# Patient Record
Sex: MALE | Race: WHITE | Employment: OTHER | URBAN - METROPOLITAN AREA
[De-identification: names, ages, dates, MRNs, and addresses within clinical notes are randomized per-mention and may not be internally consistent; named-entity substitution may affect disease eponyms.]

---

## 2017-06-19 ENCOUNTER — HOSPITAL ENCOUNTER (OUTPATIENT)
Dept: ULTRASOUND IMAGING | Age: 77
Discharge: HOME | End: 2017-06-19
Payer: MEDICARE

## 2017-06-19 DIAGNOSIS — R60.0 EDEMA OF RIGHT LOWER EXTREMITY: ICD-10-CM

## 2017-06-19 PROCEDURE — 93971 EXTREMITY STUDY: CPT

## 2022-03-14 ENCOUNTER — APPOINTMENT (OUTPATIENT)
Dept: GENERAL RADIOLOGY | Age: 82
DRG: 470 | End: 2022-03-14
Payer: MEDICARE

## 2022-03-14 ENCOUNTER — APPOINTMENT (OUTPATIENT)
Dept: CT IMAGING | Age: 82
DRG: 470 | End: 2022-03-14
Payer: MEDICARE

## 2022-03-14 ENCOUNTER — HOSPITAL ENCOUNTER (INPATIENT)
Age: 82
LOS: 4 days | Discharge: NURSING, REHAB OR LONG-TERM CARE | DRG: 470 | End: 2022-03-18
Attending: INTERNAL MEDICINE | Admitting: INTERNAL MEDICINE
Payer: MEDICARE

## 2022-03-14 DIAGNOSIS — G89.18 ACUTE POST-OPERATIVE PAIN: ICD-10-CM

## 2022-03-14 DIAGNOSIS — S72.425A CLOSED NONDISPLACED FRACTURE OF LATERAL CONDYLE OF LEFT FEMUR, INITIAL ENCOUNTER (HCC): Primary | ICD-10-CM

## 2022-03-14 PROBLEM — S72.8X2A OTHER FRACTURE OF LEFT FEMUR, INITIAL ENCOUNTER FOR CLOSED FRACTURE (HCC): Status: ACTIVE | Noted: 2022-03-14

## 2022-03-14 LAB
ABO/RH: NORMAL
ALBUMIN SERPL-MCNC: 3.7 G/DL (ref 3.5–4.6)
ALP BLD-CCNC: 61 U/L (ref 35–104)
ALT SERPL-CCNC: 18 U/L (ref 0–41)
ANION GAP SERPL CALCULATED.3IONS-SCNC: 14 MEQ/L (ref 9–15)
ANTIBODY SCREEN: NORMAL
APTT: 28.5 SEC (ref 24.4–36.8)
AST SERPL-CCNC: 35 U/L (ref 0–40)
BASOPHILS ABSOLUTE: 0 K/UL (ref 0–0.2)
BASOPHILS RELATIVE PERCENT: 0.5 %
BILIRUB SERPL-MCNC: 1.1 MG/DL (ref 0.2–0.7)
BUN BLDV-MCNC: 12 MG/DL (ref 8–23)
CALCIUM SERPL-MCNC: 9 MG/DL (ref 8.5–9.9)
CHLORIDE BLD-SCNC: 99 MEQ/L (ref 95–107)
CO2: 21 MEQ/L (ref 20–31)
CREAT SERPL-MCNC: 0.55 MG/DL (ref 0.7–1.2)
EOSINOPHILS ABSOLUTE: 0.2 K/UL (ref 0–0.7)
EOSINOPHILS RELATIVE PERCENT: 2.7 %
GFR AFRICAN AMERICAN: >60
GFR NON-AFRICAN AMERICAN: >60
GLOBULIN: 3.8 G/DL (ref 2.3–3.5)
GLUCOSE BLD-MCNC: 115 MG/DL (ref 70–99)
HCT VFR BLD CALC: 43.5 % (ref 42–52)
HEMOGLOBIN: 15 G/DL (ref 14–18)
INR BLD: 1.1
LYMPHOCYTES ABSOLUTE: 1.2 K/UL (ref 1–4.8)
LYMPHOCYTES RELATIVE PERCENT: 15.7 %
MCH RBC QN AUTO: 32.7 PG (ref 27–31.3)
MCHC RBC AUTO-ENTMCNC: 34.4 % (ref 33–37)
MCV RBC AUTO: 95.2 FL (ref 80–100)
MONOCYTES ABSOLUTE: 0.7 K/UL (ref 0.2–0.8)
MONOCYTES RELATIVE PERCENT: 9.7 %
NEUTROPHILS ABSOLUTE: 5.4 K/UL (ref 1.4–6.5)
NEUTROPHILS RELATIVE PERCENT: 71.4 %
PDW BLD-RTO: 13.6 % (ref 11.5–14.5)
PLATELET # BLD: 159 K/UL (ref 130–400)
POTASSIUM SERPL-SCNC: 4.1 MEQ/L (ref 3.4–4.9)
PROTHROMBIN TIME: 14.1 SEC (ref 12.3–14.9)
RBC # BLD: 4.57 M/UL (ref 4.7–6.1)
REASON FOR REJECTION: NORMAL
REJECTED TEST: NORMAL
SODIUM BLD-SCNC: 134 MEQ/L (ref 135–144)
TOTAL PROTEIN: 7.5 G/DL (ref 6.3–8)
WBC # BLD: 7.6 K/UL (ref 4.8–10.8)

## 2022-03-14 PROCEDURE — 86850 RBC ANTIBODY SCREEN: CPT

## 2022-03-14 PROCEDURE — 85610 PROTHROMBIN TIME: CPT

## 2022-03-14 PROCEDURE — 1210000000 HC MED SURG R&B

## 2022-03-14 PROCEDURE — 73564 X-RAY EXAM KNEE 4 OR MORE: CPT

## 2022-03-14 PROCEDURE — 86900 BLOOD TYPING SEROLOGIC ABO: CPT

## 2022-03-14 PROCEDURE — 36415 COLL VENOUS BLD VENIPUNCTURE: CPT

## 2022-03-14 PROCEDURE — 73700 CT LOWER EXTREMITY W/O DYE: CPT

## 2022-03-14 PROCEDURE — 85730 THROMBOPLASTIN TIME PARTIAL: CPT

## 2022-03-14 PROCEDURE — 2580000003 HC RX 258: Performed by: INTERNAL MEDICINE

## 2022-03-14 PROCEDURE — 86901 BLOOD TYPING SEROLOGIC RH(D): CPT

## 2022-03-14 PROCEDURE — 6360000002 HC RX W HCPCS: Performed by: INTERNAL MEDICINE

## 2022-03-14 PROCEDURE — 6360000002 HC RX W HCPCS

## 2022-03-14 PROCEDURE — 85025 COMPLETE CBC W/AUTO DIFF WBC: CPT

## 2022-03-14 PROCEDURE — 6830039000 HC L3 TRAUMA ALERT

## 2022-03-14 PROCEDURE — 99285 EMERGENCY DEPT VISIT HI MDM: CPT

## 2022-03-14 PROCEDURE — 71045 X-RAY EXAM CHEST 1 VIEW: CPT

## 2022-03-14 PROCEDURE — 80053 COMPREHEN METABOLIC PANEL: CPT

## 2022-03-14 RX ORDER — SODIUM CHLORIDE 0.9 % (FLUSH) 0.9 %
10 SYRINGE (ML) INJECTION EVERY 12 HOURS SCHEDULED
Status: DISCONTINUED | OUTPATIENT
Start: 2022-03-14 | End: 2022-03-16 | Stop reason: SDUPTHER

## 2022-03-14 RX ORDER — MORPHINE SULFATE 2 MG/ML
2 INJECTION, SOLUTION INTRAMUSCULAR; INTRAVENOUS EVERY 4 HOURS PRN
Status: DISCONTINUED | OUTPATIENT
Start: 2022-03-14 | End: 2022-03-15

## 2022-03-14 RX ORDER — ONDANSETRON 2 MG/ML
4 INJECTION INTRAMUSCULAR; INTRAVENOUS ONCE
Status: COMPLETED | OUTPATIENT
Start: 2022-03-14 | End: 2022-03-14

## 2022-03-14 RX ORDER — FENTANYL CITRATE 50 UG/ML
25 INJECTION, SOLUTION INTRAMUSCULAR; INTRAVENOUS ONCE
Status: COMPLETED | OUTPATIENT
Start: 2022-03-14 | End: 2022-03-14

## 2022-03-14 RX ORDER — SODIUM CHLORIDE 0.9 % (FLUSH) 0.9 %
10 SYRINGE (ML) INJECTION PRN
Status: DISCONTINUED | OUTPATIENT
Start: 2022-03-14 | End: 2022-03-16 | Stop reason: SDUPTHER

## 2022-03-14 RX ORDER — ONDANSETRON 2 MG/ML
4 INJECTION INTRAMUSCULAR; INTRAVENOUS EVERY 6 HOURS PRN
Status: DISCONTINUED | OUTPATIENT
Start: 2022-03-14 | End: 2022-03-18 | Stop reason: HOSPADM

## 2022-03-14 RX ORDER — ACETAMINOPHEN 650 MG/1
650 SUPPOSITORY RECTAL EVERY 6 HOURS PRN
Status: DISCONTINUED | OUTPATIENT
Start: 2022-03-14 | End: 2022-03-15

## 2022-03-14 RX ORDER — POTASSIUM CHLORIDE 7.45 MG/ML
10 INJECTION INTRAVENOUS PRN
Status: DISCONTINUED | OUTPATIENT
Start: 2022-03-14 | End: 2022-03-18 | Stop reason: HOSPADM

## 2022-03-14 RX ORDER — MAGNESIUM SULFATE IN WATER 40 MG/ML
2000 INJECTION, SOLUTION INTRAVENOUS PRN
Status: DISCONTINUED | OUTPATIENT
Start: 2022-03-14 | End: 2022-03-18 | Stop reason: HOSPADM

## 2022-03-14 RX ORDER — POLYETHYLENE GLYCOL 3350 17 G/17G
17 POWDER, FOR SOLUTION ORAL DAILY PRN
Status: DISCONTINUED | OUTPATIENT
Start: 2022-03-14 | End: 2022-03-18 | Stop reason: HOSPADM

## 2022-03-14 RX ORDER — ACETAMINOPHEN 325 MG/1
650 TABLET ORAL EVERY 6 HOURS PRN
Status: DISCONTINUED | OUTPATIENT
Start: 2022-03-14 | End: 2022-03-15

## 2022-03-14 RX ORDER — POTASSIUM CHLORIDE 20 MEQ/1
40 TABLET, EXTENDED RELEASE ORAL PRN
Status: DISCONTINUED | OUTPATIENT
Start: 2022-03-14 | End: 2022-03-18 | Stop reason: HOSPADM

## 2022-03-14 RX ORDER — SODIUM CHLORIDE 9 MG/ML
25 INJECTION, SOLUTION INTRAVENOUS PRN
Status: DISCONTINUED | OUTPATIENT
Start: 2022-03-14 | End: 2022-03-15

## 2022-03-14 RX ORDER — METOPROLOL SUCCINATE 50 MG/1
50 TABLET, EXTENDED RELEASE ORAL DAILY
Status: DISCONTINUED | OUTPATIENT
Start: 2022-03-15 | End: 2022-03-16 | Stop reason: SDUPTHER

## 2022-03-14 RX ORDER — ONDANSETRON 4 MG/1
4 TABLET, ORALLY DISINTEGRATING ORAL EVERY 8 HOURS PRN
Status: DISCONTINUED | OUTPATIENT
Start: 2022-03-14 | End: 2022-03-18 | Stop reason: HOSPADM

## 2022-03-14 RX ADMIN — Medication 10 ML: at 20:30

## 2022-03-14 RX ADMIN — ONDANSETRON 4 MG: 2 INJECTION INTRAMUSCULAR; INTRAVENOUS at 18:08

## 2022-03-14 RX ADMIN — FENTANYL CITRATE 25 MCG: 50 INJECTION INTRAMUSCULAR; INTRAVENOUS at 18:07

## 2022-03-14 RX ADMIN — ENOXAPARIN SODIUM 30 MG: 100 INJECTION SUBCUTANEOUS at 20:29

## 2022-03-14 RX ADMIN — MORPHINE SULFATE 2 MG: 2 INJECTION, SOLUTION INTRAMUSCULAR; INTRAVENOUS at 20:29

## 2022-03-14 ASSESSMENT — PAIN DESCRIPTION - LOCATION
LOCATION: KNEE

## 2022-03-14 ASSESSMENT — PAIN SCALES - GENERAL
PAINLEVEL_OUTOF10: 7
PAINLEVEL_OUTOF10: 8
PAINLEVEL_OUTOF10: 8
PAINLEVEL_OUTOF10: 10

## 2022-03-14 ASSESSMENT — ENCOUNTER SYMPTOMS
SHORTNESS OF BREATH: 0
EYE DISCHARGE: 0
CONSTIPATION: 0
ABDOMINAL PAIN: 0
RHINORRHEA: 0
COLOR CHANGE: 0
SORE THROAT: 0
ABDOMINAL DISTENTION: 0

## 2022-03-14 ASSESSMENT — PAIN DESCRIPTION - DESCRIPTORS: DESCRIPTORS: SHARP

## 2022-03-14 NOTE — H&P
Hospital Medicine  History and Physical    Patient:  Patricia Troncoso  MRN: [id number]    CHIEF COMPLAINT:    Chief Complaint   Patient presents with    Knee Pain     LEFT KNEE PAIN  STATES HE WENT TO GET IN TRUCK AND HIS LEFT KNEE FOLDED UNDER NEATH HI,M       History Obtained From:  Patient, EMR  Primary Care Physician: Genie Francis MD    HISTORY OF PRESENT ILLNESS:   The patient is a [de-identified] y.o. male with PMH of HTN who presents with left knee pain. The patient went to get into his truck when his knee gave out under him. He has leg/knee pain that shoots up him but otherwise did not lose consciousness. Denies fevers, chills, sweats, CP, SOB, diarrhea or burning micturition. He has not been very active over the last 1-2 years due to knee pain; does walk but short distances due to pain. Denies ever having shortness of breath with exertion or chest pain with exertion. Past Medical History:  No past medical history on file. Past Surgical History:  No past surgical history on file. Medications Prior to Admission:    Prior to Admission medications    Not on File       Allergies:  Aspirin    Social History:   TOBACCO:   has no history on file for tobacco use. ETOH:   has no history on file for alcohol use. Family History:   No family history on file. REVIEW OF SYSTEMS:  Ten systems reviewed and negative except for stated in HPI    Physical Exam:    Vitals: BP (!) 141/70   Pulse 81   Temp 97.9 °F (36.6 °C)   Resp 16   Ht 5' 10\" (1.778 m)   Wt 269 lb (122 kg)   SpO2 99%   BMI 38.60 kg/m²   General appearance: alert, appears stated age and cooperative  Skin:  No rashes or lesions  HEENT: Head: Normal, normocephalic, atraumatic. Jaki Maid    Neck: supple, symmetrical, trachea midline  Lungs: clear to auscultation bilaterally  Heart: regular rate and rhythm  Abdomen: soft, non-tender; bowel sounds normal; no masses,  no organomegaly  Extremities: +2 edema  Neurologic: Non focal    Recent Labs     03/14/22  1515 WBC 7.6   HGB 15.0        Recent Labs     03/14/22  1515   *   K 4.1   CL 99   CO2 21   BUN 12   CREATININE 0.55*   GLUCOSE 115*   AST 35   ALT 18   BILITOT 1.1*   ALKPHOS 61     Troponin T: No results for input(s): TROPONINI in the last 72 hours. ABGs: No results found for: PHART, PO2ART, [id number]  INR:   Recent Labs     03/14/22  1605   INR 1.1     URINALYSIS:No results for input(s): NITRITE, COLORU, PHUR, LABCAST, WBCUA, RBCUA, MUCUS, TRICHOMONAS, YEAST, BACTERIA, CLARITYU, SPECGRAV, LEUKOCYTESUR, UROBILINOGEN, BILIRUBINUR, BLOODU, GLUCOSEU, AMORPHOUS in the last 72 hours. Invalid input(s): Jon Fairbanks  -----------------------------------------------------------------   No results found. Assessment and Plan   1. Femur fracture:  Ortho consulted in ER; continue current consult; plan for OR tomorrow. Will consult cardiology given high than average cardiac risk and unable to get a history of SANCHEZ; echo also ordered  2. HTN:  Continue toprol; hold triamterene   3. Functional Status: Fall precautions. Up with assistance. PT OT  4. Diet: General  5. DVT ppx: Lovenox SCDs  6. Disposition: Dependent on hospital course. Will discharge once medically stable. GADIEL on board for discharge planning.      Patient Active Problem List   Diagnosis Code    Other fracture of left femur, initial encounter for closed fracture ([facility] [address] U01.4M0Z     Clau Zamarripa MD, MD  Admitting Hospitalist    Emergency Contact:   Contact 1: Name: Susan Rai 1: Number: [phone]  Contact 1: Relationship: Sister

## 2022-03-14 NOTE — ED PROVIDER NOTES
[facility] ED  eMERGENCY dEPARTMENT eNCOUnter      Pt Name: Paul Hicks  MRN: [id number]  [place] 1940  Date of evaluation: 3/14/2022  Provider: Mirella Mccurdy PA-C    CHIEF COMPLAINT       Chief Complaint   Patient presents with    Knee Pain     LEFT KNEE PAIN  STATES HE WENT TO GET IN TRUCK AND HIS LEFT KNEE FOLDED Tommygiselle SIMON,M         HISTORY OF PRESENT ILLNESS   (Location/Symptom, Timing/Onset,Context/Setting, Quality, Duration, Modifying Factors, Severity)  Note limiting factors. Paul Hicks is a [de-identified] y.o. male who presents to the emergency department complaint of left knee pain. Patient states he was standing try to get into his truck, he states that his knee gave out on his left side, and folded underneath him, he states he fell directly onto his knee. He states since increasing pain, and weakness while trying to stand only at the knee itself. Patient denies any other injuries, no head neck or back pain. Patient rates his current pain at this time as a 7 out of 10 with movement, 0-10 while at rest.  Patient states he has had some ongoing issues with his left knee, and had been considering a knee replacement. HPI    NursingNotes were reviewed. REVIEW OF SYSTEMS    (2-9 systems for level 4, 10 or more for level 5)     Review of Systems   Constitutional: Negative for activity change and appetite change. HENT: Negative for congestion, ear discharge, ear pain, nosebleeds, rhinorrhea and sore throat. Eyes: Negative for discharge. Respiratory: Negative for shortness of breath. Cardiovascular: Negative for chest pain, palpitations and leg swelling. Gastrointestinal: Negative for abdominal distention, abdominal pain and constipation. Genitourinary: Negative for difficulty urinating and dysuria. Musculoskeletal: Negative for arthralgias. Left knee pain   Skin: Negative for color change.    Neurological: Negative for dizziness, syncope, numbness and headaches. Psychiatric/Behavioral: Negative for agitation and confusion. Except as noted above the remainder of the review of systems was reviewed and negative. PAST MEDICAL HISTORY   No past medical history on file. SURGICALHISTORY     No past surgical history on file. CURRENT MEDICATIONS       Previous Medications    No medications on file       ALLERGIES     Aspirin    FAMILY HISTORY     No family history on file. SOCIAL HISTORY       Social History     Socioeconomic History    Marital status: Single     Spouse name: Not on file    Number of children: Not on file    Years of education: Not on file    Highest education level: Not on file   Occupational History    Not on file   Tobacco Use    Smoking status: Not on file    Smokeless tobacco: Not on file   Substance and Sexual Activity    Alcohol use: Not on file    Drug use: Not on file    Sexual activity: Not on file   Other Topics Concern    Not on file   Social History Narrative    Not on file     Social Determinants of Health     Financial Resource Strain:     Difficulty of Paying Living Expenses: Not on file   Food Insecurity:     Worried About Running Out of Food in the Last Year: Not on file    Kimberly of Food in the Last Year: Not on file   Transportation Needs:     Lack of Transportation (Medical): Not on file    Lack of Transportation (Non-Medical):  Not on file   Physical Activity:     Days of Exercise per Week: Not on file    Minutes of Exercise per Session: Not on file   Stress:     Feeling of Stress : Not on file   Social Connections:     Frequency of Communication with Friends and Family: Not on file    Frequency of Social Gatherings with Friends and Family: Not on file    Attends [employer] Services: Not on file    Active Member of Clubs or Organizations: Not on file    Attends Club or Organization Meetings: Not on file    Marital Status: Not on file   Intimate Partner Violence:     Fear of none    LABS:  Labs Reviewed   CBC WITH AUTO DIFFERENTIAL - Abnormal; Notable for the following components:       Result Value    RBC 4.57 (*)     MCH 32.7 (*)     All other components within normal limits   COMPREHENSIVE METABOLIC PANEL - Abnormal; Notable for the following components:    Sodium 134 (*)     Glucose 115 (*)     CREATININE 0.55 (*)     Total Bilirubin 1.1 (*)     Globulin 3.8 (*)     All other components within normal limits   SPECIMEN REJECTION   PROTIME-INR    Narrative:     QNS/REDRAW   APTT    Narrative:     QNS/REDRAW   TYPE AND SCREEN       All other labs were within normal range or not returned as of this dictation. EMERGENCY DEPARTMENT COURSE and DIFFERENTIAL DIAGNOSIS/MDM:   Vitals:    Vitals:    03/14/22 1432 03/14/22 1439   BP: (!) 141/70 (!) 141/70   Pulse: 88 81   Resp: 16 16   Temp: 97.9 °F (36.6 °C) 97.9 °F (36.6 °C)   SpO2: 99%    Weight: 269 lb (122 kg)    Height: 5' 10\" (1.778 m)             MDM  Number of Diagnoses or Management Options  Closed nondisplaced fracture of lateral condyle of left femur, initial encounter [facility])  Diagnosis management comments: Patient presented ED with complaint of left knee pain secondary to a fall which occurred around 1 PM today. Patient states that his leg gave out, and he landed on his left knee. With assistance he was able to get back in his vehicle, and drove to his sister's home. Upon arrival there he was unable to stand or ambulate, so EMS was contacted and patient was transported to the ED. He does have a fracture to the lateral condyle of the distal left femur. Patient states that he has had known knee problems in the past, is requesting orthopedic consult with Dr. Lindsay Dudley. I did call [facility], and advised them of patient's request, they state that Dr. Lindsay Dudley is not on-call, and does not provide this type of surgical intervention.   I did discuss this with the patient, he was willing to have surgical intervention, and follow-up by Dr. Latisha Page who is currently on-call. Patient will be admitted through [facility] hospitalist, with surgical consult to [facility]. I did speak with Dr. Leah Elizabeth the [facility] hospitalist, he will except admission this patient. CRITICAL CARE TIME   Total Critical Care time was 0 minutes, excluding separately reportableprocedures. There was a high probability of clinicallysignificant/life threatening deterioration in the patient's condition which required my urgent intervention. CONSULTS:  [facility] [place] CONSULT TO ORTHOPEDIC SURGERY    PROCEDURES:  Unless otherwise noted below, none     Procedures    FINAL IMPRESSION      1. Closed nondisplaced fracture of lateral condyle of left femur, initial encounter [facility])          DISPOSITION/PLAN   DISPOSITION Decision To Admit 03/14/2022 03:17:08 PM      PATIENT REFERRED TO:  No follow-up provider specified.     DISCHARGE MEDICATIONS:  New Prescriptions    No medications on file          (Please note that portions of this note were completed with a voice recognition program.  Efforts were made to edit the dictations but occasionally words are mis-transcribed.)    Ramona High PA-C (electronically signed)  Attending Emergency Physician         Ramona High PA-C  03/14/22 110[address] [facility] Kaity Fortune PA-C  03/14/22 7972

## 2022-03-14 NOTE — PROGRESS NOTES
Pharmacy Dose Adjustment Per Protocol    Lamont Martinez is a [de-identified] y.o. male. Recent Labs     03/14/22  1515   BUN 12   CREATININE 0.55*   Estimated Creatinine Clearance: 140 mL/min (A) (based on SCr of 0.55 mg/dL (L)). Marquis Meier     Height: 5' 10\" (177.8 cm), Weight: 269 lb (122 kg)      Current order:  Enoxaparin 40 mg SUBQ once daily      Plan:  Pharmacologic VTE prophylaxis modified based on patient weight per [facility]/P&T approved protocol     Patient Weight (kg)      50.9 and below .9 101-150.9 151-174.9 175 or greater   Estimated   CrCl  (ml/min) 30 or greater []   30 mg   SUBQ daily   [x]   40 mg   SUBQ daily []  30 mg SUBQ   BID  []  40 mg   SUBQ   BID []  60mg SUBQ BID    15-29.9 []  UFH 5000   units SUBQ BID []  30 mg   SUBQ daily [] 30 mg SUBQ   daily []  40 mg SUBQ   daily [] 60 mg SUBQ   daily    Less than 15 or dialysis []  UFH 5000   units SUBQ BID [] UFH 5000 units SUBQ TID []  UFH 7500   units   SUBQ TID       Thank you,    Oc Boyer, PharmD, BCPS, [facility]  Staff Pharmacist  3/14/2022 7:35 PM

## 2022-03-14 NOTE — CARE COORDINATION
[facility] Case Management Initial Discharge Assessment    Met with Patient and PTS SISTER SHAWNEE  to discuss discharge plan. SISTER HELPED SELECTION OF SNF     PCP: Mirta Bhatia MD                                Date of Last Visit:       [facility] Patient: No        [facility] Notified: no    If no PCP, list provided? Declined    Discharge Planning    Living Arrangements: independently at home    Who do you live with? alone    Who helps you with your care:  Melissa Kunz    If lives at home:     Do you have any barriers navigating in your home? no    Patient can perform ADL? Yes    Current Services (outpatient and in home) :  None    Dialysis: No    Is transportation available to get to your appointments? Yes    DME Equipment:  yes - HAS WALKER AND CANE     Respiratory equipment: None    Respiratory provider:  no     Pharmacy:  yes - [place] with Medication Assistance Program?  No      Patient agreeable to [facility]? N/A    Patient agreeable to SNF/Rehab? Yes, [address] SNF LIST POSSIBLY #1 [facility]    Other discharge needs identified? Other TBD ANTICIPATING SX TOMORROW    Does Patient Have a High-Risk for Readmission Diagnosis (CHF, PN, MI, COPD)? No      Initial Discharge Plan? (Note: please see concurrent daily documentation for any updates after initial note).     INITIAL PLAN #1 [facility], HAS SNF LIST  Readmission Risk              Risk of Unplanned Readmission:  0         Electronically signed by Felipe Manuel RN on 3/14/2022 at 6:00 PM

## 2022-03-15 ENCOUNTER — ANESTHESIA (OUTPATIENT)
Dept: OPERATING ROOM | Age: 82
DRG: 470 | End: 2022-03-15
Payer: MEDICARE

## 2022-03-15 ENCOUNTER — APPOINTMENT (OUTPATIENT)
Dept: GENERAL RADIOLOGY | Age: 82
DRG: 470 | End: 2022-03-15
Payer: MEDICARE

## 2022-03-15 ENCOUNTER — ANESTHESIA EVENT (OUTPATIENT)
Dept: OPERATING ROOM | Age: 82
DRG: 470 | End: 2022-03-15
Payer: MEDICARE

## 2022-03-15 VITALS — DIASTOLIC BLOOD PRESSURE: 56 MMHG | OXYGEN SATURATION: 100 % | SYSTOLIC BLOOD PRESSURE: 115 MMHG | TEMPERATURE: 97 F

## 2022-03-15 LAB
ANION GAP SERPL CALCULATED.3IONS-SCNC: 15 MEQ/L (ref 9–15)
BASOPHILS ABSOLUTE: 0 K/UL (ref 0–0.2)
BASOPHILS RELATIVE PERCENT: 0.4 %
BUN BLDV-MCNC: 13 MG/DL (ref 8–23)
CALCIUM SERPL-MCNC: 8.7 MG/DL (ref 8.5–9.9)
CHLORIDE BLD-SCNC: 98 MEQ/L (ref 95–107)
CO2: 23 MEQ/L (ref 20–31)
CREAT SERPL-MCNC: 0.64 MG/DL (ref 0.7–1.2)
EOSINOPHILS ABSOLUTE: 0.2 K/UL (ref 0–0.7)
EOSINOPHILS RELATIVE PERCENT: 3.3 %
GFR AFRICAN AMERICAN: >60
GFR NON-AFRICAN AMERICAN: >60
GLUCOSE BLD-MCNC: 138 MG/DL (ref 70–99)
HCT VFR BLD CALC: 38 % (ref 42–52)
HEMOGLOBIN: 13.4 G/DL (ref 14–18)
LV EF: 65 %
LVEF MODALITY: NORMAL
LYMPHOCYTES ABSOLUTE: 1.2 K/UL (ref 1–4.8)
LYMPHOCYTES RELATIVE PERCENT: 16 %
MCH RBC QN AUTO: 33 PG (ref 27–31.3)
MCHC RBC AUTO-ENTMCNC: 35.2 % (ref 33–37)
MCV RBC AUTO: 93.6 FL (ref 80–100)
MONOCYTES ABSOLUTE: 0.8 K/UL (ref 0.2–0.8)
MONOCYTES RELATIVE PERCENT: 10.6 %
NEUTROPHILS ABSOLUTE: 5.2 K/UL (ref 1.4–6.5)
NEUTROPHILS RELATIVE PERCENT: 69.7 %
PDW BLD-RTO: 13.3 % (ref 11.5–14.5)
PLATELET # BLD: 168 K/UL (ref 130–400)
POTASSIUM REFLEX MAGNESIUM: 4 MEQ/L (ref 3.4–4.9)
RBC # BLD: 4.06 M/UL (ref 4.7–6.1)
SODIUM BLD-SCNC: 136 MEQ/L (ref 135–144)
WBC # BLD: 7.5 K/UL (ref 4.8–10.8)

## 2022-03-15 PROCEDURE — 2580000003 HC RX 258: Performed by: ORTHOPAEDIC SURGERY

## 2022-03-15 PROCEDURE — 6360000002 HC RX W HCPCS: Performed by: ANESTHESIOLOGY

## 2022-03-15 PROCEDURE — 2580000003 HC RX 258: Performed by: ANESTHESIOLOGY

## 2022-03-15 PROCEDURE — 73560 X-RAY EXAM OF KNEE 1 OR 2: CPT

## 2022-03-15 PROCEDURE — 0SRD0J9 REPLACEMENT OF LEFT KNEE JOINT WITH SYNTHETIC SUBSTITUTE, CEMENTED, OPEN APPROACH: ICD-10-PCS | Performed by: ORTHOPAEDIC SURGERY

## 2022-03-15 PROCEDURE — 93005 ELECTROCARDIOGRAM TRACING: CPT | Performed by: ANESTHESIOLOGY

## 2022-03-15 PROCEDURE — 6360000002 HC RX W HCPCS: Performed by: ORTHOPAEDIC SURGERY

## 2022-03-15 PROCEDURE — 6360000002 HC RX W HCPCS: Performed by: NURSE ANESTHETIST, CERTIFIED REGISTERED

## 2022-03-15 PROCEDURE — 2720000010 HC SURG SUPPLY STERILE: Performed by: ORTHOPAEDIC SURGERY

## 2022-03-15 PROCEDURE — 64447 NJX AA&/STRD FEMORAL NRV IMG: CPT | Performed by: ANESTHESIOLOGY

## 2022-03-15 PROCEDURE — 6370000000 HC RX 637 (ALT 250 FOR IP): Performed by: INTERNAL MEDICINE

## 2022-03-15 PROCEDURE — 6370000000 HC RX 637 (ALT 250 FOR IP): Performed by: ORTHOPAEDIC SURGERY

## 2022-03-15 PROCEDURE — 3600000004 HC SURGERY LEVEL 4 BASE: Performed by: ORTHOPAEDIC SURGERY

## 2022-03-15 PROCEDURE — 3700000000 HC ANESTHESIA ATTENDED CARE: Performed by: ORTHOPAEDIC SURGERY

## 2022-03-15 PROCEDURE — 6360000002 HC RX W HCPCS: Performed by: INTERNAL MEDICINE

## 2022-03-15 PROCEDURE — 2580000003 HC RX 258: Performed by: INTERNAL MEDICINE

## 2022-03-15 PROCEDURE — 2500000003 HC RX 250 WO HCPCS: Performed by: ANESTHESIOLOGY

## 2022-03-15 PROCEDURE — 93306 TTE W/DOPPLER COMPLETE: CPT

## 2022-03-15 PROCEDURE — C1713 ANCHOR/SCREW BN/BN,TIS/BN: HCPCS | Performed by: ORTHOPAEDIC SURGERY

## 2022-03-15 PROCEDURE — 99221 1ST HOSP IP/OBS SF/LOW 40: CPT | Performed by: ORTHOPAEDIC SURGERY

## 2022-03-15 PROCEDURE — 85025 COMPLETE CBC W/AUTO DIFF WBC: CPT

## 2022-03-15 PROCEDURE — 80048 BASIC METABOLIC PNL TOTAL CA: CPT

## 2022-03-15 PROCEDURE — 36415 COLL VENOUS BLD VENIPUNCTURE: CPT

## 2022-03-15 PROCEDURE — 2709999900 HC NON-CHARGEABLE SUPPLY: Performed by: ORTHOPAEDIC SURGERY

## 2022-03-15 PROCEDURE — 3700000001 HC ADD 15 MINUTES (ANESTHESIA): Performed by: ORTHOPAEDIC SURGERY

## 2022-03-15 PROCEDURE — 27447 TOTAL KNEE ARTHROPLASTY: CPT | Performed by: ORTHOPAEDIC SURGERY

## 2022-03-15 PROCEDURE — 1210000000 HC MED SURG R&B

## 2022-03-15 PROCEDURE — 7100000001 HC PACU RECOVERY - ADDTL 15 MIN: Performed by: ORTHOPAEDIC SURGERY

## 2022-03-15 PROCEDURE — 2500000003 HC RX 250 WO HCPCS: Performed by: ORTHOPAEDIC SURGERY

## 2022-03-15 PROCEDURE — 7100000000 HC PACU RECOVERY - FIRST 15 MIN: Performed by: ORTHOPAEDIC SURGERY

## 2022-03-15 PROCEDURE — 99222 1ST HOSP IP/OBS MODERATE 55: CPT | Performed by: INTERNAL MEDICINE

## 2022-03-15 PROCEDURE — C1776 JOINT DEVICE (IMPLANTABLE): HCPCS | Performed by: ORTHOPAEDIC SURGERY

## 2022-03-15 PROCEDURE — 3600000014 HC SURGERY LEVEL 4 ADDTL 15MIN: Performed by: ORTHOPAEDIC SURGERY

## 2022-03-15 DEVICE — IMPLANTABLE DEVICE: Type: IMPLANTABLE DEVICE | Site: KNEE | Status: FUNCTIONAL

## 2022-03-15 DEVICE — Z DISCONTINUED USE 2522798 CEMENT BNE ANTIBIO HI VISC W/ GENTAMICIN SIMPLEX HV: Type: IMPLANTABLE DEVICE | Site: KNEE | Status: FUNCTIONAL

## 2022-03-15 DEVICE — SLEEVE TIB STD UNIV POLYETH MOD CEM STEM ROT HNG REV: Type: IMPLANTABLE DEVICE | Site: KNEE | Status: FUNCTIONAL

## 2022-03-15 DEVICE — CEMENT BNE 20ML 40GM ACRYL RESIN HI VISC RADPQ FAST SET: Type: IMPLANTABLE DEVICE | Site: KNEE | Status: FUNCTIONAL

## 2022-03-15 DEVICE — BUSHING FEM UNIV KNEE PLOY MOD CEM STEM ROT HNG REV: Type: IMPLANTABLE DEVICE | Site: KNEE | Status: FUNCTIONAL

## 2022-03-15 DEVICE — IMPLANT PATELLAR DIA32MM THK10MM X3 ASYMMETRIC TRIATHLON: Type: IMPLANTABLE DEVICE | Site: KNEE | Status: FUNCTIONAL

## 2022-03-15 DEVICE — INSERT TIB 3DEG DST KNEE POLY BUMPER MOD ROT HNG GMRS: Type: IMPLANTABLE DEVICE | Site: KNEE | Status: FUNCTIONAL

## 2022-03-15 DEVICE — AXLE TIB PROX KNEE MOD ROT HNG MONOGRAM: Type: IMPLANTABLE DEVICE | Site: KNEE | Status: FUNCTIONAL

## 2022-03-15 DEVICE — COMPONENT TIB XSM-XL KNEE POLYETH MOD CEM STEM ROT HNG REV: Type: IMPLANTABLE DEVICE | Site: KNEE | Status: FUNCTIONAL

## 2022-03-15 RX ORDER — METOPROLOL SUCCINATE 50 MG/1
50 TABLET, EXTENDED RELEASE ORAL DAILY
Status: DISCONTINUED | OUTPATIENT
Start: 2022-03-16 | End: 2022-03-18 | Stop reason: HOSPADM

## 2022-03-15 RX ORDER — SODIUM CHLORIDE 9 MG/ML
25 INJECTION, SOLUTION INTRAVENOUS PRN
Status: DISCONTINUED | OUTPATIENT
Start: 2022-03-15 | End: 2022-03-15 | Stop reason: HOSPADM

## 2022-03-15 RX ORDER — SODIUM CHLORIDE 0.9 % (FLUSH) 0.9 %
10 SYRINGE (ML) INJECTION PRN
Status: DISCONTINUED | OUTPATIENT
Start: 2022-03-15 | End: 2022-03-18 | Stop reason: HOSPADM

## 2022-03-15 RX ORDER — HYDRALAZINE HYDROCHLORIDE 20 MG/ML
10 INJECTION INTRAMUSCULAR; INTRAVENOUS
Status: DISCONTINUED | OUTPATIENT
Start: 2022-03-15 | End: 2022-03-15 | Stop reason: HOSPADM

## 2022-03-15 RX ORDER — SODIUM CHLORIDE 0.9 % (FLUSH) 0.9 %
5-40 SYRINGE (ML) INJECTION PRN
Status: DISCONTINUED | OUTPATIENT
Start: 2022-03-15 | End: 2022-03-15 | Stop reason: HOSPADM

## 2022-03-15 RX ORDER — SODIUM CHLORIDE, SODIUM LACTATE, POTASSIUM CHLORIDE, CALCIUM CHLORIDE 600; 310; 30; 20 MG/100ML; MG/100ML; MG/100ML; MG/100ML
INJECTION, SOLUTION INTRAVENOUS CONTINUOUS
Status: DISPENSED | OUTPATIENT
Start: 2022-03-15 | End: 2022-03-16

## 2022-03-15 RX ORDER — OXYCODONE HYDROCHLORIDE 5 MG/1
5 TABLET ORAL
Status: DISCONTINUED | OUTPATIENT
Start: 2022-03-15 | End: 2022-03-15 | Stop reason: HOSPADM

## 2022-03-15 RX ORDER — ROCURONIUM BROMIDE 10 MG/ML
INJECTION, SOLUTION INTRAVENOUS PRN
Status: DISCONTINUED | OUTPATIENT
Start: 2022-03-15 | End: 2022-03-15 | Stop reason: SDUPTHER

## 2022-03-15 RX ORDER — FUROSEMIDE 20 MG/1
20 TABLET ORAL DAILY
COMMUNITY

## 2022-03-15 RX ORDER — OXYCODONE HYDROCHLORIDE 5 MG/1
5 TABLET ORAL EVERY 4 HOURS PRN
Status: DISCONTINUED | OUTPATIENT
Start: 2022-03-15 | End: 2022-03-17

## 2022-03-15 RX ORDER — FUROSEMIDE 20 MG/1
20 TABLET ORAL DAILY
Status: DISCONTINUED | OUTPATIENT
Start: 2022-03-16 | End: 2022-03-18 | Stop reason: HOSPADM

## 2022-03-15 RX ORDER — SODIUM CHLORIDE 9 MG/ML
25 INJECTION, SOLUTION INTRAVENOUS PRN
Status: DISCONTINUED | OUTPATIENT
Start: 2022-03-15 | End: 2022-03-18 | Stop reason: HOSPADM

## 2022-03-15 RX ORDER — FENTANYL CITRATE 50 UG/ML
INJECTION, SOLUTION INTRAMUSCULAR; INTRAVENOUS PRN
Status: DISCONTINUED | OUTPATIENT
Start: 2022-03-15 | End: 2022-03-15 | Stop reason: SDUPTHER

## 2022-03-15 RX ORDER — TRIAMTERENE AND HYDROCHLOROTHIAZIDE 37.5; 25 MG/1; MG/1
0.5 TABLET ORAL DAILY
COMMUNITY

## 2022-03-15 RX ORDER — SODIUM CHLORIDE 0.9 % (FLUSH) 0.9 %
10 SYRINGE (ML) INJECTION EVERY 12 HOURS SCHEDULED
Status: DISCONTINUED | OUTPATIENT
Start: 2022-03-15 | End: 2022-03-18 | Stop reason: HOSPADM

## 2022-03-15 RX ORDER — ONDANSETRON 2 MG/ML
4 INJECTION INTRAMUSCULAR; INTRAVENOUS
Status: DISCONTINUED | OUTPATIENT
Start: 2022-03-15 | End: 2022-03-15 | Stop reason: HOSPADM

## 2022-03-15 RX ORDER — ACETAMINOPHEN 325 MG/1
650 TABLET ORAL EVERY 6 HOURS
Status: DISCONTINUED | OUTPATIENT
Start: 2022-03-15 | End: 2022-03-18 | Stop reason: HOSPADM

## 2022-03-15 RX ORDER — TRIAMTERENE AND HYDROCHLOROTHIAZIDE 37.5; 25 MG/1; MG/1
0.5 TABLET ORAL DAILY
Status: DISCONTINUED | OUTPATIENT
Start: 2022-03-16 | End: 2022-03-18 | Stop reason: HOSPADM

## 2022-03-15 RX ORDER — OXYCODONE HYDROCHLORIDE 5 MG/1
10 TABLET ORAL EVERY 4 HOURS PRN
Status: DISCONTINUED | OUTPATIENT
Start: 2022-03-15 | End: 2022-03-17

## 2022-03-15 RX ORDER — PROPOFOL 10 MG/ML
INJECTION, EMULSION INTRAVENOUS PRN
Status: DISCONTINUED | OUTPATIENT
Start: 2022-03-15 | End: 2022-03-15 | Stop reason: SDUPTHER

## 2022-03-15 RX ORDER — FENTANYL CITRATE 50 UG/ML
25 INJECTION, SOLUTION INTRAMUSCULAR; INTRAVENOUS EVERY 5 MIN PRN
Status: DISCONTINUED | OUTPATIENT
Start: 2022-03-15 | End: 2022-03-15 | Stop reason: HOSPADM

## 2022-03-15 RX ORDER — ONDANSETRON 2 MG/ML
INJECTION INTRAMUSCULAR; INTRAVENOUS PRN
Status: DISCONTINUED | OUTPATIENT
Start: 2022-03-15 | End: 2022-03-15 | Stop reason: SDUPTHER

## 2022-03-15 RX ORDER — MAGNESIUM HYDROXIDE 1200 MG/15ML
LIQUID ORAL CONTINUOUS PRN
Status: COMPLETED | OUTPATIENT
Start: 2022-03-15 | End: 2022-03-15

## 2022-03-15 RX ORDER — LABETALOL HYDROCHLORIDE 5 MG/ML
10 INJECTION, SOLUTION INTRAVENOUS
Status: DISCONTINUED | OUTPATIENT
Start: 2022-03-15 | End: 2022-03-15 | Stop reason: HOSPADM

## 2022-03-15 RX ORDER — METOPROLOL SUCCINATE 50 MG/1
50 TABLET, EXTENDED RELEASE ORAL DAILY
COMMUNITY

## 2022-03-15 RX ORDER — BUPIVACAINE HYDROCHLORIDE 2.5 MG/ML
INJECTION, SOLUTION EPIDURAL; INFILTRATION; INTRACAUDAL PRN
Status: DISCONTINUED | OUTPATIENT
Start: 2022-03-15 | End: 2022-03-15 | Stop reason: SDUPTHER

## 2022-03-15 RX ORDER — SODIUM CHLORIDE 0.9 % (FLUSH) 0.9 %
5-40 SYRINGE (ML) INJECTION EVERY 12 HOURS SCHEDULED
Status: DISCONTINUED | OUTPATIENT
Start: 2022-03-15 | End: 2022-03-15 | Stop reason: HOSPADM

## 2022-03-15 RX ORDER — VANCOMYCIN HYDROCHLORIDE 1 G/20ML
INJECTION, POWDER, LYOPHILIZED, FOR SOLUTION INTRAVENOUS PRN
Status: DISCONTINUED | OUTPATIENT
Start: 2022-03-15 | End: 2022-03-15 | Stop reason: ALTCHOICE

## 2022-03-15 RX ORDER — METOCLOPRAMIDE HYDROCHLORIDE 5 MG/ML
10 INJECTION INTRAMUSCULAR; INTRAVENOUS
Status: DISCONTINUED | OUTPATIENT
Start: 2022-03-15 | End: 2022-03-15 | Stop reason: HOSPADM

## 2022-03-15 RX ORDER — SODIUM CHLORIDE, SODIUM LACTATE, POTASSIUM CHLORIDE, CALCIUM CHLORIDE 600; 310; 30; 20 MG/100ML; MG/100ML; MG/100ML; MG/100ML
INJECTION, SOLUTION INTRAVENOUS CONTINUOUS PRN
Status: DISCONTINUED | OUTPATIENT
Start: 2022-03-15 | End: 2022-03-15 | Stop reason: SDUPTHER

## 2022-03-15 RX ADMIN — MORPHINE SULFATE 2 MG: 2 INJECTION, SOLUTION INTRAMUSCULAR; INTRAVENOUS at 08:43

## 2022-03-15 RX ADMIN — PHENYLEPHRINE HYDROCHLORIDE 100 MCG: 10 INJECTION INTRAVENOUS at 16:36

## 2022-03-15 RX ADMIN — Medication 3000 MG: at 14:04

## 2022-03-15 RX ADMIN — Medication 650 MG: at 22:16

## 2022-03-15 RX ADMIN — ROCURONIUM BROMIDE 50 MG: 10 INJECTION INTRAVENOUS at 14:38

## 2022-03-15 RX ADMIN — ONDANSETRON 4 MG: 2 INJECTION INTRAMUSCULAR; INTRAVENOUS at 17:37

## 2022-03-15 RX ADMIN — BUPIVACAINE HYDROCHLORIDE 30 ML: 2.5 INJECTION, SOLUTION EPIDURAL; INFILTRATION; INTRACAUDAL; PERINEURAL at 13:17

## 2022-03-15 RX ADMIN — SODIUM CHLORIDE, POTASSIUM CHLORIDE, SODIUM LACTATE AND CALCIUM CHLORIDE: 600; 310; 30; 20 INJECTION, SOLUTION INTRAVENOUS at 16:32

## 2022-03-15 RX ADMIN — ROCURONIUM BROMIDE 50 MG: 10 INJECTION INTRAVENOUS at 15:26

## 2022-03-15 RX ADMIN — ROCURONIUM BROMIDE 30 MG: 10 INJECTION INTRAVENOUS at 15:59

## 2022-03-15 RX ADMIN — MORPHINE SULFATE 2 MG: 2 INJECTION, SOLUTION INTRAMUSCULAR; INTRAVENOUS at 00:14

## 2022-03-15 RX ADMIN — PHENYLEPHRINE HYDROCHLORIDE 100 MCG: 10 INJECTION INTRAVENOUS at 17:01

## 2022-03-15 RX ADMIN — ROCURONIUM BROMIDE 50 MG: 10 INJECTION INTRAVENOUS at 13:55

## 2022-03-15 RX ADMIN — ROCURONIUM BROMIDE 20 MG: 10 INJECTION INTRAVENOUS at 16:25

## 2022-03-15 RX ADMIN — PHENYLEPHRINE HYDROCHLORIDE 100 MCG: 10 INJECTION INTRAVENOUS at 16:53

## 2022-03-15 RX ADMIN — SODIUM CHLORIDE, POTASSIUM CHLORIDE, SODIUM LACTATE AND CALCIUM CHLORIDE: 600; 310; 30; 20 INJECTION, SOLUTION INTRAVENOUS at 22:33

## 2022-03-15 RX ADMIN — PROPOFOL 200 MG: 10 INJECTION, EMULSION INTRAVENOUS at 13:55

## 2022-03-15 RX ADMIN — FENTANYL CITRATE 25 MCG: 50 INJECTION, SOLUTION INTRAMUSCULAR; INTRAVENOUS at 14:22

## 2022-03-15 RX ADMIN — Medication 2000 MG: at 17:38

## 2022-03-15 RX ADMIN — METOPROLOL SUCCINATE 50 MG: 50 TABLET, EXTENDED RELEASE ORAL at 08:17

## 2022-03-15 RX ADMIN — FENTANYL CITRATE 25 MCG: 50 INJECTION, SOLUTION INTRAMUSCULAR; INTRAVENOUS at 15:59

## 2022-03-15 RX ADMIN — SODIUM CHLORIDE, PRESERVATIVE FREE 10 ML: 5 INJECTION INTRAVENOUS at 22:16

## 2022-03-15 RX ADMIN — FENTANYL CITRATE 25 MCG: 50 INJECTION, SOLUTION INTRAMUSCULAR; INTRAVENOUS at 15:30

## 2022-03-15 RX ADMIN — SUGAMMADEX 200 MG: 100 INJECTION, SOLUTION INTRAVENOUS at 17:53

## 2022-03-15 RX ADMIN — Medication 10 ML: at 22:16

## 2022-03-15 RX ADMIN — PHENYLEPHRINE HYDROCHLORIDE 100 MCG: 10 INJECTION INTRAVENOUS at 14:37

## 2022-03-15 RX ADMIN — SODIUM CHLORIDE, POTASSIUM CHLORIDE, SODIUM LACTATE AND CALCIUM CHLORIDE: 600; 310; 30; 20 INJECTION, SOLUTION INTRAVENOUS at 13:49

## 2022-03-15 RX ADMIN — MORPHINE SULFATE 2 MG: 2 INJECTION, SOLUTION INTRAMUSCULAR; INTRAVENOUS at 04:39

## 2022-03-15 RX ADMIN — FENTANYL CITRATE 25 MCG: 50 INJECTION, SOLUTION INTRAMUSCULAR; INTRAVENOUS at 16:25

## 2022-03-15 RX ADMIN — CEFAZOLIN 2000 MG: 10 INJECTION, POWDER, FOR SOLUTION INTRAVENOUS at 22:32

## 2022-03-15 RX ADMIN — Medication 650 MG: at 00:14

## 2022-03-15 RX ADMIN — Medication 10 ML: at 08:17

## 2022-03-15 ASSESSMENT — PULMONARY FUNCTION TESTS
PIF_VALUE: 21
PIF_VALUE: 20
PIF_VALUE: 19
PIF_VALUE: 22
PIF_VALUE: 20
PIF_VALUE: 20
PIF_VALUE: 19
PIF_VALUE: 20
PIF_VALUE: 21
PIF_VALUE: 20
PIF_VALUE: 20
PIF_VALUE: 22
PIF_VALUE: 20
PIF_VALUE: 20
PIF_VALUE: 17
PIF_VALUE: 20
PIF_VALUE: 18
PIF_VALUE: 17
PIF_VALUE: 22
PIF_VALUE: 21
PIF_VALUE: 21
PIF_VALUE: 20
PIF_VALUE: 21
PIF_VALUE: 1
PIF_VALUE: 21
PIF_VALUE: 19
PIF_VALUE: 20
PIF_VALUE: 17
PIF_VALUE: 1
PIF_VALUE: 19
PIF_VALUE: 20
PIF_VALUE: 23
PIF_VALUE: 20
PIF_VALUE: 19
PIF_VALUE: 22
PIF_VALUE: 20
PIF_VALUE: 20
PIF_VALUE: 21
PIF_VALUE: 18
PIF_VALUE: 20
PIF_VALUE: 20
PIF_VALUE: 1
PIF_VALUE: 20
PIF_VALUE: 19
PIF_VALUE: 21
PIF_VALUE: 20
PIF_VALUE: 20
PIF_VALUE: 19
PIF_VALUE: 20
PIF_VALUE: 18
PIF_VALUE: 17
PIF_VALUE: 19
PIF_VALUE: 17
PIF_VALUE: 19
PIF_VALUE: 20
PIF_VALUE: 20
PIF_VALUE: 19
PIF_VALUE: 19
PIF_VALUE: 21
PIF_VALUE: 19
PIF_VALUE: 21
PIF_VALUE: 20
PIF_VALUE: 19
PIF_VALUE: 9
PIF_VALUE: 20
PIF_VALUE: 20
PIF_VALUE: 21
PIF_VALUE: 19
PIF_VALUE: 21
PIF_VALUE: 19
PIF_VALUE: 22
PIF_VALUE: 19
PIF_VALUE: 18
PIF_VALUE: 0
PIF_VALUE: 18
PIF_VALUE: 0
PIF_VALUE: 21
PIF_VALUE: 20
PIF_VALUE: 21
PIF_VALUE: 20
PIF_VALUE: 19
PIF_VALUE: 19
PIF_VALUE: 20
PIF_VALUE: 19
PIF_VALUE: 20
PIF_VALUE: 21
PIF_VALUE: 19
PIF_VALUE: 18
PIF_VALUE: 19
PIF_VALUE: 20
PIF_VALUE: 19
PIF_VALUE: 20
PIF_VALUE: 18
PIF_VALUE: 20
PIF_VALUE: 22
PIF_VALUE: 20
PIF_VALUE: 18
PIF_VALUE: 20
PIF_VALUE: 21
PIF_VALUE: 17
PIF_VALUE: 20
PIF_VALUE: 21
PIF_VALUE: 19
PIF_VALUE: 20
PIF_VALUE: 19
PIF_VALUE: 20
PIF_VALUE: 19
PIF_VALUE: 19
PIF_VALUE: 14
PIF_VALUE: 21
PIF_VALUE: 2
PIF_VALUE: 17
PIF_VALUE: 20
PIF_VALUE: 24
PIF_VALUE: 20
PIF_VALUE: 19
PIF_VALUE: 20
PIF_VALUE: 21
PIF_VALUE: 20
PIF_VALUE: 22
PIF_VALUE: 20
PIF_VALUE: 23
PIF_VALUE: 22
PIF_VALUE: 21
PIF_VALUE: 20
PIF_VALUE: 20
PIF_VALUE: 3
PIF_VALUE: 19
PIF_VALUE: 19
PIF_VALUE: 18
PIF_VALUE: 22
PIF_VALUE: 20
PIF_VALUE: 20
PIF_VALUE: 18
PIF_VALUE: 22
PIF_VALUE: 20
PIF_VALUE: 19
PIF_VALUE: 19
PIF_VALUE: 20
PIF_VALUE: 20
PIF_VALUE: 19
PIF_VALUE: 20
PIF_VALUE: 21
PIF_VALUE: 18
PIF_VALUE: 20
PIF_VALUE: 18
PIF_VALUE: 19
PIF_VALUE: 21
PIF_VALUE: 20
PIF_VALUE: 20
PIF_VALUE: 18
PIF_VALUE: 20
PIF_VALUE: 19
PIF_VALUE: 20
PIF_VALUE: 24
PIF_VALUE: 20
PIF_VALUE: 21
PIF_VALUE: 20
PIF_VALUE: 21
PIF_VALUE: 23
PIF_VALUE: 19
PIF_VALUE: 21
PIF_VALUE: 20
PIF_VALUE: 18
PIF_VALUE: 22
PIF_VALUE: 20
PIF_VALUE: 19
PIF_VALUE: 21
PIF_VALUE: 20
PIF_VALUE: 18
PIF_VALUE: 18
PIF_VALUE: 20
PIF_VALUE: 19
PIF_VALUE: 20
PIF_VALUE: 21
PIF_VALUE: 20
PIF_VALUE: 19
PIF_VALUE: 19
PIF_VALUE: 20
PIF_VALUE: 19
PIF_VALUE: 20
PIF_VALUE: 18
PIF_VALUE: 17
PIF_VALUE: 21
PIF_VALUE: 20
PIF_VALUE: 19
PIF_VALUE: 20
PIF_VALUE: 20
PIF_VALUE: 19
PIF_VALUE: 18
PIF_VALUE: 20
PIF_VALUE: 20
PIF_VALUE: 21
PIF_VALUE: 1
PIF_VALUE: 20
PIF_VALUE: 1
PIF_VALUE: 21
PIF_VALUE: 19
PIF_VALUE: 19
PIF_VALUE: 18
PIF_VALUE: 20
PIF_VALUE: 19
PIF_VALUE: 19
PIF_VALUE: 20
PIF_VALUE: 21
PIF_VALUE: 18
PIF_VALUE: 19
PIF_VALUE: 20
PIF_VALUE: 21
PIF_VALUE: 36
PIF_VALUE: 21
PIF_VALUE: 19
PIF_VALUE: 20
PIF_VALUE: 20
PIF_VALUE: 18
PIF_VALUE: 23
PIF_VALUE: 19
PIF_VALUE: 19

## 2022-03-15 ASSESSMENT — PAIN DESCRIPTION - ORIENTATION
ORIENTATION: LEFT

## 2022-03-15 ASSESSMENT — PAIN SCALES - GENERAL
PAINLEVEL_OUTOF10: 0
PAINLEVEL_OUTOF10: 5
PAINLEVEL_OUTOF10: 7
PAINLEVEL_OUTOF10: 7
PAINLEVEL_OUTOF10: 6
PAINLEVEL_OUTOF10: 7
PAINLEVEL_OUTOF10: 9
PAINLEVEL_OUTOF10: 7
PAINLEVEL_OUTOF10: 4

## 2022-03-15 ASSESSMENT — PAIN DESCRIPTION - LOCATION
LOCATION: LEG

## 2022-03-15 ASSESSMENT — ENCOUNTER SYMPTOMS
STRIDOR: 0
EYES NEGATIVE: 1
BLOOD IN STOOL: 0
NAUSEA: 0
GASTROINTESTINAL NEGATIVE: 1
SHORTNESS OF BREATH: 0
RESPIRATORY NEGATIVE: 1
COUGH: 0
WHEEZING: 0
CHEST TIGHTNESS: 0

## 2022-03-15 ASSESSMENT — PAIN DESCRIPTION - PAIN TYPE
TYPE: ACUTE PAIN

## 2022-03-15 NOTE — DISCHARGE INSTR - COC
Continuity of Care Form    Patient Name: Dudley Rodriguez   :  1940  MRN:  [id number]    Admit date:  3/14/2022  Discharge date:  3/18/2022    Code Status Order: Full Code   Advance Directives:      Admitting Physician:  Moe Hamilton MD  PCP: Jen Harris MD    Discharging Nurse: [place] [address] Unit/Room#: [address] Unit Phone Number: [phone]    Emergency Contact:   Extended Emergency Contact Information  Primary Emergency Contact: [address] Phone: [phone]  Work Phone: [phone]  Mobile Phone: [phone]  Relation: Brother/Sister    Past Surgical History:  No past surgical history on file. Immunization History: There is no immunization history on file for this patient. Active Problems:  Patient Active Problem List   Diagnosis Code    Other fracture of left femur, [facility] S72.8X2A       Isolation/Infection:   Isolation            No Isolation          Patient Infection Status       None to display            Nurse Assessment:  Last Vital Signs: BP (!) 150/65   Pulse 99   Temp 99 °F (37.2 °C) (Temporal)   Resp 18   Ht 5' 10\" (1.778 m)   Wt 260 lb 5.8 oz (118.1 kg)   SpO2 92%   BMI 37.36 kg/m²     Last documented pain score (0-10 scale): Pain Level: 7  Last Weight:   Wt Readings from Last 1 Encounters:   03/15/22 260 lb 5.8 oz (118.1 kg)     Mental Status:  oriented, alert, and logical    IV Access:  - None    Nursing Mobility/ADLs:  Walking   Dependent  Transfer  Dependent  Bathing  Assisted  Dressing  Assisted  Toileting  Assisted  Feeding  Independent  Med Admin  Assisted  Med Delivery   whole    Wound Care Documentation and Therapy:        Elimination:  Continence:    Bowel: {YES   Bladder: {YES   Urinary Catheter: None   Colostomy/Ileostomy/Ileal Conduit: {YES / GRAY:73525}       Date of Last BM: 3/18/2022  No intake or output data in the 24 hours ending 03/15/22 1254  No intake/output data recorded. Safety Concerns:     History of Falls (last 30 days) and At Risk for Falls    Impairments/Disabilities:      None    Nutrition Therapy:  Current Nutrition Therapy:   - Oral Diet:  [address] SANDRA Oral RODC:[id number]}    Routes of Feeding: Oral  Liquids: {Slp liquid thickness:49925}  Daily Fluid Restriction: no  Last Modified Barium Swallow with Video (Video Swallowing Test): not done    Treatments at the Time of Hospital Discharge:   Respiratory Treatments: ***  Oxygen Therapy:  is not on home oxygen therapy. Ventilator:    {MH CC Vent FHTD:227261579}    Rehab Therapies: {THERAPEUTIC INTERVENTION:8672847799}  Weight Bearing Status/Restrictions: No weight bearing restrictions: as tolerated  Other Medical Equipment (for information only, NOT a DME order):  {EQUIPMENT:880788991}  Other Treatments: ***    Patient's personal belongings (please select all that are sent with patient):  Dentures {DESC; UPPER/LOWER/BOTH:68968}    RN SIGNATURE:  Electronically signed by Bj Plasencia RN on 9/48/30 at [address] PM EDT    CASE MANAGEMENT/SOCIAL WORK SECTION    Inpatient Status Date: 3/14/22    Readmission Risk Assessment Score:  Readmission Risk              Risk of Unplanned Readmission:  9           Discharging to Facility/ Agency   Name: Jayla Jax  Address:  [place] [address]  Fax:    Dialysis Facility (if applicable)   Name:  Address:  Dialysis Schedule:  Phone:  Fax:    / signature: Electronically signed by RAZA Matta on 3/15/22 at 12:54 PM EDT    PHYSICIAN SECTION    Prognosis: Good    Condition at Discharge: Stable    Rehab Potential (if transferring to Rehab):     Recommended Labs or Other Treatments After Discharge:   WBAT to operative extremity   Use knee immobilizer until patient is able to straight leg raise.    Send immobilizer with patient at discharge to be used PRN to prevent bucking of the knee  Lovenox for dvt prophylaxis  Follow up with orthopedic surgeon in two weeks   Follow up with cardiology in 2 weeks    Physician Certification: I certify the above information and transfer of Lamont Martinez  is necessary for the continuing treatment of the diagnosis listed and that he requires [facility] for less 30 days.      Update Admission H&P: No change in H&P    PHYSICIAN SIGNATURE:  Electronically signed by Bere Camarena MD on 3/18/22 at 1:08 PM EDT

## 2022-03-15 NOTE — CONSULTS
Consults    Patient Name: Mckenna Gibson  Admit Date: 3/14/2022  2:31 PM  MR #: [id number]  : 1940    Attending Physician: Clau Zamarripa MD  Reason for consult: Preop    History of Presenting Illness:      Mckenna Gibson is a [de-identified] y.o. male on hospital day 1 with a history of . History Obtained From:  patient, electronic medical record    Pt has had bad knees for a long time and has ignored it. He was told to have surgery in the past.  His leg gave out while walking to his trucj=k and suffered left Femur Fx. He lives independently and no CP nor SOB. His limit to his activity are his knees. He is able to shop and otherwise active    ECG Sr  He has no prior CAD   History:      EKG: SR  No past medical history on file. No past surgical history on file. Family History  No family history on file. [] Unable to obtain due to ventilated and/ or neurologic status    Social History     Socioeconomic History    Marital status: Single     Spouse name: Not on file    Number of children: Not on file    Years of education: Not on file    Highest education level: Not on file   Occupational History    Not on file   Tobacco Use    Smoking status: Former Smoker     Years: 40.00     Types: Cigarettes     Quit date: 18     Years since quittin.2    Smokeless tobacco: Former User    Tobacco comment: 40 years ago per patient   Vaping Use    Vaping Use: Never used   Substance and Sexual Activity    Alcohol use: Never    Drug use: Never    Sexual activity: Not Currently     Partners: Male   Other Topics Concern    Not on file   Social History Narrative    Not on file     Social Determinants of Health     Financial Resource Strain:     Difficulty of Paying Living Expenses: Not on file   Food Insecurity:     Worried About [address] in the Last Year: Not on file    [address] in the Last Year: Not on file   Transportation Needs:     Lack of Transportation (Medical):  Not on file    Lack of Transportation (Non-Medical): Not on file   Physical Activity:     Days of Exercise per Week: Not on file    Minutes of Exercise per Session: Not on file   Stress:     Feeling of Stress : Not on file   Social Connections:     Frequency of Communication with Friends and Family: Not on file    Frequency of Social Gatherings with Friends and Family: Not on file    Attends Restorationism Services: Not on file    Active Member of [address] [place] [address] or Organizations: Not on file    Attends Club or Organization Meetings: Not on file    Marital Status: Not on file   Intimate Partner Violence:     Fear of Current or Ex-Partner: Not on file    Emotionally Abused: Not on file    Physically Abused: Not on file    Sexually Abused: Not on file   Housing Stability:     Unable to Pay for Housing in the Last Year: Not on file    Number of [place] in the Last Year: Not on file    Unstable Housing in the Last Year: Not on file      [] Unable to obtain due to ventilated and/ or neurologic status      Home Medications:      Medications Prior to Admission: furosemide (LASIX) 20 MG tablet, Take 20 mg by mouth daily  triamterene-hydroCHLOROthiazide (MAXZIDE-25) 37.5-25 MG per tablet, Take 0.5 tablets by mouth daily  metoprolol succinate (TOPROL XL) 50 MG extended release tablet, Take 50 mg by mouth daily    Current Hospital Medications:     Scheduled Meds:   ceFAZolin  3,000 mg IntraVENous Once    metoprolol succinate  50 mg Oral Daily    sodium chloride flush  10 mL IntraVENous 2 times per day    [Held by provider] enoxaparin  30 mg SubCUTAneous BID     Continuous Infusions:   sodium chloride       PRN Meds:.sodium chloride flush, sodium chloride, ondansetron **OR** ondansetron, polyethylene glycol, acetaminophen **OR** acetaminophen, potassium chloride **OR** potassium alternative oral replacement **OR** potassium chloride, magnesium sulfate, morphine  .  sodium chloride          Allergies:      Allergies   Allergen Reactions  Aspirin         Review of Systems:       Review of Systems   Constitutional: Negative. Negative for diaphoresis and fatigue. HENT: Negative. Eyes: Negative. Respiratory: Negative. Negative for cough, chest tightness, shortness of breath, wheezing and stridor. Cardiovascular: Negative. Negative for chest pain, palpitations and leg swelling. Gastrointestinal: Negative. Negative for blood in stool and nausea. Genitourinary: Negative. Musculoskeletal: Positive for arthralgias and gait problem. Skin: Negative. Neurological: Negative for dizziness, syncope, weakness and light-headedness. Hematological: Negative. Psychiatric/Behavioral: Negative. Objective Findings:     Vitals:BP (!) 124/56   Pulse 87   Temp 97.9 °F (36.6 °C)   Resp 18   Ht 5' 10\" (1.778 m)   Wt 260 lb 5.8 oz (118.1 kg)   SpO2 92%   BMI 37.36 kg/m²      Physical Examination:    Physical Exam   Constitutional: He appears healthy. No distress. HENT:   Normal cephalic and Atraumatic   Eyes: Pupils are equal, round, and reactive to light. Neck: Thyroid normal. No JVD present. No neck adenopathy. No thyromegaly present. Cardiovascular: Normal rate, regular rhythm, normal heart sounds, intact distal pulses and normal pulses. Pulmonary/Chest: Effort normal and breath sounds normal. He has no wheezes. He has no rales. He exhibits no tenderness. Abdominal: Soft. Bowel sounds are normal. There is no abdominal tenderness. Musculoskeletal:         General: No tenderness or edema. Normal range of motion. Cervical back: Normal range of motion and neck supple. Neurological: He is alert and oriented to person, place, and time. Skin: Skin is warm. No cyanosis. Nails show no clubbing.          Results/ Medications reviewed 3/15/2022, 11:02 AM     Laboratory, Microbiology, Pathology, Radiology, Cardiology, Medications and Transcriptions reviewed  Scheduled Meds:   ceFAZolin  3,000 mg IntraVENous Once  metoprolol succinate  50 mg Oral Daily    sodium chloride flush  10 mL IntraVENous 2 times per day    [Held by provider] enoxaparin  30 mg SubCUTAneous BID     Continuous Infusions:   sodium chloride         Recent Labs     03/14/22  1515 03/15/22  0551   WBC 7.6 7.5   HGB 15.0 13.4*   HCT 43.5 38.0*   MCV 95.2 93.6    168     Recent Labs     03/14/22  1515 03/15/22  0551   * 136   K 4.1 4.0   CL 99 98   CO2 21 23   BUN 12 13   CREATININE 0.55* 0.64*     Recent Labs     03/14/22  1515   AST 35   ALT 18   BILITOT 1.1*   ALKPHOS 61     No results for input(s): LIPASE, AMYLASE in the last 72 hours. Recent Labs     03/14/22  1515 03/14/22  1605   PROT 7.5  --    INR  --  1.1     XR KNEE LEFT (MIN 4 VIEWS)    Result Date: 3/15/2022  EXAMINATION: 4 VIEWS OF THE LEFT KNEE DATE AND TIME:3/14/2022 2:49 PM CLINICAL HISTORY: ACUTE ANTERIOR LEFT KNEE PAIN. KNEE GAVE OUT, FALL  COMPARISON: NONE AVAILABLE. Findings: Acute lateral condylar fracture with extension proximally into the right femoral shaft . Minimal 5 mm displacement. Alignment normal. Slight widening of the lateral joint space. Bony spurring s and joint space narrowing consistent with  severe tricompartmental degenerative changes. Small effusion. ACUTE RIGHT SUPRACONDYLAR FEMORAL FRACTURE. EXAMINATION: XR KNEE LEFT (MIN 4 VIEWS), XR CHEST PORTABLE. DATE AND TIME:3/14/2022 2:49 PM CLINICAL HISTORY: SHORTNESS OF BREATH   KNEE GAVE OUT, FALL  COMPARISONS: NONE  FINDINGS: The heart, mediastinum and pulmonary vasculature are within normal limits. Nodular shadow right upper lobe possibly vessel on end. No previous films for comparison. Follow-up recommended to exclude a lung nodule. Otherwise, visualized lung fields are clear. Bones unremarkable. IMPRESSION:  NO ACTIVE DISEASE. QUESTIONABLE RIGHT LUNG NODULE.      CT KNEE LEFT WO CONTRAST    Result Date: 3/14/2022  EXAM: CT KNEE LEFT WO CONTRAST HISTORY: Distal femur fracture TECHNIQUE: Multiple contiguous axial images were obtained of the left knee without contrast. Multiplanar reformats were obtained. COMPARISON: Radiographs performed earlier on the same date FINDINGS: Comminuted mildly displaced fracture of the distal femoral diaphysis extending into the medial and lateral femoral condyles and into the patellofemoral and tibiofemoral joints. Intramuscular hamstring hematoma partially visualized. Degenerative changes of the knee most significant within the lateral compartment where the degenerative changes are advanced. Small knee joint effusion. Small Haas's cyst measures approximately 2 x 2 x 4.5 cm. Comminuted mildly displaced intra-articular fracture of the distal femur. All CT scans at this facility use dose modulation, iterative reconstruction, and/or weight based dosing when appropriate to reduce radiation dose to as low as reasonably achievable. XR CHEST PORTABLE    Result Date: 3/15/2022  EXAMINATION: 4 VIEWS OF THE LEFT KNEE DATE AND TIME:3/14/2022 2:49 PM CLINICAL HISTORY: ACUTE ANTERIOR LEFT KNEE PAIN. KNEE GAVE OUT, FALL  COMPARISON: NONE AVAILABLE. Findings: Acute lateral condylar fracture with extension proximally into the right femoral shaft . Minimal 5 mm displacement. Alignment normal. Slight widening of the lateral joint space. Bony spurring s and joint space narrowing consistent with  severe tricompartmental degenerative changes. Small effusion. ACUTE RIGHT SUPRACONDYLAR FEMORAL FRACTURE. EXAMINATION: XR KNEE LEFT (MIN 4 VIEWS), XR CHEST PORTABLE. DATE AND TIME:3/14/2022 2:49 PM CLINICAL HISTORY: SHORTNESS OF BREATH   KNEE GAVE OUT, FALL  COMPARISONS: NONE  FINDINGS: The heart, mediastinum and pulmonary vasculature are within normal limits. Nodular shadow right upper lobe possibly vessel on end. No previous films for comparison. Follow-up recommended to exclude a lung nodule. Otherwise, visualized lung fields are clear.  Bones unremarkable. IMPRESSION:  NO ACTIVE DISEASE. QUESTIONABLE RIGHT LUNG NODULE. Active Hospital Problems    Diagnosis Date Noted    Other fracture of left femur, initial encounter for closed fracture ([facility] [address] [S72.8X2A] 03/14/2022     Priority: Low         Impression/Plan:   1. Preop Left Femur- pt is an acceptable risk for planned surgery based on unremarkable ECG and no cardiac symptoms of angina nor HF.   2. HTN Stable  3. continue perioperative BB. Thank you for allowing us to participate in the care of this patient. Will continue to follow. Please call if questions or concerns arise.     Electronically signed by Rosalva Birmingham MD on 3/15/2022 at 11:02 AM

## 2022-03-15 NOTE — ANESTHESIA PRE PROCEDURE
Department of Anesthesiology  Preprocedure Note       Name:  Jyotsna Roth   Age:  [de-identified] y.o.  :  1940                                          MRN:  [id number]         Date:  3/15/2022      Surgeon: Grey Castellanos):  Caron Romero MD    Procedure: Procedure(s):  OPEN REDUCTION INTERNAL FIXATION LEFT DISTAL FEMUR FRACTURE SYNTHES REG TABLE LARGE C-ARM, SYNTHES RECONSTRUCTION PLATES 6.5 & 7.3 SCREWS SYNTHES  ROOM:      **REQUESTING 1:00 PM**LEFT TESS VAZQUEZ  KEY PER DR. LEMON (SET UP LIKE A REPLACEMENT)    Medications prior to admission:   Prior to Admission medications    Medication Sig Start Date End Date Taking?  Authorizing Provider   furosemide (LASIX) 20 MG tablet Take 20 mg by mouth daily   Yes Historical Provider, MD   triamterene-hydroCHLOROthiazide (MAXZIDE-25) 37.5-25 MG per tablet Take 0.5 tablets by mouth daily   Yes Historical Provider, MD   metoprolol succinate (TOPROL XL) 50 MG extended release tablet Take 50 mg by mouth daily   Yes Historical Provider, MD       Current medications:    Current Facility-Administered Medications   Medication Dose Route Frequency Provider Last Rate Last Admin    ceFAZolin (ANCEF) 3000 mg in dextrose 5 % 100 mL IVPB  3,000 mg IntraVENous Once Caron Romero MD        sodium chloride flush 0.9 % injection 5-40 mL  5-40 mL IntraVENous 2 times per day Emmie Navarro MD        sodium chloride flush 0.9 % injection 5-40 mL  5-40 mL IntraVENous PRN Emmie Navarro MD        0.9 % sodium chloride infusion  25 mL IntraVENous PRN Emmie Navarro MD        fentaNYL (SUBLIMAZE) injection 25 mcg  25 mcg IntraVENous Q5 Min PRN Emmie Navarro MD        HYDROmorphone (DILAUDID) injection 0.5 mg  0.5 mg IntraVENous Q5 Min PRN Emmie Navarro MD        oxyCODONE (ROXICODONE) immediate release tablet 5 mg  5 mg Oral Once PRN Emmie Navarro MD        [facility]ron [facility] injection 4 mg  4 mg IntraVENous Once PRN Emmie Navarro MD        metoclopramide [facility]) Active Problem List   Diagnosis Code    Other fracture of left femur, initial encounter for closed fracture ([facility] [place] [address] D92.7T2K       Past Medical History:  No past medical history on file. Past Surgical History:  No past surgical history on file. Social History:    Social History     Tobacco Use    Smoking status: Former Smoker     Years: 40.00     Types: Cigarettes     Quit date:      Years since quittin.2    Smokeless tobacco: Former User    Tobacco comment: 40 years ago per patient   Substance Use Topics    Alcohol use: Never                                Counseling given: No  Comment: 40 years ago per patient      Vital Signs (Current):   Vitals:    03/15/22 0200 03/15/22 0600 03/15/22 0814 03/15/22 1230   BP: (!) 124/56   (!) 150/65   Pulse: 87   99   Resp:   18 18   Temp: 97.9 °F (36.6 °C)   99 °F (37.2 °C)   TempSrc:   Oral Temporal   SpO2: 92%   92%   Weight:  260 lb 5.8 oz (118.1 kg)     Height:                                                  BP Readings from Last 3 Encounters:   03/15/22 (!) 150/65       NPO Status: Time of last liquid consumption: 0000                        Time of last solid consumption: 0000                        Date of last liquid consumption: 03/15/22                        Date of last solid food consumption: 03/15/22    BMI:   Wt Readings from Last 3 Encounters:   03/15/22 260 lb 5.8 oz (118.1 kg)     Body mass index is 37.36 kg/m².     CBC:   Lab Results   Component Value Date    WBC 7.5 03/15/2022    RBC 4.06 03/15/2022    HGB 13.4 03/15/2022    HCT 38.0 03/15/2022    MCV 93.6 03/15/2022    RDW 13.3 03/15/2022     03/15/2022       CMP:   Lab Results   Component Value Date     03/15/2022    K 4.0 03/15/2022    CL 98 03/15/2022    CO2 23 03/15/2022    BUN 13 03/15/2022    CREATININE 0.64 03/15/2022    GFRAA >60.0 03/15/2022    LABGLOM >60.0 03/15/2022    GLUCOSE 138 03/15/2022    PROT 7.5 2022    CALCIUM 8.7 03/15/2022    BILITOT 1.1 2022 ALKPHOS 61 03/14/2022    AST 35 03/14/2022    ALT 18 03/14/2022       POC Tests: No results for input(s): POCGLU, POCNA, POCK, POCCL, POCBUN, POCHEMO, POCHCT in the last 72 hours. Coags:   Lab Results   Component Value Date    PROTIME 14.1 03/14/2022    INR 1.1 03/14/2022    APTT 28.5 03/14/2022       HCG (If Applicable): No results found for: PREGTESTUR, PREGSERUM, HCG, HCGQUANT     ABGs: No results found for: PHART, PO2ART, GLG9XEQ, CXH4EVM, BEART, W8DUNXAA     Type & Screen (If Applicable):  No results found for: LABABO, LABRH    Drug/Infectious Status (If Applicable):  No results found for: HIV, HEPCAB    COVID-19 Screening (If Applicable): No results found for: COVID19        Anesthesia Evaluation    Airway: Mallampati: II  TM distance: >3 FB   Neck ROM: full  Mouth opening: > = 3 FB Dental:    (+) upper dentures and lower dentures      Pulmonary:Negative Pulmonary ROS breath sounds clear to auscultation                             Cardiovascular:    (+) hypertension:,       ECG reviewed  Rhythm: regular    Echocardiogram reviewed    Cleared by cardiology     Beta Blocker:  Dose within 24 Hrs         Neuro/Psych:   Negative Neuro/Psych ROS              GI/Hepatic/Renal:   (+) morbid obesity          Endo/Other: Negative Endo/Other ROS                    Abdominal:             Vascular: negative vascular ROS. Other Findings:             Anesthesia Plan      general and regional     ASA 3     (US guided Femoral nerve block)  Induction: intravenous. MIPS: Prophylactic antiemetics administered. Anesthetic plan and risks discussed with patient. Use of blood products discussed with patient whom consented to blood products.    Plan discussed with surgical team.    Attending anesthesiologist reviewed and agrees with Preprocedure content              Frida Azevedo MD   3/15/2022

## 2022-03-15 NOTE — CONSULTS
Subjective:      Patient ID: Brigette Whiting is a [de-identified] y.o. male who presents today for:  Chief Complaint   Patient presents with    Knee Pain     LEFT KNEE PAIN  STATES HE WENT TO GET IN TRUCK AND HIS LEFT KNEE FOLDED TommyBARAK Albarran       HPI  Patient at baseline has significant pain in both knees. Patient has been long trying to get a total knee replacement but but due to multiple issues has been unable to pursue this effectively. Patient sustained a flexion type event to his left knee that resulted in his knee buckling and landing on the ground. Patient had inability to bear weight and ultimately was diagnosed with a left displaced distal femur fracture and was admitted at [facility]. Patient localizes the pain to the left knee. Pain has been improved with p.o. medication as well as immobilization of the left knee. Patient admits to some baseline numbness that he experiences on the medial aspect of the ankle however this is been present long before this injury. Patient has no radiation of the pain proximally or distally. No past medical history on file. No past surgical history on file.   Social History     Socioeconomic History    Marital status: Single     Spouse name: Not on file    Number of children: Not on file    Years of education: Not on file    Highest education level: Not on file   Occupational History    Not on file   Tobacco Use    Smoking status: Former Smoker     Years: 40.00     Types: Cigarettes     Quit date: 18     Years since quittin.2    Smokeless tobacco: Former User    Tobacco comment: 40 years ago per patient   Vaping Use    Vaping Use: Never used   Substance and Sexual Activity    Alcohol use: Never    Drug use: Never    Sexual activity: Not Currently     Partners: Male   Other Topics Concern    Not on file   Social History Narrative    Not on file     Social Determinants of Health     Financial Resource Strain:     Difficulty of Paying Living Expenses: Not on file   Food Insecurity:     Worried About [address] in the Last Year: Not on file    [employer] in the Last Year: Not on file   Transportation Needs:     Lack of Transportation (Medical): Not on file    Lack of Transportation (Non-Medical): Not on file   Physical Activity:     Days of Exercise per Week: Not on file    Minutes of Exercise per Session: Not on file   Stress:     Feeling of Stress : Not on file   Social Connections:     Frequency of Communication with Friends and Family: Not on file    Frequency of Social Gatherings with Friends and Family: Not on file    Attends [employer] Services: Not on file    Active Member of [address] [place] [address] or Organizations: Not on file    Attends Club or Organization Meetings: Not on file    Marital Status: Not on file   Intimate Partner Violence:     Fear of Current or Ex-Partner: Not on file    Emotionally Abused: Not on file    Physically Abused: Not on file    Sexually Abused: Not on file   Housing Stability:     Unable to Pay for Housing in the Last Year: Not on file    Number of [place] in the Last Year: Not on file    Unstable Housing in the Last Year: Not on file     No family history on file. Allergies   Allergen Reactions    Aspirin      No current facility-administered medications on file prior to encounter.      Current Outpatient Medications on File Prior to Encounter   Medication Sig Dispense Refill    furosemide (LASIX) 20 MG tablet Take 20 mg by mouth daily      triamterene-hydroCHLOROthiazide (MAXZIDE-25) 37.5-25 MG per tablet Take 0.5 tablets by mouth daily      metoprolol succinate (TOPROL XL) 50 MG extended release tablet Take 50 mg by mouth daily           Review of Systems  General: Denies fever, chills  Cardiovascular: Denies chest pain  Pulmonary: Denies shortness of breath  GI: Denies nausea or vomiting  Neuro: Denies numbness or tingling    Objective:   BP (!) 150/65   Pulse 99   Temp 99 °F (37.2 °C) (Temporal)   Resp 18   Ht 5' 10\" (1.778 m)   Wt 260 lb 5.8 oz (118.1 kg)   SpO2 92%   BMI 37.36 kg/m²     Ortho Exam  General: Well-appearing obese male who appears his stated age  [de-identified]: Normocephalic atraumatic extraocular motions are intact  Lungs: Symmetric chest rise bilaterally no labored respirations noted  Heart: Regular rate and rhythm is palpated by the radial artery  Left lower extremity: Large effusion is appreciated about the left knee with associated tenderness palpation appreciated about the distal femur. Nontender palpation distally about the leg ankle or foot. Nontender palpation proximally about the proximal thigh around the hip. Patient has ability to dorsiflex and plantarflex at the ankle as well as motor toes. Sensation is grossly intact light touch in superficial peroneal, deep peroneal and tibial nerve distribution. Mild decrease sensation in the saphenous nerve distribution. Bilateral upper and right lower extremity were ranged and found to be without deformity or pain on motion. Radiographs and Laboratory Studies:     Diagnostic Imaging Studies:    Pertinent imaging includes CT scan left knee which was obtained on 3/14/2022    Findings demonstrate advanced arthritic change with tricompartmental osteoarthritis of the left knee with associated lateral femoral condyle fracture with extension into the intercondylar notch and associated lateral displacement. 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   Discussed that ultimately low risk with surgery would involve surgical stabilization the form of open reduction internal fixation of lateral condyle fracture and possibly reconstruction of the left knee in the form of total knee replacement at a later date. Patient discussed at length with me the weightbearing precautions and restrictions associated with fixation and patient does not feel that he would be able to adequately function with multiple months with nonweightbearing on the left lower extremity. As such, discussion was had in regards to weightbearing procedures such as distal femoral replacement which does have increased operative risks specifically in regards to infection and neurovascular complications surrounding the procedure and perioperative timeframe. Patient states he was barely able to walk before this and does not feel he would survive very long without the ability to walk. As such, discussion was had in regards to proceeding with left distal femoral replacement with risks including not limited to damage to nerves, tendons, vessels, infection, loosening, persistent pain, dislocation, need for revision surgery, cardiopulmonary complications associated the procedure and anesthesia including DVT, PE, stroke, heart attack with goals of surgery to appropriately reconstruct the left knee with removal of fractured components to allow for early weightbearing, maximize functional recovery and improved pain control. With knowledge these risk and benefits patient is electing to proceed. Patient is already been medically optimized per the primary team as well as per cardiology. Antibiotics on-call to the OR.

## 2022-03-15 NOTE — ANESTHESIA PROCEDURE NOTES
Peripheral Block    Patient location during procedure: pre-op  Start time: 3/15/2022 1:15 PM  End time: 3/15/2022 1:20 PM  Staffing  Performed: anesthesiologist   Anesthesiologist: Jose Armando Jones MD  Preanesthetic Checklist  Completed: patient identified, IV checked, site marked, risks and benefits discussed, surgical consent, monitors and equipment checked, pre-op evaluation, timeout performed, anesthesia consent given, oxygen available and patient being monitored  Peripheral Block  Patient position: supine  Prep: ChloraPrep  Patient monitoring: cardiac monitor, continuous pulse ox, frequent blood pressure checks and IV access  Block type: Femoral  Laterality: left  Injection technique: single-shot  Guidance: ultrasound guided  Local infiltration: bupivacaine  Infiltration strength: 0.25 %  Dose: 30 mL  Provider prep: mask and sterile gloves (Sterile probe cover)  Local infiltration: bupivacaine  Needle  Needle type: combined needle/nerve stimulator   Needle gauge: 21 G  Needle length: 10 cm  Needle localization: anatomical landmarks and ultrasound guidance  Assessment  Injection assessment: negative aspiration for heme, no paresthesia on injection and local visualized surrounding nerve on ultrasound  Paresthesia pain: immediately resolved  Slow fractionated injection: yes  Hemodynamics: stable  Additional Notes  Ultrasound image printed and saved in patient chart.     Sterile probe cover used    Reason for block: post-op pain management and at surgeon's request

## 2022-03-15 NOTE — PROGRESS NOTES
Pt unable to complete med list. States he takes one BP med and two other pills. Pt said we can call his sister in the morning, she knows what  meds he takes.

## 2022-03-15 NOTE — PROGRESS NOTES
Hospitalist Progress Note      PCP: Will Mora MD    Date of Admission: 3/14/2022    Chief Complaint:    Chief Complaint   Patient presents with    Knee Pain     LEFT KNEE PAIN  STATES HE WENT TO GET IN TRUCK AND HIS LEFT KNEE FOLDED UNDER [employer] HOUSE     Subjective:  Patient still has pain with movement. 12 point ROS negative other than mentioned above     Medications:  Reviewed    Infusion Medications    sodium chloride      sodium chloride       Scheduled Medications    sodium chloride flush  5-40 mL IntraVENous 2 times per day    metoprolol succinate  50 mg Oral Daily    sodium chloride flush  10 mL IntraVENous 2 times per day    [Held by provider] enoxaparin  30 mg SubCUTAneous BID     PRN Meds: sodium chloride flush, sodium chloride, fentanNYL, HYDROmorphone, oxyCODONE, ondansetron, metoclopramide, labetalol **OR** hydrALAZINE, sodium chloride flush, sodium chloride, ondansetron **OR** ondansetron, polyethylene glycol, acetaminophen **OR** acetaminophen, potassium chloride **OR** potassium alternative oral replacement **OR** potassium chloride, magnesium sulfate, morphine    No intake or output data in the 24 hours ending 03/15/22 1421    Exam:    BP (!) 150/65   Pulse 99   Temp 99 °F (37.2 °C) (Temporal)   Resp 18   Ht 5' 10\" (1.778 m)   Wt 260 lb 5.8 oz (118.1 kg)   SpO2 92%   BMI 37.36 kg/m²     General appearance: alert, appears stated age and cooperative  Skin:  No rashes or lesions  HEENT: Head: Normal, normocephalic, atraumatic. Robert Maury    Neck: supple, symmetrical, trachea midline  Lungs: clear to auscultation bilaterally  Heart: regular rate and rhythm  Abdomen: soft, non-tender; bowel sounds normal; no masses,  no organomegaly  Extremities: +2 edema  Neurologic: Non focal    Labs:   Recent Labs     03/14/22  1515 03/15/22  0551   WBC 7.6 7.5   HGB 15.0 13.4*   HCT 43.5 38.0*    168     Recent Labs     03/14/22  1515 03/15/22  0551   * 136   K 4.1 4.0   CL 99 98   CO2 21 23   BUN 12 13   CREATININE 0.55* 0.64*   CALCIUM 9.0 8.7     Recent Labs     03/14/22  1515   AST 35   ALT 18   BILITOT 1.1*   ALKPHOS 61     Recent Labs     03/14/22  1605   INR 1.1     No results for input(s): Skippy Gault in the last 72 hours. Urinalysis:    No results found for: Porsha Joseline, BACTERIA, RBCUA, BLOODU, [address], [place] [address]    Radiology:  CT KNEE LEFT WO CONTRAST   Final Result      XR CHEST PORTABLE   Preliminary Result   ACUTE RIGHT SUPRACONDYLAR FEMORAL FRACTURE. EXAMINATION: XR KNEE LEFT (MIN 4 VIEWS), XR CHEST PORTABLE. DATE AND TIME:3/14/2022 2:49 PM       CLINICAL HISTORY: SHORTNESS OF BREATH   KNEE GAVE OUT, FALL        COMPARISONS: NONE       FINDINGS: The heart, mediastinum and pulmonary vasculature are within normal limits. Nodular shadow right upper lobe possibly vessel on end. No previous films for comparison. Follow-up recommended to exclude a lung nodule. Otherwise, visualized lung    fields are clear. Bones unremarkable. IMPRESSION:  NO ACTIVE DISEASE. QUESTIONABLE RIGHT LUNG NODULE. XR KNEE LEFT (MIN 4 VIEWS)   Preliminary Result   ACUTE RIGHT SUPRACONDYLAR FEMORAL FRACTURE. EXAMINATION: XR KNEE LEFT (MIN 4 VIEWS), XR CHEST PORTABLE. DATE AND TIME:3/14/2022 2:49 PM       CLINICAL HISTORY: SHORTNESS OF BREATH   KNEE GAVE OUT, FALL        COMPARISONS: NONE       FINDINGS: The heart, mediastinum and pulmonary vasculature are within normal limits. Nodular shadow right upper lobe possibly vessel on end. No previous films for comparison. Follow-up recommended to exclude a lung nodule. Otherwise, visualized lung    fields are clear. Bones unremarkable. IMPRESSION:  NO ACTIVE DISEASE. QUESTIONABLE RIGHT LUNG NODULE. FLUORO FOR SURGICAL PROCEDURES    (Results Pending)     Assessment/Plan:    1. Femur fracture:  Seen by cardiology; benefits likely outweigh risks for surgery  2.  HTN:  Continue toprol perioperatively; hold triamterene  3. Functional Status: Fall precautions. Up with assistance. PT OT  4. Diet: NPO  5. DVT ppx: Lovenox SCDs  6. Disposition: Dependent on hospital course. Will discharge once medically stable. SW on board for discharge planning. Active Hospital Problems    Diagnosis Date Noted    Other fracture of left femur, initial encounter for closed fracture ([facility] [address] Radha Camarillo 03/14/2022     Additional work up or/and treatment plan may be added today or then after based on clinical progression. I am managing a portion of pt care. Some medical issues are handled by other specialists. Additional work up and treatment should be done in out pt setting by pt PCP and other out pt providers. In addition to examining and evaluating pt, I spent additional time explaining care, normal and abnormal findings, and treatment plan. All of pt questions were answered. Counseling, diet and education were  provided. Case will be discussed with nursing staff when appropriate. Family will be updated if and when appropriate.       Diet: Diet NPO    Code Status: Full Code    PT/OT Eval     Electronically signed by Shelbi Stephens MD on 3/15/2022 at 2:21 PM

## 2022-03-15 NOTE — ANESTHESIA PRE PROCEDURE
Department of Anesthesiology  Preprocedure Note       Name:  Brigette Whiting   Age:  [de-identified] y.o.  :  1940                                          MRN:  [id number]         Date:  3/15/2022      Surgeon: Michelle Navarro):  Marga Diaz MD    Procedure: ORIF distal femur Left    Medications prior to admission:   Prior to Admission medications    Medication Sig Start Date End Date Taking?  Authorizing Provider   furosemide (LASIX) 20 MG tablet Take 20 mg by mouth daily   Yes Historical Provider, MD   triamterene-hydroCHLOROthiazide (MAXZIDE-25) 37.5-25 MG per tablet Take 0.5 tablets by mouth daily   Yes Historical Provider, MD   metoprolol succinate (TOPROL XL) 50 MG extended release tablet Take 50 mg by mouth daily   Yes Historical Provider, MD       Current medications:    Current Facility-Administered Medications   Medication Dose Route Frequency Provider Last Rate Last Admin    ceFAZolin (ANCEF) 3000 mg in dextrose 5 % 100 mL IVPB  3,000 mg IntraVENous Once Marga Diaz MD        metoprolol succinate (TOPROL XL) extended release tablet 50 mg  50 mg Oral Daily Syeda Garcia MD   50 mg at 03/15/22 0817    sodium chloride flush 0.9 % injection 10 mL  10 mL IntraVENous 2 times per day Syeda Garcia MD   10 mL at 03/15/22 0817    sodium chloride flush 0.9 % injection 10 mL  10 mL IntraVENous PRN Syeda Garcia MD        0.9 % sodium chloride infusion  25 mL IntraVENous PRN Syeda Garcia MD        ondansetron (ZOFRAN-ODT) disintegrating tablet 4 mg  4 mg Oral Q8H PRN Syeda Garcia MD        Or    ondansetron (ZOFRAN) injection 4 mg  4 mg IntraVENous Q6H PRN Syeda Garcia MD        polyethylene glycol (GLYCOLAX) packet 17 g  17 g Oral Daily PRN Syeda Garcia MD        acetaminophen (TYLENOL) tablet 650 mg  650 mg Oral Q6H PRN Syeda Garcia MD   650 mg at 03/15/22 0014    Or    acetaminophen (TYLENOL) suppository 650 mg  650 mg Rectal Q6H PRN Syeda Garcia MD        potassium chloride (Levonne Phoenix M) extended release tablet 40 mEq  40 mEq Oral PRN Gildardo Eldridge MD        Or    potassium bicarb-citric acid (EFFER-K) effervescent tablet 40 mEq  40 mEq Oral PRN Gildardo Eldridge MD        Or    potassium chloride 10 mEq/100 mL IVPB (Peripheral Line)  10 mEq IntraVENous PRN Gildardo Eldridge MD        magnesium sulfate 2000 mg in 50 mL IVPB premix  2,000 mg IntraVENous PRN Gildardo Eldridge MD        [Held by provider] enoxaparin (LOVENOX) injection 30 mg  30 mg SubCUTAneous BID Gildardo Eldridge MD   30 mg at 22    morphine (PF) injection 2 mg  2 mg IntraVENous Q4H PRN Ilene Courts Sedar, DO   2 mg at 03/15/22 0843       Allergies: Allergies   Allergen Reactions    Aspirin        Problem List:    Patient Active Problem List   Diagnosis Code    Other fracture of left femur, [facility] I24.7Z5K       Past Medical History:  No past medical history on file. Past Surgical History:  No past surgical history on file.     Social History:    Social History     Tobacco Use    Smoking status: Former Smoker     Years: 40.00     Types: Cigarettes     Quit date:      Years since quittin.2    Smokeless tobacco: Former User    Tobacco comment: 40 years ago per patient   Substance Use Topics    Alcohol use: Never                                Counseling given: No  Comment: 40 years ago per patient      Vital Signs (Current):   Vitals:    03/15/22 0200 03/15/22 0600 03/15/22 0814 03/15/22 1230   BP: (!) 124/56   (!) 150/65   Pulse: 87   99   Resp:   18 18   Temp: 97.9 °F (36.6 °C)   99 °F (37.2 °C)   TempSrc:   Oral Temporal   SpO2: 92%   92%   Weight:  260 lb 5.8 oz (118.1 kg)     Height:                                                  BP Readings from Last 3 Encounters:   03/15/22 (!) 150/65       NPO Status: Time of last liquid consumption: 0000                        Time of last solid consumption: 0000                        Date of last liquid consumption: 03/15/22 Date of last solid food consumption: 03/15/22    BMI:   Wt Readings from Last 3 Encounters:   03/15/22 260 lb 5.8 oz (118.1 kg)     Body mass index is 37.36 kg/m². CBC:   Lab Results   Component Value Date    WBC 7.5 03/15/2022    RBC 4.06 03/15/2022    HGB 13.4 03/15/2022    HCT 38.0 03/15/2022    MCV 93.6 03/15/2022    RDW 13.3 03/15/2022     03/15/2022       CMP:   Lab Results   Component Value Date     03/15/2022    K 4.0 03/15/2022    CL 98 03/15/2022    CO2 23 03/15/2022    BUN 13 03/15/2022    CREATININE 0.64 03/15/2022    GFRAA >60.0 03/15/2022    LABGLOM >60.0 03/15/2022    GLUCOSE 138 03/15/2022    PROT 7.5 03/14/2022    CALCIUM 8.7 03/15/2022    BILITOT 1.1 03/14/2022    ALKPHOS 61 03/14/2022    AST 35 03/14/2022    ALT 18 03/14/2022       POC Tests: No results for input(s): POCGLU, POCNA, POCK, POCCL, POCBUN, POCHEMO, POCHCT in the last 72 hours.     Coags:   Lab Results   Component Value Date    PROTIME 14.1 03/14/2022    INR 1.1 03/14/2022    APTT 28.5 03/14/2022       HCG (If Applicable): No results found for: PREGTESTUR, PREGSERUM, HCG, HCGQUANT     ABGs: No results found for: PHART, PO2ART, TTP2DCV, FEN8EBR, BEART, I2CKOGEH     Type & Screen (If Applicable):  No results found for: LABABO, LABRH    Drug/Infectious Status (If Applicable):  No results found for: HIV, HEPCAB    COVID-19 Screening (If Applicable): No results found for: COVID19        Anesthesia Evaluation  Patient summary reviewed and Nursing notes reviewed no history of anesthetic complications:   Airway: Mallampati: II  TM distance: >3 FB   Neck ROM: full  Mouth opening: > = 3 FB Dental: normal exam         Pulmonary:Negative Pulmonary ROS and normal exam    (+) sleep apnea:                             Cardiovascular:Negative CV ROS  Exercise tolerance: good (>4 METS),   (+) hypertension:,       ECG reviewed      Echocardiogram reviewed    Cleared by cardiology     Beta Blocker:  Not on Beta Blocker Neuro/Psych:   Negative Neuro/Psych ROS              GI/Hepatic/Renal: Neg GI/Hepatic/Renal ROS  (+) morbid obesity          Endo/Other: Negative Endo/Other ROS             Pt had PAT visit. Abdominal:             Vascular: negative vascular ROS. Other Findings:             Anesthesia Plan      general and regional     ASA 3       Induction: intravenous. MIPS: Postoperative opioids intended and Prophylactic antiemetics administered. Anesthetic plan and risks discussed with patient.       Plan discussed with surgical team.    Attending anesthesiologist reviewed and agrees with Pre Eval content              Camelia Kwok MD   3/15/2022

## 2022-03-15 NOTE — ANESTHESIA POSTPROCEDURE EVALUATION
Department of Anesthesiology  Postprocedure Note    Patient: Saroj Adams  MRN: [id number]  YOB: 1940  Date of evaluation: 3/15/2022  Time:  7:50 PM     Procedure Summary     Date: 03/15/22 Room / Location: [address] [place] [address]    Anesthesia Start: 9708 Anesthesia Stop: 1822    Procedure: LEFT KNEE DISTAL FEMUR REPLACEMENT (Left Knee) Diagnosis: (LEFT DISTAL FEMUR FRACTURE)    Surgeons: Kenneth Case MD Responsible Provider: Galileo Suárez MD    Anesthesia Type: general, regional ASA Status: 3          Anesthesia Type: general, regional    Markus Phase I: Markus Score: 9    Markus Phase II:      Last vitals: Reviewed and per EMR flowsheets.        Anesthesia Post Evaluation    Patient location during evaluation: PACU  Patient participation: complete - patient participated  Level of consciousness: awake and alert  Pain score: 0  Airway patency: patent  Nausea & Vomiting: no vomiting and no nausea  Complications: no  Cardiovascular status: hemodynamically stable  Respiratory status: face mask  Hydration status: stable  Multimodal analgesia pain management approach

## 2022-03-15 NOTE — FLOWSHEET NOTE
Report called and given to [facility]. Dressing cdi ice to area, Sheral Gilmer on. Patient states has numbness to LLE, +3 pedal pulse and strong flexion and extension.

## 2022-03-16 ENCOUNTER — APPOINTMENT (OUTPATIENT)
Dept: GENERAL RADIOLOGY | Age: 82
DRG: 470 | End: 2022-03-16
Payer: MEDICARE

## 2022-03-16 ENCOUNTER — APPOINTMENT (OUTPATIENT)
Dept: ULTRASOUND IMAGING | Age: 82
DRG: 470 | End: 2022-03-16
Payer: MEDICARE

## 2022-03-16 LAB
ANION GAP SERPL CALCULATED.3IONS-SCNC: 15 MEQ/L (ref 9–15)
BACTERIA: NEGATIVE /HPF
BILIRUBIN URINE: ABNORMAL
BLOOD, URINE: ABNORMAL
BUN BLDV-MCNC: 21 MG/DL (ref 8–23)
CALCIUM SERPL-MCNC: 8.3 MG/DL (ref 8.5–9.9)
CHLORIDE BLD-SCNC: 99 MEQ/L (ref 95–107)
CLARITY: ABNORMAL
CO2: 21 MEQ/L (ref 20–31)
COLOR: ABNORMAL
CREAT SERPL-MCNC: 0.84 MG/DL (ref 0.7–1.2)
EKG ATRIAL RATE: 91 BPM
EKG P AXIS: 100 DEGREES
EKG P-R INTERVAL: 196 MS
EKG Q-T INTERVAL: 388 MS
EKG QRS DURATION: 86 MS
EKG QTC CALCULATION (BAZETT): 477 MS
EKG R AXIS: 67 DEGREES
EKG T AXIS: 61 DEGREES
EKG VENTRICULAR RATE: 91 BPM
EPITHELIAL CELLS, UA: ABNORMAL /HPF (ref 0–5)
GFR AFRICAN AMERICAN: >60
GFR NON-AFRICAN AMERICAN: >60
GLUCOSE BLD-MCNC: 191 MG/DL (ref 70–99)
GLUCOSE URINE: NEGATIVE MG/DL
HCT VFR BLD CALC: 31.8 % (ref 42–52)
HEMOGLOBIN: 11 G/DL (ref 14–18)
HYALINE CASTS: ABNORMAL /HPF (ref 0–5)
HYALINE CASTS: ABNORMAL /LPF (ref 0–5)
KETONES, URINE: ABNORMAL MG/DL
LEUKOCYTE ESTERASE, URINE: ABNORMAL
MCH RBC QN AUTO: 32.9 PG (ref 27–31.3)
MCHC RBC AUTO-ENTMCNC: 34.7 % (ref 33–37)
MCV RBC AUTO: 94.8 FL (ref 80–100)
NITRITE, URINE: NEGATIVE
PDW BLD-RTO: 13.4 % (ref 11.5–14.5)
PH UA: 5 (ref 5–9)
PLATELET # BLD: 170 K/UL (ref 130–400)
POTASSIUM REFLEX MAGNESIUM: 4.4 MEQ/L (ref 3.4–4.9)
PROCALCITONIN: 0.62 NG/ML (ref 0–0.15)
PROTEIN UA: 100 MG/DL
RBC # BLD: 3.36 M/UL (ref 4.7–6.1)
RBC UA: >100 /HPF (ref 0–5)
RENAL EPITHELIAL, UA: ABNORMAL /HPF
SODIUM BLD-SCNC: 135 MEQ/L (ref 135–144)
SPECIFIC GRAVITY UA: 1.04 (ref 1–1.03)
UROBILINOGEN, URINE: 1 E.U./DL
WBC # BLD: 10.5 K/UL (ref 4.8–10.8)
WBC UA: ABNORMAL /HPF (ref 0–5)

## 2022-03-16 PROCEDURE — 85027 COMPLETE CBC AUTOMATED: CPT

## 2022-03-16 PROCEDURE — 81001 URINALYSIS AUTO W/SCOPE: CPT

## 2022-03-16 PROCEDURE — 97163 PT EVAL HIGH COMPLEX 45 MIN: CPT

## 2022-03-16 PROCEDURE — 94150 VITAL CAPACITY TEST: CPT

## 2022-03-16 PROCEDURE — 2580000003 HC RX 258: Performed by: ORTHOPAEDIC SURGERY

## 2022-03-16 PROCEDURE — 6370000000 HC RX 637 (ALT 250 FOR IP): Performed by: ORTHOPAEDIC SURGERY

## 2022-03-16 PROCEDURE — 97535 SELF CARE MNGMENT TRAINING: CPT

## 2022-03-16 PROCEDURE — 36415 COLL VENOUS BLD VENIPUNCTURE: CPT

## 2022-03-16 PROCEDURE — 71045 X-RAY EXAM CHEST 1 VIEW: CPT

## 2022-03-16 PROCEDURE — 6360000002 HC RX W HCPCS: Performed by: ORTHOPAEDIC SURGERY

## 2022-03-16 PROCEDURE — 99232 SBSQ HOSP IP/OBS MODERATE 35: CPT | Performed by: INTERNAL MEDICINE

## 2022-03-16 PROCEDURE — 2700000000 HC OXYGEN THERAPY PER DAY

## 2022-03-16 PROCEDURE — 84145 PROCALCITONIN (PCT): CPT

## 2022-03-16 PROCEDURE — 93970 EXTREMITY STUDY: CPT

## 2022-03-16 PROCEDURE — 6360000002 HC RX W HCPCS: Performed by: INTERNAL MEDICINE

## 2022-03-16 PROCEDURE — 80048 BASIC METABOLIC PNL TOTAL CA: CPT

## 2022-03-16 PROCEDURE — 97166 OT EVAL MOD COMPLEX 45 MIN: CPT

## 2022-03-16 PROCEDURE — 1210000000 HC MED SURG R&B

## 2022-03-16 RX ORDER — OXYCODONE HYDROCHLORIDE 5 MG/1
5 TABLET ORAL EVERY 6 HOURS PRN
Qty: 28 TABLET | Refills: 0 | Status: SHIPPED | OUTPATIENT
Start: 2022-03-16 | End: 2022-03-31 | Stop reason: SDUPTHER

## 2022-03-16 RX ADMIN — ENOXAPARIN SODIUM 30 MG: 100 INJECTION SUBCUTANEOUS at 20:49

## 2022-03-16 RX ADMIN — Medication 650 MG: at 02:58

## 2022-03-16 RX ADMIN — Medication 650 MG: at 16:08

## 2022-03-16 RX ADMIN — METOPROLOL SUCCINATE 50 MG: 50 TABLET, EXTENDED RELEASE ORAL at 08:37

## 2022-03-16 RX ADMIN — Medication 650 MG: at 08:37

## 2022-03-16 RX ADMIN — OXYCODONE 5 MG: 5 TABLET ORAL at 03:00

## 2022-03-16 RX ADMIN — SODIUM CHLORIDE, PRESERVATIVE FREE 10 ML: 5 INJECTION INTRAVENOUS at 20:48

## 2022-03-16 RX ADMIN — ONDANSETRON 4 MG: 2 INJECTION INTRAMUSCULAR; INTRAVENOUS at 20:53

## 2022-03-16 RX ADMIN — CEFAZOLIN 2000 MG: 10 INJECTION, POWDER, FOR SOLUTION INTRAVENOUS at 05:22

## 2022-03-16 RX ADMIN — ENOXAPARIN SODIUM 30 MG: 100 INJECTION SUBCUTANEOUS at 08:37

## 2022-03-16 RX ADMIN — POLYETHYLENE GLYCOL 3350 17 G: 17 POWDER, FOR SOLUTION ORAL at 01:14

## 2022-03-16 RX ADMIN — OXYCODONE 10 MG: 5 TABLET ORAL at 14:05

## 2022-03-16 ASSESSMENT — ENCOUNTER SYMPTOMS
COUGH: 0
STRIDOR: 0
GASTROINTESTINAL NEGATIVE: 1
SHORTNESS OF BREATH: 0
EYES NEGATIVE: 1
RESPIRATORY NEGATIVE: 1
BACK PAIN: 1
WHEEZING: 0
NAUSEA: 0
BLOOD IN STOOL: 0
CHEST TIGHTNESS: 0

## 2022-03-16 ASSESSMENT — PAIN SCALES - GENERAL
PAINLEVEL_OUTOF10: 6
PAINLEVEL_OUTOF10: 6
PAINLEVEL_OUTOF10: 0
PAINLEVEL_OUTOF10: 9
PAINLEVEL_OUTOF10: 6
PAINLEVEL_OUTOF10: 6
PAINLEVEL_OUTOF10: 0
PAINLEVEL_OUTOF10: 2
PAINLEVEL_OUTOF10: 6
PAINLEVEL_OUTOF10: 0

## 2022-03-16 ASSESSMENT — PAIN DESCRIPTION - ORIENTATION
ORIENTATION: LEFT
ORIENTATION: LEFT

## 2022-03-16 ASSESSMENT — PAIN DESCRIPTION - LOCATION
LOCATION: LEG
LOCATION: KNEE

## 2022-03-16 ASSESSMENT — PAIN DESCRIPTION - DESCRIPTORS: DESCRIPTORS: ACHING

## 2022-03-16 ASSESSMENT — PAIN DESCRIPTION - FREQUENCY: FREQUENCY: INTERMITTENT

## 2022-03-16 ASSESSMENT — PAIN DESCRIPTION - PAIN TYPE
TYPE: ACUTE PAIN
TYPE: SURGICAL PAIN

## 2022-03-16 NOTE — PROGRESS NOTES
While patient was on the bed pan attempting to move his bowels, he had a moderately sized emesis, it was thin liquid in consistency and brown in color. He is associating it to pain meds possibly. Patient had 2 small bm's this evening, pt still has sensation to move his bowels. Pt did not want any antiemetics at this time when offered. Pt sitting high fowlers and given ice chips, emesis basin and call light within reach; Will continue to monitor.

## 2022-03-16 NOTE — FLOWSHEET NOTE
Kaufman catheter removed, pt tolerated well. Urine in bag pritesh/ pink tinged in color from previous manipulation of catheter. No drainage or resistance met upon removal.Will monitor.

## 2022-03-16 NOTE — CARE COORDINATION
LSW spoke with the pt to confirm his DC plan. Pt wants to go to [facility] for his rehab. [facility] has accepted the pt for admission when he is medically cleared.

## 2022-03-16 NOTE — PROGRESS NOTES
Physician Progress Note      PATIENT:               Keyona Barron  CSN #:                  [id number]  :                       1940  ADMIT DATE:       3/14/2022 2:31 PM  [address] [place] DATE:  RESPONDING  PROVIDER #:        SREE Gould CNP          QUERY TEXT:    Pt  had surgery for a left knee distal femur replacement with Estimated Blood   Loss (mL): 600 cc. Noted decrease in hemaglobin 11.0 g/dL  from 13.4 g/dL from   15.0 g/dL. If possible, please document in the progress notes and discharge   summary if you are evaluating and/or treating any of the following: The medical record reflects the following:  Risk Factors: age [de-identified]  HTN  Clinical Indicators:  left knee distal femur replacement with Estimated Blood   Loss (mL): 600 cc  Treatment: monitoring  IVF    Walter Kunz [facility]  [phone]  Options provided:  -- Acute blood loss anemia  -- Postoperative acute blood loss anemia  -- Other - I will add my own diagnosis  -- Disagree - Not applicable / Not valid  -- Disagree - Clinically unable to determine / Unknown  -- Refer to Clinical Documentation Reviewer    PROVIDER RESPONSE TEXT:    This patient has postoperative acute blood loss anemia.     Query created by: Sherita Zamora on 3/16/2022 12:21 PM      Electronically signed by:  Tobias Gould CNP 3/16/2022 1:34 PM

## 2022-03-16 NOTE — PLAN OF CARE
See OT evaluation for all goals and OT POC.  Electronically signed by ORIN Olivarez/L on 3/16/2022 at 10:19 AM

## 2022-03-16 NOTE — PROGRESS NOTES
Patients outter dressing to LLE removed by this nurse per order. Island dressing intact no drainage or redness noted. Pt tolerated well. Will monitor.

## 2022-03-16 NOTE — PROGRESS NOTES
Patient returned from ultrasound reporting LLE pain, Rating a 9/10 at this time. Also placed on telemetry, will obtain EKG per order.

## 2022-03-16 NOTE — PROGRESS NOTES
Progress Note  Patient: Freddy Fraga  Unit/Bed: H126/X243-54  YOB: 1940  MRN: [id number]  Acct: [de-identified]   Admitting Diagnosis: Other fracture of left femur, initial encounter for closed fracture [facility]) [S72.8X2A]  Closed nondisplaced fracture of lateral condyle of left femur[facility]  Admit Date:  3/14/2022  Hospital Day: 2    Chief Complaint: Post op femur    Histories:  History reviewed. No pertinent past medical history. History reviewed. No pertinent surgical history. History reviewed. No pertinent family history. Social History     Socioeconomic History    Marital status: Single     Spouse name: None    Number of children: None    Years of education: None    Highest education level: None   Occupational History    None   Tobacco Use    Smoking status: Former Smoker     Years: 40.00     Types: Cigarettes     Quit date:      Years since quittin.2    Smokeless tobacco: Former User    Tobacco comment: 40 years ago per patient   Vaping Use    Vaping Use: Never used   Substance and Sexual Activity    Alcohol use: Never    Drug use: Never    Sexual activity: Not Currently     Partners: Male   Other Topics Concern    None   Social History Narrative    None     Social Determinants of Health     Financial Resource Strain:     Difficulty of Paying Living Expenses: Not on file   Food Insecurity:     Worried About Running Out of Food in the Last Year: Not on file    Kimberly of Food in the Last Year: Not on file   Transportation Needs:     Lack of Transportation (Medical): Not on file    Lack of Transportation (Non-Medical):  Not on file   Physical Activity:     Days of Exercise per Week: Not on file    Minutes of Exercise per Session: Not on file   Stress:     Feeling of Stress : Not on file   Social Connections:     Frequency of Communication with Friends and Family: Not on file    Frequency of Social Gatherings with Friends and Family: Not on Pulmonary/Chest: Effort normal and breath sounds normal. He has no wheezes. He has no rales. He exhibits no tenderness. Abdominal: Soft. Bowel sounds are normal. There is no abdominal tenderness. Musculoskeletal:         General: No tenderness or edema. Normal range of motion. Cervical back: Normal range of motion and neck supple. Neurological: He is alert and oriented to person, place, and time. Skin: Skin is warm. No cyanosis. Nails show no clubbing.        LABS:  CBC:   Lab Results   Component Value Date    WBC 10.5 03/16/2022    RBC 3.36 03/16/2022    HGB 11.0 03/16/2022    HCT 31.8 03/16/2022    MCV 94.8 03/16/2022    MCH 32.9 03/16/2022    MCHC 34.7 03/16/2022    RDW 13.4 03/16/2022     03/16/2022     CBC with Differential:    Lab Results   Component Value Date    WBC 10.5 03/16/2022    RBC 3.36 03/16/2022    HGB 11.0 03/16/2022    HCT 31.8 03/16/2022     03/16/2022    MCV 94.8 03/16/2022    MCH 32.9 03/16/2022    MCHC 34.7 03/16/2022    RDW 13.4 03/16/2022    LYMPHOPCT 16.0 03/15/2022    MONOPCT 10.6 03/15/2022    BASOPCT 0.4 03/15/2022    MONOSABS 0.8 03/15/2022    LYMPHSABS 1.2 03/15/2022    EOSABS 0.2 03/15/2022    BASOSABS 0.0 03/15/2022     CMP:    Lab Results   Component Value Date     03/16/2022    K 4.4 03/16/2022    CL 99 03/16/2022    CO2 21 03/16/2022    BUN 21 03/16/2022    CREATININE 0.84 03/16/2022    GFRAA >60.0 03/16/2022    LABGLOM >60.0 03/16/2022    GLUCOSE 191 03/16/2022    PROT 7.5 03/14/2022    LABALBU 3.7 03/14/2022    CALCIUM 8.3 03/16/2022    BILITOT 1.1 03/14/2022    ALKPHOS 61 03/14/2022    AST 35 03/14/2022    ALT 18 03/14/2022     BMP:    Lab Results   Component Value Date     03/16/2022    K 4.4 03/16/2022    CL 99 03/16/2022    CO2 21 03/16/2022    BUN 21 03/16/2022    LABALBU 3.7 03/14/2022    CREATININE 0.84 03/16/2022    CALCIUM 8.3 03/16/2022    GFRAA >60.0 03/16/2022    LABGLOM >60.0 03/16/2022    GLUCOSE 191 03/16/2022     Magnesium: No results found for: MG  Troponin:  No results found for: [facility] Problems    Diagnosis Date Noted    Other fracture of left femur, initial encounter for closed fracture ([facility] [address] [S72.8X2A] 03/14/2022     Priority: Low        Assessment/Plan:  1. POD1 Left Femur-stable. 2. HTN Stable  3. continue perioperative BB.   4. LVEF 65%       Electronically signed by Mello Swanson MD on 3/16/2022 at 8:55 AM

## 2022-03-16 NOTE — PROGRESS NOTES
[facility] OCCUPATIONAL THERAPY EVALUATION - ACUTE     NAME: Екатерина Mock  : 1940 ([de-identified] y.o.)  MRN: [id number]  CODE STATUS: Full Code  Room: J973/G976-06    Date of Service: 3/16/2022    Patient Diagnosis(es): Other fracture of left femur, initial encounter for closed fracture ([facility] [place] [address] [S72.8X2A]  Closed nondisplaced fracture of lateral condyle of left femur, initial encounter [facility]) [S72.425A]   Chief Complaint   Patient presents with    Knee Pain     LEFT KNEE PAIN  STATES HE WENT TO GET IN TRUCK AND HIS LEFT KNEE FOLDED BARAK Montanez     Patient Active Problem List    Diagnosis Date Noted    Other fracture of left femur, initial encounter for closed fracture ([facility] 2022        History reviewed. No pertinent past medical history. History reviewed. No pertinent surgical history. Restrictions  Restrictions/Precautions: Weight Bearing  Lower Extremity Weight Bearing Restrictions  Left Lower Extremity Weight Bearing: Weight Bearing As Tolerated  Required Braces or Orthoses  Left Lower Extremity Brace: Knee Immobilizer (d/c when able to SLR)     Safety Devices: Safety Devices  Safety Devices in place: Yes  Type of devices:  All fall risk precautions in place        Subjective  Pre Treatment Pain Screening  Pain at present: 0  Scale Used: Numeric Score  Intervention List: Patient able to continue with treatment,Patient declined any intervention  Comments / Details: Pt states no pain at rest, but increase in pain with movement, moves to a 6 with standing    Pain Reassessment:   Pain Assessment  Patient Currently in Pain: Yes  Pain Assessment: 0-10  Pain Level: 0     Prior Level of Function:  Social/Functional History  Lives With: Alone  Type of Home: House  Home Layout: Two level,Performs ADL's on one level  Home Access: Stairs to enter without rails  Entrance Stairs - Number of Steps: 1  Bathroom Shower/Tub: Walk-in shower  Bathroom Toilet: Handicap height  Bathroom Equipment: Built-in shower seat,Hand-held shower  Home Equipment: Cane,Standard walker  ADL Assistance: Independent  Homemaking Assistance: Independent (Sister comes by every other day or two to assist with cleaning/laundry PRN)  Ambulation Assistance: Independent ([place])  Transfer Assistance: Independent  Active : Yes  Additional Comments: Sister can assist with groceries    OBJECTIVE:     Orientation Status:  Orientation  Overall Orientation Status: Within Functional Limits    Observation:  Observation/Palpation  Posture: Fair (forward head, rounded shoulders)  Observation: pleasant, cooperative, no acute distress noted on RA, dressing and ace wrap in place    Cognition Status:  Cognition  Overall Cognitive Status: Exceptions  Arousal/Alertness: Appropriate responses to stimuli  Following Commands:  Follows one step commands with repetition  Attention Span: Difficulty attending to directions  Memory: Appears intact  Safety Judgement: Decreased awareness of need for safety  Problem Solving: Assistance required to generate solutions,Assistance required to implement solutions,Assistance required to identify errors made,Assistance required to correct errors made  Insights: Decreased awareness of deficits  Initiation: Requires cues for some  Sequencing: Requires cues for some  Cognition Comment: Pt requires increased processing time    Perception Status:  Perception  Overall Perceptual Status: [facility]    Sensation Status:  Sensation  Overall Sensation Status: [facility]    Vision and Hearing Status:  Vision  Vision: Impaired  Vision Exceptions:  (\"I don't wear glasses but I should\")     ROM:   LUE AROM (degrees)  LUE AROM : WFL  LUE General AROM: Arthritic changes at shoulder  Left Hand AROM (degrees)  Left Hand AROM: WFL  RUE AROM (degrees)  RUE AROM : WFL  RUE General AROM: Arthritic changes at shoulder  Right Hand AROM (degrees)  Right Hand AROM: WFL    Strength:  LUE Strength  Gross LUE Strength: Exceptions to [facility] Hand General: 3/5  LUE Strength Comment: 3/5 all planes  RUE Strength  Gross RUE Strength: Exceptions to [facility]  R Hand General: 3/5  RUE Strength Comment: 3/5 all planes    Coordination, Tone, Quality of Movement: Tone RUE  RUE Tone: Normotonic  Tone LUE  LUE Tone: Normotonic  Coordination  Movements Are Fluid And Coordinated: Yes  Quality of Movement Other  Comment: Minor arthritic changes    Hand Dominance:  Hand Dominance  Hand Dominance: Right    ADL Status:  ADL  Feeding: Setup  Grooming: Setup  UE Bathing: Setup  LE Bathing: Maximum assistance  UE Dressing: Setup  LE Dressing: Dependent/Total  Toileting: Dependent/Total  Additional Comments: Simulated ADLs as above. Unable to stand consistently  Toilet Transfers  Toilet - Technique:  (From Sandra Gonzales)  Equipment Used: Standard bedside commode  Toilet Transfer: Dependent/Total  Toilet Transfers Comments: Depended from Sandra Gonzales     Educated pt. on techniques for use of AE for LE dressing, including using reacher and sock aid to assist with donning pants d/t decreased overall mobility. Also educated pt. on techniques for sit-stand on and off of BSC to promote independence with toileting tasks. Pt. demonstrates G awareness and F understanding of techniques.      Therapy key for assistance levels    Independent = Pt. is able to perform task with no assistance but may require a device   Stand by assistance = Pt. does not perform task at an independent level but does not need physical assistance, requires verbal cues  Minimal, Moderate, Maximal Assistance = Pt. requires physical assistance (25%, 50%, 75% assist from helper) for task but is able to actively participate in task   Dependent = Pt. requires total assistance with task and is not able to actively participate with task completion     Functional Mobility:  Functional Mobility  Functional Mobility Comments: Unable to functionally stand to use [employer]  Transfers  Sit to stand: Dependent/Total,2 Person assistance  Stand to sit: Dependent/Total,2 Person assistance  Transfer Comments: Pulls from Brooks ferguson, bed height elevated, significant limitations with mobility initiation even with [facility]    Bed Mobility  Bed mobility  Supine to Sit: Dependent/Total,2 Person assistance  Sit to Supine: Dependent/Total,2 Person assistance  Comment: Unable to efficiently utilize UEs or LEs to effectively assist with mobility    Seated and Standing Balance:  Balance  Sitting Balance: Contact guard assistance  Standing Balance: Dependent/Total (Assist + 2)    Functional Endurance:  Activity Tolerance  Activity Tolerance: Patient limited by fatigue,Patient limited by pain    D/C Recommendations:  OT D/C RECOMMENDATIONS  REQUIRES OT FOLLOW UP: Yes    Equipment Recommendations:  OT Equipment Recommendations  Other: Continue to assess    OT Education:   OT Education  OT Education: OT [facility]  Patient Education: Educated pt. on role of acute care OT  Barriers to Learning: None    OT Follow Up:  OT D/C RECOMMENDATIONS  REQUIRES OT FOLLOW UP: Yes     Assessment/Discharge Disposition:  Assessment: Pt is an [de-identified]year old man from home who presents to [facility] with the above deficits which impact his ability to perform ADLs and IADLs s/p fall with femur fx. Pt. demonstrates decreased overall balance, endurance and strength. Pt. would benefit from continued OT to maximize independence and safety with ADL tasks.   Performance deficits / Impairments: Decreased strength,Decreased functional mobility ,Decreased ADL status,Decreased balance,Decreased safe awareness,Decreased cognition,Decreased endurance,Decreased fine motor control,Decreased high-level IADLs  Prognosis: Good  Discharge Recommendations: Continue to assess pending progress  Decision Making: Medium Complexity  History: Pt's medical history is moderately complex  Exam: Pt. has 9 performance deficits  Assistance / Modification: Pt. requires max A    Six Click Score \  How much help for putting on and taking off regular lower body clothing?: Total  How much help for Bathing?: A Lot  How much help for Toileting?: Total  How much help for putting on and taking off regular upper body clothing?: A Little  How much help for taking care of personal grooming?: A Little  How much help for eating meals?: A Little  AM-[facility] Inpatient Daily Activity Raw Score: 13  AM-[facility] Inpatient ADL T-Scale Score : 32.03  ADL Inpatient CMS 0-100% Score: 63.03    Plan:  Plan  Times per week: 1-4x  Plan weeks: Length of acute stay  Current Treatment Recommendations: Mary Acevedo Training,Functional Mobility [employer] Management,Safety Education & Training,Patient/Caregiver Education & Training,Equipment Evaluation, Education, & procurement,Self-Care / ADL,Home Management Training,Cognitive/Perceptual Training    Goals:   Patient will:    - Improve functional endurance to tolerate/complete 30 mins of ADL's  - Be setup in UB ADLs   - Be Max A in LB ADLs  - Be Max A in ADL transfers without LOB  - Be Mod A in toileting tasks  - Improve B UE strength and endurance to 3+/5 in order to participate in self-care activities as projected. - Access appropriate D/C site with as few architectural barriers as possible. - Sequence self-care tasks with no verbal cues for technique    Patient Goal: Patient goals : \"I want to get moving again\"     Discussed and agreed upon: Yes Comments:     Therapy Time:   OT Individual Minutes  Time In: 840  Time Out:   Minutes: 51    ADL/IADL trainin minutes  Eval: 31 minutes     Electronically signed by:     ORIN Ruiz/L, OTR/L  3/16/2022, 10:15 AM

## 2022-03-16 NOTE — PROGRESS NOTES
Physical Therapy Med Surg Initial Assessment  Facility/Department: [facility]  Room: O807/O226-05     NAME: Wayland Babinski  : 1940 ([de-identified] y.o.)  MRN: [id number]  CODE STATUS: Full Code    Date of Service: 3/16/2022    Patient Diagnosis(es): Other fracture of left femur, initial encounter for closed fracture ([facility] [S72.8X2A]  Closed nondisplaced fracture of lateral condyle of left femur, initial encounter [facility]) [S72.425A]   Chief Complaint   Patient presents with    Knee Pain     LEFT KNEE PAIN  STATES HE WENT TO GET IN TRUCK AND HIS LEFT KNEE FOLDED BARAK Montanez     Patient Active Problem List    Diagnosis Date Noted    Other fracture of left femur, initial encounter for closed fracture ([facility] 2022      History reviewed. No pertinent past medical history. History reviewed. No pertinent surgical history.     Chart Reviewed: Yes  Patient assessed for rehabilitation services?: Yes  Family / Caregiver Present: No  General Comment  Comments: Pt resting in bed, agreeable to PT eval    Restrictions:  Restrictions/Precautions: Weight Bearing  Lower Extremity Weight Bearing Restrictions  Left Lower Extremity Weight Bearing: Weight Bearing As Tolerated  Required Braces or Orthoses  Left Lower Extremity Brace: Knee Immobilizer (d/c when able to SLR)     SUBJECTIVE:      Pain  Pre Treatment Pain Screening  Pain at present: 0  Scale Used: Numeric Score  Intervention List: Patient able to continue with treatment;Patient declined any intervention    Post Treatment Pain Screening:   Pain Screening  Patient Currently in Pain: Yes  Pain Assessment  Pain Assessment: 0-10  Pain Level: 6  Pain Type: Surgical pain  Pain Location: Knee  Pain Orientation: Left  Pain Descriptors: Aching  Pain Frequency: Intermittent    Prior Level of Function:  Social/Functional History  Lives With: Alone  Type of Home: House  Home Layout: Two level,Performs ADL's on one level  Home Access: Stairs to enter without rails  Entrance Stairs - Number of Steps: 1  Bathroom Shower/Tub: Walk-in shower  Bathroom Toilet: Handicap height  Bathroom Equipment: Built-in shower seat,Hand-held shower  Home Equipment: Cane,Standard walker  ADL Assistance: Independent  Homemaking Assistance: Independent (Sister comes by every other day or two to assist with cleaning/laundry PRN)  Ambulation Assistance: Independent ([place])  Transfer Assistance: Independent  Active :  Yes  Additional Comments: Sister can assist with groceries    OBJECTIVE:   Vision: Impaired  Vision Exceptions:  (\"I don't wear glasses but I should\")    Cognition:  Overall Orientation Status: Within Functional Limits  Follows Commands: Within Functional Limits    Observation/Palpation  Posture: Fair (forward head, rounded shoulders)  Observation: pleasant, cooperative, no acute distress noted on RA, dressing and ace wrap in place    ROM:  RLE General PROM: R knee flex -8-90degrees  RLE General AROM: limited by strength deficits  LLE General PROM: knee flex -12-60degrees    Strength:  Strength RLE  Comment: functionally 2+/5 at hip and knee, ankle 3/5  Strength LLE  Comment: functionally 2-/5 hip and knee, ankle 3-/5    Neuro:  Balance  Sitting - Static: Fair (SBA)  Sitting - Dynamic: Fair (CGA, decreased anterior wt shift)  Standing - Static: Poor (requires 2 person Mod A with use of sarasteady)  Standing - Dynamic: Poor     Motor Control  Gross Motor?: Exceptions  Comments: generalized weakness, poor ability to effectively sequence gross motor tasks  Sensation  Overall Sensation Status: WFL     Bed mobility  Supine to Sit: Dependent/Total;2 Person assistance  Sit to Supine: Dependent/Total;2 Person assistance  Comment: Unable to efficiently utilize UEs or LEs to effectively assist with mobility    Transfers  Sit to Stand: 2 Person Assistance;Dependent/Total  Stand to sit: 2 Person Assistance;Dependent/Total  Bed to Chair: Dependent/Total;2 Person Assistance  Comment: x3 unsuccessful attempts sit<>Stand with L knee immobilizer, use of lety as sling, and elevated bed height. Pt eager to attempt use of bedside commode to have BM. LAHTI steady used bed<>bedside commode, instructed in use, UE placement, assist with B LE placement, anterior wt shift, lety used as sling, poor glute and oakley ext mm activation. Poor ability to effectively sequence requires increased attempts despite use of sarasteady    Ambulation  Ambulation?: No        Activity Tolerance  Activity Tolerance: Patient Tolerated treatment well      PT Education  PT Education: PT Role;Goals;Plan of Care;Weight-bearing Education;General Safety;Gait Training  Patient Education: knee immobilizer    ASSESSMENT:   Body structures, Functions, Activity limitations: Decreased functional mobility ; Decreased strength;Decreased balance;Decreased cognition; Increased pain;Decreased posture;Decreased ROM  Decision Making: Medium Complexity  History: med  Exam: med  Clinical Presentation: med    Prognosis: Good  Patient Education: knee immobilizer  Barriers to Learning: none    DISCHARGE RECOMMENDATIONS:  Discharge Recommendations: Continue to assess pending progress,Patient would benefit from continued therapy after discharge    Assessment: Pt demonstrates the above deficits and decline in functional mobility status placing them at increased risk for falls. Pt requires 2 person assist for out of bed activity. Pt would benefit from physical therapy to address above deficits and allow for safe return home at highest level of function, decrease risk for falls, and improve QOL.   REQUIRES PT FOLLOW UP: Yes       PLAN OF CARE:  Plan  Times per week: 5-7  Times per day:  (1-2)  Current Treatment Recommendations: Strengthening,Functional Mobility Training,Wheelchair Mobility Training,Neuromuscular Re-education,Home Exercise Program,Equipment Evaluation, Education, & procurement,Modalities,Safety Education & Training,Transfer Training,Balance Training,Patient/Caregiver Education & Training,Positioning  Safety Devices  Type of devices: Left in bed,Call light within reach,Bed alarm in place,Nurse notified    Goals:  Patient goals : to get better  Short term goals  Short term goal 1: Min A for HEP to improve LE strength and activity tolerance  Long term goals  Long term goal 1: rolling with Min A  Long term goal 2: Supine<>sit with Mod A  Long term goal 3: Unsupported sitting with supervision  Long term goal 4: Sit<>Stand with Mod A  Long term goal 5: Stand with 2ww x 1 min with Min A    AMPAC (6 CLICK) Kate Lerma 28 Inpatient Mobility Raw Score : 18     Therapy Time:   Individual   Time In 0845   Time Out 0934   Minutes 49     bed mob/transfers 15 min     Braden Gomez, PT, 03/16/22 at 10:30 AM     Definitions for assistance levels  Independent = pt does not require any physical supervision or assistance from another person for activity completion. Device may be needed.   Stand by assistance = pt requires verbal cues or instructions from another person, close to but not touching, to perform the activity  Minimal assistance= pt performs 75% or more of the activity; assistance is required to complete the activity  Moderate assistance= pt performs 50% of the activity; assistance is required to complete the activity  Maximal assistance = pt performs 25% of the activity; assistance is required to complete the activity  Dependent = pt requires total physical assistance to accomplish the task

## 2022-03-16 NOTE — PROGRESS NOTES
Hospitalist Progress Note      PCP: Fortino Contreras MD    Date of Admission: 3/14/2022    Chief Complaint:    Chief Complaint   Patient presents with    Knee Pain     LEFT KNEE PAIN  STATES HE WENT TO GET IN TRUCK AND HIS LEFT KNEE FOLDED UNDER BARAK HOUSE     Subjective:  Patient still has pain with movement. 12 point ROS negative other than mentioned above     Medications:  Reviewed    Infusion Medications    lactated ringers 50 mL/hr at 03/15/22 2233    sodium chloride       Scheduled Medications    furosemide  20 mg Oral Daily    metoprolol succinate  50 mg Oral Daily    triamterene-hydroCHLOROthiazide  0.5 tablet Oral Daily    sodium chloride flush  10 mL IntraVENous 2 times per day    acetaminophen  650 mg Oral Q6H    enoxaparin  30 mg SubCUTAneous BID     PRN Meds: sodium chloride flush, sodium chloride, oxyCODONE **OR** oxyCODONE, ondansetron **OR** ondansetron, polyethylene glycol, potassium chloride **OR** potassium alternative oral replacement **OR** potassium chloride, magnesium sulfate      Intake/Output Summary (Last 24 hours) at 3/16/2022 1328  Last data filed at 3/15/2022 1913  Gross per 24 hour   Intake 1700 ml   Output 800 ml   Net 900 ml       Exam:    BP (!) 135/55   Pulse 130   Temp 98.1 °F (36.7 °C)   Resp 18   Ht 5' 10\" (1.778 m)   Wt 260 lb 5.8 oz (118.1 kg)   SpO2 91%   BMI 37.36 kg/m²     General appearance: alert, appears stated age and cooperative  Skin:  No rashes or lesions  HEENT: Head: Normal, normocephalic, atraumatic. Gweneth Latch    Neck: supple, symmetrical, trachea midline  Lungs: clear to auscultation bilaterally  Heart: regular rate and rhythm  Abdomen: soft, non-tender; bowel sounds normal; no masses,  no organomegaly  Extremities: +2 edema  Neurologic: Non focal    Labs:   Recent Labs     03/14/22  1515 03/15/22  0551 03/16/22  0557   WBC 7.6 7.5 10.5   HGB 15.0 13.4* 11.0*   HCT 43.5 38.0* 31.8*    168 170     Recent Labs     03/14/22  1515 03/15/22  0551 03/16/22  0557   * 136 135   K 4.1 4.0 4.4   CL 99 98 99   CO2 21 23 21   BUN 12 13 21   CREATININE 0.55* 0.64* 0.84   CALCIUM 9.0 8.7 8.3*     Recent Labs     03/14/22  1515   AST 35   ALT 18   BILITOT 1.1*   ALKPHOS 61     Recent Labs     03/14/22  1605   INR 1.1     No results for input(s): Liv Walters in the last 72 hours. Urinalysis:      Lab Results   Component Value Date    NITRU Negative 03/16/2022    WBCUA 6-9 03/16/2022    BACTERIA Negative 03/16/2022    RBCUA >100 03/16/2022    BLOODU LARGE 03/16/2022    SPECGRAV 1.044 03/16/2022    GLUCOSEU Negative 03/16/2022       Radiology:  XR KNEE LEFT (1-2 VIEWS)   Final Result      Interval placement of left knee arthroplasty with longstem tibial and femoral components with resection of the distal femur. CT KNEE LEFT WO CONTRAST   Final Result      XR CHEST PORTABLE   Final Result   ACUTE RIGHT SUPRACONDYLAR FEMORAL FRACTURE. EXAMINATION: XR KNEE LEFT (MIN 4 VIEWS), XR CHEST PORTABLE. DATE AND TIME:3/14/2022 2:49 PM       CLINICAL HISTORY: SHORTNESS OF BREATH   KNEE GAVE OUT, FALL        COMPARISONS: NONE       FINDINGS: The heart, mediastinum and pulmonary vasculature are within normal limits. Nodular shadow right upper lobe possibly vessel on end. No previous films for comparison. Follow-up recommended to exclude a lung nodule. Otherwise, visualized lung    fields are clear. Bones unremarkable. IMPRESSION:  NO ACTIVE DISEASE. QUESTIONABLE RIGHT LUNG NODULE. XR KNEE LEFT (MIN 4 VIEWS)   Final Result   ACUTE RIGHT SUPRACONDYLAR FEMORAL FRACTURE. EXAMINATION: XR KNEE LEFT (MIN 4 VIEWS), XR CHEST PORTABLE. DATE AND TIME:3/14/2022 2:49 PM       CLINICAL HISTORY: SHORTNESS OF BREATH   KNEE GAVE OUT, FALL        COMPARISONS: NONE       FINDINGS: The heart, mediastinum and pulmonary vasculature are within normal limits. Nodular shadow right upper lobe possibly vessel on end.  No previous films for comparison. Follow-up recommended to exclude a lung nodule. Otherwise, visualized lung    fields are clear. Bones unremarkable. IMPRESSION:  NO ACTIVE DISEASE. QUESTIONABLE RIGHT LUNG NODULE. US DUP LOWER EXTREMITIES BILATERAL VENOUS    (Results Pending)   XR CHEST (SINGLE VIEW FRONTAL)    (Results Pending)     Assessment/Plan:    1. Femur fracture:  s/p repair  2. HTN:  Continue toprol perioperatively; holding triamterene'  3. Tachycardia; low grade fever:  Likely post op fever; UA, CXR and procal ordered. EGK ordered; telemetry ordered  4. Functional Status: Fall precautions. Up with assistance. PT OT  5. Diet: General  6. DVT ppx: Lovenox SCDs  7. Disposition: Dependent on hospital course. Will discharge once medically stable. SW on board for discharge planning. Active Hospital Problems    Diagnosis Date Noted    Other fracture of left femur, initial encounter for closed fracture ([facility] [address] Earnestine Lee 03/14/2022     Additional work up or/and treatment plan may be added today or then after based on clinical progression. I am managing a portion of pt care. Some medical issues are handled by other specialists. Additional work up and treatment should be done in out pt setting by pt PCP and other out pt providers. In addition to examining and evaluating pt, I spent additional time explaining care, normal and abnormal findings, and treatment plan. All of pt questions were answered. Counseling, diet and education were  provided. Case will be discussed with nursing staff when appropriate. Family will be updated if and when appropriate. Diet: ADULT DIET;  Regular    Code Status: Full Code    PT/OT Eval     Electronically signed by Aye Joseph MD on 3/16/2022 at 1:28 PM

## 2022-03-16 NOTE — OP NOTE
Operative Note      Patient: Freddy Fraga  YOB: 1940  MRN: [id number]    Date of Procedure: 3/15/2022    Pre-Op Diagnosis: LEFT DISTAL FEMUR FRACTURE    Post-Op Diagnosis: Same       Procedure(s):  LEFT KNEE DISTAL FEMUR REPLACEMENT    Surgeon(s):  Lieutenant Annie MD    Assistant:   First Assistant: Chuy Knapp; Shiloh Zafar    Anesthesia: Choice    Estimated Blood Loss (mL): 767 cc    Complications: None    Specimens:   * No specimens in log *    Implants:  Implant Name Type Inv.  Item Serial No.  Lot No. LRB No. Used Action   Z DISCONTINUED USE [id number] CEMENT BNE ANTIBIO HI VISC W/ GENTAMICIN SIMPLEX HV - [id number]  Z DISCONTINUED USE [id number] CEMENT BNE ANTIBIO HI VISC W/ GENTAMICIN SIMPLEX HV  [employer] [facility] [id number] Left 2 Implanted   CEMENT BNE 20ML 40GM ACRYL RESIN HI VISC RADPQ FAST SET - [id number]  CEMENT BNE 20ML 40GM ACRYL RESIN HI VISC RADPQ FAST SET  [employer] [facility] [id number] Left 1 Implanted   CEMENT BNE 20ML 40GM ACRYL RESIN HI VISC RADPQ FAST SET - [id number]  CEMENT BNE 20ML 40GM ACRYL RESIN HI VISC RADPQ FAST SET  [employer] [facility] [id number] Left 1 Implanted   COMPONENT FEM STD L DST KNEE MOD REPL SYS GMRS - [id number]  COMPONENT FEM STD L DST KNEE MOD REPL SYS GMRS  [id number] 1 Im[facility]   EXTENSION STEM L30MM DST FEM MOD PC GMRS - [id number]  EXTENSION STEM L30MM DST FEM MOD PC GMRS  [employer] [facility] [id number] Left 1 Implanted   BASEPLATE TIB L SZ 2 KNEE KEEL MOD ROT HNG MONOGRAM - [id number]  BASEPLATE TIB L SZ 2 KNEE KEEL MOD ROT HNG MONOGRAM  [employer] [facility] [id number] Left 1 Implanted   STEM FEM L102MM [id number] KNEE TI W/O POR BODY STR PEDRO GMRS - [id number]  STEM FEM L102MM [id number] KNEE TI W/O POR BODY STR PEDRO GMRS  [employer] [facility] [id number] Left 1 Implanted   EXTENSION STEM XSM L80MM KNEE CO CHROM PEDRO MOD MONOGRAM - [id number]  EXTENSION STEM XSM L80MM KNEE CO CHROM PEDRO MOD [facility] [id number] Left 1 Implanted   IMPLANT PATELLAR [id number] X3 ASYMMETRIC TRIATHLON - [id number]  IMPLANT PATELLAR [id number] X3 ASYMMETRIC TRIATHLON  [employer] [facility] [id number] Left 1 Implanted   INSERT TIB M/L SZ 2 [id number] KNEE MOD ROT HNG MONOGRAM - [id number]  INSERT TIB M/L SZ 2 [id number] KNEE MOD ROT HNG [facility] [id number] Left 1 Implanted   BUSHING FEM UNIV KNEE PLOY MOD PEDRO STEM ROT HNG REV - [id number]  BUSHING FEM UNIV KNEE PLOY MOD PEDRO STEM ROT HNG REV  [employer] [facility] [id number] Left 1 Implanted   BUSHING FEM UNIV KNEE PLOY MOD PEDRO STEM ROT HNG REV - [id number]  BUSHING FEM UNIV KNEE PLOY MOD PEDRO STEM ROT HNG REV  [employer] [facility] [id number] Left 1 Implanted   AXLE TIB PROX KNEE MOD ROT HNG MONOGRAM - [id number]  AXLE TIB PROX KNEE MOD ROT HNG [facility] [id number] Left 1 Implanted   SLEEVE TIB STD UNIV POLYETH MOD PEDRO STEM ROT HNG REV - [id number]  SLEEVE TIB STD UNIV POLYETH MOD PEDRO STEM ROT HNG REV  [employer] [facility] [id number] Left 1 Implanted   INSERT TIB 3DEG DST KNEE POLY BUMPER MOD ROT HNG RS - [id number]  INSERT TIB 3DEG DST KNEE POLY BUMPER MOD ROT HNG RS  [employer] [facility] [id number] Left 1 Implanted   COMPONENT TIB XSM-XL KNEE POLYETH MOD PEDRO STEM ROT HNG REV - [id number]  COMPONENT TIB XSM-XL KNEE POLYETH MOD PEDRO STEM ROT HNG REV  [employer] [facility] [id number] Left 1 Implanted         Drains:   Urethral Catheter (Active)   Catheter Indications Perioperative use for selected surgical procedures 03/15/22 1915   Urine Color Yellow 03/15/22 1915   Urine Appearance Clear 03/15/22 1915       Findings: Significant contracture of the left knee with complex fracture of the left distal femur    Detailed Description of Procedure:   Patient was taken the operating room placed supine the operative table.   The left lower extremity was subsequently prepped and draped as typical for extremity procedure. A timeout was performed, all parties identified, the correct surgical site was noted. A sterile tourniquet was placed in the most proximal aspect of the thigh to allow maximal ability for dissection. The leg was subsequently exsanguinated and tourniquet was inflated to 300 mmHg. After this occurred a standard anterior approach to the knee and distal thigh was performed. Dissection was carried down through skin and subcutaneous tissue elevating flaps both medially and laterally. An extended medial parapatellar arthrotomy with extension into the quadricep musculature and a midline split was performed. There was significant hematoma appreciated upon entering the distal femur and this was subsequently evacuated. 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 Ultimately decision was made to perform a resection of the distal femur in order to more easily remove distal femoral bone. The distal femoral shaft was subsequently cleared of soft tissue and on the posterior aspect of the distal femur this was protected with retractors to avoid penetration of the saw. Subsequently proceeded then to make a saw cut and then proceeded to carefully excise the distal femur in its entirety. This included all comminuted fracture segments. With maintaining all soft tissue attachments to the surrounding structures. There was no evidence of bleeding and no compromise of the vasculature during this portion of the dissection. At this juncture, I turned my attention to an additional femoral cut in order to appropriately sized for a distal femoral component with a 30 mm augment. The prior joint line was utilized as a guide and additional femoral cut was made ensuring protection of the vasculature posteriorly.   I then inserted the trial component and leg length was exact compared to preoperative assessment. At this juncture, I then proceeded to evaluate my tibia. Extensive medial and lateral wear was appreciated most noted in the medial side. I utilized a intramedullary guide and subsequently resected the tibia as is appropriate for a standard polyethylene cut. Additional bone needed to be removed given sclerotic nature of the tibia. Soft tissue prominence of the contracture surrounding the fat pad the lateral aspect of the knee as well as the meniscus was subsequently removed. Additional loose bodies were found and these were subsequently removed. I then proceeded to size my tibia and the large tibial baseplate for the distal femoral replacement from [employer] was most appropriate. Rotation was set and this was subsequently pinned, impacted with an impactor and then subsequently drilled and reamed in the canal for appropriate insertion of the tibial tray. This was subsequently inserted without difficulty and ultimately several trial polyethylene's were utilized with a trial femur and the most appropriate for a size 16 mm polyethylene. This was able to obtain full extension with just slight recurvatum and stable throughout varus and valgus stressing. Femoral component did require a mild amount of external rotation in order to compensate for tibial external torsion as well as lateral tracking of the patella. At this juncture, I proceeded to make a standard flat cut for my patella and drilled as appropriate for a size 32 mm polyethylene. All bony surfaces were subsequently cleaned copiously with normal saline and dried to its maximum components. We were reaching the end of the 120-minute tourniquet time and subsequently proceeded to deflate the tourniquet while cement was being prepared.   Tourniquet was then subsequently reinflated for ultimate cementing of the tibial component and femoral component for an additional tourniquet time of 15 minutes in addition to the 120-minute standard tourniquet time. Final components were subsequently inserted with appropriate rotational alignments and cement was allowed to cure. After cement was cured final polyethylene and 3 degree bumper were subsequently impacted without difficulty. Knee was put through range of motion and was stable throughout with no maltracking of the patella. Range of motion exceeded 120 degrees in flexion and was able to obtain full extension with a 3 degree bumper. Wound was copiously irrigated with chlorhexidine solution and then washed with normal saline. Wound was infiltrated with vancomycin powder. Capsular repair and quadriceps repair was performed with a combination of #1 Vicryl and #1 strata fix. Skin was subsequently closed with 2-0 Vicryl and 3-0 strata fix. A Prineo dressing was applied under a Mepilex dressing and a soft compressive wrap. Knee immobilizer was subsequently reapplied. Requesting a 22 modifier for this case with additional 100% compensation. Complexity of contractures involving the knee with associated fracture required increased surgical time, advanced surgical techniques and advanced utilization of components.     Electronically signed by Caron Romero MD on 3/15/2022 at 8:05 PM

## 2022-03-16 NOTE — PROGRESS NOTES
Progress Note  Patient: Maddison Conte  Unit/Bed: D781/W327-35  YOB: 1940  MRN: [id number]  Acct: [de-identified]   Admitting Diagnosis: Other fracture of left femur, initial encounter for closed fracture [facility]) [S72.8X2A]  Closed nondisplaced fracture of lateral condyle of left femur, initial encounter [facility]) Talbott Taiwo  Date:  3/14/2022  Hospital Day: 2    Chief Complaint:  Closed non-displaced fracture of lateral condyle of left femur     Subjective:   Patient reports moderate pain to operative extremity. His pain complaint is feeling hot and diaphoretic secondary to fever. He denies any chest pain, shortness of breath, cough, abdominal pan or urinary symptoms. Physical Examination:    BP (!) 135/55   Pulse 130   Temp 98.1 °F (36.7 °C)   Resp 18   Ht 5' 10\" (1.778 m)   Wt 260 lb 5.8 oz (118.1 kg)   SpO2 91%   BMI 37.36 kg/m²    Physical Exam     Patient is unable to perform straight leg raise of the operative extremity as of yet. The extremity is neurovascular intact with intact sensation to light touch. Distal pulses intact with brisk capillary refill.      Dressing CDI     LABS:  CBC:   Lab Results   Component Value Date    WBC 10.5 03/16/2022    RBC 3.36 03/16/2022    HGB 11.0 03/16/2022    HCT 31.8 03/16/2022    MCV 94.8 03/16/2022    MCH 32.9 03/16/2022    MCHC 34.7 03/16/2022    RDW 13.4 03/16/2022     03/16/2022     CBC with Differential:   Lab Results   Component Value Date    WBC 10.5 03/16/2022    RBC 3.36 03/16/2022    HGB 11.0 03/16/2022    HCT 31.8 03/16/2022     03/16/2022    MCV 94.8 03/16/2022    MCH 32.9 03/16/2022    MCHC 34.7 03/16/2022    RDW 13.4 03/16/2022    LYMPHOPCT 16.0 03/15/2022    MONOPCT 10.6 03/15/2022    BASOPCT 0.4 03/15/2022    MONOSABS 0.8 03/15/2022    LYMPHSABS 1.2 03/15/2022    EOSABS 0.2 03/15/2022    BASOSABS 0.0 03/15/2022     CMP:    Lab Results   Component Value Date     03/16/2022    K 4.4 03/16/2022    CL 99 03/16/2022    CO2 21 03/16/2022    BUN 21 03/16/2022    CREATININE 0.84 03/16/2022    GFRAA >60.0 03/16/2022    LABGLOM >60.0 03/16/2022    GLUCOSE 191 03/16/2022    PROT 7.5 03/14/2022    LABALBU 3.7 03/14/2022    CALCIUM 8.3 03/16/2022    BILITOT 1.1 03/14/2022    ALKPHOS 61 03/14/2022    AST 35 03/14/2022    ALT 18 03/14/2022     BMP:    Lab Results   Component Value Date     03/16/2022    K 4.4 03/16/2022    CL 99 03/16/2022    CO2 21 03/16/2022    BUN 21 03/16/2022    LABALBU 3.7 03/14/2022    CREATININE 0.84 03/16/2022    CALCIUM 8.3 03/16/2022    GFRAA >60.0 03/16/2022    LABGLOM >60.0 03/16/2022    GLUCOSE 191 03/16/2022     Magnesium:  No results found for: MG      Assessment:    Active Hospital Problems    Diagnosis Date Noted    Other fracture of left femur, initial encounter for closed fracture [facility]) Radha  03/14/2022     Patient pod1 left distal knee femur replacement. Acute post-operative pain: reports moderate pain to operative extremity. Is unable to straight leg raise at this time. This will improve. Patient to remain in knee immobilizer until able to perform this. Patient with low grade fever overnight and this morning. He is also tachycardic with HR in the 130's. He was seen and evaluated by cardiology and recommended to continue beta blocker post-operatively. Fever is not uncommon after surgery and patient does not have any leukocytosis concerning for infection. He does not appear to be in any acute distress. He does not appear to have any signs of infection however given his fever, tachycardia and ill feeling will obtain chest xray and UA to evaluate for atelectasis vs pneumonia and UTI. Recommend reaching out to medicine for any further workup for fever and tachycardia outside of chest xray and UA. Plan:  1. Continue post-operative course  2. Chest xray and UA to rule out atelectasis vs pneumonia and UTI  3.  PT/OT: wbat to operative extremity, knee immobilizer to remain in place until patient is able to straight leg raise. Please send immobilizer home with patient. 4. Recommend reaching out to medicine for further workup outside of chest xray and UA for fever and tachycardia if they feel necessasry   5. Contine pain control   6. Discharge planning: discharge disposition to be determined.        Electronically signedby CHARLIE Alvarez CNP on 3/16/2022 at 10:55 AM

## 2022-03-17 ENCOUNTER — APPOINTMENT (OUTPATIENT)
Dept: CT IMAGING | Age: 82
DRG: 470 | End: 2022-03-17
Payer: MEDICARE

## 2022-03-17 LAB
HCT VFR BLD CALC: 28.9 % (ref 42–52)
HEMOGLOBIN: 10 G/DL (ref 14–18)
MCH RBC QN AUTO: 33.1 PG (ref 27–31.3)
MCHC RBC AUTO-ENTMCNC: 34.6 % (ref 33–37)
MCV RBC AUTO: 95.5 FL (ref 80–100)
PDW BLD-RTO: 13.5 % (ref 11.5–14.5)
PLATELET # BLD: 179 K/UL (ref 130–400)
RBC # BLD: 3.02 M/UL (ref 4.7–6.1)
WBC # BLD: 11.7 K/UL (ref 4.8–10.8)

## 2022-03-17 PROCEDURE — 85027 COMPLETE CBC AUTOMATED: CPT

## 2022-03-17 PROCEDURE — 6360000002 HC RX W HCPCS: Performed by: INTERNAL MEDICINE

## 2022-03-17 PROCEDURE — 1210000000 HC MED SURG R&B

## 2022-03-17 PROCEDURE — 6360000002 HC RX W HCPCS: Performed by: NURSE PRACTITIONER

## 2022-03-17 PROCEDURE — 36415 COLL VENOUS BLD VENIPUNCTURE: CPT

## 2022-03-17 PROCEDURE — 71250 CT THORAX DX C-: CPT

## 2022-03-17 PROCEDURE — 99232 SBSQ HOSP IP/OBS MODERATE 35: CPT | Performed by: INTERNAL MEDICINE

## 2022-03-17 PROCEDURE — 6370000000 HC RX 637 (ALT 250 FOR IP): Performed by: ORTHOPAEDIC SURGERY

## 2022-03-17 PROCEDURE — 99222 1ST HOSP IP/OBS MODERATE 55: CPT | Performed by: INTERNAL MEDICINE

## 2022-03-17 PROCEDURE — 6370000000 HC RX 637 (ALT 250 FOR IP): Performed by: NURSE PRACTITIONER

## 2022-03-17 PROCEDURE — 97535 SELF CARE MNGMENT TRAINING: CPT

## 2022-03-17 PROCEDURE — 99221 1ST HOSP IP/OBS SF/LOW 40: CPT | Performed by: THORACIC SURGERY (CARDIOTHORACIC VASCULAR SURGERY)

## 2022-03-17 PROCEDURE — 6360000002 HC RX W HCPCS: Performed by: ORTHOPAEDIC SURGERY

## 2022-03-17 PROCEDURE — 2580000003 HC RX 258: Performed by: ORTHOPAEDIC SURGERY

## 2022-03-17 RX ORDER — MORPHINE SULFATE 2 MG/ML
2 INJECTION, SOLUTION INTRAMUSCULAR; INTRAVENOUS EVERY 4 HOURS PRN
Status: DISCONTINUED | OUTPATIENT
Start: 2022-03-17 | End: 2022-03-18 | Stop reason: HOSPADM

## 2022-03-17 RX ORDER — HYDROCODONE BITARTRATE AND ACETAMINOPHEN 5; 325 MG/1; MG/1
1 TABLET ORAL EVERY 4 HOURS PRN
Status: DISCONTINUED | OUTPATIENT
Start: 2022-03-17 | End: 2022-03-18 | Stop reason: HOSPADM

## 2022-03-17 RX ADMIN — ENOXAPARIN SODIUM 30 MG: 100 INJECTION SUBCUTANEOUS at 21:52

## 2022-03-17 RX ADMIN — TRIAMTERENE AND HYDROCHLOROTHIAZIDE 0.5 TABLET: 37.5; 25 TABLET ORAL at 08:19

## 2022-03-17 RX ADMIN — ENOXAPARIN SODIUM 30 MG: 100 INJECTION SUBCUTANEOUS at 08:19

## 2022-03-17 RX ADMIN — FUROSEMIDE 20 MG: 20 TABLET ORAL at 08:19

## 2022-03-17 RX ADMIN — MORPHINE SULFATE 2 MG: 2 INJECTION, SOLUTION INTRAMUSCULAR; INTRAVENOUS at 08:18

## 2022-03-17 RX ADMIN — MORPHINE SULFATE 2 MG: 2 INJECTION, SOLUTION INTRAMUSCULAR; INTRAVENOUS at 19:05

## 2022-03-17 RX ADMIN — ONDANSETRON 4 MG: 2 INJECTION INTRAMUSCULAR; INTRAVENOUS at 19:05

## 2022-03-17 RX ADMIN — ONDANSETRON 4 MG: 2 INJECTION INTRAMUSCULAR; INTRAVENOUS at 08:19

## 2022-03-17 RX ADMIN — HYDROCODONE BITARTRATE AND ACETAMINOPHEN 1 TABLET: 5; 325 TABLET ORAL at 21:52

## 2022-03-17 RX ADMIN — Medication 650 MG: at 04:02

## 2022-03-17 RX ADMIN — Medication 650 MG: at 16:06

## 2022-03-17 RX ADMIN — Medication 650 MG: at 21:52

## 2022-03-17 RX ADMIN — METOPROLOL SUCCINATE 50 MG: 50 TABLET, EXTENDED RELEASE ORAL at 08:19

## 2022-03-17 RX ADMIN — Medication 650 MG: at 08:19

## 2022-03-17 RX ADMIN — MORPHINE SULFATE 2 MG: 2 INJECTION, SOLUTION INTRAMUSCULAR; INTRAVENOUS at 23:08

## 2022-03-17 RX ADMIN — SODIUM CHLORIDE, PRESERVATIVE FREE 10 ML: 5 INJECTION INTRAVENOUS at 21:53

## 2022-03-17 ASSESSMENT — PAIN DESCRIPTION - DESCRIPTORS: DESCRIPTORS: ACHING

## 2022-03-17 ASSESSMENT — PAIN DESCRIPTION - ORIENTATION: ORIENTATION: LEFT

## 2022-03-17 ASSESSMENT — ENCOUNTER SYMPTOMS
CHEST TIGHTNESS: 0
BLOOD IN STOOL: 0
EYES NEGATIVE: 1
WHEEZING: 0
NAUSEA: 0
SHORTNESS OF BREATH: 0
GASTROINTESTINAL NEGATIVE: 1
COUGH: 0
STRIDOR: 0
RESPIRATORY NEGATIVE: 1
BACK PAIN: 1

## 2022-03-17 ASSESSMENT — PAIN SCALES - GENERAL
PAINLEVEL_OUTOF10: 6
PAINLEVEL_OUTOF10: 10
PAINLEVEL_OUTOF10: 8
PAINLEVEL_OUTOF10: 6
PAINLEVEL_OUTOF10: 6
PAINLEVEL_OUTOF10: 7
PAINLEVEL_OUTOF10: 8

## 2022-03-17 ASSESSMENT — PAIN DESCRIPTION - PAIN TYPE: TYPE: SURGICAL PAIN

## 2022-03-17 ASSESSMENT — PAIN DESCRIPTION - FREQUENCY: FREQUENCY: INTERMITTENT

## 2022-03-17 ASSESSMENT — PAIN DESCRIPTION - LOCATION: LOCATION: KNEE

## 2022-03-17 NOTE — PROGRESS NOTES
Hospitalist Progress Note      PCP: Lali Greenberg MD    Date of Admission: 3/14/2022    Chief Complaint:    Chief Complaint   Patient presents with    Knee Pain     LEFT KNEE PAIN  STATES HE WENT TO GET IN TRUCK AND HIS LEFT KNEE FOLDED UNDER [employer] HOUSE     Subjective:  Patient still has pain with movement. 12 point ROS negative other than mentioned above     Medications:  Reviewed    Infusion Medications    sodium chloride       Scheduled Medications    furosemide  20 mg Oral Daily    metoprolol succinate  50 mg Oral Daily    triamterene-hydroCHLOROthiazide  0.5 tablet Oral Daily    sodium chloride flush  10 mL IntraVENous 2 times per day    acetaminophen  650 mg Oral Q6H    enoxaparin  30 mg SubCUTAneous BID     PRN Meds: HYDROcodone 5 mg - acetaminophen, morphine, sodium chloride flush, sodium chloride, ondansetron **OR** ondansetron, polyethylene glycol, potassium chloride **OR** potassium alternative oral replacement **OR** potassium chloride, magnesium sulfate      Intake/Output Summary (Last 24 hours) at 3/17/2022 1344  Last data filed at 3/17/2022 1016  Gross per 24 hour   Intake    Output 450 ml   Net -450 ml       Exam:    BP (!) 138/51   Pulse 91   Temp 97.7 °F (36.5 °C) (Oral)   Resp 18   Ht 5' 10\" (1.778 m)   Wt 260 lb 5.8 oz (118.1 kg)   SpO2 95%   BMI 37.36 kg/m²     General appearance: alert, appears stated age and cooperative  Skin:  No rashes or lesions  HEENT: Head: Normal, normocephalic, atraumatic. [place]    Neck: supple, symmetrical, trachea midline  Lungs: clear to auscultation bilaterally  Heart: regular rate and rhythm  Abdomen: soft, non-tender; bowel sounds normal; no masses,  no organomegaly  Extremities: +2 edema  Neurologic: Non focal    Labs:   Recent Labs     03/15/22  0551 03/16/22  0557 03/17/22  0616   WBC 7.5 10.5 11.7*   HGB 13.4* 11.0* 10.0*   HCT 38.0* 31.8* 28.9*    170 179     Recent Labs     03/14/22  1515 03/15/22  0551 03/16/22  0557   * 136 135   K 4.1 4.0 4.4   CL 99 98 99   CO2 21 23 21   BUN 12 13 21   CREATININE 0.55* 0.64* 0.84   CALCIUM 9.0 8.7 8.3*     Recent Labs     03/14/22  1515   AST 35   ALT 18   BILITOT 1.1*   ALKPHOS 61     Recent Labs     03/14/22  1605   INR 1.1     No results for input(s): Jessie Jovanny in the last 72 hours. Urinalysis:      Lab Results   Component Value Date    NITRU Negative 03/16/2022    WBCUA 6-9 03/16/2022    BACTERIA Negative 03/16/2022    RBCUA >100 03/16/2022    BLOODU LARGE 03/16/2022    SPECGRAV 1.044 03/16/2022    GLUCOSEU Negative 03/16/2022       Radiology:  CT CHEST WO CONTRAST   Final Result      No CT evidence of acute abnormality. No suspicious pulmonary nodule or mass. 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 XR CHEST (SINGLE VIEW FRONTAL)   Final Result      US DUP LOWER EXTREMITIES BILATERAL VENOUS   Final Result      No acute DVT of the visualized bilateral lower extremity veins from the groin to the knee. Calf veins not well visualized/evaluated. XR KNEE LEFT (1-2 VIEWS)   Final Result      Interval placement of left knee arthroplasty with longstem tibial and femoral components with resection of the distal femur. CT KNEE LEFT WO CONTRAST   Final Result      XR CHEST PORTABLE   Final Result   ACUTE RIGHT SUPRACONDYLAR FEMORAL FRACTURE. EXAMINATION: XR KNEE LEFT (MIN 4 VIEWS), XR CHEST PORTABLE. DATE AND TIME:3/14/2022 2:49 PM       CLINICAL HISTORY: SHORTNESS OF BREATH   KNEE GAVE OUT, FALL        COMPARISONS: NONE       FINDINGS: The heart, mediastinum and pulmonary vasculature are within normal limits. Nodular shadow right upper lobe possibly vessel on end. No previous films for comparison. Follow-up recommended to exclude a lung nodule. Otherwise, visualized lung    fields are clear. Bones unremarkable. IMPRESSION:  NO ACTIVE DISEASE. QUESTIONABLE RIGHT LUNG NODULE.                   XR KNEE LEFT (MIN 4 VIEWS)   Final Result   ACUTE RIGHT SUPRACONDYLAR FEMORAL FRACTURE. EXAMINATION: XR KNEE LEFT (MIN 4 VIEWS), XR CHEST PORTABLE. DATE AND TIME:3/14/2022 2:49 PM       CLINICAL HISTORY: SHORTNESS OF BREATH   KNEE GAVE OUT, FALL        COMPARISONS: NONE       FINDINGS: The heart, mediastinum and pulmonary vasculature are within normal limits. Nodular shadow right upper lobe possibly vessel on end. No previous films for comparison. Follow-up recommended to exclude a lung nodule. Otherwise, visualized lung    fields are clear. Bones unremarkable. IMPRESSION:  NO ACTIVE DISEASE. QUESTIONABLE RIGHT LUNG NODULE. Assessment/Plan:    1. Femur fracture:  s/p repair  2. HTN:  Continue toprol perioperatively; holding triamterene'  3. Tachycardia; low grade fever:  Resolved; likely due to recent surgery; observe  4. 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 Functional Status: Fall precautions. Up with assistance. PT OT  6. Diet: General  7. DVT ppx: Lovenox SCDs  8. Disposition: Dependent on hospital course. Will discharge once medically stable. SW on board for discharge planning. Active Hospital Problems    Diagnosis Date Noted    Other fracture of left femur, initial encounter for closed fracture ([facility] [address] Tank Lee 03/14/2022     Additional work up or/and treatment plan may be added today or then after based on clinical progression. I am managing a portion of pt care. Some medical issues are handled by other specialists. Additional work up and treatment should be done in out pt setting by pt PCP and other out pt providers. In addition to examining and evaluating pt, I spent additional time explaining care, normal and abnormal findings, and treatment plan. All of pt questions were answered. Counseling, diet and education were  provided. Case will be discussed with nursing staff when appropriate. Family will be updated if and when appropriate. Diet: ADULT DIET;  Regular    Code Status: Full Code    PT/OT Eval     Electronically signed by Tacho Wellington MD on 3/17/2022 at 1:44 PM

## 2022-03-17 NOTE — CONSULTS
Inpatient consult to Pulmonology  Consult performed by: Karolyn Pisano MD  Consult ordered by: Shelbi Stephens MD              Admit Date: 3/14/2022    PCP:  Evie Rios MD    CHIEF COMPLAINT:  Femur fracture. HPI:  The patient is a [de-identified] y.o. male with significant past medical history of hypertension and obesity who presented with left knee pain and a fall. He was found to have femur fracture. He underwent repair with orthopedic surgery when he is 2 days now after operation. We have been asked to see him due to concern about lung nodule. This was picked up on a previous chest x-ray. Interestingly, he underwent a CT chest today which I reviewed personally and interpreted the results independently. There is no evidence of lung nodule. 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  Echocardiogram showed mild pulmonary hypertension. CT chest reviewed personally and results interpreted  independently. Past Medical History:  History reviewed. No pertinent past medical history. Past Surgical History:    History reviewed. No pertinent surgical history. Current Medications:     furosemide  20 mg Oral Daily    metoprolol succinate  50 mg Oral Daily    triamterene-hydroCHLOROthiazide  0.5 tablet Oral Daily    sodium chloride flush  10 mL IntraVENous 2 times per day    acetaminophen  650 mg Oral Q6H    enoxaparin  30 mg SubCUTAneous BID     Home Meds:  Prior to Admission medications    Medication Sig Start Date End Date Taking? Authorizing Provider   oxyCODONE (ROXICODONE) 5 MG immediate release tablet Take 1 tablet by mouth every 6 hours as needed for Pain for up to 7 days.  3/16/22 3/23/22 Yes Fauzia Goode, APRN - CNP   furosemide (LASIX) 20 MG tablet Take 20 mg by mouth daily   Yes Historical Provider, MD   triamterene-hydroCHLOROthiazide (MAXZIDE-25) 37.5-25 MG per tablet Take 0.5 tablets by mouth daily   Yes Historical Provider, MD   metoprolol succinate (TOPROL XL) 50 MG extended release tablet Take 50 mg by mouth daily   Yes Historical Provider, MD       Allergies:  Aspirin     Social History:      reports that he quit smoking about 40 years ago. His smoking use included cigarettes. He quit after 40.00 years of use. He has quit using smokeless tobacco. He reports that he does not drink alcohol and does not use drugs. TOBACCO:   reports that he quit smoking about 40 years ago. His smoking use included cigarettes. He quit after 40.00 years of use. He has quit using smokeless tobacco.     ETOH:   reports no history of alcohol use. Family History:   None pertinent       Review of Systems  Complete review of systems done and negative unless otherwise noted positive. Objective:     PHYSICAL EXAM:      VITALS:  BP (!) 138/51   Pulse 91   Temp 97.7 °F (36.5 °C) (Oral)   Resp 18   Ht 5' 10\" (1.778 m)   Wt 260 lb 5.8 oz (118.1 kg)   SpO2 94%   BMI 37.36 kg/m²   24HR INTAKE/OUTPUT:      Intake/Output Summary (Last 24 hours) at 3/17/2022 1223  Last data filed at 3/17/2022 1016  Gross per 24 hour   Intake    Output 450 ml   Net -450 ml     CURRENT PULSE OXIMETRY:  SpO2: 94 %  24HR PULSE OXIMETRY RANGE:  SpO2  Av %  Min: 93 %  Max: 96 %    General appearance - alert, ill appearing, and in no distress  Mental status - alert, oriented to person, place, and time  Eyes - pupils equal and reactive, extraocular eye movements intact. Normal sclera and conjunctiva   Nose - normal and patent, no erythema   Neck - thick neck, supple, no significant adenopathy. No thyromegaly. Chest -diminished bilaterally to auscultation, no wheezes, rales or rhonchi, symmetric air entry  Heart - normal rate, regular rhythm, normal S1, S2, no murmurs, rubs, clicks or gallops  Abdomen - soft, nontender, nondistended, no masses or organomegaly. Bowel sounds present.  No hernia   Rectal - deferred, not clinically indicated  Neurological - alert, oriented, normal speech, no focal findings or movement disorder noted, motor and sensory grossly normal bilaterally  Musculoskeletal - no joint tenderness, deformity or swelling  Extremities - peripheral pulses normal, no pedal edema, no clubbing or cyanosis  Skin - normal coloration and turgor, no rashes, no suspicious skin lesions noted  Psych: Normal mood          DATA:    CBC:   Recent Labs     03/15/22  0551 03/16/22  0557 03/17/22  0616   WBC 7.5 10.5 11.7*   HGB 13.4* 11.0* 10.0*   HCT 38.0* 31.8* 28.9*    170 179     BMP:    Recent Labs     03/14/22  1515 03/15/22  0551 03/16/22  0557   * 136 135   K 4.1 4.0 4.4   CL 99 98 99   CO2 21 23 21   BUN 12 13 21   CREATININE 0.55* 0.64* 0.84   GLUCOSE 115* 138* 191*   CALCIUM 9.0 8.7 8.3*     HEPATIC:   Recent Labs     03/14/22  1515   AST 35   ALT 18   BILITOT 1.1*   ALKPHOS 61     LACTATE: No results for input(s): LACTA in the last 72 hours. PROCALCITONIN:   Recent Labs     03/16/22  1127   PROCAL 0.62*     INR:   Recent Labs     03/14/22  1605   INR 1.1     BLOOD GAS: No results for input(s): PH, PCO2, PO2, HCO3, O2SAT in the last 72 hours. UA:  Recent Labs     03/16/22  1115   COLORU ORANGE*   NITRU Negative   LEUKOCYTESUR TRACE*   GLUCOSEU Negative   KETUA TRACE*   RBCUA >100*   WBCUA 6-9*   BACTERIA Negative       Radiology Review:    CXR portable: Results for orders placed during the hospital encounter of 03/14/22    XR CHEST PORTABLE    Narrative  EXAMINATION: 4 VIEWS OF THE LEFT KNEE    DATE AND TIME:3/14/2022 2:49 PM    CLINICAL HISTORY: ACUTE ANTERIOR LEFT KNEE PAIN. KNEE GAVE OUT, FALL    COMPARISON: NONE AVAILABLE. Findings: Acute lateral condylar fracture with extension proximally into the right femoral shaft . Minimal 5 mm displacement. Alignment normal. Slight widening of the lateral joint space. Bony spurring s and joint space narrowing consistent with  severe tricompartmental degenerative changes. Small effusion.     Impression  ACUTE RIGHT SUPRACONDYLAR FEMORAL FRACTURE. EXAMINATION: XR KNEE LEFT (MIN 4 VIEWS), XR CHEST PORTABLE. DATE AND TIME:3/14/2022 2:49 PM    CLINICAL HISTORY: SHORTNESS OF BREATH   KNEE GAVE OUT, FALL    COMPARISONS: NONE    FINDINGS: The heart, mediastinum and pulmonary vasculature are within normal limits. Nodular shadow right upper lobe possibly vessel on end. No previous films for comparison. Follow-up recommended to exclude a lung nodule. Otherwise, visualized lung  fields are clear. Bones unremarkable. IMPRESSION:  NO ACTIVE DISEASE. QUESTIONABLE RIGHT LUNG NODULE. Impression:     -Abnormal CT chest.  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 -Mild pulmonary pretension evident on echo. -Femur fracture status post fall. Recommendations:    -I went over the results of the CT chest with the patient in details and answered all his questions.  -Would suggest cardiothoracic surgery consultation. Likely will need observation.  -Might need images with contrast.  But will defer to CT surgery. -DVT and GI prophylaxis.        Electronically signed by Alonzo Shah MD on 3/17/2022 at 12:23 PM

## 2022-03-17 NOTE — PROGRESS NOTES
Progress Note  Patient: Paul Cruzs  Unit/Bed: R302/Z582-72  YOB: 1940  MRN: [id number]  Acct: [de-identified]   Admitting Diagnosis: Other fracture of left femur, initial encounter for closed fracture [facility]) [S72.8X2A]  Closed nondisplaced fracture of lateral condyle of left femur, initial encounter [facility]) Emily Infante  Date:  3/14/2022  Hospital Day: 3    Chief Complaint:  Closed non-displaced fracture of lateral condyle of left femur     Subjective:   Patient reports moderate pain to operative extremity. His pain complaint is feeling hot and diaphoretic secondary to fever. He denies any chest pain, shortness of breath, cough, abdominal pan or urinary symptoms. Physical Examination:    BP (!) 138/51   Pulse 91   Temp 97.7 °F (36.5 °C) (Oral)   Resp 18   Ht 5' 10\" (1.778 m)   Wt 260 lb 5.8 oz (118.1 kg)   SpO2 94%   BMI 37.36 kg/m²    Physical Exam     Patient has better control of left lower extremity and is able to straight leg raise better than he was yesterday. Recommend keeping knee immobilizer to left lower extremity until patient is able to fully straight leg raise. The extremity is neurovascular intact with intact sensation to light touch. Distal pulses intact with brisk capillary refill.      Dressing CDI     LABS:  CBC:   Lab Results   Component Value Date    WBC 11.7 03/17/2022    RBC 3.02 03/17/2022    HGB 10.0 03/17/2022    HCT 28.9 03/17/2022    MCV 95.5 03/17/2022    MCH 33.1 03/17/2022    MCHC 34.6 03/17/2022    RDW 13.5 03/17/2022     03/17/2022     CBC with Differential:   Lab Results   Component Value Date    WBC 11.7 03/17/2022    RBC 3.02 03/17/2022    HGB 10.0 03/17/2022    HCT 28.9 03/17/2022     03/17/2022    MCV 95.5 03/17/2022    MCH 33.1 03/17/2022    MCHC 34.6 03/17/2022    RDW 13.5 03/17/2022    LYMPHOPCT 16.0 03/15/2022    MONOPCT 10.6 03/15/2022    BASOPCT 0.4 03/15/2022    MONOSABS 0.8 03/15/2022    LYMPHSABS 1.2 03/15/2022    EOSABS 0.2 03/15/2022    BASOSABS 0.0 03/15/2022     CMP:    Lab Results   Component Value Date     03/16/2022    K 4.4 03/16/2022    CL 99 03/16/2022    CO2 21 03/16/2022    BUN 21 03/16/2022    CREATININE 0.84 03/16/2022    GFRAA >60.0 03/16/2022    LABGLOM >60.0 03/16/2022    GLUCOSE 191 03/16/2022    PROT 7.5 03/14/2022    LABALBU 3.7 03/14/2022    CALCIUM 8.3 03/16/2022    BILITOT 1.1 03/14/2022    ALKPHOS 61 03/14/2022    AST 35 03/14/2022    ALT 18 03/14/2022     BMP:    Lab Results   Component Value Date     03/16/2022    K 4.4 03/16/2022    CL 99 03/16/2022    CO2 21 03/16/2022    BUN 21 03/16/2022    LABALBU 3.7 03/14/2022    CREATININE 0.84 03/16/2022    CALCIUM 8.3 03/16/2022    GFRAA >60.0 03/16/2022    LABGLOM >60.0 03/16/2022    GLUCOSE 191 03/16/2022     Magnesium:  No results found for: MG      Assessment:    Active Hospital Problems    Diagnosis Date Noted    Other fracture of left femur, initial encounter for closed fracture ([address] Dylon Mcdaniel 03/14/2022     Patient pod2 left distal knee femur replacement. Acute post-operative pain: reports moderate pain to operative extremity. Patient with fever and tachycardia yesterday. CXR and UA obtained. CXR is negative for acute cardiopulmonary process however there was an incidental finding of a nodular density of the right upper lung. This nodule was seen on chest xray on 3/14/22 as well. I do not see any other imaging noting this nodule. Would recommend ct scan for further evaluation however will let medicine manage this. Plan:  1. Continue post-operative course  2. Chest xray and UA to rule out atelectasis vs pneumonia and UTI  3. PT/OT: wbat to operative extremity, knee immobilizer to remain in place until patient is able to straight leg raise. Please send immobilizer home with patient. 4. Discharge planning; patient can discharge to SNF from orthopedic standpoint when cleared by medicine. Scripts for discharge on chart. Electronically signedby CHARLIE Ramirez - CNP on 3/17/2022 at 9:10 AM

## 2022-03-17 NOTE — CARE COORDINATION
DC planning updates:    Plan remains [facility] [place] when medically clear. Patient has cardiology, pulmonology, and thoracic surgery on consult. He continues to experience pain in his operative extremity (LLE).

## 2022-03-17 NOTE — PROGRESS NOTES
Physical Therapy Med Surg Daily Treatment Note  Facility/Department: [address] [place] [address] [address]  Room: [place] [id number]       NAME: Stan Bonilla  : 1940 ([de-identified] y.o.)  MRN: [id number]  CODE STATUS: Full Code    Date of Service: 3/17/2022    Patient Diagnosis(es): Other fracture of left femur, initial encounter for closed fracture ([facility] [address] [S72.8X2A]  Closed nondisplaced fracture of lateral condyle of left femur, initial encounter [facility]) [S72.425A]   Chief Complaint   Patient presents with    Knee Pain     LEFT KNEE PAIN  STATES HE WENT TO GET IN TRUCK AND HIS LEFT KNEE FOLDED UNDER [address]     Patient Active Problem List    Diagnosis Date Noted    Other fracture of left femur, initial encounter for closed fracture ([facility] [address] 2022        History reviewed. No pertinent past medical history.   Past Surgical History:   Procedure Laterality Date    TOTAL KNEE ARTHROPLASTY Left 3/15/2022    LEFT KNEE DISTAL FEMUR REPLACEMENT performed by Lemuel Coffman MD at [address] [place] [address]  Restrictions/Precautions: Weight Bearing  Lower Extremity Weight Bearing Restrictions  Left Lower Extremity Weight Bearing: Weight Bearing As Tolerated  Required Braces or Orthoses  Left Lower Extremity Brace: Knee Immobilizer    SUBJECTIVE   General  Chart Reviewed: Yes  Family / Caregiver Present: No  Subjective  Subjective: \"i'm in so much pain but i want to get on commode\"  General Comment  Comments: agreeable to try therapy    Pre-Session Pain Report     Pain Screening  Patient Currently in Pain: Yes       Post-Session Pain Report  Pain Assessment  Pain Assessment: 0-10  Pain Level: 6  Pain Type: Surgical pain  Pain Location: Knee  Pain Orientation: Left  Pain Descriptors: Aching  Pain Frequency: Intermittent         OBJECTIVE   Orientation  Overall Orientation Status: Within Functional Limits    Bed mobility  Supine to Sit: Moderate assistance;2 Person assistance  Sit to Supine: 2 Person assistance;Maximum assistance  Comment: pt able to minimally move right leg at this time to get to edge of bed. head of bed mildly elevated    Transfers  Sit to Stand: Maximum Assistance;2 Person Assistance;Dependent/Total  Stand to sit: Dependent/Total;Maximum Assistance;2 Person Assistance  Bed to Chair: Dependent/Total;2 Person Assistance  Comment: franco steady used to get pt to standing from bed and onto commode. pt very fearful and slow moving due to pain. elevated bed for ease. Transfer to commode and back to bed. Standing for hygiene and practice. ASSESSMENT pt tolerated sitting edge of bed and keeping his balance while hygiene performed. Pt able to use franco steady successfully but with lots of pain. Pt transferred back to bed after commode use and cleanup. Notified staff of bm and need for pain meds. Lots of time needed for sit to stand transfers with and without franco steady. Pt able to stand from bed with ww but was very fearful and pushed fwd on ww. Safest with franco steady at this time. Discharge Recommendations:  Continue to assess pending progress,Patient would benefit from continued therapy after discharge    Goals  Short term goals  Short term goal 1: Min A for HEP to improve LE strength and activity tolerance  Long term goals  Long term goal 1: rolling with Min A  Long term goal 2: Supine<>sit with Mod A  Long term goal 3: Unsupported sitting with supervision  Long term goal 4: Sit<>Stand with Mod A  Long term goal 5: Stand with 2ww x 1 min with Min A  Patient Goals   Patient goals : to get better    PLAN    Times per week: 5-7  Times per day:  (1-2)        [facility] (6 CLICK) BASIC MOBILITY  AM-PAC Inpatient Mobility Raw Score : 9     Therapy Time   Individual   Time In 1310   Time Out 1350   Minutes 40      40 minutes bed mob/transfer/seated balance.         Marlea Sandhoff, PTA, 03/17/22 at 2:01 PM         Definitions for assistance levels  Independent = pt does not require any physical supervision or assistance from another person for activity completion. Device may be needed.   Stand by assistance = pt requires verbal cues or instructions from another person, close to but not touching, to perform the activity  Minimal assistance= pt performs 75% or more of the activity; assistance is required to complete the activity  Moderate assistance= pt performs 50% of the activity; assistance is required to complete the activity  Maximal assistance = pt performs 25% of the activity; assistance is required to complete the activity  Dependent = pt requires total physical assistance to accomplish the task

## 2022-03-17 NOTE — CONSULTS
History and Physical  [facility]  Unit/Bed:W291/W291-01  YOB: 1940  MRN: [id number]  Acct: [de-identified]   Admitting Diagnosis: Other fracture of left femur, initial encounter for closed fracture [facility]) [S72.8X2A]  Closed nondisplaced fracture of lateral condyle of left femur, initial encounter [facility]) [place] Date:  3/14/2022  Hospital Day: 3      Chief Complaint:   Pulmonary artery dilation    History of Present Illness:   Pt admitted after a fall.   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    Allergies   Allergen Reactions    Aspirin        Current Facility-Administered Medications   Medication Dose Route Frequency Provider Last Rate Last Admin    HYDROcodone-acetaminophen (NORCO) 5-325 MG per tablet 1 tablet  1 tablet Oral Q4H PRN CHARLIE Loera - CNP        morphine (PF) injection 2 mg  2 mg IntraVENous Q4H PRN CHARLIE Loera - CNP   2 mg at 03/17/22 0818    furosemide (LASIX) tablet 20 mg  20 mg Oral Daily Jania Carmichael MD   20 mg at 03/17/22 0819    metoprolol succinate (TOPROL XL) extended release tablet 50 mg  50 mg Oral Daily Jania Carmichael MD   50 mg at 03/17/22 0819    triamterene-hydroCHLOROthiazide (MAXZIDE-25) 37.5-25 MG per tablet 0.5 tablet  0.5 tablet Oral Daily Jania Carmichael MD   0.5 tablet at 03/17/22 0819    sodium chloride flush 0.9 % injection 10 mL  10 mL IntraVENous 2 times per day Jania Carmichael MD   10 mL at 03/16/22 2048    sodium chloride flush 0.9 % injection 10 mL  10 mL IntraVENous PRN Jania Carmichael MD        0.9 % sodium chloride infusion  25 mL IntraVENous PRN Jania Carmichael MD        acetaminophen (TYLENOL) tablet 650 mg  650 mg Oral Q6H Jania Carmichael MD   650 mg at 03/17/22 0819    ondansetron (ZOFRAN-ODT) disintegrating tablet 4 mg  4 mg Oral Q8H PRN Jania Carmichael MD        Or    ondansetron [facility]) injection 4 mg  4 mg IntraVENous Q6H PRN Jania Carmichael MD   4 mg at 03/17/22 5881  polyethylene glycol (GLYCOLAX) packet 17 g  17 g Oral Daily PRN Lieutenant Annie MD   17 g at 22 0114    potassium chloride (KLOR-CON M) extended release tablet 40 mEq  40 mEq Oral PRN Lieutenant Annie MD        Or    potassium bicarb-citric acid (EFFER-K) effervescent tablet 40 mEq  40 mEq Oral PRN Lieutenant Annie MD        Or    potassium chloride 10 mEq/100 mL IVPB (Peripheral Line)  10 mEq IntraVENous PRN Lieutenant Annie MD        magnesium sulfate 2000 mg in 50 mL IVPB premix  2,000 mg IntraVENous PRN Lieutenant Annie MD        enoxaparin (LOVENOX) injection 30 mg  30 mg SubCUTAneous BID Diaz Montague MD   30 mg at 22 6440       PMHx:  History reviewed. No pertinent past medical history. PSHx:  Past Surgical History:   Procedure Laterality Date    TOTAL KNEE ARTHROPLASTY Left 3/15/2022    LEFT KNEE DISTAL FEMUR REPLACEMENT performed by Lieutenant Annie MD at [place] [address] Hx:  Social History     Socioeconomic History    Marital status: Single     Spouse name: None    Number of children: None    Years of education: None    Highest education level: None   Occupational History    None   Tobacco Use    Smoking status: Former Smoker     Years: 40.00     Types: Cigarettes     Quit date:      Years since quittin.2    Smokeless tobacco: Former User    Tobacco comment: 40 years ago per patient   Vaping Use    Vaping Use: Never used   Substance and Sexual Activity    Alcohol use: Never    Drug use: Never    Sexual activity: Not Currently     Partners: Male   Other Topics Concern    None   Social History Narrative    None     Social Determinants of Health     Financial Resource Strain:     Difficulty of Paying Living Expenses: Not on file   Food Insecurity:     Worried About [address] in the Last Year: Not on file    Kimberly of Food in the Last Year: Not on file   Transportation Needs:     Lack of Transportation (Medical):  Not on file    Lack of Transportation (Non-Medical): Not on file   Physical Activity:     Days of Exercise per Week: Not on file    Minutes of Exercise per Session: Not on file   Stress:     Feeling of Stress : Not on file   Social Connections:     Frequency of Communication with Friends and Family: Not on file    Frequency of Social Gatherings with Friends and Family: Not on file    Attends [employer] Services: Not on file    Active Member of [address] [place] [address] or Organizations: Not on file    Attends Club or Organization Meetings: Not on file    Marital Status: Not on file   Intimate Partner Violence:     Fear of Current or Ex-Partner: Not on file    Emotionally Abused: Not on file    Physically Abused: Not on file    Sexually Abused: Not on file   Housing Stability:     Unable to Pay for Housing in the Last Year: Not on file    Number of [place] in the Last Year: Not on file    Unstable Housing in the Last Year: Not on file       Family Hx:  History reviewed. No pertinent family history.     Review ofSystems:   Review of Systems       Physical Examination:    BP (!) 138/51   Pulse 91   Temp 97.7 °F (36.5 °C) (Oral)   Resp 18   Ht 5' 10\" (1.778 m)   Wt 260 lb 5.8 oz (118.1 kg)   SpO2 95%   BMI 37.36 kg/m²    Physical Exam     LABS:  CBC:   Lab Results   Component Value Date    WBC 11.7 03/17/2022    RBC 3.02 03/17/2022    HGB 10.0 03/17/2022    HCT 28.9 03/17/2022    MCV 95.5 03/17/2022    MCH 33.1 03/17/2022    MCHC 34.6 03/17/2022    RDW 13.5 03/17/2022     03/17/2022     CBC with Differential:   Lab Results   Component Value Date    WBC 11.7 03/17/2022    RBC 3.02 03/17/2022    HGB 10.0 03/17/2022    HCT 28.9 03/17/2022     03/17/2022    MCV 95.5 03/17/2022    MCH 33.1 03/17/2022    MCHC 34.6 03/17/2022    RDW 13.5 03/17/2022    LYMPHOPCT 16.0 03/15/2022    MONOPCT 10.6 03/15/2022    BASOPCT 0.4 03/15/2022    MONOSABS 0.8 03/15/2022    LYMPHSABS 1.2 03/15/2022    EOSABS 0.2 03/15/2022    BASOSABS 0.0 patellar resurfacing. Hardware appears intact with appropriate positioning. Diffuse soft tissue edema and gas about the knee related to the recent procedure. Vascular calcifications  No other significant abnormality. Interval placement of left knee arthroplasty with longstem tibial and femoral components with resection of the distal femur. CT CHEST WO CONTRAST    Result Date: 3/17/2022  EXAMINATION:  CHEST CT WITHOUT CONTRAST Indication:  Evaluate for suspected right apical lung mass. Technique:  Spiral CT acquisition of the chest from the thoracic inlet to the upper abdomen without contrast.  Maximum intensity projection (MIP) were performed. All CT scans at this facility use dose modulation, iterative reconstruction, and/or weight based dosing when appropriate to reduce radiation dose to as low as reasonably achievable. Comparison: Same day chest x-ray. RESULT: Limitations:  None. Lines, tubes, and devices:  None. Lung parenchyma and pleura:  No consolidation. No suspicious pulmonary nodule. No pleural effusion. Central airways are patent. Bibasilar atelectasis/scarring. Scattered calcified granulomas. Thoracic inlet, heart, and mediastinum:  No lymphadenopathy in the axillary, mediastinal, or hilar regions. Atherosclerotic calcification of the thoracic aorta. The thoracic aorta is normal in caliber. 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 . Cardiomegaly. Moderate coronary artery atherosclerotic calcifications are noted, although the study is not optimized for coronary assessment. No pericardial effusion or thickening. Bones and soft tissues:  No destructive bone lesion. Degenerative changes of the thoracic spine. Chest wall is unremarkable. Upper abdomen:  No abnormality in the imaged upper abdomen. .      No CT evidence of acute abnormality. No suspicious pulmonary nodule or mass. 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      US DUP LOWER EXTREMITIES BILATERAL VENOUS    Result Date: 3/16/2022  EXAMINATION: US DUP LOWER EXTREMITIES BILATERAL VENOUS CLINICAL HISTORY: Bilateral leg pain and swelling. Concern for DVT. COMPARISON: 6/19/2017 TECHNIQUE: The bilateral lower extremity veins were evaluated with color doppler, gray scale imaging and spectral analysis while using compression and augmentation when possible. RESULT: Limitation secondary to leg edema. Within these limitations:  Evaluation of the bilateral lower extremity veins from the thigh to the knee shows normal phasic flow, normal augmentation of the Doppler signal, and normal compression of the deep veins. There is no sonographic evidence for acute deep venous thrombosis from the bilateral groin to the popliteal region. Calf veins not well visualized or evaluated. No acute DVT of the visualized bilateral lower extremity veins from the groin to the knee. Calf veins not well visualized/evaluated. Assessment:    Active Hospital Problems    Diagnosis Date Noted    Other fracture of left femur, initial encounter for closed fracture ([facility] [address] [S72.8X2A] 03/14/2022     1.  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 No surgical intervention. Agree with cardiology evaluation for pulmonary hypertension and medical management.         Electronically signed by Alee Barone MD on 3/17/2022 at 2:29 PM

## 2022-03-17 NOTE — PROGRESS NOTES
Progress Note  Patient: John Paul Berry  Unit/Bed: H880/F274-76  YOB: 1940  MRN: [id number]  Acct: [de-identified]   Admitting Diagnosis: Other fracture of left femur, initial encounter for closed fracture [facility]) [S72.8X2A]  Closed nondisplaced fracture of lateral condyle of left femur, initial encounter [facility]) Ildefonso Whitten  Admit Date:  3/14/2022  Hospital Day: 3    Chief Complaint: Post op femur    Histories:  History reviewed. No pertinent past medical history. History reviewed. No pertinent surgical history. History reviewed. No pertinent family history. Social History     Socioeconomic History    Marital status: Single     Spouse name: None    Number of children: None    Years of education: None    Highest education level: None   Occupational History    None   Tobacco Use    Smoking status: Former Smoker     Years: 40.00     Types: Cigarettes     Quit date:      Years since quittin.2    Smokeless tobacco: Former User    Tobacco comment: 40 years ago per patient   Vaping Use    Vaping Use: Never used   Substance and Sexual Activity    Alcohol use: Never    Drug use: Never    Sexual activity: Not Currently     Partners: Male   Other Topics Concern    None   Social History Narrative    None     Social Determinants of Health     Financial Resource Strain:     Difficulty of Paying Living Expenses: Not on file   Food Insecurity:     Worried About Running Out of Food in the Last Year: Not on file    Kimberly of Food in the Last Year: Not on file   Transportation Needs:     Lack of Transportation (Medical): Not on file    Lack of Transportation (Non-Medical):  Not on file   Physical Activity:     Days of Exercise per Week: Not on file    Minutes of Exercise per Session: Not on file   Stress:     Feeling of Stress : Not on file   Social Connections:     Frequency of Communication with Friends and Family: Not on file    Frequency of Social Gatherings with Friends and Family: Not on file    Attends Judaism Services: Not on file    Active Member of Clubs or Organizations: Not on file    Attends Club or Organization Meetings: Not on file    Marital Status: Not on file   Intimate Partner Violence:     Fear of Current or Ex-Partner: Not on file    Emotionally Abused: Not on file    Physically Abused: Not on file    Sexually Abused: Not on file   Housing Stability:     Unable to Pay for Housing in the Last Year: Not on file    Number of [place] in the Last Year: Not on file    Unstable Housing in the Last Year: Not on file       Subjective/HPI POD 2. Was not able to stand. No cp no sob    EKG:        Review of Systems:   Review of Systems   Constitutional: Negative. Negative for diaphoresis and fatigue. HENT: Negative. Eyes: Negative. Respiratory: Negative. Negative for cough, chest tightness, shortness of breath, wheezing and stridor. Cardiovascular: Negative. Negative for chest pain, palpitations and leg swelling. Gastrointestinal: Negative. Negative for blood in stool and nausea. Genitourinary: Negative. Musculoskeletal: Positive for arthralgias, back pain, gait problem and joint swelling. Skin: Negative. Neurological: Positive for weakness. Negative for dizziness, syncope and light-headedness. Hematological: Negative. Psychiatric/Behavioral: Negative. Physical Examination:    BP (!) 138/51   Pulse 91   Temp 97.7 °F (36.5 °C) (Oral)   Resp 18   Ht 5' 10\" (1.778 m)   Wt 260 lb 5.8 oz (118.1 kg)   SpO2 94%   BMI 37.36 kg/m²    Physical Exam   Constitutional: He appears healthy. No distress. HENT:   Normal cephalic and Atraumatic   Eyes: Pupils are equal, round, and reactive to light. Neck: Thyroid normal. No JVD present. No neck adenopathy. No thyromegaly present. Cardiovascular: Normal rate, regular rhythm, normal heart sounds, intact distal pulses and normal pulses.    Pulmonary/Chest: Effort normal and breath sounds normal. He has no wheezes. He has no rales. He exhibits no tenderness. Abdominal: Soft. Bowel sounds are normal. There is no abdominal tenderness. Musculoskeletal:         General: No tenderness or edema. Normal range of motion. Cervical back: Normal range of motion and neck supple. Neurological: He is alert and oriented to person, place, and time. Skin: Skin is warm. No cyanosis. Nails show no clubbing.        LABS:  CBC:   Lab Results   Component Value Date    WBC 11.7 03/17/2022    RBC 3.02 03/17/2022    HGB 10.0 03/17/2022    HCT 28.9 03/17/2022    MCV 95.5 03/17/2022    MCH 33.1 03/17/2022    MCHC 34.6 03/17/2022    RDW 13.5 03/17/2022     03/17/2022     CBC with Differential:    Lab Results   Component Value Date    WBC 11.7 03/17/2022    RBC 3.02 03/17/2022    HGB 10.0 03/17/2022    HCT 28.9 03/17/2022     03/17/2022    MCV 95.5 03/17/2022    MCH 33.1 03/17/2022    MCHC 34.6 03/17/2022    RDW 13.5 03/17/2022    LYMPHOPCT 16.0 03/15/2022    MONOPCT 10.6 03/15/2022    BASOPCT 0.4 03/15/2022    MONOSABS 0.8 03/15/2022    LYMPHSABS 1.2 03/15/2022    EOSABS 0.2 03/15/2022    BASOSABS 0.0 03/15/2022     CMP:    Lab Results   Component Value Date     03/16/2022    K 4.4 03/16/2022    CL 99 03/16/2022    CO2 21 03/16/2022    BUN 21 03/16/2022    CREATININE 0.84 03/16/2022    GFRAA >60.0 03/16/2022    LABGLOM >60.0 03/16/2022    GLUCOSE 191 03/16/2022    PROT 7.5 03/14/2022    LABALBU 3.7 03/14/2022    CALCIUM 8.3 03/16/2022    BILITOT 1.1 03/14/2022    ALKPHOS 61 03/14/2022    AST 35 03/14/2022    ALT 18 03/14/2022     BMP:    Lab Results   Component Value Date     03/16/2022    K 4.4 03/16/2022    CL 99 03/16/2022    CO2 21 03/16/2022    BUN 21 03/16/2022    LABALBU 3.7 03/14/2022    CREATININE 0.84 03/16/2022    CALCIUM 8.3 03/16/2022    GFRAA >60.0 03/16/2022    LABGLOM >60.0 03/16/2022    GLUCOSE 191 03/16/2022     Magnesium:  No results found for: MG  Troponin:  No results found for: [facility] Problems    Diagnosis Date Noted    Other fracture of left femur, initial encounter for closed fracture [facility]) [S72.8X2A] 03/14/2022     Priority: Low        Assessment/Plan:  1. POD2 Left Femur-stable. 2. HTN Stable  3. continue perioperative BB. 4. LVEF 65%  5.  PTOT Rehab eval.       Electronically signed by Gonzalez Hammer MD on 3/17/2022 at 9:33 AM

## 2022-03-18 VITALS
BODY MASS INDEX: 37.43 KG/M2 | OXYGEN SATURATION: 93 % | RESPIRATION RATE: 18 BRPM | SYSTOLIC BLOOD PRESSURE: 142 MMHG | WEIGHT: 261.47 LBS | TEMPERATURE: 97.9 F | HEART RATE: 88 BPM | HEIGHT: 70 IN | DIASTOLIC BLOOD PRESSURE: 46 MMHG

## 2022-03-18 LAB
HCT VFR BLD CALC: 27.2 % (ref 42–52)
HEMOGLOBIN: 9.2 G/DL (ref 14–18)
MCH RBC QN AUTO: 32.8 PG (ref 27–31.3)
MCHC RBC AUTO-ENTMCNC: 33.9 % (ref 33–37)
MCV RBC AUTO: 96.8 FL (ref 80–100)
PDW BLD-RTO: 13.5 % (ref 11.5–14.5)
PLATELET # BLD: 191 K/UL (ref 130–400)
RBC # BLD: 2.81 M/UL (ref 4.7–6.1)
WBC # BLD: 9.8 K/UL (ref 4.8–10.8)

## 2022-03-18 PROCEDURE — 85027 COMPLETE CBC AUTOMATED: CPT

## 2022-03-18 PROCEDURE — 36415 COLL VENOUS BLD VENIPUNCTURE: CPT

## 2022-03-18 PROCEDURE — 6360000002 HC RX W HCPCS: Performed by: NURSE PRACTITIONER

## 2022-03-18 PROCEDURE — 2580000003 HC RX 258: Performed by: ORTHOPAEDIC SURGERY

## 2022-03-18 PROCEDURE — 6370000000 HC RX 637 (ALT 250 FOR IP): Performed by: ORTHOPAEDIC SURGERY

## 2022-03-18 PROCEDURE — 99232 SBSQ HOSP IP/OBS MODERATE 35: CPT | Performed by: INTERNAL MEDICINE

## 2022-03-18 PROCEDURE — 6370000000 HC RX 637 (ALT 250 FOR IP): Performed by: INTERNAL MEDICINE

## 2022-03-18 PROCEDURE — 6360000002 HC RX W HCPCS: Performed by: INTERNAL MEDICINE

## 2022-03-18 PROCEDURE — 97535 SELF CARE MNGMENT TRAINING: CPT

## 2022-03-18 PROCEDURE — 6360000002 HC RX W HCPCS: Performed by: ORTHOPAEDIC SURGERY

## 2022-03-18 RX ORDER — SENNA PLUS 8.6 MG/1
2 TABLET ORAL NIGHTLY
Status: DISCONTINUED | OUTPATIENT
Start: 2022-03-18 | End: 2022-03-18 | Stop reason: HOSPADM

## 2022-03-18 RX ORDER — SENNA PLUS 8.6 MG/1
2 TABLET ORAL NIGHTLY
Qty: 60 TABLET | Refills: 0 | DISCHARGE
Start: 2022-03-18 | End: 2022-04-17

## 2022-03-18 RX ORDER — POLYETHYLENE GLYCOL 3350 17 G/17G
17 POWDER, FOR SOLUTION ORAL DAILY
Status: DISCONTINUED | OUTPATIENT
Start: 2022-03-18 | End: 2022-03-18 | Stop reason: HOSPADM

## 2022-03-18 RX ORDER — DOCUSATE SODIUM 100 MG/1
100 CAPSULE, LIQUID FILLED ORAL 2 TIMES DAILY
DISCHARGE
Start: 2022-03-18

## 2022-03-18 RX ORDER — ONDANSETRON 4 MG/1
4 TABLET, ORALLY DISINTEGRATING ORAL EVERY 8 HOURS PRN
Qty: 15 TABLET | Refills: 0 | Status: SHIPPED | OUTPATIENT
Start: 2022-03-18 | End: 2022-03-23

## 2022-03-18 RX ORDER — POLYETHYLENE GLYCOL 3350 17 G/17G
17 POWDER, FOR SOLUTION ORAL DAILY
Qty: 527 G | Refills: 1 | DISCHARGE
Start: 2022-03-19 | End: 2022-04-18

## 2022-03-18 RX ORDER — DOCUSATE SODIUM 100 MG/1
100 CAPSULE, LIQUID FILLED ORAL 2 TIMES DAILY
Status: DISCONTINUED | OUTPATIENT
Start: 2022-03-18 | End: 2022-03-18 | Stop reason: HOSPADM

## 2022-03-18 RX ADMIN — Medication 650 MG: at 15:24

## 2022-03-18 RX ADMIN — DOCUSATE SODIUM 100 MG: 100 CAPSULE, LIQUID FILLED ORAL at 12:54

## 2022-03-18 RX ADMIN — MORPHINE SULFATE 2 MG: 2 INJECTION, SOLUTION INTRAMUSCULAR; INTRAVENOUS at 07:47

## 2022-03-18 RX ADMIN — ONDANSETRON 4 MG: 2 INJECTION INTRAMUSCULAR; INTRAVENOUS at 12:45

## 2022-03-18 RX ADMIN — MORPHINE SULFATE 2 MG: 2 INJECTION, SOLUTION INTRAMUSCULAR; INTRAVENOUS at 03:21

## 2022-03-18 RX ADMIN — SODIUM CHLORIDE, PRESERVATIVE FREE 10 ML: 5 INJECTION INTRAVENOUS at 07:48

## 2022-03-18 RX ADMIN — FUROSEMIDE 20 MG: 20 TABLET ORAL at 07:47

## 2022-03-18 RX ADMIN — Medication 650 MG: at 03:21

## 2022-03-18 RX ADMIN — MORPHINE SULFATE 2 MG: 2 INJECTION, SOLUTION INTRAMUSCULAR; INTRAVENOUS at 12:45

## 2022-03-18 RX ADMIN — ONDANSETRON 4 MG: 2 INJECTION INTRAMUSCULAR; INTRAVENOUS at 19:07

## 2022-03-18 RX ADMIN — ENOXAPARIN SODIUM 30 MG: 100 INJECTION SUBCUTANEOUS at 07:46

## 2022-03-18 RX ADMIN — Medication 650 MG: at 07:47

## 2022-03-18 RX ADMIN — MORPHINE SULFATE 2 MG: 2 INJECTION, SOLUTION INTRAMUSCULAR; INTRAVENOUS at 19:08

## 2022-03-18 RX ADMIN — POLYETHYLENE GLYCOL 3350 17 G: 17 POWDER, FOR SOLUTION ORAL at 12:54

## 2022-03-18 RX ADMIN — ONDANSETRON 4 MG: 2 INJECTION INTRAMUSCULAR; INTRAVENOUS at 07:46

## 2022-03-18 RX ADMIN — METOPROLOL SUCCINATE 50 MG: 50 TABLET, EXTENDED RELEASE ORAL at 07:47

## 2022-03-18 RX ADMIN — TRIAMTERENE AND HYDROCHLOROTHIAZIDE 0.5 TABLET: 37.5; 25 TABLET ORAL at 07:47

## 2022-03-18 ASSESSMENT — ENCOUNTER SYMPTOMS
EYES NEGATIVE: 1
NAUSEA: 0
COUGH: 0
WHEEZING: 0
STRIDOR: 0
BACK PAIN: 1
CHEST TIGHTNESS: 0
GASTROINTESTINAL NEGATIVE: 1
RESPIRATORY NEGATIVE: 1
SHORTNESS OF BREATH: 0
BLOOD IN STOOL: 0

## 2022-03-18 ASSESSMENT — PAIN DESCRIPTION - LOCATION: LOCATION: KNEE

## 2022-03-18 ASSESSMENT — PAIN SCALES - GENERAL
PAINLEVEL_OUTOF10: 5
PAINLEVEL_OUTOF10: 6
PAINLEVEL_OUTOF10: 7
PAINLEVEL_OUTOF10: 8
PAINLEVEL_OUTOF10: 7
PAINLEVEL_OUTOF10: 7

## 2022-03-18 ASSESSMENT — PAIN DESCRIPTION - ORIENTATION: ORIENTATION: LEFT

## 2022-03-18 ASSESSMENT — PAIN DESCRIPTION - PAIN TYPE: TYPE: SURGICAL PAIN

## 2022-03-18 NOTE — CARE COORDINATION
DC Planning updates: To [employer] today, transport set up for 1800. Patient updated.  [id number] complete by Gregory Baig.

## 2022-03-18 NOTE — PROGRESS NOTES
Called [facility] [place] to give report more than 10 times in attempt to give report with no success. Patient  ETA is 6PM . Still awaiting transport. Will re-attempt before my shift is over.

## 2022-03-18 NOTE — PROGRESS NOTES
Progress Note  Patient: Ford Morse  Unit/Bed: Z426/I093-39  YOB: 1940  MRN: [id number]  Acct: [de-identified]   Admitting Diagnosis: Other fracture of left femur, initial encounter for closed fracture [facility]) [S72.8X2A]  Closed nondisplaced fracture of lateral condyle of left femur, initial encounter [facility]) [place]  Admit Date:  3/14/2022  Hospital Day: 4    Chief Complaint: Post op femur    Histories:  History reviewed. No pertinent past medical history. Past Surgical History:   Procedure Laterality Date    TOTAL KNEE ARTHROPLASTY Left 3/15/2022    LEFT KNEE DISTAL FEMUR REPLACEMENT performed by Griselda Lama MD at [place] [address] reviewed. No pertinent family history. Social History     Socioeconomic History    Marital status: Single     Spouse name: None    Number of children: None    Years of education: None    Highest education level: None   Occupational History    None   Tobacco Use    Smoking status: Former Smoker     Years: 40.00     Types: Cigarettes     Quit date:      Years since quittin.2    Smokeless tobacco: Former User    Tobacco comment: 40 years ago per patient   Vaping Use    Vaping Use: Never used   Substance and Sexual Activity    Alcohol use: Never    Drug use: Never    Sexual activity: Not Currently     Partners: Male   Other Topics Concern    None   Social History Narrative    None     Social Determinants of Health     Financial Resource Strain:     Difficulty of Paying Living Expenses: Not on file   Food Insecurity:     Worried About Running Out of Food in the Last Year: Not on file    Kimberly of Food in the Last Year: Not on file   Transportation Needs:     Lack of Transportation (Medical): Not on file    Lack of Transportation (Non-Medical):  Not on file   Physical Activity:     Days of Exercise per Week: Not on file    Minutes of Exercise per Session: Not on file   Stress:     Feeling of Stress : Not on file   Social Connections:     Frequency of Communication with Friends and Family: Not on file    Frequency of Social Gatherings with Friends and Family: Not on file    Attends Episcopalian Services: Not on file    Active Member of Clubs or Organizations: Not on file    Attends Club or Organization Meetings: Not on file    Marital Status: Not on file   Intimate Partner Violence:     Fear of Current or Ex-Partner: Not on file    Emotionally Abused: Not on file    Physically Abused: Not on file    Sexually Abused: Not on file   Housing Stability:     Unable to Pay for Housing in the Last Year: Not on file    Number of [place] in the Last Year: Not on file    Unstable Housing in the Last Year: Not on file       Subjective/HPI POD 3. Was not able to stand. No cp no sob. Motivated. Wants to get better. EKG: SR 88        Review of Systems:   Review of Systems   Constitutional: Negative. Negative for diaphoresis and fatigue. HENT: Negative. Eyes: Negative. Respiratory: Negative. Negative for cough, chest tightness, shortness of breath, wheezing and stridor. Cardiovascular: Negative. Negative for chest pain, palpitations and leg swelling. Gastrointestinal: Negative. Negative for blood in stool and nausea. Genitourinary: Negative. Musculoskeletal: Positive for arthralgias, back pain, gait problem and joint swelling. Skin: Negative. Neurological: Positive for weakness. Negative for dizziness, syncope and light-headedness. Hematological: Negative. Psychiatric/Behavioral: Negative. Physical Examination:    BP (!) 142/46   Pulse 88   Temp 97.9 °F (36.6 °C) (Oral)   Resp 18   Ht 5' 10\" (1.778 m)   Wt 261 lb 7.5 oz (118.6 kg)   SpO2 93%   BMI 37.52 kg/m²    Physical Exam   Constitutional: He appears healthy. No distress. HENT:   Normal cephalic and Atraumatic   Eyes: Pupils are equal, round, and reactive to light. Neck: Thyroid normal. No JVD present. No neck adenopathy.  No thyromegaly present. Cardiovascular: Normal rate, regular rhythm, normal heart sounds, intact distal pulses and normal pulses. Pulmonary/Chest: Effort normal and breath sounds normal. He has no wheezes. He has no rales. He exhibits no tenderness. Abdominal: Soft. Bowel sounds are normal. There is no abdominal tenderness. Musculoskeletal:         General: No tenderness or edema. Normal range of motion. Cervical back: Normal range of motion and neck supple. Neurological: He is alert and oriented to person, place, and time. Skin: Skin is warm. No cyanosis. Nails show no clubbing.        LABS:  CBC:   Lab Results   Component Value Date    WBC 9.8 03/18/2022    RBC 2.81 03/18/2022    HGB 9.2 03/18/2022    HCT 27.2 03/18/2022    MCV 96.8 03/18/2022    MCH 32.8 03/18/2022    MCHC 33.9 03/18/2022    RDW 13.5 03/18/2022     03/18/2022     CBC with Differential:    Lab Results   Component Value Date    WBC 9.8 03/18/2022    RBC 2.81 03/18/2022    HGB 9.2 03/18/2022    HCT 27.2 03/18/2022     03/18/2022    MCV 96.8 03/18/2022    MCH 32.8 03/18/2022    MCHC 33.9 03/18/2022    RDW 13.5 03/18/2022    LYMPHOPCT 16.0 03/15/2022    MONOPCT 10.6 03/15/2022    BASOPCT 0.4 03/15/2022    MONOSABS 0.8 03/15/2022    LYMPHSABS 1.2 03/15/2022    EOSABS 0.2 03/15/2022    BASOSABS 0.0 03/15/2022     CMP:    Lab Results   Component Value Date     03/16/2022    K 4.4 03/16/2022    CL 99 03/16/2022    CO2 21 03/16/2022    BUN 21 03/16/2022    CREATININE 0.84 03/16/2022    GFRAA >60.0 03/16/2022    LABGLOM >60.0 03/16/2022    GLUCOSE 191 03/16/2022    PROT 7.5 03/14/2022    LABALBU 3.7 03/14/2022    CALCIUM 8.3 03/16/2022    BILITOT 1.1 03/14/2022    ALKPHOS 61 03/14/2022    AST 35 03/14/2022    ALT 18 03/14/2022     BMP:    Lab Results   Component Value Date     03/16/2022    K 4.4 03/16/2022    CL 99 03/16/2022    CO2 21 03/16/2022    BUN 21 03/16/2022    LABALBU 3.7 03/14/2022    CREATININE 0.84 03/16/2022 CALCIUM 8.3 03/16/2022    GFRAA >60.0 03/16/2022    LABGLOM >60.0 03/16/2022    GLUCOSE 191 03/16/2022     Magnesium:  No results found for: MG  Troponin:  No results found for: [facility] Problems    Diagnosis Date Noted    Other fracture of left femur, initial encounter for closed fracture ([facility] [address] [S72.8X2A] 03/14/2022     Priority: Low        Assessment/Plan:  1. POD3 Left Femur-stable. 2. HTN Stable  3. continue perioperative BB. 4. LVEF 65%  5. 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 Seen by Thoracic surgery and recommended no intervention.    6. PTOT Rehab eval.       Electronically signed by Rebekah Espinoza MD on 3/18/2022 at 9:54 AM

## 2022-03-18 NOTE — PROGRESS NOTES
Physical Therapy Med Surg Daily Treatment Note  Fac[facility]/Department: [facility]  [id number]       NAME: Maddison Conte  : 1940 ([de-identified] y.o.)  MRN: [id number]  CODE STATUS: Full Code    Date of Service: 3/18/2022    Patient Diagnosis(es): Other fracture of left femur, initial encounter for closed fracture ([facility] [S72.8X2A]  Closed nondisplaced fracture of lateral condyle of left femur, initial encounter [facility]) [S72.425A]   Chief Complaint   Patient presents with    Knee Pain     LEFT KNEE PAIN  STATES HE WENT TO GET IN TRUCK AND HIS LEFT KNEE FOLDED BARAK Montanez     Patient Active Problem List    Diagnosis Date Noted    Other fracture of left femur, initial encounter for closed fracture ([facility] 2022        History reviewed. No pertinent past medical history. Past Surgical History:   Procedure Laterality Date    TOTAL KNEE ARTHROPLASTY Left 3/15/2022    LEFT KNEE DISTAL FEMUR REPLACEMENT performed by Austin Rain MD at [address] [place] [address]  Restrictions/Precautions: Weight Bearing  Lower Extremity Weight Bearing Restrictions  Left Lower Extremity Weight Bearing: Weight Bearing As Tolerated  Required Braces or Orthoses  Left Lower Extremity Brace: Knee Immobilizer (d/c when able to SLR)    SUBJECTIVE   General  Chart Reviewed: Yes  Family / Caregiver Present: No  Subjective  Subjective: \"I want to try but I'm going to need help. \"    Pre-Session Pain Report  Pre Treatment Pain Screening  Pain at present: 7  Scale Used: Numeric Score  Intervention List: Patient able to continue with treatment  Pain Screening  Patient Currently in Pain: Yes       Post-Session Pain Report  Pain Assessment  Pain Assessment: 0-10  Pain Level: 7  Pain Type: Surgical pain  Pain Location: Knee  Pain Orientation: Left         OBJECTIVE        Bed mobility  Supine to Sit: Moderate assistance;2 Person assistance  Sit to Supine:  Moderate assistance;2 Person assistance  Comment: assistance needed to bring LLE out of and into bed. as well as to bring trunk upright. Transfers  Sit to Stand: Maximum Assistance;2 Person Assistance  Stand to sit: Maximum Assistance;2 Person Assistance  Comment: bed elevated d/t pt's height, pt able to come to complete stand and maintain for ~45\". vc's for hand placement, sequencing, and posture. Ambulation  Ambulation?: No (unsafe to attempt d/t difficulty in standing.)                                         Activity Tolerance  Activity Tolerance: Patient Tolerated treatment well          ASSESSMENT   Assessment: pt able to stand with bed elevated and Max A. +2 pt with poor tolerance to standing, unable to progress to ambulation. Discharge Recommendations:  Continue to assess pending progress,Patient would benefit from continued therapy after discharge    Goals  Short term goals  Short term goal 1: Min A for HEP to improve LE strength and activity tolerance  Long term goals  Long term goal 1: rolling with Min A  Long term goal 2: Supine<>sit with Mod A  Long term goal 3: Unsupported sitting with supervision  Long term goal 4: Sit<>Stand with Mod A  Long term goal 5: Stand with 2ww x 1 min with Min A  Patient Goals   Patient goals : to get better    PLAN    Times per week: 5-7  Times per day:  (1-2)  Safety Devices  Type of devices: All fall risk precautions in place,Call light within reach,Bed alarm in place,Left in bed     [facility] (6 CLICK) BASIC MOBILITY  AM-PAC Inpatient Mobility Raw Score : 9      Therapy Time   Individual   Time In 0957   Time Out 1020   Minutes 23      BM/Trsf: [address] PTA, 03/18/22 at 1:39 PM         Definitions for assistance levels  Independent = pt does not require any physical supervision or assistance from another person for activity completion. Device may be needed.   Stand by assistance = pt requires verbal cues or instructions from another person, close to but not touching, to perform the activity  Minimal assistance= pt performs 75% or more of the activity; assistance is required to complete the activity  Moderate assistance= pt performs 50% of the activity; assistance is required to complete the activity  Maximal assistance = pt performs 25% of the activity; assistance is required to complete the activity  Dependent = pt requires total physical assistance to accomplish the task

## 2022-03-18 NOTE — PROGRESS NOTES
INPATIENT PROGRESS NOTES    PATIENT NAME: Patricia Troncoso  MRN: [id number]  SERVICE DATE:  March 18, 2022   SERVICE TIME:  4:52 PM      PRIMARY SERVICE: Pulmonary Disease    CHIEF COMPLAIN: Femur fracture status post surgery      INTERVAL HPI: Patient seen and examined at bedside, Interval Notes, orders reviewed. Nursing notes noted  He denies having short of breath. No chest pain. No cough or sputum production. No fever or chills. 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 Recommended no surgical intervention. OBJECTIVE    Body mass index is 37.52 kg/m². PHYSICAL EXAM:  Vitals:  BP (!) 142/46   Pulse 88   Temp 97.9 °F (36.6 °C) (Oral)   Resp 18   Ht 5' 10\" (1.778 m)   Wt 261 lb 7.5 oz (118.6 kg)   SpO2 93%   BMI 37.52 kg/m²   General: Alert, awake . comfortable in bed, No distress. Head: Atraumatic , Normocephalic   Eyes: PERRL. No sclera icterus. No conjunctival injection. No discharge   ENT: No nasal  discharge. Pharynx clear. Neck:  Trachea midline. No thyromegaly, no JVD, No cervical adenopathy. Chest : Bilaterally symmetrical ,Normal effort,  No accessory muscle use  Lung : . Fair BS bilateral, decreased BS at bases. No Rales. No wheezing. No rhonchi. Heart[de-identified] Normal  rate. Regular rhythm. No mumur ,  Rub or gallop  ABD: Non-tender. Non-distended. No masses. No organmegaly. Normal bowel sounds. No hernia. Ext : No Pitting both leg , No Cyanosis No clubbing  Neuro: no focal weakness          DATA:   Recent Labs     03/17/22  0616 03/18/22  0601   WBC 11.7* 9.8   HGB 10.0* 9.2*   HCT 28.9* 27.2*   MCV 95.5 96.8    191     Recent Labs     03/16/22  0557      K 4.4   CL 99   CO2 21   BUN 21   CREATININE 0.84   GLUCOSE 191*   CALCIUM 8.3*   LABGLOM >60.0   GFRAA >60.0       MV Settings:          No results for input(s): PHART, DJM1WNP, PO2ART, GRW6IQO, BEART, B0XOTFMV in the last 72 hours.     O2 Device: None (Room air)  O2 Flow Rate (L/min): 3 L/min    ADULT DIET; Regular     MEDICATIONS during current hospitalization:    Continuous Infusions:   sodium chloride         Scheduled Meds:   docusate sodium  100 mg Oral BID    senna  2 tablet Oral Nightly    polyethylene glycol  17 g Oral Daily    furosemide  20 mg Oral Daily    metoprolol succinate  50 mg Oral Daily    triamterene-hydroCHLOROthiazide  0.5 tablet Oral Daily    sodium chloride flush  10 mL IntraVENous 2 times per day    acetaminophen  650 mg Oral Q6H    enoxaparin  30 mg SubCUTAneous BID       PRN Meds:HYDROcodone 5 mg - acetaminophen, morphine, sodium chloride flush, sodium chloride, ondansetron **OR** ondansetron, polyethylene glycol, potassium chloride **OR** potassium alternative oral replacement **OR** potassium chloride, magnesium sulfate    Radiology  XR CHEST (SINGLE VIEW FRONTAL)    Result Date: 3/16/2022  Exam: XR CHEST (SINGLE VIEW FRONTAL) History: Fever Technique: AP portable view of the chest obtained. Comparison: Portable chest radiograph March 14, 2022 Findings: Atherosclerotic calcification of the thoracic aorta. The cardiomediastinal silhouette is within normal limits. No pneumothorax, pleural effusion, or consolidation. Nodular density of the right upper lung again identified. No acute osseous abnormality. No radiographic evidence of acute intrathoracic process. Nodular density of the right upper lung is better evaluated with CT of the chest without contrast.    XR KNEE LEFT (1-2 VIEWS)    Result Date: 3/16/2022  EXAMINATION/TECHNIQUE:  XR KNEE LEFT (1-2 VIEWS) HISTORY:  Left femur fracture. Postop follow-up. COMPARISON: CT 3/14/2022 and radiographs 3/14/2022. RESULT: Interval placement of left knee arthroplasty with longstem tibial and femoral components, with interval resection of the distal femur, with patellar resurfacing. Hardware appears intact with appropriate positioning.  Diffuse soft tissue edema and gas about the knee related to the recent procedure. Vascular calcifications  No other significant abnormality. Interval placement of left knee arthroplasty with longstem tibial and femoral components with resection of the distal femur. XR KNEE LEFT (MIN 4 VIEWS)    Result Date: 3/15/2022  EXAMINATION: 4 VIEWS OF THE LEFT KNEE DATE AND TIME:3/14/2022 2:49 PM CLINICAL HISTORY: ACUTE ANTERIOR LEFT KNEE PAIN. KNEE GAVE OUT, FALL  COMPARISON: NONE AVAILABLE. Findings: Acute lateral condylar fracture with extension proximally into the right femoral shaft . Minimal 5 mm displacement. Alignment normal. Slight widening of the lateral joint space. Bony spurring s and joint space narrowing consistent with  severe tricompartmental degenerative changes. Small effusion. ACUTE RIGHT SUPRACONDYLAR FEMORAL FRACTURE. EXAMINATION: XR KNEE LEFT (MIN 4 VIEWS), XR CHEST PORTABLE. DATE AND TIME:3/14/2022 2:49 PM CLINICAL HISTORY: SHORTNESS OF BREATH   KNEE GAVE OUT, FALL  COMPARISONS: NONE  FINDINGS: The heart, mediastinum and pulmonary vasculature are within normal limits. Nodular shadow right upper lobe possibly vessel on end. No previous films for comparison. Follow-up recommended to exclude a lung nodule. Otherwise, visualized lung fields are clear. Bones unremarkable. IMPRESSION:  NO ACTIVE DISEASE. QUESTIONABLE RIGHT LUNG NODULE. CT CHEST WO CONTRAST    Result Date: 3/17/2022  EXAMINATION:  CHEST CT WITHOUT CONTRAST Indication:  Evaluate for suspected right apical lung mass. Technique:  Spiral CT acquisition of the chest from the thoracic inlet to the upper abdomen without contrast.  Maximum intensity projection (MIP) were performed. All CT scans at this facility use dose modulation, iterative reconstruction, and/or weight based dosing when appropriate to reduce radiation dose to as low as reasonably achievable. Comparison: Same day chest x-ray. RESULT: Limitations:  None.  Lines, tubes, and devices:  None. Lung parenchyma and pleura:  No consolidation. No suspicious pulmonary nodule. No pleural effusion. Central airways are patent. Bibasilar atelectasis/scarring. Scattered calcified granulomas. Thoracic inlet, heart, and mediastinum:  No lymphadenopathy in the axillary, mediastinal, or hilar regions. Atherosclerotic calcification of the thoracic aorta. The thoracic aorta is normal in caliber. 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 . Cardiomegaly. Moderate coronary artery atherosclerotic calcifications are noted, although the study is not optimized for coronary assessment. No pericardial effusion or thickening. Bones and soft tissues:  No destructive bone lesion. Degenerative changes of the thoracic spine. Chest wall is unremarkable. Upper abdomen:  No abnormality in the imaged upper abdomen. .      No CT evidence of acute abnormality. No suspicious pulmonary nodule or mass. 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 CT KNEE LEFT WO CONTRAST    Result Date: 3/14/2022  EXAM: CT KNEE LEFT WO CONTRAST HISTORY: Distal femur fracture TECHNIQUE: Multiple contiguous axial images were obtained of the left knee without contrast. Multiplanar reformats were obtained. COMPARISON: Radiographs performed earlier on the same date FINDINGS: Comminuted mildly displaced fracture of the distal femoral diaphysis extending into the medial and lateral femoral condyles and into the patellofemoral and tibiofemoral joints. Intramuscular hamstring hematoma partially visualized. Degenerative changes of the knee most significant within the lateral compartment where the degenerative changes are advanced. Small knee joint effusion. Small Haas's cyst measures approximately 2 x 2 x 4.5 cm. Comminuted mildly displaced intra-articular fracture of the distal femur.  All CT scans at this facility use dose modulation, iterative reconstruction, and/or weight based dosing when appropriate to reduce radiation dose to as low as reasonably achievable. XR CHEST PORTABLE    Result Date: 3/15/2022  EXAMINATION: 4 VIEWS OF THE LEFT KNEE DATE AND TIME:3/14/2022 2:49 PM CLINICAL HISTORY: ACUTE ANTERIOR LEFT KNEE PAIN. KNEE GAVE OUT, FALL  COMPARISON: NONE AVAILABLE. Findings: Acute lateral condylar fracture with extension proximally into the right femoral shaft . Minimal 5 mm displacement. Alignment normal. Slight widening of the lateral joint space. Bony spurring s and joint space narrowing consistent with  severe tricompartmental degenerative changes. Small effusion. ACUTE RIGHT SUPRACONDYLAR FEMORAL FRACTURE. EXAMINATION: XR KNEE LEFT (MIN 4 VIEWS), XR CHEST PORTABLE. DATE AND TIME:3/14/2022 2:49 PM CLINICAL HISTORY: SHORTNESS OF BREATH   KNEE GAVE OUT, FALL  COMPARISONS: NONE  FINDINGS: The heart, mediastinum and pulmonary vasculature are within normal limits. Nodular shadow right upper lobe possibly vessel on end. No previous films for comparison. Follow-up recommended to exclude a lung nodule. Otherwise, visualized lung fields are clear. Bones unremarkable. IMPRESSION:  NO ACTIVE DISEASE. QUESTIONABLE RIGHT LUNG NODULE. Echo 2d w doppler w color w contrast    Result Date: 3/15/2022  Transthoracic Echocardiography Report (TTE)  Demographics   Patient Name    Claire Solomon Gender                Male   Patient Number  [id number]     Race                                                  Ethnicity   Visit Number    [id number]    Room Number           H343   Corporate ID                 Date of Study         03/15/2022   Accession       [id number]   Referring Physician  Number   Date of Birth   1940   Sonographer           Phillip Shipman [facility]   Age             [de-identified] year(s)   Interpreting          [facility]) Cardiology                               Physician             Donna Borja  Procedure Type of Study   TTE procedure:ECHOCARDIOGRAM 2D DOPPLER W COLOR W CONTRAST. Procedure Date Date: 03/15/2022 Start: 11:11 AM Study Location: Portable Technical Quality: Adequate visualization Indications:Edema. Patient Status: STAT Height: 70 inches Weight: 269 pounds BSA: 2.37 m^2 BMI: 38.6 kg/m^2 BP: 124/56 mmHg  Conclusions   Summary  This is a suboptimal study due to poor echocardiographic window due to  body habitus  Left ventricular ejection fraction is estimated at 65%. Mildly enlarged LA  E/A flow reversal noted. Suggestive of diastolic dysfunction. Normal right ventricle systolic pressure. RVSP 29mmHg  No prior echos to compare   Signature   ----------------------------------------------------------------  Electronically signed by Ramesh Mcmanus(Interpreting physician)  on 03/15/2022 12:18 PM  ----------------------------------------------------------------   Findings  Left Ventricle Left ventricular ejection fraction is estimated at 65%. E/A flow reversal noted. Suggestive of diastolic dysfunction. Normal left ventricular size and function. Normal left ventricular wall thickness. Right Ventricle Normal right ventricle structure and function. Normal right ventricle systolic pressure. RVSP 29mmHg Left Atrium Mildly enlarged LA Right Atrium Normal right atrium. Mitral Valve Calcification of the mitral valve noted. No evidence of mitral stenosis No evidence of mitral regurgitaton. Tricuspid Valve Normal tricuspid valve structure and function. Aortic Valve Calcified ao leaflets No evidence of aortic valve stenosis . No evidence of aortic valve regurgitation . Pulmonic Valve Normal pulmonic valve structure and function. Pericardial Effusion No evidence of pericardial effusion.  Doppler Measurements:   AV Peak Gradient: 12.88 mmHg           MV Peak E-Wave: 0.82 m/s  AV Mean Gradient: 5.9 mmHg             MV Peak A-Wave: 1.22 m/s  TR Velocity:2.56 m/s  TR Gradient:26.18 mmHg                                         Estimated RAP:3 mmHg                                         RVSP:29.18 mmHg  Valves  Mitral Valve   Peak E-Wave: 0.82 m/s                 Peak A-Wave: 1.22 m/s                                        E/A Ratio: 0.68                                        Peak Gradient: 2.71 mmHg                                        Deceleration Time: 225.9 msec   Tissue Doppler   E' Septal Velocity: 0.05 m/s  E' Lateral Velocity: 0.08 m/s   Aortic Valve   Peak Velocity: 1.79 m/s                Mean Velocity: 1.13 m/s  Peak Gradient: 12.88 mmHg              Mean Gradient: 5.9 mmHg  AV VTI: 33.08 cm   Tricuspid Valve   Estimated RVSP: 29.18 mmHg              Estimated RAP: 3 mmHg  TR Velocity: 2.56 m/s                   TR Gradient: 26.18 mmHg   Pulmonic Valve   Peak Velocity: 1.5 m/s           Peak Gradient: 9 mmHg                                   Estimated PASP: 29.18 mmHg   LVOT   Peak Velocity: 1.02 m/s              Mean Velocity: 0.32 m/s  Peak Gradient: 4.16 mmHg             Mean Gradient: 0.95 mmHg                                       LVOT VTI: 23.71 cm  Structures  Left Atrium   LA Volume/Index: 96.93 ml /41 m^2             LA Area: 33.57 cm^2   Left Ventricle   LV EDV/LV EDV Index: 73.3 ml/31 m^2  LV ESV/LV ESV Index: 21.49 ml/9 m^2  EF Calculated: 70.7 %                LV Length: 7.88 cm   Right Atrium   RA Systolic Pressure: 3 mmHg   Right Ventricle            RV Systolic Pressure: 30.27 mmHg      US DUP LOWER EXTREMITIES BILATERAL VENOUS    Result Date: 3/16/2022  EXAMINATION: US DUP LOWER EXTREMITIES BILATERAL VENOUS CLINICAL HISTORY: Bilateral leg pain and swelling. Concern for DVT. COMPARISON: 6/19/2017 TECHNIQUE: The bilateral lower extremity veins were evaluated with color doppler, gray scale imaging and spectral analysis while using compression and augmentation when possible. RESULT: Limitation secondary to leg edema.  Within these limitations:  Evaluation of the bilateral lower extremity veins from the thigh to the knee shows normal phasic flow, normal augmentation of the Doppler signal, and normal compression of the deep veins. There is no sonographic evidence for acute deep venous thrombosis from the bilateral groin to the popliteal region. Calf veins not well visualized or evaluated. No acute DVT of the visualized bilateral lower extremity veins from the groin to the knee. Calf veins not well visualized/evaluated. IMPRESSION AND SUGGESTION:  1. 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 Mild pulmonary hypertension  3. History of fall status post femur fracture    Patient with mild pulmonary hypertension. No complaint of shortness of breath. On room air O2 saturation 95%. CT surgery recommended no intervention. Continue to observe. Pulmonary to sign off. NOTE: This report was transcribed using voice recognition software. Every effort was made to ensure accuracy; however, inadvertent computerized transcription errors may be present.       Electronically signed by Kami Sanches MD, FCCP on 3/18/2022 at 4:52 PM

## 2022-03-18 NOTE — DISCHARGE SUMMARY
Hospital Medicine Discharge Summary    Lamont Martinez  :  1940  MRN:  [id number]    Admit date:  3/14/2022  Discharge date:  3/18/2022    Admitting Physician:  Bere Camarena MD  Primary Care Physician:  Ariana Yancey MD    Discharge Diagnoses:    Femur fracture    Chief Complaint   Patient presents with    Knee Pain     LEFT KNEE PAIN  STATES HE WENT TO GET IN TRUCK AND HIS LEFT KNEE FOLDED UNDER [address] Course:   Patient presented with a femur fracture. It was repaired by ortho; he did have a mild post op fever which has since resolved. 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 Exam on discharge:   BP (!) 142/46   Pulse 88   Temp 97.9 °F (36.6 °C) (Oral)   Resp 18   Ht 5' 10\" (1.778 m)   Wt 261 lb 7.5 oz (118.6 kg)   SpO2 93%   BMI 37.52 kg/m²   General appearance: alert, appears stated age and cooperative  Skin:  No rashes or lesions  HEENT: Head: Normal, normocephalic, atraumatic. Keven Brocks Neck: supple, symmetrical, trachea midline  Lungs: clear to auscultation bilaterally  Heart: regular rate and rhythm  Abdomen: soft, non-tender; bowel sounds normal; no masses,  no organomegaly  Extremities: +1 edema  Neurologic: Non focal       Patient was seen by the following consultants   Consults:  [address]  IP CONSULT TO ORTHOPEDIC SURGERY  IP CONSULT TO CARDIOLOGY  IP CONSULT TO CASE MANAGEMENT  IP CONSULT TO SOCIAL WORK  IP CONSULT TO PULMONOLOGY  IP CONSULT TO THORACIC SURGERY  IP CONSULT TO CARDIOTHORACIC SURGERY    Significant Diagnostic Studies:    Refer to chart     Please refer to chart if no studies are shown here    XR KNEE LEFT (1-2 VIEWS)    Result Date: 3/16/2022  EXAMINATION/TECHNIQUE:  XR KNEE LEFT (1-2 VIEWS) HISTORY:  Left femur fracture. Postop follow-up. COMPARISON: CT 3/14/2022 and radiographs 3/14/2022.  RESULT: Interval placement of left knee arthroplasty with longstem tibial and femoral components, with interval resection of the distal femur, with patellar resurfacing. Hardware appears intact with appropriate positioning. Diffuse soft tissue edema and gas about the knee related to the recent procedure. Vascular calcifications  No other significant abnormality. Interval placement of left knee arthroplasty with longstem tibial and femoral components with resection of the distal femur. XR KNEE LEFT (MIN 4 VIEWS)    Result Date: 3/15/2022  EXAMINATION: 4 VIEWS OF THE LEFT KNEE DATE AND TIME:3/14/2022 2:49 PM CLINICAL HISTORY: ACUTE ANTERIOR LEFT KNEE PAIN. KNEE GAVE OUT, FALL  COMPARISON: NONE AVAILABLE. Findings: Acute lateral condylar fracture with extension proximally into the right femoral shaft . Minimal 5 mm displacement. Alignment normal. Slight widening of the lateral joint space. Bony spurring s and joint space narrowing consistent with  severe tricompartmental degenerative changes. Small effusion. ACUTE RIGHT SUPRACONDYLAR FEMORAL FRACTURE. EXAMINATION: XR KNEE LEFT (MIN 4 VIEWS), XR CHEST PORTABLE. DATE AND TIME:3/14/2022 2:49 PM CLINICAL HISTORY: SHORTNESS OF BREATH   KNEE GAVE OUT, FALL  COMPARISONS: NONE  FINDINGS: The heart, mediastinum and pulmonary vasculature are within normal limits. Nodular shadow right upper lobe possibly vessel on end. No previous films for comparison. Follow-up recommended to exclude a lung nodule. Otherwise, visualized lung fields are clear. Bones unremarkable. IMPRESSION:  NO ACTIVE DISEASE. QUESTIONABLE RIGHT LUNG NODULE. CT KNEE LEFT WO CONTRAST    Result Date: 3/14/2022  EXAM: CT KNEE LEFT WO CONTRAST HISTORY: Distal femur fracture TECHNIQUE: Multiple contiguous axial images were obtained of the left knee without contrast. Multiplanar reformats were obtained.  COMPARISON: Radiographs performed earlier on the same date FINDINGS: Comminuted mildly displaced fracture of the distal femoral diaphysis extending into the medial and lateral femoral condyles and into the patellofemoral and tibiofemoral joints. Intramuscular hamstring hematoma partially visualized. Degenerative changes of the knee most significant within the lateral compartment where the degenerative changes are advanced. Small knee joint effusion. Small Haas's cyst measures approximately 2 x 2 x 4.5 cm. Comminuted mildly displaced intra-articular fracture of the distal femur. All CT scans at this facility use dose modulation, iterative reconstruction, and/or weight based dosing when appropriate to reduce radiation dose to as low as reasonably achievable. XR CHEST PORTABLE    Result Date: 3/15/2022  EXAMINATION: 4 VIEWS OF THE LEFT KNEE DATE AND TIME:3/14/2022 2:49 PM CLINICAL HISTORY: ACUTE ANTERIOR LEFT KNEE PAIN. KNEE GAVE OUT, FALL  COMPARISON: NONE AVAILABLE. Findings: Acute lateral condylar fracture with extension proximally into the right femoral shaft . Minimal 5 mm displacement. Alignment normal. Slight widening of the lateral joint space. Bony spurring s and joint space narrowing consistent with  severe tricompartmental degenerative changes. Small effusion. ACUTE RIGHT SUPRACONDYLAR FEMORAL FRACTURE. EXAMINATION: XR KNEE LEFT (MIN 4 VIEWS), XR CHEST PORTABLE. DATE AND TIME:3/14/2022 2:49 PM CLINICAL HISTORY: SHORTNESS OF BREATH   KNEE GAVE OUT, FALL  COMPARISONS: NONE  FINDINGS: The heart, mediastinum and pulmonary vasculature are within normal limits. Nodular shadow right upper lobe possibly vessel on end. No previous films for comparison. Follow-up recommended to exclude a lung nodule. Otherwise, visualized lung fields are clear. Bones unremarkable. IMPRESSION:  NO ACTIVE DISEASE. QUESTIONABLE RIGHT LUNG NODULE.      Echo 2d w doppler w color w contrast    Result Date: 3/15/2022  Transthoracic Echocardiography Report (TTE)  Demographics   Patient Name    [facility] Gender                Male   Patient Number  [id number]     Race                                                  Ethnicity   Visit Number    [id number]    Room Number           C802   Corporate ID                 Date of Study         03/15/2022   Accession       [id number]   Referring Physician  Number   Date of Birth   1940   Sonographer           Laurie Soni RDCS   Age             [de-identified] year(s)   Interpreting          [facility]) Cardiology                               Physician             Corey Wilcox  Procedure Type of Study   TTE procedure:ECHOCARDIOGRAM 2D DOPPLER W COLOR W CONTRAST. Procedure Date Date: 03/15/2022 Start: 11:11 AM Study Location: Portable Technical Quality: Adequate visualization Indications:Edema. Patient Status: STAT Height: 70 inches Weight: 269 pounds BSA: 2.37 m^2 BMI: 38.6 kg/m^2 BP: 124/56 mmHg  Conclusions   Summary  This is a suboptimal study due to poor echocardiographic window due to  body habitus  Left ventricular ejection fraction is estimated at 65%. Mildly enlarged LA  E/A flow reversal noted. Suggestive of diastolic dysfunction. Normal right ventricle systolic pressure. RVSP 29mmHg  No prior echos to compare   Signature   ----------------------------------------------------------------  Electronically signed by Funmilayo Mcmanus(Interpreting physician)  on 03/15/2022 12:18 PM  ----------------------------------------------------------------   Findings  Left Ventricle Left ventricular ejection fraction is estimated at 65%. E/A flow reversal noted. Suggestive of diastolic dysfunction. Normal left ventricular size and function. Normal left ventricular wall thickness. Right Ventricle Normal right ventricle structure and function. Normal right ventricle systolic pressure. RVSP 29mmHg Left Atrium Mildly enlarged LA Right Atrium Normal right atrium. Mitral Valve Calcification of the mitral valve noted. No evidence of mitral stenosis No evidence of mitral regurgitaton. Tricuspid Valve Normal tricuspid valve structure and function. Aortic Valve Calcified ao leaflets No evidence of aortic valve stenosis . No evidence of aortic valve regurgitation . Pulmonic Valve Normal pulmonic valve structure and function. Pericardial Effusion No evidence of pericardial effusion.  Doppler Measurements:   AV Peak Gradient: 12.88 mmHg           MV Peak E-Wave: 0.82 m/s  AV Mean Gradient: 5.9 mmHg             MV Peak A-Wave: 1.22 m/s  TR Velocity:2.56 m/s  TR Gradient:26.18 mmHg                                         Estimated RAP:3 mmHg                                         RVSP:29.18 mmHg  Valves  Mitral Valve   Peak E-Wave: 0.82 m/s                 Peak A-Wave: 1.22 m/s                                        E/A Ratio: 0.68                                        Peak Gradient: 2.71 mmHg                                        Deceleration Time: 225.9 msec   Tissue Doppler   E' Septal Velocity: 0.05 m/s  E' Lateral Velocity: 0.08 m/s   Aortic Valve   Peak Velocity: 1.79 m/s                Mean Velocity: 1.13 m/s  Peak Gradient: 12.88 mmHg              Mean Gradient: 5.9 mmHg  AV VTI: 33.08 cm   Tricuspid Valve   Estimated RVSP: 29.18 mmHg              Estimated RAP: 3 mmHg  TR Velocity: 2.56 m/s                   TR Gradient: 26.18 mmHg   Pulmonic Valve   Peak Velocity: 1.5 m/s           Peak Gradient: 9 mmHg                                   Estimated PASP: 29.18 mmHg   LVOT   Peak Velocity: 1.02 m/s              Mean Velocity: 0.32 m/s  Peak Gradient: 4.16 mmHg             Mean Gradient: 0.95 mmHg                                       LVOT VTI: 23.71 cm  Structures  Left Atrium   LA Volume/Index: 96.93 ml /41 m^2             LA Area: 33.57 cm^2   Left Ventricle   LV EDV/LV EDV Index: 73.3 ml/31 m^2  LV ESV/LV ESV Index: 21.49 ml/9 m^2  EF Calculated: 70.7 %                LV Length: 7.88 cm   Right Atrium   RA Systolic Pressure: 3 mmHg   Right Ventricle            RV Systolic Pressure: 39.99 mmHg        Discharge Medications:         Medication List      START taking these medications    docusate sodium 100 MG capsule  Commonly known as: COLACE  Take 1 capsule by mouth 2 times daily     enoxaparin 30 MG/0.3ML injection  Commonly known as: LOVENOX  Inject 0.3 mLs into the skin 2 times daily for 14 days     oxyCODONE 5 MG immediate release tablet  Commonly known as: ROXICODONE  Take 1 tablet by mouth every 6 hours as needed for Pain for up to 7 days. polyethylene glycol 17 g packet  Commonly known as: GLYCOLAX  Take 17 g by mouth daily  Start taking on: March 19, 2022     senna 8.6 MG tablet  Commonly known as: SENOKOT  Take 2 tablets by mouth nightly        CONTINUE taking these medications    furosemide 20 MG tablet  Commonly known as: LASIX     metoprolol succinate 50 MG extended release tablet  Commonly known as: TOPROL XL     triamterene-hydroCHLOROthiazide 37.5-25 MG per tablet  Commonly known as: MAXZIDE-25           Where to Get Your Medications      You can get these medications from any pharmacy    Bring a paper prescription for each of these medications  · enoxaparin 30 MG/0.3ML injection  · oxyCODONE 5 MG immediate release tablet     Information about where to get these medications is not yet available    Ask your nurse or doctor about these medications  · docusate sodium 100 MG capsule  · polyethylene glycol 17 g packet  · senna 8.6 MG tablet         Disposition:   If discharged to Home, Any [facility] needs that were indicated and/or required as been addressed and set up by Social Work. Condition at discharge: good     Activity: activity as tolerated    Total time taken for discharging this patient: 40 minutes. Greater than 70% of time was spent focused exclusively on this patient. Time was taken to review chart, discuss plans with consultants, reconciling medications, discussing plan answering questions with patient.      Signed:  Kristen Giles MD  3/18/2022, 1:18 PM  ----------------------------------------------------------------------------------------------------------------------    Elle Germain,

## 2022-03-20 NOTE — PROGRESS NOTES
Physical Therapy  Facility/Department: [facility] MED SURG [id number]/[id number]  Physical Therapy Discharge      NAME: Isaac Obrien    : 1940 ([de-identified] y.o.)  MRN: [id number]    Account: [de-identified]  Gender: male      Patient has been discharged from acute care hospital. DC patient from current PT program.      Electronically signed by Bety Goodwin PT on 3/20/22 at 10:14 AM EDT

## 2022-03-21 ENCOUNTER — OFFICE VISIT (OUTPATIENT)
Dept: GERIATRIC MEDICINE | Age: 82
End: 2022-03-21
Payer: MEDICARE

## 2022-03-21 VITALS
WEIGHT: 260 LBS | HEART RATE: 86 BPM | TEMPERATURE: 97.3 F | OXYGEN SATURATION: 97 % | RESPIRATION RATE: 18 BRPM | HEIGHT: 70 IN | BODY MASS INDEX: 37.22 KG/M2 | SYSTOLIC BLOOD PRESSURE: 121 MMHG | DIASTOLIC BLOOD PRESSURE: 51 MMHG

## 2022-03-21 DIAGNOSIS — R53.81 PHYSICAL DECONDITIONING: ICD-10-CM

## 2022-03-21 DIAGNOSIS — I10 PRIMARY HYPERTENSION: ICD-10-CM

## 2022-03-21 DIAGNOSIS — Z74.09 IMPAIRED MOBILITY AND ENDURANCE: ICD-10-CM

## 2022-03-21 DIAGNOSIS — E87.1 HYPONATREMIA: ICD-10-CM

## 2022-03-21 DIAGNOSIS — Z96.652 HISTORY OF KNEE REPLACEMENT PROCEDURE OF LEFT KNEE: Primary | ICD-10-CM

## 2022-03-21 PROBLEM — J34.2 DEVIATED NASAL SEPTUM: Status: ACTIVE | Noted: 2022-03-21

## 2022-03-21 PROBLEM — R73.9 HYPERGLYCEMIA: Status: ACTIVE | Noted: 2022-03-21

## 2022-03-21 PROBLEM — M19.90 OSTEOARTHRITIS: Status: ACTIVE | Noted: 2022-03-21

## 2022-03-21 PROBLEM — J32.9 CHRONIC SINUSITIS: Status: ACTIVE | Noted: 2022-03-21

## 2022-03-21 PROCEDURE — 1123F ACP DISCUSS/DSCN MKR DOCD: CPT | Performed by: INTERNAL MEDICINE

## 2022-03-21 PROCEDURE — 99304 1ST NF CARE SF/LOW MDM 25: CPT | Performed by: INTERNAL MEDICINE

## 2022-03-21 PROCEDURE — G8484 FLU IMMUNIZE NO ADMIN: HCPCS | Performed by: INTERNAL MEDICINE

## 2022-03-21 ASSESSMENT — ENCOUNTER SYMPTOMS
DIARRHEA: 0
EYE PAIN: 0
SHORTNESS OF BREATH: 0
TROUBLE SWALLOWING: 0
ORTHOPNEA: 0
CHANGE IN BOWEL HABIT: 0
ABDOMINAL PAIN: 0
VOMITING: 0
NAUSEA: 0
CHEST TIGHTNESS: 0
COUGH: 0
CONSTIPATION: 0

## 2022-03-21 NOTE — PROGRESS NOTES
Charles Whitten is a [de-identified] y.o. male with hypertension, obesity, osteoarthritis,  whom I am seeing at [facility] for initial evaluation for the admission of 03/18/2022, for rehabilitation, after admission to [facility] from March 14 till March 18, 2022, for left femoral fracture, having had surgery on March 15. The patient was seen with his/her consent in his/her room at the facility. I also spoke with the nurse and reviewed the hospital and nursing home records. Chief Complaint   Patient presents with    Follow-Up from Hospital    Leg Injury    Hypertension           BRIEF HOSPITALIZATION EVENTS:    ER notes: \"Juan Richardson is a [de-identified] y.o. male who presents to the emergency department complaint of left knee pain. Patient states he was standing try to get into his truck, he states that his knee gave out on his left side, and folded underneath him, he states he fell directly onto his knee. He states since increasing pain, and weakness while trying to stand only at the knee itself. Patient denies any other injuries, no head neck or back pain. Patient rates his current pain at this time as a 7 out of 10 with movement, 0-10 while at rest.  Patient states he has had some ongoing issues with his left knee, and had been considering a knee replacement. \"       \"Hospital Course:   Patient presented with a femur fracture. It was repaired by ortho (Dr Kristian Salinas). He did have a mild post op fever which has since resolved. 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 \"         EVENTS SINCE SNF ADMISSION:    Since admission to the nursing facility patient's vital signs remained stable, pain well controlled with just Tylenol while at rest.  He was evaluated by PT/OT. No significant concerns from the nursing staff.     Laboratory and imaging studies reports reviewed included (but were not limited to) the following:      EXAM: CT KNEE LEFT WO CONTRAST 03/15/2022       HISTORY: Distal femur fracture       TECHNIQUE: Multiple contiguous axial images were obtained of the left knee without contrast. Multiplanar reformats were obtained.       COMPARISON: Radiographs performed earlier on the same date       FINDINGS:       Comminuted mildly displaced fracture of the distal femoral diaphysis extending into the medial and lateral femoral condyles and into the patellofemoral and tibiofemoral joints. Intramuscular hamstring hematoma partially visualized. Degenerative changes    of the knee most significant within the lateral compartment where the degenerative changes are advanced. Small knee joint effusion. Small Haas's cyst measures approximately 2 x 2 x 4.5 cm.           Impression       Comminuted mildly displaced intra-articular fracture of the distal femur.             EXAMINATION:  CHEST CT WITHOUT CONTRAST 03/17/2022       Indication:  Evaluate for suspected right apical lung mass.       Technique:  Spiral CT acquisition of the chest from the thoracic inlet to the upper abdomen without contrast.  Maximum intensity projection (MIP) were performed. All CT scans at this facility use dose modulation, iterative reconstruction, and/or weight    based dosing when appropriate to reduce radiation dose to as low as reasonably achievable.       Comparison: Same day chest x-ray.           RESULT:       Limitations:  None.       Lines, tubes, and devices:  None.       Lung parenchyma and pleura:  No consolidation. No suspicious pulmonary nodule. No pleural effusion. Central airways are patent. Bibasilar atelectasis/scarring. Scattered calcified granulomas.       Thoracic inlet, heart, and mediastinum:  No lymphadenopathy in the axillary, mediastinal, or hilar regions. Atherosclerotic calcification of the thoracic aorta. The thoracic aorta is normal in caliber.  3.8 cm aneurysmal dilation of the right main    pulmonary artery. . Cardiomegaly. Moderate coronary artery atherosclerotic calcifications are noted, although the study is not optimized for coronary assessment. No pericardial effusion or thickening.       Bones and soft tissues:  No destructive bone lesion. Degenerative changes of the thoracic spine. Chest wall is unremarkable.       Upper abdomen:  No abnormality in the imaged upper abdomen.           .           Impression       No CT evidence of acute abnormality. No suspicious pulmonary nodule or mass.     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             Admission on 03/14/2022, Discharged on 03/18/2022   Component Date Value Ref Range Status    WBC 03/14/2022 7.6  4.8 - 10.8 K/uL Final    RBC 03/14/2022 4.57* 4.70 - 6.10 M/uL Final    Hemoglobin 03/14/2022 15.0  14.0 - 18.0 g/dL Final    Hematocrit 03/14/2022 43.5  42.0 - 52.0 % Final    MCV 03/14/2022 95.2  80.0 - 100.0 fL Final    MCH 03/14/2022 32.7* 27.0 - 31.3 pg Final    MCHC 03/14/2022 34.4  33.0 - 37.0 % Final    RDW 03/14/2022 13.6  11.5 - 14.5 % Final    Platelets 85/67/3929 159  130 - 400 K/uL Final    Neutrophils % 03/14/2022 71.4  % Final    Lymphocytes % 03/14/2022 15.7  % Final    Monocytes % 03/14/2022 9.7  % Final    Eosinophils % 03/14/2022 2.7  % Final    Basophils % 03/14/2022 0.5  % Final    Neutrophils Absolute 03/14/2022 5.4  1.4 - 6.5 K/uL Final    Lymphocytes Absolute 03/14/2022 1.2  1.0 - 4.8 K/uL Final    Monocytes Absolute 03/14/2022 0.7  0.2 - 0.8 K/uL Final    Eosinophils Absolute 03/14/2022 0.2  0.0 - 0.7 K/uL Final    Basophils Absolute 03/14/2022 0.0  0.0 - 0.2 K/uL Final    Sodium 03/14/2022 134* 135 - 144 mEq/L Final    Potassium 03/14/2022 4.1  3.4 - 4.9 mEq/L Final    Chloride 03/14/2022 99  95 - 107 mEq/L Final    CO2 03/14/2022 21  20 - 31 mEq/L Final    Anion Gap 03/14/2022 14  9 - 15 mEq/L Final    Glucose 03/14/2022 115* 70 - 99 mg/dL Final    BUN 03/14/2022 12  8 - 23 mg/dL Final    CREATININE 03/14/2022 0.55* 0.70 - 1.20 mg/dL Final    GFR Non- 03/14/2022 >60.0  >60 Final    Comment: >60 mL/min/1.73m2 EGFR, calc. for ages 25 and older using the  MDRD formula (not corrected for weight), is valid for stable  renal function.  GFR  03/14/2022 >60.0  >60 Final    Comment: >60 mL/min/1.73m2 EGFR, calc. for ages 25 and older using the  MDRD formula (not corrected for weight), is valid for stable  renal function.  Calcium 03/14/2022 9.0  8.5 - 9.9 mg/dL Final    Total Protein 03/14/2022 7.5  6.3 - 8.0 g/dL Final    Albumin 03/14/2022 3.7  3.5 - 4.6 g/dL Final    Total Bilirubin 03/14/2022 1.1* 0.2 - 0.7 mg/dL Final    Alkaline Phosphatase 03/14/2022 61  35 - 104 U/L Final    ALT 03/14/2022 18  0 - 41 U/L Final    AST 03/14/2022 35  0 - 40 U/L Final    Globulin 03/14/2022 3.8* 2.3 - 3.5 g/dL Final    ABO/Rh 03/14/2022 A NEG   Final    Antibody Screen 03/14/2022 NEG   Final    Rejected Test 03/14/2022 PT/PTT   Final    Reason for Rejection 03/14/2022 see below   Final    Comment: Unable to perform testing; specimen quantity not sufficient. To perform testing the specimen will need to be recollected. QNS      Protime 03/14/2022 14.1  12.3 - 14.9 sec Final    INR 03/14/2022 1.1   Final    aPTT 03/14/2022 28.5  24.4 - 36.8 sec Final    Comment: Effective 11/4/2020:  Heparin Therapeutic Range: 64.0  98.0 seconds.       WBC 03/15/2022 7.5  4.8 - 10.8 K/uL Final    RBC 03/15/2022 4.06* 4.70 - 6.10 M/uL Final    Hemoglobin 03/15/2022 13.4* 14.0 - 18.0 g/dL Final    Hematocrit 03/15/2022 38.0* 42.0 - 52.0 % Final    MCV 03/15/2022 93.6  80.0 - 100.0 fL Final    MCH 03/15/2022 33.0* 27.0 - 31.3 pg Final    MCHC 03/15/2022 35.2  33.0 - 37.0 % Final    RDW 03/15/2022 13.3  11.5 - 14.5 % Final    Platelets 50/39/1036 168  130 - 400 K/uL Final    Neutrophils % 03/15/2022 69.7  % Final    Lymphocytes % 03/15/2022 16.0  % Final    Monocytes % 03/15/2022 10.6  % Final    Eosinophils % 03/15/2022 3.3  % Final    Basophils % 03/15/2022 0.4  % Final    Neutrophils Absolute 03/15/2022 5.2  1.4 - 6.5 K/uL Final    Lymphocytes Absolute 03/15/2022 1.2  1.0 - 4.8 K/uL Final    Monocytes Absolute 03/15/2022 0.8  0.2 - 0.8 K/uL Final    Eosinophils Absolute 03/15/2022 0.2  0.0 - 0.7 K/uL Final    Basophils Absolute 03/15/2022 0.0  0.0 - 0.2 K/uL Final    Sodium 03/15/2022 136  135 - 144 mEq/L Final    Potassium reflex Magnesium 03/15/2022 4.0  3.4 - 4.9 mEq/L Final    Chloride 03/15/2022 98  95 - 107 mEq/L Final    CO2 03/15/2022 23  20 - 31 mEq/L Final    Anion Gap 03/15/2022 15  9 - 15 mEq/L Final    Glucose 03/15/2022 138* 70 - 99 mg/dL Final    BUN 03/15/2022 13  8 - 23 mg/dL Final    CREATININE 03/15/2022 0.64* 0.70 - 1.20 mg/dL Final    GFR Non- 03/15/2022 >60.0  >60 Final    Comment: >60 mL/min/1.73m2 EGFR, calc. for ages 25 and older using the  MDRD formula (not corrected for weight), is valid for stable  renal function.  GFR  03/15/2022 >60.0  >60 Final    Comment: >60 mL/min/1.73m2 EGFR, calc. for ages 25 and older using the  MDRD formula (not corrected for weight), is valid for stable  renal function.       Calcium 03/15/2022 8.7  8.5 - 9.9 mg/dL Final    Ventricular Rate 03/15/2022 91  BPM Final    Atrial Rate 03/15/2022 91  BPM Final    P-R Interval 03/15/2022 196  ms Final    QRS Duration 03/15/2022 86  ms Final    Q-T Interval 03/15/2022 388  ms Final    QTc Calculation (Bazett) 03/15/2022 477  ms Final    P Axis 03/15/2022 100  degrees Final    R Axis 03/15/2022 67  degrees Final    T Axis 03/15/2022 61  degrees Final    Sodium 03/16/2022 135  135 - 144 mEq/L Final    Potassium reflex Magnesium 03/16/2022 4.4  3.4 - 4.9 mEq/L Final    Chloride 03/16/2022 99  95 - 107 mEq/L Final    CO2 03/16/2022 21  20 - 31 mEq/L Final    Anion Gap 03/16/2022 15  9 - 15 mEq/L Final    Glucose 03/16/2022 191* 70 - 99 mg/dL Final    BUN 03/16/2022 21  8 - 23 mg/dL Final    CREATININE 03/16/2022 0.84  0.70 - 1.20 mg/dL Final    GFR Non- 03/16/2022 >60.0  >60 Final    Comment: >60 mL/min/1.73m2 EGFR, calc. for ages 25 and older using the  MDRD formula (not corrected for weight), is valid for stable  renal function.  GFR  03/16/2022 >60.0  >60 Final    Comment: >60 mL/min/1.73m2 EGFR, calc. for ages 25 and older using the  MDRD formula (not corrected for weight), is valid for stable  renal function.       Calcium 03/16/2022 8.3* 8.5 - 9.9 mg/dL Final    WBC 03/16/2022 10.5  4.8 - 10.8 K/uL Final    RBC 03/16/2022 3.36* 4.70 - 6.10 M/uL Final    Hemoglobin 03/16/2022 11.0* 14.0 - 18.0 g/dL Final    Hematocrit 03/16/2022 31.8* 42.0 - 52.0 % Final    MCV 03/16/2022 94.8  80.0 - 100.0 fL Final    MCH 03/16/2022 32.9* 27.0 - 31.3 pg Final    MCHC 03/16/2022 34.7  33.0 - 37.0 % Final    RDW 03/16/2022 13.4  11.5 - 14.5 % Final    Platelets 20/19/3873 170  130 - 400 K/uL Final    Color, UA 03/16/2022 ORANGE* Straw/Yellow Final    Clarity, UA 03/16/2022 CLOUDY* Clear Final    Glucose, Ur 03/16/2022 Negative  Negative mg/dL Final    Bilirubin Urine 03/16/2022 SMALL* Negative Final    Ketones, Urine 03/16/2022 TRACE* Negative mg/dL Final    Specific [place], UA 03/16/2022 1.044  1.005 - 1.030 Final    Blood, Urine 03/16/2022 LARGE* Negative Final    pH, UA 03/16/2022 5.0  5.0 - 9.0 Final    Protein, UA 03/16/2022 100* Negative mg/dL Final    Urobilinogen, Urine 03/16/2022 1.0  <2.0 E.U./dL Final    Nitrite, Urine 03/16/2022 Negative  Negative Final    Leukocyte Esterase, Urine 03/16/2022 TRACE* Negative Final    Procalcitonin 03/16/2022 0.62* 0.00 - 0.15 ng/mL Final    Comment: Suspected Sepsis:  Low likelihood of sepsis  <.50 ng/mL    Increased likelihood of sepsis 0.50-2.00 ng/mL  Antibiotics encouraged    High risk of sepsis/shock   >2.00 ng/mL  Antibiotics strongly encouraged    Suspected Lower Respiratory Tract Infections:  Low likelihood of bacterial infection  <0.24 ng/mL    Increased likelihood of bacterial infection >0.24 ng/mL  Antibiotics encouraged    With successful antibiotic therapy, PCT levels should decrease  rapidly. (Half-life of 24 to 36 hours.)    Procalcitonin values from samples collected within the first  6 hours of systemic infection may still be low. Retesting may be indicated. Values from day 1 and day 4 can be entered into the Change in  Procalcitonin Calculator to determine the patient's  Mortality Risk Prognosis  ([email or web address])    In healthy neonates, plasma Procalcitonin (PCT) concentrations  increase gradually after birth, reaching peak values at about  24 hours of age then decrease to normal values below 0.5                            ng/mL  by 48-72 hours of age.       Hyaline Casts, UA 03/16/2022 0-1  0 - 5 /LPF Final    Renal Epithelial, UA 03/16/2022 0-2* /HPF Final    Bacteria, UA 03/16/2022 Negative  Negative /HPF Final    Hyaline Casts, UA 03/16/2022 10-20  0 - 5 /HPF Final    WBC, UA 03/16/2022 6-9* 0 - 5 /HPF Final    RBC, UA 03/16/2022 >100* 0 - 5 /HPF Final    Epithelial Cells, UA 03/16/2022 0-2  0 - 5 /HPF Final    WBC 03/17/2022 11.7* 4.8 - 10.8 K/uL Final    RBC 03/17/2022 3.02* 4.70 - 6.10 M/uL Final    Hemoglobin 03/17/2022 10.0* 14.0 - 18.0 g/dL Final    Hematocrit 03/17/2022 28.9* 42.0 - 52.0 % Final    MCV 03/17/2022 95.5  80.0 - 100.0 fL Final    MCH 03/17/2022 33.1* 27.0 - 31.3 pg Final    MCHC 03/17/2022 34.6  33.0 - 37.0 % Final    RDW 03/17/2022 13.5  11.5 - 14.5 % Final    Platelets 75/06/2285 179  130 - 400 K/uL Final    WBC 03/18/2022 9.8  4.8 - 10.8 K/uL Final    RBC 03/18/2022 2.81* 4.70 - 6.10 M/uL Final    Hemoglobin 03/18/2022 9.2* 14.0 - 18.0 g/dL Final    Hematocrit 03/18/2022 27.2* 42.0 - 52.0 % Final    MCV 03/18/2022 96.8 80.0 - 100.0 fL Final    MCH 03/18/2022 32.8* 27.0 - 31.3 pg Final    MCHC 03/18/2022 33.9  33.0 - 37.0 % Final    RDW 03/18/2022 13.5  11.5 - 14.5 % Final    Platelets 64/10/7682 191  130 - 400 K/uL Final       HPI:    More detail above in the chiefcomplaint(s), interim history and below in the review of systems. Leg Injury  The current episode started 1 to 4 weeks ago. Pertinent negatives include no abdominal pain, change in bowel habit, chest pain, coughing, fatigue, fever, headaches, nausea, neck pain, rash, urinary symptoms, vomiting or weakness. He has tried immobilization and acetaminophen for the symptoms. The treatment provided significant relief. Hypertension  This is a chronic problem. The problem is controlled. Pertinent negatives include no anxiety, chest pain, headaches, neck pain, orthopnea, palpitations or shortness of breath. There are no associated agents to hypertension. Risk factors for coronary artery disease include male gender and obesity. Past treatments include beta blockers and diuretics. The current treatment provides significant improvement. Compliance problems include diet. There is no history of angina, CAD/MI, CVA or heart failure. There is no history of renovascular disease or a thyroid problem.          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    not surgical    Benign prostatic hyperplasia with lower urinary tract symptoms     Chronic sinusitis     History of left knee [facility] 03/15/2022    [facility] 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    HTN (hypertension)     Hx of carpal tunnel syndrome     Obesity     Primary osteoarthritis of both knees        Past Surgical History:   Procedure Laterality Date    TOTAL KNEE ARTHROPLASTY Left 3/15/2022    LEFT KNEE DISTAL FEMUR REPLACEMENT performed by Christophe Vivas MD at [address] Marital status: Single Spouse name: Not on file    Number of children: Not on file    Years of education: Not on file    Highest education level: Not on file   Occupational History    Not on file   Tobacco Use    Smoking status: Former Smoker     Years: 40.00     Types: Cigarettes     Quit date: 18     Years since quittin.2    Smokeless tobacco: Former User    Tobacco comment: 40 years ago per patient   Vaping Use    Vaping Use: Never used   Substance and Sexual Activity    Alcohol use: Not Currently    Drug use: Never    Sexual activity: Not Currently     Partners: Male   Other Topics Concern    Not on file   Social History Narrative    Alone, was     No children    Worked in construction, at the [employer], drove a truck     Social Determinants of Health     Financial Resource Strain:     Difficulty of Paying Living Expenses: Not on file   Food Insecurity:     Worried About [address] in the Last Year: Not on file    [address] in the Last Year: Not on file   Transportation Needs:     Lack of Transportation (Medical): Not on file    Lack of Transportation (Non-Medical):  Not on file   Physical Activity:     Days of Exercise per Week: Not on file    Minutes of Exercise per Session: Not on file   Stress:     Feeling of Stress : Not on file   Social Connections:     Frequency of Communication with Friends and Family: Not on file    Frequency of Social Gatherings with Friends and Family: Not on file    Attends Rastafarian Services: Not on file    Active Member of Clubs or Organizations: Not on file    Attends Club or Organization Meetings: Not on file    Marital Status: Not on file   Intimate Partner Violence:     Fear of Current or Ex-Partner: Not on file    Emotionally Abused: Not on file    Physically Abused: Not on file    Sexually Abused: Not on file   Housing Stability:     Unable to Pay for Housing in the Last Year: Not on file    Number of [place] in the Last Year: Not on Negative for nosebleeds and trouble swallowing. Eyes: Negative for pain. Respiratory: Negative for cough, chest tightness and shortness of breath. Cardiovascular: Positive for leg swelling. Negative for chest pain, palpitations and orthopnea. Gastrointestinal: Negative for abdominal pain, change in bowel habit, constipation, diarrhea, nausea and vomiting. Endocrine: Negative for cold intolerance and heat intolerance. Genitourinary: Negative for difficulty urinating and hematuria. Musculoskeletal: Positive for gait problem. Negative for neck pain. Skin: Negative for rash. Neurological: Negative for tremors, speech difficulty, weakness and headaches. Hematological: Bruises/bleeds easily. Psychiatric/Behavioral: Negative for decreased concentration and dysphoric mood. The patient is not nervous/anxious. Vitals:    03/21/22 1540   BP: (!) 121/51   Pulse: 86   Resp: 18   Temp: 97.3 °F (36.3 °C)   SpO2: 97%   Weight: 260 lb (117.9 kg)   Height: 5' 10\" (1.778 m)       Physical Exam  Constitutional:       General: He is not in acute distress. Appearance: He is obese. He is not ill-appearing. Comments: In recliner with legs elevated, pleasant, looking younger than actual age   HENT:      Head: Atraumatic. Nose: No rhinorrhea. Mouth/Throat:      Mouth: Mucous membranes are moist.   Eyes:      General: No scleral icterus. Extraocular Movements: Extraocular movements intact. Cardiovascular:      Rate and Rhythm: Normal rate and regular rhythm. Pulses: Normal pulses. Heart sounds: Heart sounds are distant. No gallop. Comments: Stasis dermatitis of the lower legs noted  Pulmonary:      Effort: Pulmonary effort is normal.      Breath sounds: Normal breath sounds. No wheezing or rhonchi. Chest:      Chest wall: No tenderness. Abdominal:      General: There is no distension. Palpations: Abdomen is soft. Tenderness: There is no abdominal tenderness. working on [employer]. Hyponatremia               Monitor serum electrolytes, discontinue TMP/HCTZ for now, if hypokalemia develops with furosemide only, may resume TMT/HCTZ and reduce or discontinue furosemide        Plan:    See all orders and comments in the assessment section. Reviewed with the patient/nurse today's diagnosis and associated problems, treatment plans, prognosis, questions answered. Orders for [place] changes and for repeat laboratories placed in the nursing home chart. Close follow up needed with the geriatric team (CNP or with MD) in one week. I have reviewed the patient's medical and surgical, family and social history, health maintenance schedule, and updated the computerized patient record. Please note this report has been partially produced by using speech recognition hardware. It may contain errors related to the system, including grammar, punctuation and spelling as well as words and phrases that may seem inaccurate. For anyquestions or concerns, please feel free to contact me for clarification.         Electronically signed by Charlotte Balderas MD

## 2022-03-24 ENCOUNTER — OFFICE VISIT (OUTPATIENT)
Dept: GERIATRIC MEDICINE | Age: 82
End: 2022-03-24
Payer: MEDICARE

## 2022-03-24 DIAGNOSIS — S72.92XD: ICD-10-CM

## 2022-03-24 DIAGNOSIS — M25.562 ACUTE PAIN OF LEFT KNEE: ICD-10-CM

## 2022-03-24 DIAGNOSIS — E66.1 CLASS 2 DRUG-INDUCED OBESITY WITH SERIOUS COMORBIDITY AND BODY MASS INDEX (BMI) OF 37.0 TO 37.9 IN ADULT: ICD-10-CM

## 2022-03-24 DIAGNOSIS — K59.03 DRUG-INDUCED CONSTIPATION: ICD-10-CM

## 2022-03-24 DIAGNOSIS — I10 PRIMARY HYPERTENSION: ICD-10-CM

## 2022-03-24 DIAGNOSIS — M15.9 PRIMARY OSTEOARTHRITIS INVOLVING MULTIPLE JOINTS: Primary | ICD-10-CM

## 2022-03-24 PROCEDURE — G8484 FLU IMMUNIZE NO ADMIN: HCPCS | Performed by: NURSE PRACTITIONER

## 2022-03-24 PROCEDURE — 1123F ACP DISCUSS/DSCN MKR DOCD: CPT | Performed by: NURSE PRACTITIONER

## 2022-03-24 PROCEDURE — 99310 SBSQ NF CARE HIGH MDM 45: CPT | Performed by: NURSE PRACTITIONER

## 2022-03-24 PROCEDURE — 99356 PR PROLONGED SVC I/P OR OBS SETTING 1ST HOUR: CPT | Performed by: NURSE PRACTITIONER

## 2022-03-28 ENCOUNTER — OFFICE VISIT (OUTPATIENT)
Dept: GERIATRIC MEDICINE | Age: 82
End: 2022-03-28
Payer: MEDICARE

## 2022-03-28 DIAGNOSIS — I10 PRIMARY HYPERTENSION: ICD-10-CM

## 2022-03-28 DIAGNOSIS — E66.1 CLASS 2 DRUG-INDUCED OBESITY WITH SERIOUS COMORBIDITY AND BODY MASS INDEX (BMI) OF 37.0 TO 37.9 IN ADULT: ICD-10-CM

## 2022-03-28 DIAGNOSIS — R33.8 BENIGN PROSTATIC HYPERPLASIA WITH URINARY RETENTION: ICD-10-CM

## 2022-03-28 DIAGNOSIS — N40.1 BENIGN PROSTATIC HYPERPLASIA WITH URINARY RETENTION: ICD-10-CM

## 2022-03-28 DIAGNOSIS — M15.9 PRIMARY OSTEOARTHRITIS INVOLVING MULTIPLE JOINTS: Primary | ICD-10-CM

## 2022-03-28 PROCEDURE — 1123F ACP DISCUSS/DSCN MKR DOCD: CPT | Performed by: NURSE PRACTITIONER

## 2022-03-28 PROCEDURE — G8484 FLU IMMUNIZE NO ADMIN: HCPCS | Performed by: NURSE PRACTITIONER

## 2022-03-28 PROCEDURE — 99309 SBSQ NF CARE MODERATE MDM 30: CPT | Performed by: NURSE PRACTITIONER

## 2022-03-29 PROBLEM — E66.812 CLASS 2 DRUG-INDUCED OBESITY WITH SERIOUS COMORBIDITY AND BODY MASS INDEX (BMI) OF 37.0 TO 37.9 IN ADULT: Status: ACTIVE | Noted: 2022-03-29

## 2022-03-29 PROBLEM — E66.1 CLASS 2 DRUG-INDUCED OBESITY WITH SERIOUS COMORBIDITY AND BODY MASS INDEX (BMI) OF 37.0 TO 37.9 IN ADULT: Status: ACTIVE | Noted: 2022-03-29

## 2022-03-29 NOTE — PROGRESS NOTES
[address] [facility]. [place], [address] [place] [place]    3/24/2022    Deb Hernandez  is a [de-identified] y.o. in the NF being seen for a f/u of   Chief Complaint   Patient presents with    Follow-Up from Hospital     L femur ORIF       HPI   The pt is here for rehab after their hospitalization 3/14-3/18 for left femur surgery on left femoral fracture, he had a   Fall  On cement with injuries. They are up with [facility] for  wheel chair NO wt bearing. F/U with Ortho    They are eating and drinking OK per nursing. They are  complaining of any un addressed pain issues. But he is NOT asking for prn percocet. Have had no recent choking or dysphagia issues. NO agitation or unusual mental behavioral issues. Incision dressing D & I   PT reports they are not progressing yet with ambulation. OT reports they are progressing with ADLs. Family supportive. Plans to go home after rehab.      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    not surgical    Benign prostatic hyperplasia with lower urinary tract symptoms     Chronic sinusitis     History of left knee replacement 03/15/2022    at [facility] 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    HTN (hypertension)     Hx of carpal tunnel syndrome     Obesity     Primary osteoarthritis of both knees      Past Surgical History:   Procedure Laterality Date    TOTAL KNEE ARTHROPLASTY Left 3/15/2022    LEFT KNEE DISTAL FEMUR REPLACEMENT performed by Norma Pink MD at [address] History   Problem Relation Age of Onset    Cancer Mother     Cancer Father      Social History     Socioeconomic History    Marital status: Single     Spouse name: Not on file    Number of children: Not on file    Years of education: Not on file    Highest education level: Not on file   Occupational History    Not on file   Tobacco Use    Smoking status: Former Smoker     Years: 40.00     Types: Cigarettes     Quit date: 1982     Years since quittin.2    Smokeless tobacco: Former User    Tobacco comment: 40 years ago per patient   Vaping Use    Vaping Use: Never used   Substance and Sexual Activity    Alcohol use: Not Currently    Drug use: Never    Sexual activity: Not Currently     Partners: Male   Other Topics Concern    Not on file   Social History Narrative    Alone, was     No children    Worked in construction, at the [employer], drove a truck     Social Determinants of Health     Financial Resource Strain:     Difficulty of Paying Living Expenses: Not on file   Food Insecurity:     Worried About [address] in the Last Year: Not on file    [address] in the Last Year: Not on file   Transportation Needs:     Lack of Transportation (Medical): Not on file    Lack of Transportation (Non-Medical): Not on file   Physical Activity:     Days of Exercise per Week: Not on file    Minutes of Exercise per Session: Not on file   Stress:     Feeling of Stress : Not on file   Social Connections:     Frequency of Communication with Friends and Family: Not on file    Frequency of Social Gatherings with Friends and Family: Not on file    Attends Cheondoism Services: Not on file    Active Member of [address] [place] [address] or Organizations: Not on file    Attends Club or Organization Meetings: Not on file    Marital Status: Not on file   Intimate Partner Violence:     Fear of Current or Ex-Partner: Not on file    Emotionally Abused: Not on file    Physically Abused: Not on file    Sexually Abused: Not on file   Housing Stability:     Unable to Pay for Housing in the Last Year: Not on file    Number of [place] in the Last Year: Not on file    Unstable Housing in the Last Year: Not on file       Allergies: Aspirin  NF MEDICATIONS REVIEWED    ROS:   Constitutional: There are no reports of behavioral issues, change in appetite, fever, or increased weakness. No bleeding issues.  No head aches, change in vision or hearing. Respiratory: denies SOB, dyspnea, dyspnea on exertion, change in exercise capacity  Cardiovascular: denies CP, lightheadedness, palpitations, PND, orthopnea,    GI: No N/V/D. : no reports of dysuria, continent of urine using urinal, but c/o cannot use when up in chair. Extremities:   reports of pain issues, edema in left knee and leg since surgery per pt   Psych: at baseline   lucid    Physical exam:   T  97.3  P  80  R  16  BP  128/71  262lb  96%RA    Constitutional: Awake and alert sitting up in bed, general weakness  HEENT: Normocephalic, intact facial symmetry, EOMs intact, sclera non icteric, pupils are equal and round, buccal mucosa pink and moist  Speech clear   NECK: - carotid bruit, euthyroid, no mass visualized  Cardiovascular: Regular rate S1S2 normal, NO S3S4  Respiratory: LCTA, even unlabored respirations, no accessory muscle use noted  GI: abdomen NT, +BS, obese  : continent of urine  Extremities: left  ankle edema, PPP, some heme stain  SKELETAL: no redness, or swelling over joints. Limited ROM but spontaneous movement x 4   Psych: pleasant, good judgement, normal thought content  SKIN: no gross skin lesions noted on visualized skin, warm and dry    ASSESSMENT:     Diagnosis Orders   1. Primary osteoarthritis involving multiple joints     2. Primary hypertension     3. Class 2 drug-induced obesity with serious comorbidity and body mass index (BMI) of 37.0 to 37.9 in adult     4. Acute pain of left knee     5. Drug-induced constipation     6. Traumatic fracture of femur with routine healing, left, closed         PLAN:  Pt/POA agrees with POC   PT  OT  Change Oxy 5mg to routine q 6 h for one week then return to q 6 h prn   laxatives for narcotic  use   Labs bmp mg cbc pending      adhere to the JNC VIII guidelines for HTN management and the NCEP ATP III guidelines for LDL-C management.     Timed Visit  Time in 2pm Time out 3:05pm  with the patient, direct face to face patient contact at the bedside, and on the patient's unit for multiple complex medical problem for diagnosis of lateral distal condyle fracture and repair of left femur, constipation related to narcotics, narcotic addiction risk, HTN, difficulty ambualting, review of abnormal lab results, diagnosis possibilities, treatment options, risks and benefits, reviewing NF, hospital charts, labs; discussion with the  Pt , therapists and nursing regarding rehabilitation  POC and services. Return if symptoms worsen or fail to improve. Pertinent POC, medications, labs, have been reviewed, continue same. Encourage fluids and good nutrition. Stress fall prevention strategies. CHARLIE Caballero-CNP      German Colbert DNP, MSN, RN, GNP-BC, NP-C  Adult/Juan Carlos Nurse Practitioner  [facility] [place] [facility]  Department of Geriatrics  8:30am-4:30pm   [phone]  After hours Answering service [phone]      Please note this report is partially produced by using speech recognition hardware. It may contain errors related to the system, including grammar, punctuation and spelling as well as words and phrases that may seem inaccurate.   For any questions or concerns feel free to contact me for clarification

## 2022-03-29 NOTE — PROGRESS NOTES
[address] [facility]. [place], [address] [place]    3/28/2022    Payal Lee  is a [de-identified] y.o. in the NF being seen for a f/u of   Chief Complaint   Patient presents with    Follow-Up from Hospital     femur fx repair       HPI The pt is here for rehab after their hospitalization  3/14-3/18 for left femur surgery on left femoral fracture, he had a   Fall  On cement with injuries. They are up with [facility] for  wheel chair NO wt bearing. They are up in wheel chair     Takes Oxy q 6 h PRN   Leg pain under control  Now has abdominal pain from constipation and MOM causing  Cramps. Takes metamucil at home is requesting here also     Left knee incision looks good per wound nurse. They are eating and drinking OK per nursing. They have had no recent falls with injuries. They are not complaining of any un addressed pain issues. Have had no recent choking or dysphagia issues. NO agitation or unusual mental behavioral issues. Incision healing well. PT reports they are progressing with ambulation. OT reports they are progressing with ADLs. Family supportive. Plans to go home after rehab.      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 History   Problem Relation Age of Onset    Cancer Mother     Cancer Father      Social History     Socioeconomic History    Marital status: Single     Spouse name: Not on file    Number of children: Not on file    Years of education: Not on file    Highest education level: Not on file   Occupational History    Not on file   Tobacco Use    Smoking status: Former Smoker     Years: 40.00     Types: Cigarettes     Quit date: 18     Years since quittin.2    Smokeless tobacco: Former User    Tobacco comment: 40 years ago per patient   Vaping Use    Vaping Use: Never used   Substance and Sexual Activity    Alcohol use: Not Currently    Drug use: Never    Sexual activity: Not Currently     Partners: Male   Other Topics Concern    Not on file   Social History Narrative    Alone, was     No children    Worked in construction, at the [employer], drove a truck     Social Determinants of Health     Financial Resource Strain:     Difficulty of Paying Living Expenses: Not on file   Food Insecurity:     Worried About [address] in the Last Year: Not on file    [address] in the Last Year: Not on file   Transportation Needs:     Lack of Transportation (Medical): Not on file    Lack of Transportation (Non-Medical):  Not on file   Physical Activity:     Days of Exercise per Week: Not on file    Minutes of Exercise per Session: Not on file   Stress:     Feeling of Stress : Not on file   Social Connections:     Frequency of Communication with Friends and Family: Not on file    Frequency of Social Gatherings with Friends and Family: Not on file    Attends Hoahaoism Services: Not on file    Active Member of [address] [place] [address] or Organizations: Not on file    Attends Club or Organization Meetings: Not on file    Marital Status: Not on file   Intimate Partner Violence:     Fear of Current or Ex-Partner: Not on file    Emotionally Abused: Not on file    Physically Abused: Not on file    Sexually Abused: Not on file   Housing Stability:     Unable to Pay for Housing in the Last Year: Not on file    Number of [place] in the Last Year: Not on file    Unstable Housing in the Last Year: Not on file       Allergies: Aspirin  NF MEDICATIONS REVIEWED    ROS:   Constitutional: There are no reports of behavioral issues, change in appetite, fever, or increased weakness. No bleeding issues. No head aches, change in vision or hearing. Respiratory: denies SOB, dyspnea, dyspnea on exertion, change in exercise capacity  Cardiovascular: denies CP, lightheadedness, palpitations, PND, orthopnea. GI: + constipation, and crampy diarrhea. : no reports of dysuria, continent of urine   Extremities: No reports of pain issues he is under control, edema of knee same not worse  Psych: at baseline  lucid    Physical exam:   T 97.2  P  68  R  15  /68  262lb  95%RA    Constitutional: Awake and alert sitting up in bed, general weakness  HEENT: Normocephalic, intact facial symmetry, EOMs intact, sclera non icteric, pupils are equal and round, buccal mucosa pink and moist  Speech clear    NECK:  euthyroid, no mass visualized  Cardiovascular: Regular rate S1S2 normal, NO S3S4  Respiratory: LCTA, even unlabored respirations, no accessory muscle use noted  GI: abdomen pain with cramps but not with palpation, +BS , obese  : continent of urine  Extremities:  Left knee edema, PPP  SKELETAL: no redness, or swelling over joints. Limited ROM but spontaneous movement x 4   Psych: pleasant despite abd pain , good judgement, normal thought content  SKIN: no gross skin lesions noted on visualized skin, warm and dry    ASSESSMENT:     Diagnosis Orders   1. Primary osteoarthritis involving multiple joints     2. Class 2 drug-induced obesity with serious comorbidity and body mass index (BMI) of 37.0 to 37.9 in adult     3. Primary hypertension     4. Benign prostatic hyperplasia with urinary retention         PLAN:  Pt/POA agrees with POC   PT  OT  KUB am   Added metamucil   Keep leg in immobilizer     adhere to the JNC VIII guidelines for HTN management and the NCEP ATP III guidelines for LDL-C management.       Return if symptoms worsen or fail to improve. Pertinent POC, medications, labs, have been reviewed, continue same. Encourage fluids and good nutrition. Stress fall prevention strategies. Manohar Steele, APRN-CNP      Manohar Steele, DNP, MSN, RN, GNP-BC, NP-C  Adult/Juan [place] Nurse Practitioner  [address], [facility]  Department of Geriatrics  8:30am-4:30pm   [phone]  After hours Answering service [phone]      Please note this report is partially produced by using speech recognition hardware. It may contain errors related to the system, including grammar, punctuation and spelling as well as words and phrases that may seem inaccurate.   For any questions or concerns feel free to contact me for clarification

## 2022-03-30 PROBLEM — N40.1 BENIGN PROSTATIC HYPERPLASIA WITH LOWER URINARY TRACT SYMPTOMS: Status: ACTIVE | Noted: 2022-03-30

## 2022-03-31 ENCOUNTER — OFFICE VISIT (OUTPATIENT)
Dept: GERIATRIC MEDICINE | Age: 82
End: 2022-03-31
Payer: MEDICARE

## 2022-03-31 DIAGNOSIS — I10 PRIMARY HYPERTENSION: ICD-10-CM

## 2022-03-31 DIAGNOSIS — S72.92XD CLOSED FRACTURE OF LEFT FEMUR WITH ROUTINE HEALING, UNSPECIFIED FRACTURE MORPHOLOGY, UNSPECIFIED PORTION OF FEMUR, SUBSEQUENT ENCOUNTER: ICD-10-CM

## 2022-03-31 DIAGNOSIS — G89.18 ACUTE POST-OPERATIVE PAIN: ICD-10-CM

## 2022-03-31 DIAGNOSIS — K59.03 DRUG-INDUCED CONSTIPATION: Primary | ICD-10-CM

## 2022-03-31 DIAGNOSIS — K92.1 BLACK STOOL: ICD-10-CM

## 2022-03-31 PROCEDURE — 99309 SBSQ NF CARE MODERATE MDM 30: CPT | Performed by: NURSE PRACTITIONER

## 2022-03-31 PROCEDURE — 1123F ACP DISCUSS/DSCN MKR DOCD: CPT | Performed by: NURSE PRACTITIONER

## 2022-03-31 PROCEDURE — G8484 FLU IMMUNIZE NO ADMIN: HCPCS | Performed by: NURSE PRACTITIONER

## 2022-03-31 NOTE — PROGRESS NOTES
[address] [facility]. [place], [address] [place]    3/31/2022    Stan Bonilla  is a [de-identified] y.o. in the NF being seen for a f/u of   Chief Complaint   Patient presents with    Constipation     good BMs and no abd pain       HPI   The pt remains  In rehab for left femur fracture and ORIF. They are eating and drinking at their baseline per nursing. They are not complaining of any un addressed pain issues. Have had no recent choking or dysphagia issues. NO agitation or unusual mental behavioral issues.      Past Medical History:   Diagnosis Date    Aneurysm artery, pulmonary ([address]     not surgical    Benign prostatic hyperplasia with lower urinary tract symptoms     Chronic sinusitis     History of left knee replacement [facility]/15/2022    at [facility] 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    HTN (hypertension)     Hx of carpal tunnel syndrome     Obesity     Primary osteoarthritis of both knees      Past Surgical History:   Procedure Laterality Date    TOTAL KNEE ARTHROPLASTY Left 3/15/2022    LEFT KNEE DISTAL FEMUR REPLACEMENT performed by Lemuel Coffman MD at [address] History   Problem Relation Age of Onset    Cancer Mother     Cancer Father      Social History     Socioeconomic History    Marital status: Single     Spouse name: Not on file    Number of children: Not on file    Years of education: Not on file    Highest education level: Not on file   Occupational History    Not on file   Tobacco Use    Smoking status: Former Smoker     Years: 40.00     Types: Cigarettes     Quit date:      Years since quittin.2    Smokeless tobacco: Former User    Tobacco comment: 40 years ago per patient   Vaping Use    Vaping Use: Never used   Substance and Sexual Activity    Alcohol use: Not Currently    Drug use: Never    Sexual activity: Not Currently     Partners: Male   Other Topics Concern    Not on file   Social History Narrative    Alone, was     No children    Worked in construction, at the [employer], drove a truck     Social Determinants of Health     Financial Resource Strain:     Difficulty of Paying Living Expenses: Not on file   Food Insecurity:     Worried About [address] in the Last Year: Not on file    [address] in the Last Year: Not on file   Transportation Needs:     Lack of Transportation (Medical): Not on file    Lack of Transportation (Non-Medical): Not on file   Physical Activity:     Days of Exercise per Week: Not on file    Minutes of Exercise per Session: Not on file   Stress:     Feeling of Stress : Not on file   Social Connections:     Frequency of Communication with Friends and Family: Not on file    Frequency of Social Gatherings with Friends and Family: Not on file    Attends Pentecostal Services: Not on file    Active Member of [address] [place] [address] or Organizations: Not on file    Attends Club or Organization Meetings: Not on file    Marital Status: Not on file   Intimate Partner Violence:     Fear of Current or Ex-Partner: Not on file    Emotionally Abused: Not on file    Physically Abused: Not on file    Sexually Abused: Not on file   Housing Stability:     Unable to Pay for Housing in the Last Year: Not on file    Number of [place] in the Last Year: Not on file    Unstable Housing in the Last Year: Not on file       Allergies: Aspirin  NF MEDICATIONS REVIEWED    ROS:   Constitutional: There are no reports of behavioral issues, change in appetite, fever, or increased weakness. No bleeding issues.   Respiratory: denies SOB, dyspnea, dyspnea on exertion, change in exercise capacity  Cardiovascular: denies CP, lightheadedness, palpitations, PND, orthopnea,    GI: He was constipated with abd pain , now reports are of stools are frequent and loose not formed he is on Colace , senna, miralax, metamucil,    : no reports of dysuria,  continent of urine   Extremities: No reports of pain issues, edema remains same left leg  Psych: at baseline      Physical exam:   BP  138/68  T 97.2  P  68   R  18  262lb  97%RA    Constitutional: Awake and alert sitting up in chair, general weakness  HEENT: Normocephalic, intact facial symmetry, EOMs intact, sclera non icteric, pupils are equal and round, buccal mucosa pink and moist  Speech clear    NECK:  euthyroid, no mass visualized  Cardiovascular: Regular rate S1S2 normal, NO S3S4  Respiratory: LCTA, even unlabored respirations, no accessory muscle use noted  GI: obese, abdomen NT, +BS  ND  : continent of urine  Extremities: trace ankle edema, PPP  SKELETAL: no redness, or swelling over joints. Limited ROM but spontaneous movement x 4   Psych: pleasant, good judgement, normal thought content  SKIN: partial incision visualized, well approximated. warm and dry    ASSESSMENT:     Diagnosis Orders   1. Drug-induced constipation     2. Black stool     3. Closed fracture of left femur with routine healing, unspecified fracture morphology, unspecified portion of femur, subsequent encounter     4. Primary hypertension         PLAN:  Pt/POA agrees with POC   PT  OT    KUB some stool but no ileus. He a had a good BM and states his abd pain stopped afer that. I was notified he had a balck stool, FOBT x 3 will be done to r/o GIB. His left leg is not having a lot of pain per his report. Oxycodone will wean down to q 8 h prn . HTN in goal most days, continue maxide 37.5/25, and lasix 20mg and toprol 50 for now. Stop colace and senna due to loose stools. adhere to the JNC VIII guidelines for HTN management and the NCEP ATP III guidelines for LDL-C management. Return if symptoms worsen or fail to improve. Pertinent POC, medications, labs, have been reviewed, continue same. Encourage fluids and good nutrition. Stress fall prevention strategies.     Stanley Nissen, APRN-CNP      Stanley Nissen, DNP, MSN, RN, GNP-BC, NP-C  Adult/Juan [place] Nurse Practitioner  [facility]) Physicians, [address] [facility]  8:30am-4:30pm   [phone]  After hours Answering service [phone]      Please note this report is partially produced by using speech recognition hardware. It may contain errors related to the system, including grammar, punctuation and spelling as well as words and phrases that may seem inaccurate.   For any questions or concerns feel free to contact me for clarification

## 2022-04-01 ENCOUNTER — TELEPHONE (OUTPATIENT)
Dept: ORTHOPEDIC SURGERY | Age: 82
End: 2022-04-01

## 2022-04-01 DIAGNOSIS — S72.8X2A OTHER FRACTURE OF LEFT FEMUR, INITIAL ENCOUNTER FOR CLOSED FRACTURE (HCC): Primary | ICD-10-CM

## 2022-04-01 RX ORDER — DOCUSATE SODIUM 100 MG/1
100 CAPSULE, LIQUID FILLED ORAL 3 TIMES DAILY PRN
Qty: 90 CAPSULE | Refills: 1 | Status: SHIPPED | OUTPATIENT
Start: 2022-04-01 | End: 2022-05-01

## 2022-04-01 RX ORDER — HYDROCODONE BITARTRATE AND ACETAMINOPHEN 5; 325 MG/1; MG/1
1 TABLET ORAL EVERY 6 HOURS PRN
Qty: 20 TABLET | Refills: 0 | Status: SHIPPED | OUTPATIENT
Start: 2022-04-01 | End: 2022-04-06

## 2022-04-01 RX ORDER — POLYETHYLENE GLYCOL 3350 17 G/17G
17 POWDER, FOR SOLUTION ORAL 2 TIMES DAILY
Qty: 510 G | Refills: 0 | Status: SHIPPED | OUTPATIENT
Start: 2022-04-01 | End: 2022-05-01

## 2022-04-01 NOTE — TELEPHONE ENCOUNTER
Pt's sister called stating that her brother who resides in [facility] is very constipated from the Oxycodone and she is wondering if there is something that he can take for pain that will not constipate him but will help w/ his pain. Pt has a f/u on 4/4/2021 w/ Dr. Rodrigue Flores. Thedora Shadow can be reached at [phone].

## 2022-04-01 NOTE — TELEPHONE ENCOUNTER
Please instruct the nursing home to have marcellus increase his fiber intake as well as his fluid intake. I will print out prescriptions I can help with his constipation and leave him in the patient folder -basically just increasing the frequency of docusate to 3 times a day, polyethylenes to 2 times a day. We will also change his narcotic to Fredrica Phalen.  This prescription was also printed as usually is required for nursing homes.

## 2022-04-02 RX ORDER — OXYCODONE HYDROCHLORIDE 5 MG/1
5 TABLET ORAL EVERY 6 HOURS PRN
Qty: 40 TABLET | Refills: 0 | Status: SHIPPED | OUTPATIENT
Start: 2022-04-02 | End: 2022-05-02

## 2022-04-04 ENCOUNTER — OFFICE VISIT (OUTPATIENT)
Dept: ORTHOPEDIC SURGERY | Age: 82
End: 2022-04-04

## 2022-04-04 ENCOUNTER — HOSPITAL ENCOUNTER (OUTPATIENT)
Dept: ORTHOPEDIC SURGERY | Age: 82
Discharge: HOME | End: 2022-04-06
Payer: COMMERCIAL

## 2022-04-04 VITALS
OXYGEN SATURATION: 98 % | WEIGHT: 260 LBS | BODY MASS INDEX: 37.22 KG/M2 | TEMPERATURE: 97.7 F | HEART RATE: 83 BPM | HEIGHT: 70 IN

## 2022-04-04 DIAGNOSIS — S72.8X2A OTHER FRACTURE OF LEFT FEMUR, INITIAL ENCOUNTER FOR CLOSED FRACTURE (HCC): ICD-10-CM

## 2022-04-04 DIAGNOSIS — S72.8X2A OTHER FRACTURE OF LEFT FEMUR, INITIAL ENCOUNTER FOR CLOSED FRACTURE (HCC): Primary | ICD-10-CM

## 2022-04-04 PROCEDURE — 99024 POSTOP FOLLOW-UP VISIT: CPT | Performed by: PHYSICIAN ASSISTANT

## 2022-04-04 PROCEDURE — 73562 X-RAY EXAM OF KNEE 3: CPT

## 2022-04-04 PROCEDURE — 73562 X-RAY EXAM OF KNEE 3: CPT | Performed by: ORTHOPAEDIC SURGERY

## 2022-04-04 NOTE — PATIENT INSTRUCTIONS
PT/OT: WBAT, motion as tolerated. No longer needs immobilizer. Up with assistance and with walker at all times at LEAST 4x per day - do not leave sitting in chair it is very important that he begins walking   Continue lovenox injections for DVT ppx.  Has a history of blood clots   Continue current pain regimen

## 2022-04-04 NOTE — PROGRESS NOTES
[facility]      Subjective:      Chief Complaint   Patient presents with    Post-Op Check     LEFT KNEE DISTAL FEMUR REPLACEMENT       HPI: Neva Brunner is a [de-identified] y.o. male who is about 2 weeks after a left DFR in the setting of a distal femur fracture. He has been 50% weightbearing in a knee immobilizer.     Past Medical History:   Diagnosis Date    Aneurysm artery, pulmonary ([address]     not surgical    Benign prostatic hyperplasia with lower urinary tract symptoms     Chronic sinusitis     History of left knee replacement 03/15/2022    at [facility] 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    HTN (hypertension)     Hx of carpal tunnel syndrome     Obesity     Primary osteoarthritis of both knees       Past Surgical History:   Procedure Laterality Date    TOTAL KNEE ARTHROPLASTY Left 3/15/2022    LEFT KNEE DISTAL FEMUR REPLACEMENT performed by Colleen Soto MD at [address] [place] [address] History     Socioeconomic History    Marital status: Single     Spouse name: Not on file    Number of children: Not on file    Years of education: Not on file    Highest education level: Not on file   Occupational History    Not on file   Tobacco Use    Smoking status: Former Smoker     Years: 40.00     Types: Cigarettes     Quit date:      Years since quittin.2    Smokeless tobacco: Former User    Tobacco comment: 40 years ago per patient   Vaping Use    Vaping Use: Never used   Substance and Sexual Activity    Alcohol use: Not Currently    Drug use: Never    Sexual activity: Not Currently     Partners: Male   Other Topics Concern    Not on file   Social History Narrative    Alone, was     No children    Worked in construction, at the [employer], drove a truck     Social Determinants of Health     Financial Resource Strain:     Difficulty of Paying Living Expenses: Not on file   Food Insecurity:     Worried About [address] in the Last Year: Not on file    Ran Out of Food in the Last Year: Not on file   Transportation Needs:     Lack of Transportation (Medical): Not on file    Lack of Transportation (Non-Medical): Not on file   Physical Activity:     Days of Exercise per Week: Not on file    Minutes of Exercise per Session: Not on file   Stress:     Feeling of Stress : Not on file   Social Connections:     Frequency of Communication with Friends and Family: Not on file    Frequency of Social Gatherings with Friends and Family: Not on file    Attends Mosque Services: Not on file    Active Member of [address] [place] [address] [employer] or Organizations: Not on file    Attends Club or Organization Meetings: Not on file    Marital Status: Not on file   Intimate Partner Violence:     Fear of Current or Ex-Partner: Not on file    Emotionally Abused: Not on file    Physically Abused: Not on file    Sexually Abused: Not on file   Housing Stability:     Unable to Pay for Housing in the Last Year: Not on file    Number of [place] in the Last Year: Not on file    Unstable Housing in the Last Year: Not on file     Family History   Problem Relation Age of Onset    Cancer Mother     Cancer Father      Allergies   Allergen Reactions    Aspirin      Current Outpatient Medications on File Prior to Visit   Medication Sig Dispense Refill    oxyCODONE (ROXICODONE) 5 MG immediate release tablet Take 1 tablet by mouth every 6 hours as needed for Pain for up to 30 days. 40 tablet 0    docusate sodium (COLACE) 100 MG capsule Take 1 capsule by mouth 3 times daily as needed for Constipation 90 capsule 1    HYDROcodone-acetaminophen (NORCO) 5-325 MG per tablet Take 1 tablet by mouth every 6 hours as needed for Pain for up to 5 days. Intended supply: 5 days.  Take lowest dose possible to manage pain 20 tablet 0    polyethylene glycol (GLYCOLAX) 17 GM/SCOOP powder Take 17 g by mouth 2 times daily 510 g 0    docusate sodium (COLACE) 100 MG capsule Take 1 capsule by mouth 2 times daily (Patient taking differently: Take 100 mg by mouth 2 times daily Indications: Constipation )      polyethylene glycol (GLYCOLAX) 17 g packet Take 17 g by mouth daily (Patient taking differently: Take 17 g by mouth daily Indications: Constipation ) 527 g 1    senna (SENOKOT) 8.6 MG tablet Take 2 tablets by mouth nightly (Patient taking differently: Take 2 tablets by mouth nightly Indications: Constipation ) 60 tablet 0    furosemide (LASIX) 20 MG tablet Take 20 mg by mouth daily Indications: High Blood Pressure Disorder       triamterene-hydroCHLOROthiazide (MAXZIDE-25) 37.5-25 MG per tablet Take 0.5 tablets by mouth daily Indications: High Blood Pressure Disorder       metoprolol succinate (TOPROL XL) 50 MG extended release tablet Take 50 mg by mouth daily Indications: High Blood Pressure Disorder        No current facility-administered medications on file prior to visit. Objective: There were no vitals taken for this visit. Radiographs and Laboratory Studies:   Previous diagnostic imaging studies were reviewed. Laboratory Studies:   Lab Results   Component Value Date    WBC 9.8 03/18/2022    HGB 9.2 (L) 03/18/2022    HCT 27.2 (L) 03/18/2022    MCV 96.8 03/18/2022     03/18/2022     No results found for: SEDRATE  No results found for: CRP    Assessment and Plan:      Diagnosis Orders   1. Other fracture of left femur, initial encounter for closed fracture (HCC)  XR KNEE LEFT (3 VIEWS)     2 weeks after a distal femur replacement by Dr. Reny Retana on the left side. Has been in immobilizer. Has 50% weightbearing. Nursing facility. He has been on Lovenox for DVT prophylaxis and will continue doing such for another 2 weeks. Purpose of his immobilization was protect his wound which looks good today and therefore we will allow him to have range of motion as tolerated. Also advance his weightbearing status to as tolerated.   We encouraged the nursing facility to get him up at least 4 times a day as he states that there is ultimately working with him. Unfortunately is in a difficult financial situation and may not be there much longer which could be difficult for him to rehab and effect prognosis. Will see back next week for prineo removal.      The above plan was discussed in thorough detail with Dr. Tiesha Hodge and the patient. Orders Placed This Encounter   Procedures    XR KNEE LEFT (3 VIEWS)     Standing Status:   Future     Number of Occurrences:   1     Standing Expiration Date:   4/4/2023     No orders of the defined types were placed in this encounter. No follow-ups on file.     Yaron Lopez PA-C  [facility]  [phone]

## 2022-04-11 ENCOUNTER — OFFICE VISIT (OUTPATIENT)
Dept: GERIATRIC MEDICINE | Age: 82
End: 2022-04-11
Payer: MEDICARE

## 2022-04-11 ENCOUNTER — OFFICE VISIT (OUTPATIENT)
Dept: ORTHOPEDIC SURGERY | Age: 82
End: 2022-04-11

## 2022-04-11 VITALS
HEART RATE: 70 BPM | OXYGEN SATURATION: 98 % | HEIGHT: 70 IN | BODY MASS INDEX: 37.22 KG/M2 | TEMPERATURE: 97.5 F | WEIGHT: 260 LBS

## 2022-04-11 DIAGNOSIS — S72.8X2A OTHER FRACTURE OF LEFT FEMUR, INITIAL ENCOUNTER FOR CLOSED FRACTURE (HCC): Primary | ICD-10-CM

## 2022-04-11 DIAGNOSIS — N40.1 BENIGN PROSTATIC HYPERPLASIA WITH URINARY RETENTION: ICD-10-CM

## 2022-04-11 DIAGNOSIS — S72.452D SUPRACONDYLAR FRACTURE OF FEMUR, LEFT, CLOSED, WITH ROUTINE HEALING, SUBSEQUENT ENCOUNTER: Primary | ICD-10-CM

## 2022-04-11 DIAGNOSIS — R33.8 BENIGN PROSTATIC HYPERPLASIA WITH URINARY RETENTION: ICD-10-CM

## 2022-04-11 PROCEDURE — 1123F ACP DISCUSS/DSCN MKR DOCD: CPT | Performed by: NURSE PRACTITIONER

## 2022-04-11 PROCEDURE — 99024 POSTOP FOLLOW-UP VISIT: CPT | Performed by: PHYSICIAN ASSISTANT

## 2022-04-11 PROCEDURE — 99309 SBSQ NF CARE MODERATE MDM 30: CPT | Performed by: NURSE PRACTITIONER

## 2022-04-11 NOTE — PROGRESS NOTES
[facility]      Subjective:      Chief Complaint   Patient presents with    Follow-up     Pt comes in today for second post op appt of Other fracture of left femur, initial encounter for closed fracture , He seems to still be in chronic pain, he is only taking tylenol for the pain he state. Pt is still rrently in PT he belives hes making some improvemtn . the other pain mediation prescribed to him \"burns my stomach\"        HPI: Idalia Man is a [de-identified] y.o. male who is here for prineo removal from [facility].      Past Medical History:   Diagnosis Date    Aneurysm artery, pulmonary ([address]     not surgical    Benign prostatic hyperplasia with lower urinary tract symptoms     Chronic sinusitis     History of left knee replacement 03/15/2022    at [address] 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    HTN (hypertension)     Hx of carpal tunnel syndrome     Obesity     Primary osteoarthritis of both knees       Past Surgical History:   Procedure Laterality Date    TOTAL KNEE ARTHROPLASTY Left 3/15/2022    LEFT KNEE DISTAL FEMUR REPLACEMENT performed by Celina Tam MD at [address] [facility] History     Socioeconomic History    Marital status: Single     Spouse name: Not on file    Number of children: Not on file    Years of education: Not on file    Highest education level: Not on file   Occupational History    Not on file   Tobacco Use    Smoking status: Former Smoker     Years: 40.00     Types: Cigarettes     Quit date:      Years since quittin.3    Smokeless tobacco: Former User    Tobacco comment: 40 years ago per patient   Vaping Use    Vaping Use: Never used   Substance and Sexual Activity    Alcohol use: Not Currently    Drug use: Never    Sexual activity: Not Currently     Partners: Male   Other Topics Concern    Not on file   Social History Narrative    Alone, was     No children    Worked in construction, at the [employer], daily (Patient taking differently: Take 100 mg by mouth 2 times daily Indications: Constipation )      polyethylene glycol (GLYCOLAX) 17 g packet Take 17 g by mouth daily (Patient taking differently: Take 17 g by mouth daily Indications: Constipation ) 527 g 1    senna (SENOKOT) 8.6 MG tablet Take 2 tablets by mouth nightly (Patient taking differently: Take 2 tablets by mouth nightly Indications: Constipation ) 60 tablet 0    furosemide (LASIX) 20 MG tablet Take 20 mg by mouth daily Indications: High Blood Pressure Disorder       triamterene-hydroCHLOROthiazide (MAXZIDE-25) 37.5-25 MG per tablet Take 0.5 tablets by mouth daily Indications: High Blood Pressure Disorder       metoprolol succinate (TOPROL XL) 50 MG extended release tablet Take 50 mg by mouth daily Indications: High Blood Pressure Disorder        No current facility-administered medications on file prior to visit. Objective:   Pulse 70   Temp 97.5 °F (36.4 °C) (Temporal)   Ht 5' 10\" (1.778 m)   Wt 260 lb (117.9 kg)   SpO2 98%   BMI 37.31 kg/m²       Radiographs and Laboratory Studies:   Previous diagnostic imaging studies were reviewed. Laboratory Studies:   Lab Results   Component Value Date    WBC 9.8 03/18/2022    HGB 9.2 (L) 03/18/2022    HCT 27.2 (L) 03/18/2022    MCV 96.8 03/18/2022     03/18/2022     No results found for: SEDRATE  No results found for: CRP    Assessment and Plan:      Diagnosis Orders   1. Other fracture of left femur, initial encounter for closed fracture ([address]         Continue with PT and OT until discharged from the nursing facility. He needs to be up out of his chair at least 4 times a day. Work on aggressive range of motion and strength training exercises until he leaves. Please ensure that he has PT and OT scheduled after he leaves the nursing facility. We will also need extra assistance at his home for ADLs. He may also need wound care to his house as he has developed pressure ulcers. Until he is discharged from the nursing facility please ensure that he has dressing changes daily with areas of concern. He is able to shower/get his incision wet. Do not rub alcohol over the incision. We will see him back in 6 weeks. The above plan was discussed in thorough detail with Dr. Flaquita Steinberg and the patient. No orders of the defined types were placed in this encounter. No orders of the defined types were placed in this encounter. No follow-ups on file.     Derek Jim PA-C  [facility]  [phone]

## 2022-04-11 NOTE — PATIENT INSTRUCTIONS
Continue with PT and OT until discharged from the nursing facility. He needs to be up out of his chair at least 4 times a day. Work on aggressive range of motion and strength training exercises until he leaves. Please ensure that he has PT and OT scheduled after he leaves the nursing facility. We will also need extra assistance at his home for ADLs. He may also need wound care to his house as he has developed pressure ulcers. Until he is discharged from the nursing facility please ensure that he has dressing changes daily with areas of concern. He is able to shower/get his incision wet. Do not rub alcohol over the incision. We will see him back in 6 weeks.

## 2022-04-14 ENCOUNTER — OFFICE VISIT (OUTPATIENT)
Dept: GERIATRIC MEDICINE | Age: 82
End: 2022-04-14
Payer: MEDICARE

## 2022-04-14 DIAGNOSIS — S42.412D: Primary | ICD-10-CM

## 2022-04-14 DIAGNOSIS — I10 PRIMARY HYPERTENSION: ICD-10-CM

## 2022-04-14 DIAGNOSIS — R27.0 ATAXIA: ICD-10-CM

## 2022-04-14 DIAGNOSIS — M15.9 PRIMARY OSTEOARTHRITIS INVOLVING MULTIPLE JOINTS: ICD-10-CM

## 2022-04-14 DIAGNOSIS — R33.8 BENIGN PROSTATIC HYPERPLASIA WITH URINARY RETENTION: ICD-10-CM

## 2022-04-14 DIAGNOSIS — N40.1 BENIGN PROSTATIC HYPERPLASIA WITH URINARY RETENTION: ICD-10-CM

## 2022-04-14 DIAGNOSIS — E66.1 CLASS 2 DRUG-INDUCED OBESITY WITH SERIOUS COMORBIDITY AND BODY MASS INDEX (BMI) OF 37.0 TO 37.9 IN ADULT: ICD-10-CM

## 2022-04-14 PROCEDURE — 99316 NF DSCHRG MGMT 30 MIN+: CPT | Performed by: NURSE PRACTITIONER

## 2022-04-15 VITALS
HEART RATE: 70 BPM | DIASTOLIC BLOOD PRESSURE: 73 MMHG | WEIGHT: 256.4 LBS | BODY MASS INDEX: 36.79 KG/M2 | OXYGEN SATURATION: 99 % | RESPIRATION RATE: 18 BRPM | SYSTOLIC BLOOD PRESSURE: 137 MMHG | TEMPERATURE: 98 F

## 2022-04-15 NOTE — PROGRESS NOTES
[address] [facility] [place]    4/11/2022    Robin Wells  is a [de-identified] y.o. in the [facility] being seen for a f/u of   Chief Complaint   Patient presents with    Other     rehab for femur fx        HPI The pt is here for rehab after their hospitalization for femur fx. Walking 50 feet with walker  Still requires a lot of encouragement to get OOB   They are up in wheel chair but using walker for ambulation with therapy. They are eating and drinking OK per nursing. They have had no recent falls with injuries. They are not complaining of any un addressed pain issues. NOT using narcotics. Have had no recent choking or dysphagia issues. NO agitation or unusual mental behavioral issues. Incision healing well. PT reports they are progressing with ambulation. OT reports they are progressing with ADLs. Family supportive. Plans to go home after rehab.      Past Medical History:   Diagnosis Date    Aneurysm artery, [facility] ([facility] 2022    not surgical    Benign prostatic hyperplasia with lower urinary tract symptoms     Chronic sinusitis     History of left knee replacement [facility]/15/2022    at [facility] 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    HTN (hypertension)     Hx of carpal tunnel syndrome     Obesity     Other fracture of left femur, initial encounter for closed fracture ([facility] 3/14/2022    Primary osteoarthritis of both knees      Past Surgical History:   Procedure Laterality Date    TOTAL KNEE ARTHROPLASTY Left 3/15/2022    LEFT KNEE DISTAL FEMUR REPLACEMENT performed by Aureliano Castleman, MD at [address] History   Problem Relation Age of Onset    Cancer Mother     Cancer Father      Social History     Socioeconomic History    Marital status: Single     Spouse name: Not on file    Number of children: Not on file    Years of education: Not on file    Highest education level: Not on file   Occupational History    Not on file   Tobacco Use    Smoking status: Former Smoker     Years: 40.00     Types: Cigarettes     Quit date:      Years since quittin.3    Smokeless tobacco: Former User    Tobacco comment: 40 years ago per patient   Vaping Use    Vaping Use: Never used   Substance and Sexual Activity    Alcohol use: Not Currently    Drug use: Never    Sexual activity: Not Currently     Partners: Male   Other Topics Concern    Not on file   Social History Narrative    Alone, was     No children    Worked in construction, at the [employer], drove a truck     Social Determinants of Health     Financial Resource Strain:     Difficulty of Paying Living Expenses: Not on file   Food Insecurity:    [address] in the Last Year: Not on file    [address] in the Last Year: Not on file   Transportation Needs:     Lack of Transportation (Medical): Not on file    Lack of Transportation (Non-Medical):  Not on file   Physical Activity:     Days of Exercise per Week: Not on file    Minutes of Exercise per Session: Not on file   Stress:     Feeling of Stress : Not on file   Social Connections:     Frequency of Communication with Friends and Family: Not on file    Frequency of Social Gatherings with Friends and Family: Not on file    Attends Evangelical Services: Not on file    Active Member of [address] [place] [address] or Organizations: Not on file    Attends Club or Organization Meetings: Not on file    Marital Status: Not on file   Intimate Partner Violence:     Fear of Current or Ex-Partner: Not on file    Emotionally Abused: Not on file    Physically Abused: Not on file    Sexually Abused: Not on file   Housing Stability:     Unable to Pay for Housing in the Last Year: Not on file    Number of [place] in the Last Year: Not on file    Unstable Housing in the Last Year: Not on file       Allergies: Aspirin  NF MEDICATIONS REVIEWED    ROS:   Constitutional: There are   reports of behavioral issues of not wanting to get OOB, little motivation; no change in appetite, fever, or increased weakness. No bleeding issues. No head aches, change in vision or hearing. Respiratory: denies SOB, dyspnea, dyspnea on exertion, change in exercise capacity  Cardiovascular: denies CP, lightheadedness, palpitations, PND, orthopnea, or claudication. GI: No N/V/D. Constipation under good control with meds. : no reports of dysuria, continent of urine   Extremities: No reports of pain issues, edema in legs is chronic and mild. Psych: at baseline  lucid    Physical exam:   T  97.2  P  76  R  18  BP  Wt 256lb    Constitutional: Awake and alert sitting up in wheel chair, general weakness  HEENT: Normocephalic, intact facial symmetry, EOMs intact, sclera non icteric, pupils are equal and round, buccal mucosa pink and moist  Speech clear    NECK: - carotid bruit, euthyroid, no mass visualized  Cardiovascular: Regular rate S1S2 normal, NO S3S4  Respiratory: LCTA, even unlabored respirations, no accessory muscle use noted  GI: abdomen NT, +BS  obese  : continent of urine  Extremities: trace ankle edema, knee incision healed, PPP  SKELETAL: no redness, or swelling over joints. Limited ROM but spontaneous movement x 4   Psych: pleasant, good judgement, normal thought content  SKIN: heme stain to  skin noted on visualized shin, warm and dry    ASSESSMENT:     Diagnosis Orders   1. Supracondylar fracture of femur, left, closed, with routine healing, subsequent encounter     2. Benign prostatic hyperplasia with urinary retention         PLAN:  Pt/POA agrees with POC   PT  OT  Minimal assist contact guard. Lovenox continued at Ortho visit. Watch for bleeding diathesis. No signs of urine retention. Not using narcotic, will d/c at discharge. adhere to the JNC VIII guidelines for HTN management and the NCEP ATP III guidelines for LDL-C management. Return if symptoms worsen or fail to improve.     Pertinent POC, medications, labs, have been reviewed, continue same. Encourage fluids and good nutrition. Stress fall prevention strategies. Elean Monday, APRN-CNP      Elena Monday, DNP, MSN, RN, GNP-BC, NP-C  Adult/[place] [place] Nurse Practitioner  [address]  Department of Geriatrics  8:30am-4:30pm   [phone]  After hours Answering service [phone]      Please note this report is partially produced by using speech recognition hardware. It may contain errors related to the system, including grammar, punctuation and spelling as well as words and phrases that may seem inaccurate.   For any questions or concerns feel free to contact me for clarification

## 2022-04-15 NOTE — PROGRESS NOTES
[address] [place], [facility] [place]      Kristina Crew  is a [de-identified] y.o. in the NF being seen for a f/u of   Chief Complaint   Patient presents with    Other     d/c rehab left femur fracture repair       4/14/2022    HPI patient was here for PT OT rehabilitation after he had a fall from his truck fracturing the left supra condyle femur and extended proximally up the femur shaft. Surgical repair was uneventful while he was here he was on a full course of Lovenox when he returned to orthopedics they extended the Lovenox that will be discontinued at discharge. Required lots of encouragement to get out of bed and to get out of the chair he was able to independently ambulate he was malingering in the bathroom however he wanted to use the bedpan instead of any pain in the left thigh sitting and lying in bed he did have more pain when he was weightbearing he is under control with Tylenol he is not on oxycodone at discharge. Does live by himself so there is no one there to encourage him to keep moving and be active. I did remind him that with his arthritis joints and his age he needs to continue to exercise and do therapy keep moving at home so that he can be independent and continue to go on his rides in his truck which he enjoys.     He has a BMI of almost 37 his weight is 56 pounds he will need a bariatric wheelchair and a bariatric bedside commode at discharge, or to help him be independent and for safe he related ADLs, first.    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    not surgical    Benign prostatic hyperplasia with lower urinary tract symptoms     Chronic sinusitis     History of left knee replacement 03/15/2022    [facility] 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    HTN (hypertension)     Hx of carpal tunnel syndrome     Obesity     Primary osteoarthritis of both knees      Past Surgical History:   Procedure Laterality Date    TOTAL KNEE ARTHROPLASTY Left 3/15/2022    LEFT KNEE DISTAL FEMUR REPLACEMENT performed by Nataly Coyle MD at [address] History   Problem Relation Age of Onset    Cancer Mother     Cancer Father      Social History     Socioeconomic History    Marital status: Single     Spouse name: Not on file    Number of children: Not on file    Years of education: Not on file    Highest education level: Not on file   Occupational History    Not on file   Tobacco Use    Smoking status: Former Smoker     Years: 40.00     Types: Cigarettes     Quit date:      Years since quittin.3    Smokeless tobacco: Former User    Tobacco comment: 40 years ago per patient   Vaping Use    Vaping Use: Never used   Substance and Sexual Activity    Alcohol use: Not Currently    Drug use: Never    Sexual activity: Not Currently     Partners: Male   Other Topics Concern    Not on file   Social History Narrative    Alone, was     No children    Worked in [employer], at the [employer], drove a truck     Social Determinants of Health     Financial Resource Strain:     Difficulty of Paying Living Expenses: Not on [address]:     Worried About [address] in the Last Year: Not on file    [address] [facility] [address] in the Last Year: Not on file   Transportation Needs:     Lack of Transportation (Medical): Not on file    Lack of Transportation (Non-Medical):  Not on file   Physical Activity:     Days of Exercise per Week: Not on file    Minutes of Exercise per Session: Not on file   Stress:     Feeling of Stress : Not on file   Social Connections:     Frequency of Communication with Friends and Family: Not on file    Frequency of Social Gatherings with Friends and Family: Not on file    Attends Advent Services: Not on file    Active Member of Clubs or Organizations: Not on file    Attends Club or Organization Meetings: Not on file    Marital Status: Not on file   Intimate Partner Violence:     Fear of Current or Ex-Partner: Not on file    Emotionally Abused: Not on file    Physically Abused: Not on file    Sexually Abused: Not on file   Housing Stability:     Unable to Pay for Housing in the Last Year: Not on file    Number of [place] in the Last Year: Not on file    Unstable Housing in the Last Year: Not on file       Allergies: Aspirin  NF MEDICATIONS REVIEWED  Lovenox 30 mg subcu twice daily that will be discontinued at discharge  Metoprolol ER 50 mg daily   MiraLAX 17 g daily furosemide 20 mg daily   Tylenol 1000 mg 3 times daily for pain routinely   oxycodone 5 mg 1 p.o. every 6 hours as needed will be discontinued at discharge   psyllium Metamucil capsule 1 daily omeprazole 20 mg daily   Tums to chews every 8 hours as needed as needed for burning stomach      ROS:   Constitutional: There are no reports of behavioral issues but needs encouraged to get OOB an be active. No change in appetite, fever, or weakness. Respiratory: No SOB   Cardiovascular: No CP  GI: No N/V/D. : no reports of dysuria, hx BPH with retention. Extremities:   reports of pain issues with therapy and weight bearing, using tylenol only. Some leg edema is chronic and slightly worse since surgery. Rehabilitation: Participated in rehabilitation and is able to perform ADLs, toilet self, obtain nutrition and remove self from home in an emergency. Requests home health therapy at discharge. Physical exam:   /73   Pulse 70   Temp 98 °F (36.7 °C)   Resp 18   Wt 256 lb 6.4 oz (116.3 kg)   SpO2 99%   BMI 36.79 kg/m²   Constitutional: Awake and alert sitting up in room. Cardiovascular: Regular rate  -c/r  Respiratory: LCTA  GI: abdomen NT ND  : no suprapubic tenderness  Extremities:trace edema, DP pulses 2+/4, heme stain shns  Mobility: is  able to transfer in out of bed to chair    ASSESSMENT:     Diagnosis Orders   1.  Suprcondyl fx humerus-closed, left, with routine healing, subsequent encounter     2. Primary osteoarthritis involving multiple joints     3. Class 2 drug-induced obesity with serious comorbidity and body mass index (BMI) of 37.0 to 37.9 in adult     4. Primary hypertension     5. Benign prostatic hyperplasia with urinary retention     6. Ataxia         PLAN:   PT OT for home health care he will also receive nursing    Return PCP & Ortho in 1-3 weeks. Pt/POA agrees to POC  Total time taken for discharging this patient: 35 minutes. Time was taken to review chart, discuss plans with discharge planner, nursing, reconciling medications, discussing plan answering questions with patient. Pertinent POC, labs, have been reviewed  Discharge patient from facility when arrangements are made. Home Health Agency with PT/OT and Nursing. Same medications. Pt is able to safely use the walker and the functional mobility deficit can be sufficiently resolved by the use to the walker. Bariatric Wheel chair,  commode will significantly improve pts ability to participate in mobility related activities of daily living of toileting, dressing, bathing, and transfers. Not able to static stand for long enough periods to perform ADLs. Cane or walker will NOT sufficiently improve her ability to participate in MRADLs identified. Also she will be more independent with food prep        Samantha Rao, APRN-CNP      Samantha Rao, DNP, MSN, RN, GNP-BC, NP-C  Adult/[place] Nurse Practitioner  [facility]  Department of Geriatrics  8:30am-4:30pm   [phone]  After hours Answering service [phone]      Please note this report is partially produced by using speech recognition hardware. It may contain errors related to the system, including grammar, punctuation and spelling as well as words and phrases that may seem inaccurate.   For any questions or concerns feel free to contact me for clarification    Referral to Home Health:   Documentation of Face to Face Encounter   Certification statement    Name: Stephanie Santizo: [facility]onda [facility]  Date: 2022                   : 1940    I completed a face-to-face encounter on 2022 with the above named patient. In this encounter a medical condition was addressed, which is the primary reason for home health care. Based on my findings, the following services are medically necessary (Check all the apply):    [x] Skilled Nursing [] Speech Language Pathology [x] Physical Therapy    [] MSW  [x] [address] [facility])  [x] Occupational Therapy    The following specific clinical findings (not the diagnosis) support the need for skilled services as selected above:    RN for medication management  Therapy for strengthening and increase in endurance to reduce general weakness and improve balance for ataxic gait. The follow findings support that this patient is homebound including the need for any assistance of others and he use of Assistive Devices - what makes leaving home a considerable and taxing effort:    Using walker  that causes leaving home taxing and needs assistance of one to leave home. Certification for [address]:  Based on the above findings, I certify that this patient meets the criteria for being homebound and has the skilled need for intermittent care as indicated above. The patient is under my care and I have intitated the request for the Plan of Care. A physician and/or non-physician practitioner and collaborating physician will follow this patient and periodically review the POC.     Signature:      Date: 2022    Dr. Huynh Leader collaborator

## 2022-04-18 PROBLEM — S72.8X2A OTHER FRACTURE OF LEFT FEMUR, INITIAL ENCOUNTER FOR CLOSED FRACTURE (HCC): Status: RESOLVED | Noted: 2022-03-14 | Resolved: 2022-04-18

## 2022-04-18 PROBLEM — S72.452D: Status: ACTIVE | Noted: 2022-04-18

## 2022-05-23 ENCOUNTER — HOSPITAL ENCOUNTER (OUTPATIENT)
Dept: ORTHOPEDIC SURGERY | Age: 82
Discharge: HOME | End: 2022-05-25
Payer: MEDICARE

## 2022-05-23 ENCOUNTER — OFFICE VISIT (OUTPATIENT)
Dept: ORTHOPEDIC SURGERY | Age: 82
End: 2022-05-23

## 2022-05-23 DIAGNOSIS — S72.8X2A OTHER FRACTURE OF LEFT FEMUR, INITIAL ENCOUNTER FOR CLOSED FRACTURE (HCC): Primary | ICD-10-CM

## 2022-05-23 DIAGNOSIS — S72.8X2A OTHER FRACTURE OF LEFT FEMUR, INITIAL ENCOUNTER FOR CLOSED FRACTURE (HCC): ICD-10-CM

## 2022-05-23 PROCEDURE — 73562 X-RAY EXAM OF KNEE 3: CPT | Performed by: ORTHOPAEDIC SURGERY

## 2022-05-23 PROCEDURE — 99024 POSTOP FOLLOW-UP VISIT: CPT | Performed by: PHYSICIAN ASSISTANT

## 2022-05-23 PROCEDURE — 73562 X-RAY EXAM OF KNEE 3: CPT

## 2022-05-23 NOTE — PROGRESS NOTES
[address] and Sports Medicine      Subjective:    No chief complaint on file. HPI: Vianney Gonzales is a [de-identified] y.o. male who is 2 and half months after left distal femur replacement. Because he was having issues with this motion and getting therapy to work with him at his nursing facility. Also had some pressure ulcers.     Past Medical History:   Diagnosis Date    Aneurysm artery, [address]     not surgical    Benign prostatic hyperplasia with lower urinary tract symptoms     Chronic sinusitis     History of left knee replacement 03/15/2022    at [facility] 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    HTN (hypertension)     Hx of carpal tunnel syndrome     Obesity     Other fracture of left femur, initial encounter for closed fracture ([facility] 3/14/2022    Primary osteoarthritis of both knees       Past Surgical History:   Procedure Laterality Date    TOTAL KNEE ARTHROPLASTY Left 3/15/2022    LEFT KNEE DISTAL FEMUR REPLACEMENT performed by Ruchi Shin MD at [address] [place] [address] History     Socioeconomic History    Marital status: Single     Spouse name: Not on file    Number of children: Not on file    Years of education: Not on file    Highest education level: Not on file   Occupational History    Not on file   Tobacco Use    Smoking status: Former Smoker     Years: 40.00     Types: Cigarettes     Quit date:      Years since quittin.4    Smokeless tobacco: Former User    Tobacco comment: 40 years ago per patient   Vaping Use    Vaping Use: Never used   Substance and Sexual Activity    Alcohol use: Not Currently    Drug use: Never    Sexual activity: Not Currently     Partners: Male   Other Topics Concern    Not on file   Social History Narrative    Alone, was     No children    Worked in construction, at the [employer], drove a truck     Social Determinants of Health     Financial Resource Strain:     Difficulty of Paying Living Expenses: Not on file   Food Insecurity:     Worried About Running Out of Food in the Last Year: Not on file    Kimberly of Food in the Last Year: Not on file   Transportation Needs:     Lack of Transportation (Medical): Not on file    Lack of Transportation (Non-Medical):  Not on file   Physical Activity:     Days of Exercise per Week: Not on file    Minutes of Exercise per Session: Not on file   Stress:     Feeling of Stress : Not on file   Social Connections:     Frequency of Communication with Friends and Family: Not on file    Frequency of Social Gatherings with Friends and Family: Not on file    Attends Rastafari Services: Not on file    Active Member of [address] [place] [address] [employer] or Organizations: Not on file    Attends Club or Organization Meetings: Not on file    Marital Status: Not on file   Intimate Partner Violence:     Fear of Current or Ex-Partner: Not on file    Emotionally Abused: Not on file    Physically Abused: Not on file    Sexually Abused: Not on file   Housing Stability:     Unable to Pay for Housing in the Last Year: Not on file    Number of [place] in the Last Year: Not on file    Unstable Housing in the Last Year: Not on file     Family History   Problem Relation Age of Onset    Cancer Mother     Cancer Father      Allergies   Allergen Reactions    Aspirin      Current Outpatient Medications on File Prior to Visit   Medication Sig Dispense Refill    docusate sodium (COLACE) 100 MG capsule Take 1 capsule by mouth 2 times daily (Patient taking differently: Take 100 mg by mouth 2 times daily Indications: Constipation )      furosemide (LASIX) 20 MG tablet Take 20 mg by mouth daily Indications: High Blood Pressure Disorder       triamterene-hydroCHLOROthiazide (MAXZIDE-25) 37.5-25 MG per tablet Take 0.5 tablets by mouth daily Indications: High Blood Pressure Disorder       metoprolol succinate (TOPROL XL) 50 MG extended release tablet Take 50 mg by mouth daily Indications: High Blood Pressure Disorder        No current facility-administered medications on file prior to visit. Objective: There were no vitals taken for this visit. Radiographs and Laboratory Studies:   Previous diagnostic imaging studies were reviewed. XR KNEE LEFT (3 VIEWS)    Result Date: 5/23/2022  AP, sunrise and lateral radiographs of the left knee were obtained on 5/23/2022 to evaluate for maintenance of left distal femoral replacement Radiographs demonstrate appropriately seated left distal femoral replacement with no evidence of subsidence, fracture or failure. No evidence of loosening surrounding the cement lines. Laboratory Studies:   Lab Results   Component Value Date    WBC 9.8 03/18/2022    HGB 9.2 (L) 03/18/2022    HCT 27.2 (L) 03/18/2022    MCV 96.8 03/18/2022     03/18/2022     No results found for: SEDRATE  No results found for: CRP    Assessment and Plan:      Diagnosis Orders   1. Other fracture of left femur, initial encounter for closed fracture (HCC)  XR KNEE LEFT (3 VIEWS)     2 and half months after left distal femur replacement. Because he was having issues with this motion and getting therapy to work with him at his nursing facility. Also had some pressure ulcers which are improving  Sylvester is doing well since we last saw him. He has full flexion. He does have some 10 degree lag with extension. He has 4-5 strength as compared to the contralateral side however bilaterally he is weak. For this we recommend therapy works on his quad strengthening as well as his extension motion. We will continue walking with a walker at all times, he is ensure that his house is hazard free in terms of obstacles he can trip over. I would not recommend running or driving until he has restored full motion and strength. Sylvester is progressing well, we will see him hopefully one  last time in 6 weeks to assess his motion and strength.   Till then continue with therapy and ambulating with walker. The above plan was discussed in thorough detail with Dr. Rodrigue Flores and the patient. No orders of the defined types were placed in this encounter. No orders of the defined types were placed in this encounter. No follow-ups on file.     Fadi Dodson PA-C  [place] [address] and Sports Medicine  [phone]

## 2022-09-12 ENCOUNTER — OFFICE VISIT (OUTPATIENT)
Dept: ORTHOPEDIC SURGERY | Age: 82
End: 2022-09-12
Payer: MEDICARE

## 2022-09-12 ENCOUNTER — HOSPITAL ENCOUNTER (OUTPATIENT)
Dept: ORTHOPEDIC SURGERY | Age: 82
Discharge: HOME | End: 2022-09-14
Payer: MEDICARE

## 2022-09-12 VITALS
WEIGHT: 243 LBS | BODY MASS INDEX: 34.79 KG/M2 | HEART RATE: 74 BPM | OXYGEN SATURATION: 97 % | HEIGHT: 70 IN | TEMPERATURE: 98.8 F

## 2022-09-12 DIAGNOSIS — S72.8X2A OTHER FRACTURE OF LEFT FEMUR, INITIAL ENCOUNTER FOR CLOSED FRACTURE (HCC): ICD-10-CM

## 2022-09-12 DIAGNOSIS — S72.8X2A OTHER FRACTURE OF LEFT FEMUR, INITIAL ENCOUNTER FOR CLOSED FRACTURE (HCC): Primary | ICD-10-CM

## 2022-09-12 PROCEDURE — G8427 DOCREV CUR MEDS BY ELIG CLIN: HCPCS | Performed by: ORTHOPAEDIC SURGERY

## 2022-09-12 PROCEDURE — 99214 OFFICE O/P EST MOD 30 MIN: CPT | Performed by: ORTHOPAEDIC SURGERY

## 2022-09-12 PROCEDURE — 1036F TOBACCO NON-USER: CPT | Performed by: ORTHOPAEDIC SURGERY

## 2022-09-12 PROCEDURE — 1123F ACP DISCUSS/DSCN MKR DOCD: CPT | Performed by: ORTHOPAEDIC SURGERY

## 2022-09-12 PROCEDURE — 73562 X-RAY EXAM OF KNEE 3: CPT

## 2022-09-12 PROCEDURE — G8417 CALC BMI ABV UP PARAM F/U: HCPCS | Performed by: ORTHOPAEDIC SURGERY

## 2022-09-12 PROCEDURE — 73562 X-RAY EXAM OF KNEE 3: CPT | Performed by: ORTHOPAEDIC SURGERY

## 2022-09-13 NOTE — PROGRESS NOTES
Subjective:      Patient ID: Fede Newton is a [de-identified] y.o. male who presents today for:  Chief Complaint   Patient presents with    Follow-up     3 month F/U Left knee Pt states left knee aches. Pt rates pain 5/10. Pt states he feels tingles and numbness in left knee. Pt states he does not ice but he elevates his knee. HPI  Patient describes the pain in his left thigh and knee as a dull ache. Denies any associated trauma. Is mobilizing well with a walker to aid in ambulation. Occasionally able to mobilize without the walker for short distances. Patient denies any fevers, chills or other systemic symptoms.     Past Medical History:   Diagnosis Date    Aneurysm artery, pulmonary ([facility] [address]     not surgical    Benign prostatic hyperplasia with lower urinary tract symptoms     Chronic sinusitis     History of left knee replacement 03/15/2022    at [facility]    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    HTN (hypertension)     Hx of carpal tunnel syndrome     Obesity     Other fracture of left femur, initial encounter for closed fracture ([facility] [address] [facility] 3/14/2022    Primary osteoarthritis of both knees       Past Surgical History:   Procedure Laterality Date    TOTAL KNEE ARTHROPLASTY Left 3/15/2022    LEFT KNEE DISTAL FEMUR REPLACEMENT performed by Rc Redd MD at [address] [place] [address] History     Socioeconomic History    Marital status: Single     Spouse name: Not on file    Number of children: Not on file    Years of education: Not on file    Highest education level: Not on file   Occupational History    Not on file   Tobacco Use    Smoking status: Former     Years: 40.00     Types: Cigarettes     Quit date: 18     Years since quittin.7    Smokeless tobacco: Former    Tobacco comments:     40 years ago per patient   Vaping Use    Vaping Use: Never used   Substance and Sexual Activity    Alcohol use: Not Currently    Drug use: Never    Sexual activity: Not Currently     Partners: Male   Other Topics Concern    Not on file   Social History Narrative    Alone, was     No children    Worked in construction, at the [employer], drove a truck     Social Determinants of Health     Financial Resource Strain: Not on file   Food Insecurity: Not on file   Transportation Needs: Not on file   Physical Activity: Not on file   Stress: Not on file   Social Connections: Not on file   Intimate Partner Violence: Not on file   Housing Stability: Not on file     Family History   Problem Relation Age of Onset    Cancer Mother     Cancer Father      Allergies   Allergen Reactions    Aspirin      Current Outpatient Medications on File Prior to Visit   Medication Sig Dispense Refill    docusate sodium (COLACE) 100 MG capsule Take 1 capsule by mouth 2 times daily (Patient taking differently: Take 100 mg by mouth 2 times daily Indications: Constipation )      furosemide (LASIX) 20 MG tablet Take 20 mg by mouth daily Indications: High Blood Pressure Disorder       triamterene-hydroCHLOROthiazide (MAXZIDE-25) 37.5-25 MG per tablet Take 0.5 tablets by mouth daily Indications: High Blood Pressure Disorder       metoprolol succinate (TOPROL XL) 50 MG extended release tablet Take 50 mg by mouth daily Indications: High Blood Pressure Disorder        No current facility-administered medications on file prior to visit. Review of Systems      Objective:   Pulse 74   Temp 98.8 °F (37.1 °C) (Temporal)   Ht 5' 10\" (1.778 m)   Wt 243 lb (110.2 kg)   SpO2 97%   BMI 34.87 kg/m²     Ortho Exam  Left lower extremity: Minimal residual edema compared to the contralateral side. Patient is able attain full extension and has flexion to approximately 110 degrees. No erythema, drainage or compromise of the surgical site. Nontender palpation diffusely throughout the left knee and thigh.   Dorsiflexion plantarflexion extension the big toe is intact and foot is warm and well-perfused    Witnessed patient ambulate with the aid of a walker with no

## 2022-11-17 NOTE — PROGRESS NOTES
Patient with a complex fracture left distal femur. Imaging was reviewed and recommendation was made for surgical intervention in the form of open reduction and internal fixation of the left distal femur. We will be able to provide further definitive recommendations pending CT scan of the left lower extremity    Patient should be kept n.p.o. at midnight with plans for surgery to occur in the early afternoon tomorrow pending medical clearance.     Full dictation to follow Problem: PAIN - ADULT  Goal: Verbalizes/displays adequate comfort level or baseline comfort level  Description: Interventions:  - Encourage patient to monitor pain and request assistance  - Assess pain using appropriate pain scale  - Administer analgesics based on type and severity of pain and evaluate response  - Implement non-pharmacological measures as appropriate and evaluate response  - Consider cultural and social influences on pain and pain management  - Notify physician/advanced practitioner if interventions unsuccessful or patient reports new pain  Outcome: Progressing     Problem: INFECTION - ADULT  Goal: Absence or prevention of progression during hospitalization  Description: INTERVENTIONS:  - Assess and monitor for signs and symptoms of infection  - Monitor lab/diagnostic results  - Monitor all insertion sites, i e  indwelling lines, tubes, and drains  - Monitor endotracheal if appropriate and nasal secretions for changes in amount and color  - Kerby appropriate cooling/warming therapies per order  - Administer medications as ordered  - Instruct and encourage patient and family to use good hand hygiene technique  - Identify and instruct in appropriate isolation precautions for identified infection/condition  Outcome: Progressing  Goal: Absence of fever/infection during neutropenic period  Description: INTERVENTIONS:  - Monitor WBC    Outcome: Progressing     Problem: SAFETY ADULT  Goal: Patient will remain free of falls  Description: INTERVENTIONS:  - Educate patient/family on patient safety including physical limitations  - Instruct patient to call for assistance with activity   - Consult OT/PT to assist with strengthening/mobility   - Keep Call bell within reach  - Keep bed low and locked with side rails adjusted as appropriate  - Keep care items and personal belongings within reach  - Initiate and maintain comfort rounds  - Make Fall Risk Sign visible to staff  - Offer Toileting in advance of need  - Obtain necessary fall risk management equipment  - Apply yellow socks and bracelet for high fall risk patients  - Consider moving patient to room near nurses station  Outcome: Progressing  Goal: Maintain or return to baseline ADL function  Description: INTERVENTIONS:  -  Assess patient's ability to carry out ADLs; assess patient's baseline for ADL function and identify physical deficits which impact ability to perform ADLs (bathing, care of mouth/teeth, toileting, grooming, dressing, etc )  - Assess/evaluate cause of self-care deficits   - Assess range of motion  - Assess patient's mobility; develop plan if impaired  - Assess patient's need for assistive devices and provide as appropriate  - Encourage maximum independence but intervene and supervise when necessary  - Involve family in performance of ADLs  - Assess for home care needs following discharge   - Consider OT consult to assist with ADL evaluation and planning for discharge  - Provide patient education as appropriate  Outcome: Progressing  Goal: Maintains/Returns to pre admission functional level  Description: INTERVENTIONS:  - Perform BMAT or MOVE assessment daily    - Set and communicate daily mobility goal to care team and patient/family/caregiver  - Collaborate with rehabilitation services on mobility goals if consulted  - Perform Range of Motion   - Reposition patient   - Dangle patient  - Stand patient   - Ambulate patient   - Out of bed to chair   - Out of bed for meals   - Out of bed for toileting  - Record patient progress and toleration of activity level   Outcome: Progressing     Problem: Knowledge Deficit  Goal: Patient/family/caregiver demonstrates understanding of disease process, treatment plan, medications, and discharge instructions  Description: Complete learning assessment and assess knowledge base    Interventions:  - Provide teaching at level of understanding  - Provide teaching via preferred learning methods  Outcome: Progressing     Problem: DISCHARGE PLANNING  Goal: Discharge to home or other facility with appropriate resources  Description: INTERVENTIONS:  - Identify barriers to discharge w/patient and caregiver  - Arrange for needed discharge resources and transportation as appropriate  - Identify discharge learning needs (meds, wound care, etc )  - Arrange for interpretive services to assist at discharge as needed  - Refer to Case Management Department for coordinating discharge planning if the patient needs post-hospital services based on physician/advanced practitioner order or complex needs related to functional status, cognitive ability, or social support system  Outcome: Progressing     Problem: POSTPARTUM  Goal: Experiences normal postpartum course  Description: INTERVENTIONS:  - Monitor maternal vital signs  - Assess uterine involution and lochia  Outcome: Progressing  Goal: Appropriate maternal -  bonding  Description: INTERVENTIONS:  - Identify family support  - Assess for appropriate maternal/infant bonding   -Encourage maternal/infant bonding opportunities  - Referral to  or  as needed  Outcome: Progressing  Goal: Establishment of infant feeding pattern  Description: INTERVENTIONS:  - Assess breast/bottle feeding  - Refer to lactation as needed  Outcome: Progressing  Goal: Incision(s), wounds(s) or drain site(s) healing without S/S of infection  Description: INTERVENTIONS  - Assess and document dressing, incision, wound bed, drain sites and surrounding tissue  - Provide patient and family education  - Perform skin care/dressing changes  Outcome: Progressing

## 2023-01-27 ENCOUNTER — HOSPITAL ENCOUNTER (EMERGENCY)
Age: 83
Discharge: HOME | End: 2023-01-27
Payer: MEDICARE

## 2023-01-27 ENCOUNTER — APPOINTMENT (OUTPATIENT)
Dept: CT IMAGING | Age: 83
End: 2023-01-27
Payer: MEDICARE

## 2023-01-27 VITALS
HEART RATE: 74 BPM | RESPIRATION RATE: 18 BRPM | TEMPERATURE: 98.3 F | WEIGHT: 240 LBS | SYSTOLIC BLOOD PRESSURE: 134 MMHG | HEIGHT: 72 IN | OXYGEN SATURATION: 100 % | DIASTOLIC BLOOD PRESSURE: 64 MMHG | BODY MASS INDEX: 32.51 KG/M2

## 2023-01-27 DIAGNOSIS — R19.7 DIARRHEA, UNSPECIFIED TYPE: Primary | ICD-10-CM

## 2023-01-27 LAB
ABO/RH: NORMAL
ALBUMIN SERPL-MCNC: 3.6 G/DL (ref 3.5–4.6)
ALP BLD-CCNC: 97 U/L (ref 35–104)
ALT SERPL-CCNC: 12 U/L (ref 0–41)
ANION GAP SERPL CALCULATED.3IONS-SCNC: 15 MEQ/L (ref 9–15)
ANTIBODY SCREEN: NORMAL
AST SERPL-CCNC: 21 U/L (ref 0–40)
BASOPHILS ABSOLUTE: 0 K/UL (ref 0–0.2)
BASOPHILS RELATIVE PERCENT: 0.6 %
BILIRUB SERPL-MCNC: 1.1 MG/DL (ref 0.2–0.7)
BUN BLDV-MCNC: 19 MG/DL (ref 8–23)
CALCIUM SERPL-MCNC: 9.3 MG/DL (ref 8.5–9.9)
CHLORIDE BLD-SCNC: 98 MEQ/L (ref 95–107)
CO2: 24 MEQ/L (ref 20–31)
CREAT SERPL-MCNC: 0.87 MG/DL (ref 0.7–1.2)
EKG ATRIAL RATE: 75 BPM
EKG P AXIS: 68 DEGREES
EKG P-R INTERVAL: 194 MS
EKG Q-T INTERVAL: 442 MS
EKG QRS DURATION: 104 MS
EKG QTC CALCULATION (BAZETT): 493 MS
EKG R AXIS: 45 DEGREES
EKG T AXIS: 15 DEGREES
EKG VENTRICULAR RATE: 75 BPM
EOSINOPHILS ABSOLUTE: 0.2 K/UL (ref 0–0.7)
EOSINOPHILS RELATIVE PERCENT: 3.8 %
GFR SERPL CREATININE-BSD FRML MDRD: >60 ML/MIN/{1.73_M2}
GFR SERPL CREATININE-BSD FRML MDRD: >60 ML/MIN/{1.73_M2}
GLOBULIN: 4.5 G/DL (ref 2.3–3.5)
GLUCOSE BLD-MCNC: 120 MG/DL (ref 70–99)
HCT VFR BLD CALC: 42 % (ref 42–52)
HEMOGLOBIN: 14.5 G/DL (ref 14–18)
LACTIC ACID: 2.2 MMOL/L (ref 0.5–2.2)
LIPASE: 36 U/L (ref 12–95)
LYMPHOCYTES ABSOLUTE: 1.1 K/UL (ref 1–4.8)
LYMPHOCYTES RELATIVE PERCENT: 21.3 %
MCH RBC QN AUTO: 30.9 PG (ref 27–31.3)
MCHC RBC AUTO-ENTMCNC: 34.6 % (ref 33–37)
MCV RBC AUTO: 89.5 FL (ref 79–92.2)
MONOCYTES ABSOLUTE: 0.9 K/UL (ref 0.2–0.8)
MONOCYTES RELATIVE PERCENT: 16.6 %
NEUTROPHILS ABSOLUTE: 3 K/UL (ref 1.4–6.5)
NEUTROPHILS RELATIVE PERCENT: 57.7 %
PDW BLD-RTO: 13.8 % (ref 11.5–14.5)
PERFORMED ON: NORMAL
PLATELET # BLD: 234 K/UL (ref 130–400)
POC CREATININE: 1 MG/DL (ref 0.8–1.3)
POC SAMPLE TYPE: NORMAL
POTASSIUM SERPL-SCNC: 3.5 MEQ/L (ref 3.4–4.9)
RBC # BLD: 4.69 M/UL (ref 4.7–6.1)
SODIUM BLD-SCNC: 137 MEQ/L (ref 135–144)
TOTAL PROTEIN: 8.1 G/DL (ref 6.3–8)
WBC # BLD: 5.2 K/UL (ref 4.8–10.8)

## 2023-01-27 PROCEDURE — 85025 COMPLETE CBC W/AUTO DIFF WBC: CPT

## 2023-01-27 PROCEDURE — 96374 THER/PROPH/DIAG INJ IV PUSH: CPT

## 2023-01-27 PROCEDURE — 83690 ASSAY OF LIPASE: CPT

## 2023-01-27 PROCEDURE — 87493 C DIFF AMPLIFIED PROBE: CPT

## 2023-01-27 PROCEDURE — 86901 BLOOD TYPING SEROLOGIC RH(D): CPT

## 2023-01-27 PROCEDURE — 2580000003 HC RX 258: Performed by: PHYSICIAN ASSISTANT

## 2023-01-27 PROCEDURE — 86850 RBC ANTIBODY SCREEN: CPT

## 2023-01-27 PROCEDURE — A4216 STERILE WATER/SALINE, 10 ML: HCPCS | Performed by: PHYSICIAN ASSISTANT

## 2023-01-27 PROCEDURE — 6360000004 HC RX CONTRAST MEDICATION: Performed by: PHYSICIAN ASSISTANT

## 2023-01-27 PROCEDURE — 87449 NOS EACH ORGANISM AG IA: CPT

## 2023-01-27 PROCEDURE — 87324 CLOSTRIDIUM AG IA: CPT

## 2023-01-27 PROCEDURE — 80053 COMPREHEN METABOLIC PANEL: CPT

## 2023-01-27 PROCEDURE — 83605 ASSAY OF LACTIC ACID: CPT

## 2023-01-27 PROCEDURE — 99285 EMERGENCY DEPT VISIT HI MDM: CPT

## 2023-01-27 PROCEDURE — 74177 CT ABD & PELVIS W/CONTRAST: CPT

## 2023-01-27 PROCEDURE — 6360000002 HC RX W HCPCS: Performed by: PHYSICIAN ASSISTANT

## 2023-01-27 PROCEDURE — C9113 INJ PANTOPRAZOLE SODIUM, VIA: HCPCS | Performed by: PHYSICIAN ASSISTANT

## 2023-01-27 PROCEDURE — 86900 BLOOD TYPING SEROLOGIC ABO: CPT

## 2023-01-27 PROCEDURE — 36415 COLL VENOUS BLD VENIPUNCTURE: CPT

## 2023-01-27 RX ORDER — DIPHENOXYLATE HYDROCHLORIDE AND ATROPINE SULFATE 2.5; .025 MG/1; MG/1
1 TABLET ORAL 4 TIMES DAILY PRN
Qty: 12 TABLET | Refills: 0 | Status: SHIPPED | OUTPATIENT
Start: 2023-01-27 | End: 2023-01-27 | Stop reason: SDUPTHER

## 2023-01-27 RX ORDER — DIPHENOXYLATE HYDROCHLORIDE AND ATROPINE SULFATE 2.5; .025 MG/1; MG/1
1 TABLET ORAL 4 TIMES DAILY PRN
Qty: 12 TABLET | Refills: 0 | Status: SHIPPED | OUTPATIENT
Start: 2023-01-27 | End: 2023-01-30

## 2023-01-27 RX ADMIN — IOPAMIDOL 50 ML: 612 INJECTION, SOLUTION INTRAVENOUS at 15:27

## 2023-01-27 RX ADMIN — SODIUM CHLORIDE 40 MG: 9 INJECTION, SOLUTION INTRAMUSCULAR; INTRAVENOUS; SUBCUTANEOUS at 14:56

## 2023-01-27 ASSESSMENT — ENCOUNTER SYMPTOMS
BLOOD IN STOOL: 1
SHORTNESS OF BREATH: 0
COLOR CHANGE: 0
EYE DISCHARGE: 0
SORE THROAT: 0
CONSTIPATION: 0
DIARRHEA: 1
ABDOMINAL PAIN: 0
ABDOMINAL DISTENTION: 0
RHINORRHEA: 0

## 2023-01-27 ASSESSMENT — PAIN DESCRIPTION - PAIN TYPE: TYPE: CHRONIC PAIN

## 2023-01-27 ASSESSMENT — PAIN - FUNCTIONAL ASSESSMENT
PAIN_FUNCTIONAL_ASSESSMENT: NONE - DENIES PAIN

## 2023-01-27 ASSESSMENT — LIFESTYLE VARIABLES
HOW MANY STANDARD DRINKS CONTAINING ALCOHOL DO YOU HAVE ON A TYPICAL DAY: PATIENT DOES NOT DRINK
HOW OFTEN DO YOU HAVE A DRINK CONTAINING ALCOHOL: NEVER

## 2023-01-27 ASSESSMENT — PAIN DESCRIPTION - LOCATION: LOCATION: RECTUM;KNEE

## 2023-01-27 ASSESSMENT — PAIN DESCRIPTION - FREQUENCY: FREQUENCY: INTERMITTENT

## 2023-01-27 ASSESSMENT — PAIN SCALES - GENERAL: PAINLEVEL_OUTOF10: 3

## 2023-01-27 NOTE — ED NOTES
Patient has diarrhea times 3 days  States that is was red and then all black and large amount in the toilet  Did not take meds today he was afraid to on an empty stomach  Did take Peptobismol around 10-10:30 today     Alexus Gaspar RN  01/27/23 4244

## 2023-01-27 NOTE — ED NOTES
Patient requesting bedpan for bowel movement.  Patient placed on bedpan and instructed to      Lion Monreal RN  01/27/23 4600

## 2023-01-27 NOTE — ED NOTES
Bed pan removed, stool sample collect, JOAN Black performed bedside hemoccult which was negative, roberto care performed for patient. Patient assisted with dressing before discharge.       Michelle Bui RN  01/27/23 7595

## 2023-01-27 NOTE — ED NOTES
Wheelchair offered to patient. Patient wishes to ambulate to lobby with his walker, gait steady with walker. Discharge and prescription reviewed with patient and his wife. Deny any further questions/needs.      Jose Antonio Evans RN  01/27/23 9606

## 2023-01-27 NOTE — ED PROVIDER NOTES
[address] ED  eMERGENCY dEPARTMENT eNCOUnter      Pt Name: Hi Hauser  MRN: [id number]  [place] 1940  Date of evaluation: 1/27/2023  Provider: Tamia Lyon PA-C    CHIEF COMPLAINT       Chief Complaint   Patient presents with    Rectal Bleeding     Times two days          HISTORY OF PRESENT ILLNESS   (Location/Symptom, Timing/Onset,Context/Setting, Quality, Duration, Modifying Factors, Severity)  Note limiting factors. Hi Hauser is a 81 y.o. male who presents to the emergency department with complaint of ongoing diarrhea, rectal bleeding which patient states been present for 2 days. Patient states that starting on Tuesday he has had some ongoing watery diarrhea, he states that that evening after multiple episodes of turned dark in color, he was concerned for bleeding, he complains of increasing fatigue and weakness and dizziness with ambulation. Patient denies any past history of abdominal issues. He is not on any anticoagulation, does not use over the counter NSAIDs. He does not consume alcohol on a regular basis. Past medical history significant for hypertension, BPH, left knee replacement, obesity    HPI    NursingNotes were reviewed. REVIEW OF SYSTEMS    (2-9 systems for level 4, 10 or more for level 5)     Review of Systems   Constitutional:  Negative for activity change and appetite change. HENT:  Negative for congestion, ear discharge, ear pain, nosebleeds, rhinorrhea and sore throat. Eyes:  Negative for discharge. Respiratory:  Negative for shortness of breath. Cardiovascular:  Negative for chest pain, palpitations and leg swelling. Gastrointestinal:  Positive for blood in stool and diarrhea. Negative for abdominal distention, abdominal pain and constipation. Genitourinary:  Negative for difficulty urinating and dysuria. Musculoskeletal:  Negative for arthralgias. Skin:  Negative for color change.    Neurological:  Negative for dizziness, syncope, numbness and headaches. Psychiatric/Behavioral:  Negative for agitation and confusion. Except as noted above the remainder of the review of systems was reviewed and negative.        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[facility] [address]     not surgical    Benign prostatic hyperplasia with lower urinary tract symptoms     Chronic sinusitis     History of left knee replacement 03/15/2022    at [facility]    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    HTN (hypertension)     Hx of carpal tunnel syndrome     Obesity     Other fracture of left femur, initial encounter for closed fracture ([facility] [address] 3/14/2022    Primary osteoarthritis of both knees          SURGICALHISTORY       Past Surgical History:   Procedure Laterality Date    TOTAL KNEE ARTHROPLASTY Left 3/15/2022    LEFT KNEE DISTAL FEMUR REPLACEMENT performed by Jackie Kelsey MD at [address] [place] [address]       Previous Medications    DOCUSATE SODIUM (COLACE) 100 MG CAPSULE    Take 1 capsule by mouth 2 times daily    FUROSEMIDE (LASIX) 20 MG TABLET    Take 20 mg by mouth daily Indications: High Blood Pressure Disorder     METOPROLOL SUCCINATE (TOPROL XL) 50 MG EXTENDED RELEASE TABLET    Take 50 mg by mouth daily Indications: High Blood Pressure Disorder     TRIAMTERENE-HYDROCHLOROTHIAZIDE (MAXZIDE-25) 37.5-25 MG PER TABLET    Take 0.5 tablets by mouth daily Indications: High Blood Pressure Disorder        ALLERGIES     Aspirin    FAMILY HISTORY       Family History   Problem Relation Age of Onset    Cancer Mother     Cancer Father           SOCIAL HISTORY       Social History     Socioeconomic History    Marital status: Single     Spouse name: None    Number of children: None    Years of education: None    Highest education level: None   Tobacco Use    Smoking status: Former     Years: 40.00     Types: Cigarettes     Quit date:      Years since quittin.0    Smokeless tobacco: Former    Tobacco comments:     40 years ago per patient   Vaping Use    Vaping Use: Never used   Substance and Sexual Activity    Alcohol use: Not Currently    Drug use: Never    Sexual activity: Not Currently     Partners: Male   Social History Narrative    Alone, was     No children    Worked in construction, at the [employer], drove a truck       Carlo Kohler [address] Opening: Spontaneous  Best Verbal Response: Oriented  Best Motor Response: Obeys commands  Hillary Coma Scale Score: 15 @FLOW([id number])@      PHYSICAL EXAM    (up to 7 for level 4, 8 or more for level 5)     ED Triage Vitals [01/27/23 1357]   BP Temp Temp Source Heart Rate Resp SpO2 Height Weight   129/67 98.3 °F (36.8 °C) Temporal 85 18 97 % 6' (1.829 m) 240 lb (108.9 kg)       Physical Exam  Vitals and nursing note reviewed. Constitutional:       General: He is not in acute distress. Appearance: He is well-developed. He is not ill-appearing, toxic-appearing or diaphoretic. HENT:      Head: Normocephalic. Nose: Nose normal. No congestion. Mouth/Throat:      Mouth: Mucous membranes are moist.      Pharynx: No oropharyngeal exudate or posterior oropharyngeal erythema. Eyes:      Extraocular Movements: Extraocular movements intact. Conjunctiva/sclera: Conjunctivae normal.      Pupils: Pupils are equal, round, and reactive to light. Neck:      Vascular: No JVD. Trachea: No tracheal deviation. Cardiovascular:      Rate and Rhythm: Normal rate. Pulses: Normal pulses. Heart sounds: Normal heart sounds. No murmur heard. No friction rub. No gallop. Pulmonary:      Effort: Pulmonary effort is normal. No tachypnea, accessory muscle usage, respiratory distress or retractions. Breath sounds: No stridor. No wheezing, rhonchi or rales. Chest:      Chest wall: No tenderness. Abdominal:      General: Abdomen is flat. Bowel sounds are normal. There is no distension or abdominal bruit. Palpations: There is no shifting dullness, fluid wave, hepatomegaly, splenomegaly, mass or pulsatile mass. Tenderness: There is no abdominal tenderness. There is no right CVA tenderness, left CVA tenderness, guarding or rebound. Negative signs include Varma's sign, Rovsing's sign and McBurney's sign. Genitourinary:     Rectum: Guaiac result negative. Comments: Rectal exam was completed, there is no stool within the rectal vault, small amount of mucus, this is guaiac negative, it is clear in color. Musculoskeletal:         General: No deformity. Cervical back: Normal range of motion and neck supple. No rigidity. Skin:     General: Skin is warm and dry. Capillary Refill: Capillary refill takes less than 2 seconds. Coloration: Skin is not jaundiced. Neurological:      General: No focal deficit present. Mental Status: He is alert and oriented to person, place, and time. Mental status is at baseline. Cranial Nerves: No cranial nerve deficit. Sensory: No sensory deficit. Motor: No weakness. Coordination: Coordination normal.   Psychiatric:         Mood and Affect: Mood normal.       DIAGNOSTIC RESULTS     EKG: All EKG's are interpreted by the Emergency Department Physician who either signs or Co-signsthis chart in the absence of a cardiologist.    EKG shows normal sinus rhythm with occasional premature ventricular complexes 75 bpm there is T wave versions in leads III  ms    RADIOLOGY:   Non-plain filmimages such as CT, Ultrasound and MRI are read by the radiologist. Plain radiographic images are visualized and preliminarily interpreted by the emergency physician with the below findings:        Interpretation per the Radiologist below, if available at the time ofthis note:    CT ABDOMEN PELVIS W IV CONTRAST Additional Contrast? None   Final Result   1. Multiple moderately distended segments of small bowel notable in right   abdomen with thickened wall. Findings could suggest nonspecific enteritis,   adynamic ileus, or early small bowel obstruction. 2. Cholelithiasis. 3. Nonobstructing right renal calculus measures approximately 7 mm. 4. Prostatomegaly. ED BEDSIDE ULTRASOUND:   Performed by ED Physician - none    LABS:  Labs Reviewed   CBC WITH AUTO DIFFERENTIAL - Abnormal; Notable for the following components:       Result Value    RBC 4.69 (*)     Monocytes Absolute 0.9 (*)     All other components within normal limits   COMPREHENSIVE METABOLIC PANEL - Abnormal; Notable for the following components:    Glucose 120 (*)     Total Protein 8.1 (*)     Total Bilirubin 1.1 (*)     Globulin 4.5 (*)     All other components within normal limits   C. DIFFICILE TOXIN MOLECULAR   LACTIC ACID   LIPASE   SHIGA-LIKE TOXIN ANTIGEN, EIA   TYPE AND SCREEN       All other labs were within normal range or not returned as of this dictation. EMERGENCY DEPARTMENT COURSE and DIFFERENTIAL DIAGNOSIS/MDM:   Vitals:    Vitals:    01/27/23 1357 01/27/23 1400 01/27/23 1538   BP: 129/67 139/82 134/64   Pulse: 85 73 74   Resp: 18  18   Temp: 98.3 °F (36.8 °C)     TempSrc: Temporal     SpO2: 97% 94% 100%   Weight: 240 lb (108.9 kg)     Height: 6' (1.829 m)            MDM  Number of Diagnoses or Management Options  Diarrhea, unspecified type  Diagnosis management comments: Patient presented to the ED with complaint of ongoing diarrhea since Tuesday of this past week 1/24/2023. Patient states his stools also been dark, but states he has also been taking Pepto-Bismol. He complains of some mild fatigue with movement or activity. Rectal exam was completed on patient, there was no stool within the rectal vault, there was small amount of mucus, this was tested and did test negative for blood. There is no visible hemorrhoids. Patient's hemoglobin level is 14.5 hematocrit is 42, patient is not hypotensive, or tachycardic, orthostatics are negative as well.   A CT scan of the abdomen pelvis was completed while patient was in the ED today and shows multiple moderately distended segments of small bowel notably in the right abdomen with wall thickening, findings could be suggestive of nonspecific enteritis. Patient was given prescription for Lomotil at his request, as he states his diarrhea is been ongoing, stool specimen was also sent off for analysis , as well as for C. difficile colitis. Patient has not currently been on any antibiotics recently. He was advised that these cultures may take 24 to 48 hours to return, and will be notified if positive result comes back. He was advised to contact his family provider for follow-up. Should he have any worsening or change in symptoms, he was advised to return to the ED. Amount and/or Complexity of Data Reviewed  Decide to obtain previous medical records or to obtain history from someone other than the patient: yes        CRITICAL CARE TIME   Total Critical Care time was 0 minutes, excluding separately reportableprocedures. There was a high probability of clinicallysignificant/life threatening deterioration in the patient's condition which required my urgent intervention. CONSULTS:  None    PROCEDURES:  Unless otherwise noted below, none     Procedures    FINAL IMPRESSION      1. Diarrhea, unspecified type          DISPOSITION/PLAN   DISPOSITION Decision To Discharge 01/27/2023 04:40:41 PM      PATIENT REFERRED TO:  Nic Gonzalez MD  [facility] [address] [facility]  [phone]    In 3 days      DISCHARGE MEDICATIONS:  New Prescriptions    DIPHENOXYLATE-ATROPINE (LOMOTIL) 2.5-0.025 MG PER TABLET    Take 1 tablet by mouth 4 times daily as needed for Diarrhea for up to 3 days.  Max Daily Amount: 4 tablets          (Please note that portions of this note were completed with a voice recognition program.  Efforts were made to edit the dictations but occasionally words are mis-transcribed.)    Ileana Fuentes PA-C (electronically signed)  Attending Emergency Physician         Raissa Mcleod PA-C  01/27/23 2923

## 2023-01-28 PROBLEM — R21 RASH: Status: ACTIVE | Noted: 2023-01-28

## 2023-01-28 PROBLEM — M19.90 OSTEOARTHRITIS: Status: ACTIVE | Noted: 2023-01-28

## 2023-01-28 PROBLEM — L98.419 SKIN ULCER OF BUTTOCK (MULTI): Status: ACTIVE | Noted: 2023-01-28

## 2023-01-28 PROBLEM — Z96.652 HISTORY OF TOTAL LEFT KNEE REPLACEMENT: Status: ACTIVE | Noted: 2023-01-28

## 2023-01-28 PROBLEM — R73.9 HYPERGLYCEMIA: Status: ACTIVE | Noted: 2023-01-28

## 2023-01-28 PROBLEM — N40.1 BENIGN PROSTATIC HYPERPLASIA (BPH) WITH URINARY URGENCY: Status: ACTIVE | Noted: 2023-01-28

## 2023-01-28 PROBLEM — L30.4 INTERTRIGO: Status: ACTIVE | Noted: 2023-01-28

## 2023-01-28 PROBLEM — J33.9 NASAL POLYPOSIS: Status: ACTIVE | Noted: 2023-01-28

## 2023-01-28 PROBLEM — E66.812 CLASS 2 SEVERE OBESITY DUE TO EXCESS CALORIES WITH SERIOUS COMORBIDITY AND BODY MASS INDEX (BMI) OF 37.0 TO 37.9 IN ADULT: Status: ACTIVE | Noted: 2023-01-28

## 2023-01-28 PROBLEM — I10 HTN (HYPERTENSION): Status: ACTIVE | Noted: 2023-01-28

## 2023-01-28 PROBLEM — G56.00 CARPAL TUNNEL SYNDROME: Status: ACTIVE | Noted: 2023-01-28

## 2023-01-28 PROBLEM — E66.01 CLASS 2 SEVERE OBESITY DUE TO EXCESS CALORIES WITH SERIOUS COMORBIDITY AND BODY MASS INDEX (BMI) OF 37.0 TO 37.9 IN ADULT (MULTI): Status: ACTIVE | Noted: 2023-01-28

## 2023-01-28 PROBLEM — R39.15 BENIGN PROSTATIC HYPERPLASIA (BPH) WITH URINARY URGENCY: Status: ACTIVE | Noted: 2023-01-28

## 2023-01-28 PROBLEM — J34.2 DEVIATED NASAL SEPTUM: Status: ACTIVE | Noted: 2023-01-28

## 2023-01-28 PROBLEM — R60.9 EDEMA: Status: ACTIVE | Noted: 2023-01-28

## 2023-01-28 PROBLEM — J32.9 CHRONIC SINUSITIS: Status: ACTIVE | Noted: 2023-01-28

## 2023-01-28 LAB — C DIFF TOXIN/ANTIGEN: NORMAL

## 2023-01-28 RX ORDER — METOPROLOL SUCCINATE 50 MG/1
1 TABLET, EXTENDED RELEASE ORAL DAILY
COMMUNITY
End: 2024-02-27 | Stop reason: SDUPTHER

## 2023-01-28 RX ORDER — FUROSEMIDE 20 MG/1
1 TABLET ORAL DAILY
COMMUNITY
Start: 2015-06-30 | End: 2023-08-30 | Stop reason: SDUPTHER

## 2023-01-28 RX ORDER — TRIAMTERENE/HYDROCHLOROTHIAZID 37.5-25 MG
0.5 TABLET ORAL DAILY
COMMUNITY
Start: 2019-08-31 | End: 2024-04-12 | Stop reason: ALTCHOICE

## 2023-01-28 RX ORDER — MUPIROCIN 20 MG/G
1 OINTMENT TOPICAL 2 TIMES DAILY
COMMUNITY
Start: 2022-06-13 | End: 2024-04-12 | Stop reason: ALTCHOICE

## 2023-01-28 RX ORDER — OMEPRAZOLE 20 MG/1
20 CAPSULE, DELAYED RELEASE ORAL
COMMUNITY
Start: 2022-06-01 | End: 2023-05-30 | Stop reason: SDUPTHER

## 2023-01-28 RX ORDER — ZINC OXIDE/COD LIVER OIL 40 %
PASTE (GRAM) TOPICAL
COMMUNITY
Start: 2022-02-24 | End: 2024-04-12 | Stop reason: ALTCHOICE

## 2023-01-28 RX ORDER — NAPROXEN SODIUM 220 MG/1
TABLET ORAL
COMMUNITY
Start: 2022-02-24

## 2023-01-28 RX ORDER — ASCORBIC ACID 500 MG
1 TABLET,CHEWABLE ORAL DAILY
COMMUNITY
Start: 2021-08-20

## 2023-01-31 LAB
EKG ATRIAL RATE: 75 BPM
EKG P AXIS: 68 DEGREES
EKG P-R INTERVAL: 194 MS
EKG Q-T INTERVAL: 442 MS
EKG QRS DURATION: 104 MS
EKG QTC CALCULATION (BAZETT): 493 MS
EKG R AXIS: 45 DEGREES
EKG T AXIS: 15 DEGREES
EKG VENTRICULAR RATE: 75 BPM

## 2023-03-10 ENCOUNTER — HOSPITAL ENCOUNTER (INPATIENT)
Age: 83
LOS: 8 days | Discharge: NURSING, REHAB OR LONG-TERM CARE | End: 2023-03-18
Attending: INTERNAL MEDICINE | Admitting: INTERNAL MEDICINE
Payer: MEDICARE

## 2023-03-10 ENCOUNTER — APPOINTMENT (OUTPATIENT)
Dept: ULTRASOUND IMAGING | Age: 83
End: 2023-03-10
Payer: MEDICARE

## 2023-03-10 ENCOUNTER — APPOINTMENT (OUTPATIENT)
Dept: GENERAL RADIOLOGY | Age: 83
End: 2023-03-10
Payer: MEDICARE

## 2023-03-10 ENCOUNTER — APPOINTMENT (OUTPATIENT)
Dept: CT IMAGING | Age: 83
End: 2023-03-10
Payer: MEDICARE

## 2023-03-10 DIAGNOSIS — T84.84XA PAIN IN PROSTHETIC JOINT, INITIAL ENCOUNTER (HCC): ICD-10-CM

## 2023-03-10 DIAGNOSIS — R53.1 GENERAL WEAKNESS: Primary | ICD-10-CM

## 2023-03-10 DIAGNOSIS — M79.89 LEG SWELLING: ICD-10-CM

## 2023-03-10 DIAGNOSIS — R26.2 UNABLE TO AMBULATE: ICD-10-CM

## 2023-03-10 DIAGNOSIS — T84.50XA INFECTION OF PROSTHETIC JOINT, INITIAL ENCOUNTER (HCC): ICD-10-CM

## 2023-03-10 LAB
ALBUMIN SERPL-MCNC: 3.8 G/DL (ref 3.5–4.6)
ALP BLD-CCNC: 109 U/L (ref 35–104)
ALT SERPL-CCNC: 12 U/L (ref 0–41)
ANION GAP SERPL CALCULATED.3IONS-SCNC: 11 MEQ/L (ref 9–15)
AST SERPL-CCNC: 33 U/L (ref 0–40)
BASOPHILS ABSOLUTE: 0 K/UL (ref 0–0.2)
BASOPHILS RELATIVE PERCENT: 0.2 %
BILIRUB SERPL-MCNC: 1.8 MG/DL (ref 0.2–0.7)
BUN BLDV-MCNC: 13 MG/DL (ref 8–23)
CALCIUM SERPL-MCNC: 9.4 MG/DL (ref 8.5–9.9)
CHLORIDE BLD-SCNC: 97 MEQ/L (ref 95–107)
CO2: 26 MEQ/L (ref 20–31)
CREAT SERPL-MCNC: 0.64 MG/DL (ref 0.7–1.2)
EOSINOPHILS ABSOLUTE: 0 K/UL (ref 0–0.7)
EOSINOPHILS RELATIVE PERCENT: 0.3 %
GFR SERPL CREATININE-BSD FRML MDRD: >60 ML/MIN/{1.73_M2}
GLOBULIN: 4.7 G/DL (ref 2.3–3.5)
GLUCOSE BLD-MCNC: 141 MG/DL (ref 70–99)
HCT VFR BLD CALC: 43.5 % (ref 42–52)
HEMOGLOBIN: 14.6 G/DL (ref 14–18)
LACTIC ACID: 2.9 MMOL/L (ref 0.5–2.2)
LYMPHOCYTES ABSOLUTE: 1 K/UL (ref 1–4.8)
LYMPHOCYTES RELATIVE PERCENT: 8 %
MCH RBC QN AUTO: 30.7 PG (ref 27–31.3)
MCHC RBC AUTO-ENTMCNC: 33.6 % (ref 33–37)
MCV RBC AUTO: 91.4 FL (ref 79–92.2)
MONOCYTES ABSOLUTE: 1.2 K/UL (ref 0.2–0.8)
MONOCYTES RELATIVE PERCENT: 9.6 %
NEUTROPHILS ABSOLUTE: 10.2 K/UL (ref 1.4–6.5)
NEUTROPHILS RELATIVE PERCENT: 81.9 %
PDW BLD-RTO: 14.2 % (ref 11.5–14.5)
PLATELET # BLD: 241 K/UL (ref 130–400)
POTASSIUM SERPL-SCNC: 4.2 MEQ/L (ref 3.4–4.9)
PRO-BNP: 666 PG/ML
PROCALCITONIN: 0.14 NG/ML (ref 0–0.15)
RBC # BLD: 4.76 M/UL (ref 4.7–6.1)
SODIUM BLD-SCNC: 134 MEQ/L (ref 135–144)
TOTAL PROTEIN: 8.5 G/DL (ref 6.3–8)
TSH REFLEX: 1.28 UIU/ML (ref 0.44–3.86)
WBC # BLD: 12.4 K/UL (ref 4.8–10.8)

## 2023-03-10 PROCEDURE — 84145 PROCALCITONIN (PCT): CPT

## 2023-03-10 PROCEDURE — 6360000002 HC RX W HCPCS: Performed by: NURSE PRACTITIONER

## 2023-03-10 PROCEDURE — 2580000003 HC RX 258: Performed by: NURSE PRACTITIONER

## 2023-03-10 PROCEDURE — 85025 COMPLETE CBC W/AUTO DIFF WBC: CPT

## 2023-03-10 PROCEDURE — 99285 EMERGENCY DEPT VISIT HI MDM: CPT

## 2023-03-10 PROCEDURE — 1210000000 HC MED SURG R&B

## 2023-03-10 PROCEDURE — 80053 COMPREHEN METABOLIC PANEL: CPT

## 2023-03-10 PROCEDURE — 87040 BLOOD CULTURE FOR BACTERIA: CPT

## 2023-03-10 PROCEDURE — 84443 ASSAY THYROID STIM HORMONE: CPT

## 2023-03-10 PROCEDURE — 81001 URINALYSIS AUTO W/SCOPE: CPT

## 2023-03-10 PROCEDURE — 83605 ASSAY OF LACTIC ACID: CPT

## 2023-03-10 PROCEDURE — 83880 ASSAY OF NATRIURETIC PEPTIDE: CPT

## 2023-03-10 PROCEDURE — 93970 EXTREMITY STUDY: CPT

## 2023-03-10 PROCEDURE — 70450 CT HEAD/BRAIN W/O DYE: CPT

## 2023-03-10 PROCEDURE — 71045 X-RAY EXAM CHEST 1 VIEW: CPT

## 2023-03-10 PROCEDURE — 36415 COLL VENOUS BLD VENIPUNCTURE: CPT

## 2023-03-10 RX ORDER — SODIUM CHLORIDE 0.9 % (FLUSH) 0.9 %
5-40 SYRINGE (ML) INJECTION EVERY 12 HOURS SCHEDULED
Status: DISCONTINUED | OUTPATIENT
Start: 2023-03-10 | End: 2023-03-18 | Stop reason: HOSPADM

## 2023-03-10 RX ORDER — ONDANSETRON 4 MG/1
4 TABLET, ORALLY DISINTEGRATING ORAL EVERY 8 HOURS PRN
Status: DISCONTINUED | OUTPATIENT
Start: 2023-03-10 | End: 2023-03-18 | Stop reason: HOSPADM

## 2023-03-10 RX ORDER — ENOXAPARIN SODIUM 100 MG/ML
30 INJECTION SUBCUTANEOUS 2 TIMES DAILY
Status: COMPLETED | OUTPATIENT
Start: 2023-03-10 | End: 2023-03-15

## 2023-03-10 RX ORDER — ACETAMINOPHEN 650 MG/1
650 SUPPOSITORY RECTAL EVERY 6 HOURS PRN
Status: DISCONTINUED | OUTPATIENT
Start: 2023-03-10 | End: 2023-03-11

## 2023-03-10 RX ORDER — POLYETHYLENE GLYCOL 3350 17 G/17G
17 POWDER, FOR SOLUTION ORAL DAILY PRN
Status: DISCONTINUED | OUTPATIENT
Start: 2023-03-10 | End: 2023-03-18 | Stop reason: HOSPADM

## 2023-03-10 RX ORDER — ACETAMINOPHEN 325 MG/1
650 TABLET ORAL EVERY 6 HOURS PRN
Status: DISCONTINUED | OUTPATIENT
Start: 2023-03-10 | End: 2023-03-11

## 2023-03-10 RX ORDER — ONDANSETRON 2 MG/ML
4 INJECTION INTRAMUSCULAR; INTRAVENOUS EVERY 6 HOURS PRN
Status: DISCONTINUED | OUTPATIENT
Start: 2023-03-10 | End: 2023-03-18 | Stop reason: HOSPADM

## 2023-03-10 RX ORDER — SODIUM CHLORIDE 9 MG/ML
25 INJECTION, SOLUTION INTRAVENOUS PRN
Status: DISCONTINUED | OUTPATIENT
Start: 2023-03-10 | End: 2023-03-16

## 2023-03-10 RX ORDER — SODIUM CHLORIDE 0.9 % (FLUSH) 0.9 %
5-40 SYRINGE (ML) INJECTION PRN
Status: DISCONTINUED | OUTPATIENT
Start: 2023-03-10 | End: 2023-03-18 | Stop reason: HOSPADM

## 2023-03-10 RX ADMIN — SODIUM CHLORIDE, PRESERVATIVE FREE 10 ML: 5 INJECTION INTRAVENOUS at 23:36

## 2023-03-10 RX ADMIN — ENOXAPARIN SODIUM 30 MG: 100 INJECTION SUBCUTANEOUS at 23:36

## 2023-03-10 ASSESSMENT — PAIN - FUNCTIONAL ASSESSMENT: PAIN_FUNCTIONAL_ASSESSMENT: 0-10

## 2023-03-10 ASSESSMENT — ENCOUNTER SYMPTOMS
COUGH: 0
ABDOMINAL DISTENTION: 0
EYE DISCHARGE: 0
ANAL BLEEDING: 0
COLOR CHANGE: 1
NAUSEA: 0
VOICE CHANGE: 0
VOMITING: 0

## 2023-03-10 ASSESSMENT — PAIN SCALES - GENERAL: PAINLEVEL_OUTOF10: 3

## 2023-03-10 ASSESSMENT — PAIN DESCRIPTION - DESCRIPTORS: DESCRIPTORS: ACHING

## 2023-03-10 ASSESSMENT — PAIN DESCRIPTION - LOCATION: LOCATION: LEG

## 2023-03-10 ASSESSMENT — PAIN DESCRIPTION - ORIENTATION: ORIENTATION: LEFT

## 2023-03-10 NOTE — H&P
HISTORY AND PHYSICAL             Date: 3/10/2023        Patient Name: Jose E Escamilla     YOB: 1940      Age:  81 y.o. Chief Complaint     Chief Complaint   Patient presents with    Extremity Weakness      History Obtained From   patient, spouse    History of Present Illness   The patient is an 71-year-old male with significant past medical history of hypertension, BPH, osteoarthritis, s/p left total knee arthroplasty (03/15/2022), was admitted for BLE weakness. Patient claims that for the last 3 weeks he is having off-and-on extremity weakness, however, it got worse the last 3 days. He claims that last night when he sit on the toilet around 11 PM, he was not able to get up as he felt so weak on his legs. Due to this BLE weakness, patient unable to ambulate. Patient also complains of BLE edema. He claims that this is not new to him as he is getting it off and on, however, it also worsen for the last 3 days. Patient denies any history of cardiac and lung conditions. He denies fever, chills, dizziness, shortness of breath, chest pain, difficulty urinating, abdominal pain and N/V/D.   Past Medical History     Past Medical History:   Diagnosis Date    Aneurysm artery, pulmonary ([address] 2022    not surgical    Benign prostatic hyperplasia with lower urinary tract symptoms     Chronic sinusitis     History of left knee replacement 03/15/2022    at [facility]    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    HTN (hypertension)     Hx of carpal tunnel syndrome     Obesity     Other fracture of left femur, initial encounter for closed fracture ([address] 3/14/2022    Primary osteoarthritis of both knees         Past Surgical History     Past Surgical History:   Procedure Laterality Date    TOTAL KNEE ARTHROPLASTY Left 3/15/2022    LEFT KNEE DISTAL FEMUR REPLACEMENT performed by Geovanna Angulo MD at [facility]        Medications Prior to Admission     Prior to Admission medications    Medication Sig Start Date End Date Taking? Authorizing Provider   docusate sodium (COLACE) 100 MG capsule Take 1 capsule by mouth 2 times daily  Patient taking differently: Take 100 mg by mouth 2 times daily Indications: Constipation  3/18/22   Jaime Ryan MD   furosemide (LASIX) 20 MG tablet Take 20 mg by mouth daily Indications: High Blood Pressure Disorder     Historical Provider, MD   triamterene-hydroCHLOROthiazide (MAXZIDE-25) 37.5-25 MG per tablet Take 0.5 tablets by mouth daily Indications: High Blood Pressure Disorder     Historical Provider, MD   metoprolol succinate (TOPROL XL) 50 MG extended release tablet Take 50 mg by mouth daily Indications: High Blood Pressure Disorder     Historical Provider, MD        Allergies   Aspirin    Social History     Social History       Tobacco History       Smoking Status  Former Quit Date  1982 Smoking Tobacco Type  Cigarettes quit in 1982      Smokeless Tobacco Use  Former      Tobacco Comments  40 years ago per patient              Alcohol History       Alcohol Use Status  Not Currently              Drug Use       Drug Use Status  Never              Sexual Activity       Sexually Active  Not Currently Partners  Male                    Family History     Family History   Problem Relation Age of Onset    Cancer Mother     Cancer Father        Review of Systems   12 point review of systems reviewed with patient with pertinent positive listed in HPI, otherwise, negative.   Physical Exam   BP (!) 110/59   Pulse 91   Temp 98.8 °F (37.1 °C) (Oral)   Resp 18   Wt 240 lb (108.9 kg)   SpO2 98%   BMI 32.55 kg/m²     CONSTITUTIONAL:  awake, alert, cooperative, no apparent distress, obese, and appears stated age  EYES:  sclera clear  ENT:  normocepalic, without obvious abnormality  NECK:  supple, symmetrical, trachea midline  BACK:  symmetric  LUNGS:  No increased work of breathing, good air exchange, clear to auscultation bilaterally, no crackles or wheezing  CARDIOVASCULAR:  Normal apical impulse, regular rate and rhythm, normal S1 and S2, no S3 or S4, and no murmur noted  ABDOMEN:  normal bowel sounds, non-distended, and non-tender  MUSCULOSKELETAL:  BLE edema and weakness  NEUROLOGIC:  Awake, alert, oriented to name, place and time. Cranial nerves II-XII are grossly intact. Positive weakness on BLE. SKIN:  normal skin color, texture, turgor.  Redness on BLE    Labs      Recent Results (from the past 24 hour(s))   CMP    Collection Time: 03/10/23  3:15 PM   Result Value Ref Range    Sodium 134 (L) 135 - 144 mEq/L    Potassium 4.2 3.4 - 4.9 mEq/L    Chloride 97 95 - 107 mEq/L    CO2 26 20 - 31 mEq/L    Anion Gap 11 9 - 15 mEq/L    Glucose 141 (H) 70 - 99 mg/dL    BUN 13 8 - 23 mg/dL    Creatinine 0.64 (L) 0.70 - 1.20 mg/dL    Est, Glom Filt Rate >60.0 >60    Calcium 9.4 8.5 - 9.9 mg/dL    Total Protein 8.5 (H) 6.3 - 8.0 g/dL    Albumin 3.8 3.5 - 4.6 g/dL    Total Bilirubin 1.8 (H) 0.2 - 0.7 mg/dL    Alkaline Phosphatase 109 (H) 35 - 104 U/L    ALT 12 0 - 41 U/L    AST 33 0 - 40 U/L    Globulin 4.7 (H) 2.3 - 3.5 g/dL   Lactic Acid    Collection Time: 03/10/23  3:15 PM   Result Value Ref Range    Lactic Acid 2.9 (H) 0.5 - 2.2 mmol/L   Procalcitonin    Collection Time: 03/10/23  3:15 PM   Result Value Ref Range    Procalcitonin 0.14 0.00 - 0.15 ng/mL   CBC with Auto Differential    Collection Time: 03/10/23  3:30 PM   Result Value Ref Range    WBC 12.4 (H) 4.8 - 10.8 K/uL    RBC 4.76 4.70 - 6.10 M/uL    Hemoglobin 14.6 14.0 - 18.0 g/dL    Hematocrit 43.5 42.0 - 52.0 %    MCV 91.4 79.0 - 92.2 fL    MCH 30.7 27.0 - 31.3 pg    MCHC 33.6 33.0 - 37.0 %    RDW 14.2 11.5 - 14.5 %    Platelets 105 924 - 858 K/uL    Neutrophils % 81.9 %    Lymphocytes % 8.0 %    Monocytes % 9.6 %    Eosinophils % 0.3 %    Basophils % 0.2 %    Neutrophils Absolute 10.2 (H) 1.4 - 6.5 K/uL    Lymphocytes Absolute 1.0 1.0 - 4.8 K/uL    Monocytes Absolute 1.2 (H) 0.2 - 0.8 K/uL    Eosinophils Absolute 0.0 0.0 - 0.7 K/uL Basophils Absolute 0.0 0.0 - 0.2 K/uL        Imaging/Diagnostics Last 24 Hours   CT Head W/O Contrast    Result Date: 3/10/2023  EXAMINATION: CT OF THE HEAD WITHOUT CONTRAST  3/10/2023 3:53 pm TECHNIQUE: CT of the head was performed without the administration of intravenous contrast. Automated exposure control, iterative reconstruction, and/or weight based adjustment of the mA/kV was utilized to reduce the radiation dose to as low as reasonably achievable. COMPARISON: None. HISTORY: ORDERING SYSTEM PROVIDED HISTORY: weakness lower ext lt >rt TECHNOLOGIST PROVIDED HISTORY: Reason for exam:->weakness lower ext lt >rt Has a \"code stroke\" or \"stroke alert\" been called? ->No Decision Support Exception - unselect if not a suspected or confirmed emergency medical condition->Emergency Medical Condition (MA) What reading provider will be dictating this exam?->CRC FINDINGS: BRAIN/VENTRICLES: No mass effect, edema or hemorrhage is seen. Mild volume loss is seen in the cerebrum with mild chronic microvascular ischemic changes. No hydrocephalus or extra-axial fluid is seen. ORBITS: The visualized portion of the orbits demonstrate no acute abnormality. SINUSES: Moderate mucosal thickening in the paranasal sinuses. Postoperative changes from prior sinonasal surgery are seen. The mastoids are clear. SOFT TISSUES/SKULL:  No acute abnormality of the visualized skull or soft tissues. No acute intracranial abnormality. US DUP LOWER EXTREMITIES BILATERAL VENOUS    Result Date: 3/10/2023  EXAMINATION: DUPLEX VENOUS ULTRASOUND OF THE BILATERAL LOWER EXTREMITIES3/10/2023 4:03 pm TECHNIQUE: Duplex ultrasound using B-mode/gray scaled imaging, Doppler spectral analysis and color flow Doppler was obtained of the deep venous structures of the lower bilateral extremities.  COMPARISON: Lower extremity venous duplex from March 16, 2022 HISTORY: ORDERING SYSTEM PROVIDED HISTORY: leg swelling and temp chnage lt TECHNOLOGIST PROVIDED HISTORY: Reason for exam:->leg swelling and temp chnage lt What reading provider will be dictating this exam?->CRC FINDINGS: The visualized veins of the bilateral lower extremities are patent and free of echogenic thrombus. The veins demonstrate good compressibility with normal color flow study and spectral analysis. No evidence of DVT in either lower extremity. Assessment      Hospital Problems             Last Modified POA    * (Principal) Weakness 3/10/2023 Yes       Plan     BLE edema and weakness - duplex venous ultrasound of BLE showed no evidence of DVT in either lower extremity. CT head showed no acute intracranial abnormality. Labs unremarkable. Will continue to monitor. With consult for PT and OT. Essential hypertension -blood pressure within acceptable range, latest blood pressure 110/59. Will resume home BP medication when appropriate and as tolerated. Hospital medicine managing acute needs. Patient will need to follow up with PCP for chronic disease management. Time spent evaluating and intervening patient, 35 minutes. Greater than 70% of time spent focused exclusively on this patient, reviewing chart, reconciling medications, and answering questions and discussing treatment plan.     Consultations Ordered:  None    Electronically signed by CHARLIE Win CNP on 3/10/23 at 6:42 PM EST

## 2023-03-10 NOTE — ED TRIAGE NOTES
Pt to ER today by EMS c/o weakness  Pt reported he was sitting on the toilet since yesterday at 11pm and was unable to get up.   Friend found pt this morning sitting on toilet still and called EMS  Pt denied falling and denied blood thinners.  Pt reported he has pain in his buttock and legs.  Swelling noted bilateral.  Pt lives at home by himself.  A+Ox4, denied SOB and CP at this time.

## 2023-03-10 NOTE — ED PROVIDER NOTES
[address] [facility] ED  eMERGENCY dEPARTMENT eNCOUnter      Pt Name: Vlad Andrade  MRN: [id number]  [place] 1940  Date of evaluation: 3/10/2023  Provider: Sandra Shields PA-C    CHIEF COMPLAINT       Chief Complaint   Patient presents with    Extremity Weakness         HISTORY OF PRESENT ILLNESS   (Location/Symptom, Timing/Onset,Context/Setting, Quality, Duration, Modifying Factors, Severity)  Note limiting factors. Vlad Andrade is a 81 y.o. male who presents to the emergency department patient presents with extremity weakness he has been getting weaker x3 days he was sitting on the toilet since 11 PM last night he took a water pill recently because his legs have been swelling he notes that he started back doing his leg exercises last week. He denies fever chills headache nausea vomiting chest pain back pain abdominal pain denies rectal bleeding urinary bleeding. Denies any blood thinner use. Symptoms moderate severity nothing improves or worsen symptoms    HPI    NursingNotes were reviewed. REVIEW OF SYSTEMS    (2-9 systems for level 4, 10 or more for level 5)     Review of Systems   Constitutional:  Negative for activity change, appetite change and unexpected weight change. HENT:  Negative for ear discharge, nosebleeds and voice change. Eyes:  Negative for discharge and visual disturbance. Respiratory:  Negative for cough. Cardiovascular:  Positive for leg swelling. Negative for chest pain. Gastrointestinal:  Negative for abdominal distention, anal bleeding, nausea and vomiting. Genitourinary:  Positive for frequency. Negative for dysuria. Musculoskeletal:  Negative for joint swelling. Skin:  Positive for color change. Negative for pallor. Neurological:  Positive for weakness. Negative for seizures, facial asymmetry and headaches. Psychiatric/Behavioral:  Negative for behavioral problems, self-injury and sleep disturbance.     All other systems reviewed and are negative. Except as noted above the remainder of the review of systems was reviewed and negative.        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[facility]     not surgical    Benign prostatic hyperplasia with lower urinary tract symptoms     Chronic sinusitis     History of left knee replacement 03/15/2022    at [facility]    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    HTN (hypertension)     Hx of carpal tunnel syndrome     Obesity     Other fracture of left femur, initial encounter for closed fracture ([facility] [address] 3/14/2022    Primary osteoarthritis of both knees          SURGICALHISTORY       Past Surgical History:   Procedure Laterality Date    TOTAL KNEE ARTHROPLASTY Left 3/15/2022    LEFT KNEE DISTAL FEMUR REPLACEMENT performed [facility] Jeferson Simon MD [facility]       Previous Medications    DOCUSATE SODIUM (COLACE) 100 MG CAPSULE    Take 1 capsule by mouth 2 times daily    FUROSEMIDE (LASIX) 20 MG TABLET    Take 20 mg by mouth daily Indications: High Blood Pressure Disorder     METOPROLOL SUCCINATE (TOPROL XL) 50 MG EXTENDED RELEASE TABLET    Take 50 mg by mouth daily Indications: High Blood Pressure Disorder     TRIAMTERENE-HYDROCHLOROTHIAZIDE (MAXZIDE-25) 37.5-25 MG PER TABLET    Take 0.5 tablets by mouth daily Indications: High Blood Pressure Disorder             Aspirin    FAMILY HISTORY       Family History   Problem Relation Age of Onset    Cancer Mother     Cancer Father           SOCIAL HISTORY       Social History     Socioeconomic History    Marital status: Single     Spouse name: None    Number of children: None    Years of education: None    Highest education level: None   Tobacco Use    Smoking status: Former     Years: 40.00     Types: Cigarettes     Quit date:      Years since quittin.2    Smokeless tobacco: Former    Tobacco comments:     40 years ago per patient   Vaping Use    Vaping Use: Never used Substance and Sexual Activity    Alcohol use: Not Currently    Drug use: Never    Sexual activity: Not Currently     Partners: Male   Social History Narrative    Alone, was     No children    Worked in construction, at the shipyard, drove a truck       [address] Opening: Spontaneous  Best Verbal Response: Oriented  Best Motor Response: Obeys commands  Hillary Coma Scale Score: 15  Ebola Virus Disease (EVD) Screening   Temp: 98.8 °F (37.1 °C)  CIWA Assessment  BP: (!) 110/59  Heart Rate: 91    PHYSICAL EXAM    (up to 7 for level 4, 8 or more for level 5)     ED Triage Vitals [03/10/23 1457]   BP Temp Temp Source Heart Rate Resp SpO2 Height Weight   (!) 110/59 98.8 °F (37.1 °C) Oral 91 18 98 % -- 240 lb (108.9 kg)       Physical Exam  Vitals and nursing note reviewed. Constitutional:       General: He is not in acute distress. Appearance: He is well-developed. HENT:      Head: Normocephalic and atraumatic. Right Ear: External ear normal.      Left Ear: External ear normal.      Nose: Nose normal.      Mouth/Throat:      Mouth: Mucous membranes are moist.   Eyes:      General:         Right eye: No discharge. Left eye: No discharge. Pupils: Pupils are equal, round, and reactive to light. Cardiovascular:      Rate and Rhythm: Normal rate and regular rhythm. Heart sounds: Normal heart sounds. Pulmonary:      Effort: Pulmonary effort is normal. No respiratory distress. Breath sounds: Normal breath sounds. No stridor. Abdominal:      General: Bowel sounds are normal. There is no distension. Palpations: Abdomen is soft. Tenderness: There is no abdominal tenderness. Musculoskeletal:         General: Normal range of motion. Cervical back: Normal range of motion and neck supple. Right lower leg: Edema present. Left lower leg: Edema present. Comments: +2/+3 pitting edema bilaterally   Skin:     General: Skin is warm. Findings: Erythema present. Comments: Slight erythema noted with warmth around left knee erythema noted near incisional site superiorly   Neurological:      Mental Status: He is alert and oriented to person, place, and time. Motor: Weakness present. Comments: Patient has weakness 3 for 5 left lower extremity 4 for 5 right lower extremity. Psychiatric:         Mood and Affect: Mood normal.       RESULTS     EKG: All EKG's are interpreted by the Emergency Department Physician who either signs or Co-signsthis chart in the absence of a cardiologist.         RADIOLOGY:   Kimberlyn Morelle such as CT, Ultrasound and MRI are read by the radiologist. Plain radiographic images are visualized and preliminarily interpreted by the emergency physician with the below findings:         Interpretation per the Radiologist below, if available at the time ofthis note:    US DUP LOWER EXTREMITIES BILATERAL VENOUS   Final Result   No evidence of DVT in either lower extremity. CT Head W/O Contrast   Final Result   No acute intracranial abnormality.                ED BEDSIDE ULTRASOUND:   Performed by ED Physician - none    LABS:  Labs Reviewed   COMPREHENSIVE METABOLIC PANEL - Abnormal; Notable for the following components:       Result Value    Sodium 134 (*)     Glucose 141 (*)     Creatinine 0.64 (*)     Total Protein 8.5 (*)     Total Bilirubin 1.8 (*)     Alkaline Phosphatase 109 (*)     Globulin 4.7 (*)     All other components within normal limits   LACTIC ACID - Abnormal; Notable for the following components:    Lactic Acid 2.9 (*)     All other components within normal limits   CBC WITH AUTO DIFFERENTIAL - Abnormal; Notable for the following components:    WBC 12.4 (*)     Neutrophils Absolute 10.2 (*)     Monocytes Absolute 1.2 (*)     All other components within normal limits   CULTURE, BLOOD 1   CULTURE, BLOOD 1   PROCALCITONIN       All other labs were within normal range or not returned as of this dictation. EMERGENCY DEPARTMENT COURSE and DIFFERENTIAL DIAGNOSIS/MDM:   Vitals:    Vitals:    03/10/23 1457   BP: (!) 110/59   Pulse: 91   Resp: 18   Temp: 98.8 °F (37.1 °C)   TempSrc: Oral   SpO2: 98%   Weight: 240 lb (108.9 kg)            MDM  Number of Diagnoses or Management Options  General weakness  Leg swelling  Unable to ambulate  Diagnosis management comments: Patient notes that his urine stream has been decreased to multiple small amounts. He has been checked for this by his primary care doctor using blood work in the past patient's symptoms began 3 days ago worsened last night. Denies any falls denies trauma. Patient jefferson conversant. Amount and/or Complexity of Data Reviewed  Clinical lab tests: ordered  Tests in the radiology section of CPT®: ordered              CONSULTS:  None    PROCEDURES:  Unless otherwise noted below, none     Procedures    FINAL IMPRESSION      1. General weakness    2. Leg swelling    3. Unable to ambulate          DISPOSITION/PLAN   DISPOSITION Decision To Admit 03/10/2023 05:33:28 PM      PATIENT REFERRED TO:  No follow-up provider specified.     DISCHARGE MEDICATIONS:  New Prescriptions    No medications on file          (Please note that portions of this note were completed with a voice recognition program.  Efforts were made to edit the dictations but occasionally words are mis-transcribed.)    Ayush Alva PA-C (electronically signed)  Attending Emergency Physician        Ayush Alva PA-C  03/17/23 2022

## 2023-03-10 NOTE — ED NOTES
Call received from Divya Santamaria requesting update regarding patient. Caller placed on hold and permission from patient received to update caller. Caller hung up before information could be provided.       Re Branch RN  03/10/23 0580

## 2023-03-10 NOTE — ED NOTES
Patient unable to stand or reposition self. Assisted patient with repositioning and urinal provided.        Arian Rosen RN  03/10/23 9402

## 2023-03-11 ENCOUNTER — APPOINTMENT (OUTPATIENT)
Dept: GENERAL RADIOLOGY | Age: 83
End: 2023-03-11
Payer: MEDICARE

## 2023-03-11 LAB
ALBUMIN SERPL-MCNC: 2.8 G/DL (ref 3.5–4.6)
ALP BLD-CCNC: 86 U/L (ref 35–104)
ALT SERPL-CCNC: 11 U/L (ref 0–41)
ANION GAP SERPL CALCULATED.3IONS-SCNC: 13 MEQ/L (ref 9–15)
APPEARANCE FLUID: NORMAL
AST SERPL-CCNC: 34 U/L (ref 0–40)
ATYPICAL LYMPHOCYTE RELATIVE PERCENT: 3 %
BACTERIA: ABNORMAL /HPF
BANDED NEUTROPHILS RELATIVE PERCENT: 1 %
BASOPHILS ABSOLUTE: 0 K/UL (ref 0–0.2)
BASOPHILS RELATIVE PERCENT: 0.6 %
BILIRUB SERPL-MCNC: 1.6 MG/DL (ref 0.2–0.7)
BILIRUBIN URINE: NEGATIVE
BLOOD, URINE: ABNORMAL
BUN BLDV-MCNC: 17 MG/DL (ref 8–23)
C-REACTIVE PROTEIN: 141.3 MG/L (ref 0–5)
CALCIUM SERPL-MCNC: 9 MG/DL (ref 8.5–9.9)
CELL COUNT FLUID TYPE: NORMAL
CHLORIDE BLD-SCNC: 101 MEQ/L (ref 95–107)
CLARITY: ABNORMAL
CLOT EVALUATION: NORMAL
CO2: 21 MEQ/L (ref 20–31)
COLOR FLUID: YELLOW
COLOR: ABNORMAL
CREAT SERPL-MCNC: 0.71 MG/DL (ref 0.7–1.2)
EOSINOPHILS ABSOLUTE: 0.1 K/UL (ref 0–0.7)
EOSINOPHILS RELATIVE PERCENT: 1 %
EPITHELIAL CELLS, UA: ABNORMAL /HPF (ref 0–5)
GFR SERPL CREATININE-BSD FRML MDRD: >60 ML/MIN/{1.73_M2}
GLOBULIN: 4.3 G/DL (ref 2.3–3.5)
GLUCOSE BLD-MCNC: 111 MG/DL (ref 70–99)
GLUCOSE URINE: NEGATIVE MG/DL
HCT VFR BLD CALC: 38.9 % (ref 42–52)
HEMOGLOBIN: 13.2 G/DL (ref 14–18)
HYALINE CASTS: ABNORMAL /HPF (ref 0–5)
KETONES, URINE: NEGATIVE MG/DL
LEUKOCYTE ESTERASE, URINE: ABNORMAL
LYMPHOCYTES ABSOLUTE: 1.7 K/UL (ref 1–4.8)
LYMPHOCYTES RELATIVE PERCENT: 12 %
MCH RBC QN AUTO: 31 PG (ref 27–31.3)
MCHC RBC AUTO-ENTMCNC: 33.8 % (ref 33–37)
MCV RBC AUTO: 91.7 FL (ref 79–92.2)
MONOCYTE, FLUID: 8 %
MONOCYTES ABSOLUTE: 1.5 K/UL (ref 0.2–0.8)
MONOCYTES RELATIVE PERCENT: 13.1 %
MYELOCYTE PERCENT: 1 %
NEUTROPHIL, FLUID: 92 %
NEUTROPHILS ABSOLUTE: 8.2 K/UL (ref 1.4–6.5)
NEUTROPHILS RELATIVE PERCENT: 69 %
NITRITE, URINE: NEGATIVE
NUCLEATED CELLS FLUID: NORMAL /CUMM
NUMBER OF CELLS COUNTED FLUID: 100
PDW BLD-RTO: 14.7 % (ref 11.5–14.5)
PH UA: 5.5 (ref 5–9)
PLATELET # BLD: 192 K/UL (ref 130–400)
PLATELET SLIDE REVIEW: NORMAL
POTASSIUM REFLEX MAGNESIUM: 4.1 MEQ/L (ref 3.4–4.9)
PROTEIN UA: 30 MG/DL
RBC # BLD: 4.24 M/UL (ref 4.7–6.1)
RBC FLUID: NORMAL /CUMM
RBC UA: ABNORMAL /HPF (ref 0–5)
SEDIMENTATION RATE, ERYTHROCYTE: 63 MM (ref 0–20)
SODIUM BLD-SCNC: 135 MEQ/L (ref 135–144)
SPECIFIC GRAVITY UA: 1.02 (ref 1–1.03)
TOTAL PROTEIN: 7.1 G/DL (ref 6.3–8)
UROBILINOGEN, URINE: 1 E.U./DL
WBC # BLD: 11.5 K/UL (ref 4.8–10.8)
WBC UA: >100 /HPF (ref 0–5)

## 2023-03-11 PROCEDURE — 87205 SMEAR GRAM STAIN: CPT

## 2023-03-11 PROCEDURE — 99222 1ST HOSP IP/OBS MODERATE 55: CPT | Performed by: ORTHOPAEDIC SURGERY

## 2023-03-11 PROCEDURE — 87186 SC STD MICRODIL/AGAR DIL: CPT

## 2023-03-11 PROCEDURE — 97167 OT EVAL HIGH COMPLEX 60 MIN: CPT | Performed by: OCCUPATIONAL THERAPIST

## 2023-03-11 PROCEDURE — 2580000003 HC RX 258: Performed by: INTERNAL MEDICINE

## 2023-03-11 PROCEDURE — 6370000000 HC RX 637 (ALT 250 FOR IP): Performed by: ORTHOPAEDIC SURGERY

## 2023-03-11 PROCEDURE — 86140 C-REACTIVE PROTEIN: CPT

## 2023-03-11 PROCEDURE — 85652 RBC SED RATE AUTOMATED: CPT

## 2023-03-11 PROCEDURE — 87086 URINE CULTURE/COLONY COUNT: CPT

## 2023-03-11 PROCEDURE — 85025 COMPLETE CBC W/AUTO DIFF WBC: CPT

## 2023-03-11 PROCEDURE — 6360000002 HC RX W HCPCS: Performed by: INTERNAL MEDICINE

## 2023-03-11 PROCEDURE — 89051 BODY FLUID CELL COUNT: CPT

## 2023-03-11 PROCEDURE — 97166 OT EVAL MOD COMPLEX 45 MIN: CPT | Performed by: OCCUPATIONAL THERAPIST

## 2023-03-11 PROCEDURE — 87077 CULTURE AEROBIC IDENTIFY: CPT

## 2023-03-11 PROCEDURE — 97162 PT EVAL MOD COMPLEX 30 MIN: CPT

## 2023-03-11 PROCEDURE — 36415 COLL VENOUS BLD VENIPUNCTURE: CPT

## 2023-03-11 PROCEDURE — 87088 URINE BACTERIA CULTURE: CPT

## 2023-03-11 PROCEDURE — 87070 CULTURE OTHR SPECIMN AEROBIC: CPT

## 2023-03-11 PROCEDURE — 80053 COMPREHEN METABOLIC PANEL: CPT

## 2023-03-11 PROCEDURE — 89060 EXAM SYNOVIAL FLUID CRYSTALS: CPT

## 2023-03-11 PROCEDURE — 73552 X-RAY EXAM OF FEMUR 2/>: CPT

## 2023-03-11 PROCEDURE — 20610 DRAIN/INJ JOINT/BURSA W/O US: CPT | Performed by: ORTHOPAEDIC SURGERY

## 2023-03-11 PROCEDURE — 73560 X-RAY EXAM OF KNEE 1 OR 2: CPT

## 2023-03-11 PROCEDURE — 6370000000 HC RX 637 (ALT 250 FOR IP): Performed by: INTERNAL MEDICINE

## 2023-03-11 PROCEDURE — 1210000000 HC MED SURG R&B

## 2023-03-11 PROCEDURE — 6360000002 HC RX W HCPCS: Performed by: NURSE PRACTITIONER

## 2023-03-11 PROCEDURE — 2580000003 HC RX 258: Performed by: NURSE PRACTITIONER

## 2023-03-11 RX ORDER — TRIAMTERENE AND HYDROCHLOROTHIAZIDE 37.5; 25 MG/1; MG/1
0.5 TABLET ORAL DAILY
Status: DISCONTINUED | OUTPATIENT
Start: 2023-03-12 | End: 2023-03-18 | Stop reason: HOSPADM

## 2023-03-11 RX ORDER — SENNA PLUS 8.6 MG/1
1 TABLET ORAL NIGHTLY
Status: COMPLETED | OUTPATIENT
Start: 2023-03-11 | End: 2023-03-11

## 2023-03-11 RX ORDER — FUROSEMIDE 20 MG/1
20 TABLET ORAL DAILY
Status: DISCONTINUED | OUTPATIENT
Start: 2023-03-11 | End: 2023-03-18 | Stop reason: HOSPADM

## 2023-03-11 RX ORDER — ACETAMINOPHEN 500 MG
1000 TABLET ORAL EVERY 8 HOURS SCHEDULED
Status: DISCONTINUED | OUTPATIENT
Start: 2023-03-11 | End: 2023-03-18 | Stop reason: HOSPADM

## 2023-03-11 RX ORDER — TRAMADOL HYDROCHLORIDE 50 MG/1
25 TABLET ORAL EVERY 4 HOURS PRN
Status: DISCONTINUED | OUTPATIENT
Start: 2023-03-11 | End: 2023-03-16

## 2023-03-11 RX ORDER — POLYETHYLENE GLYCOL 3350 17 G/17G
17 POWDER, FOR SOLUTION ORAL DAILY
Status: COMPLETED | OUTPATIENT
Start: 2023-03-11 | End: 2023-03-12

## 2023-03-11 RX ORDER — METOPROLOL SUCCINATE 50 MG/1
50 TABLET, EXTENDED RELEASE ORAL DAILY
Status: DISCONTINUED | OUTPATIENT
Start: 2023-03-11 | End: 2023-03-18 | Stop reason: HOSPADM

## 2023-03-11 RX ORDER — TRAMADOL HYDROCHLORIDE 50 MG/1
50 TABLET ORAL EVERY 4 HOURS PRN
Status: DISCONTINUED | OUTPATIENT
Start: 2023-03-11 | End: 2023-03-16

## 2023-03-11 RX ADMIN — SENNOSIDES 8.6 MG: 8.6 TABLET, FILM COATED ORAL at 21:03

## 2023-03-11 RX ADMIN — POLYETHYLENE GLYCOL 3350 17 G: 17 POWDER, FOR SOLUTION ORAL at 16:07

## 2023-03-11 RX ADMIN — FUROSEMIDE 20 MG: 20 TABLET ORAL at 16:07

## 2023-03-11 RX ADMIN — ENOXAPARIN SODIUM 30 MG: 100 INJECTION SUBCUTANEOUS at 08:58

## 2023-03-11 RX ADMIN — METOPROLOL SUCCINATE 50 MG: 50 TABLET, FILM COATED, EXTENDED RELEASE ORAL at 16:07

## 2023-03-11 RX ADMIN — CEFTRIAXONE SODIUM 1000 MG: 1 INJECTION, POWDER, FOR SOLUTION INTRAMUSCULAR; INTRAVENOUS at 09:13

## 2023-03-11 RX ADMIN — SODIUM CHLORIDE, PRESERVATIVE FREE 10 ML: 5 INJECTION INTRAVENOUS at 21:04

## 2023-03-11 RX ADMIN — ACETAMINOPHEN 1000 MG: 500 TABLET ORAL at 21:03

## 2023-03-11 RX ADMIN — ENOXAPARIN SODIUM 30 MG: 100 INJECTION SUBCUTANEOUS at 21:04

## 2023-03-11 RX ADMIN — SODIUM CHLORIDE, PRESERVATIVE FREE 10 ML: 5 INJECTION INTRAVENOUS at 08:59

## 2023-03-11 RX ADMIN — ACETAMINOPHEN 1000 MG: 500 TABLET ORAL at 16:15

## 2023-03-11 ASSESSMENT — PAIN DESCRIPTION - LOCATION: LOCATION: GENERALIZED

## 2023-03-11 ASSESSMENT — PAIN DESCRIPTION - DESCRIPTORS: DESCRIPTORS: ACHING

## 2023-03-11 ASSESSMENT — PAIN SCALES - GENERAL
PAINLEVEL_OUTOF10: 0

## 2023-03-11 NOTE — PROGRESS NOTES
Physical Therapy Med Surg Initial Assessment  Facility/Department: [facility]  Room: A2/D809-06       NAME: Halley Araiza  : 1940 (81 y.o.)  MRN: [id number]  CODE STATUS: Full Code    Date of Service: 3/11/2023    Patient Diagnosis(es): Weakness [R53.1]  Leg swelling [M79.89]  General weakness [R53.1]  Unable to ambulate [R26.2]   Chief Complaint   Patient presents with    Extremity Weakness     Patient Active Problem List    Diagnosis Date Noted    Weakness 03/10/2023    Supracondylar fracture of femur, left, closed, with routine healing, subsequent encounter 2022    Benign prostatic hyperplasia with lower urinary tract symptoms 2022    Class 2 drug-induced obesity with serious comorbidity and body mass index (BMI) of 37.0 to 37.9 in adult 2022    Chronic sinusitis 2022    Deviated nasal septum 2022    Hyperglycemia 2022    Hypertension 2022    Osteoarthritis 2022        Past Medical History:   Diagnosis Date    Aneurysm artery, pulmonary ([facility] [address]     not surgical    Benign prostatic hyperplasia with lower urinary tract symptoms     Chronic sinusitis     History of left knee replacement 03/15/2022    at [facility]    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    HTN (hypertension)     Hx of carpal tunnel syndrome     Obesity     Other fracture of left femur, initial encounter for closed fracture ([facility] [address] 3/14/2022    Primary osteoarthritis of both knees      Past Surgical History:   Procedure Laterality Date    TOTAL KNEE ARTHROPLASTY Left 3/15/2022    LEFT KNEE DISTAL FEMUR REPLACEMENT performed by Jimy Fish MD at [facility] OR       Chart Reviewed: Yes  Patient assessed for rehabilitation services?: Yes  Family / Caregiver Present: No    Restrictions:  Restrictions/Precautions: Fall Risk     SUBJECTIVE:   Subjective: \"no you can do what you need to\"    Pain  No number given pre/post    Prior Level of Function:  Social/Functional History  Lives With: Alone  Type of Home: House  Home Layout: Two level, Able to Live on Main level with bedroom/bathroom  Home Access: Ramped entrance  Bathroom Shower/Tub: Walk-in shower  Bathroom Equipment: Grab bars in shower, Shower chair  Home Equipment: Castro Gonzalez, rolling, Rollator  Has the patient had two or more falls in the past year or any fall with injury in the past year?: No  Receives Help From: Family  ADL Assistance: Independent  Homemaking Assistance: Independent  Ambulation Assistance: Independent  Transfer Assistance: Independent  Active : Yes  Occupation: Retired  Type of Occupation: construction    OBJECTIVE:   Vision  Vision: Impaired  Vision Exceptions: Wears glasses at all times (states he lost his glasses)  Hearing: Exceptions to [facility]  Hearing Exceptions: Hard of hearing/hearing concerns; No hearing aid    Cognition:  Orientation Level: Oriented to place, Oriented to situation, Oriented to person, Disoriented to time (new day, unsure of month)  Overall Cognitive Status: WFL    Observation/Palpation  Observation: asleep in bed upon arrival, agreeable to PT evaluation, anmol LE edema and redness    ROM:  RLE General PROM: decreased, stiffness noted with knee flexion  RLE General AROM: grossly decreased, patient unable to complete SLR, significant difficulty with knee flexion  LLE General PROM: decreased, stiffness noted with knee flexion  LLE General AROM: grossly decreased, patient unable to complete SLR, significant difficulty with knee flexion  AROM: Generally decreased, functional  PROM: Generally decreased, functional    Strength:  Strength RLE  Comment: grossly >/=2-/5 for hip flexion and knee flexion  Strength LLE  Comment: grossly >/=2-/5 for hip flexion and knee flexion  Strength: Generally decreased, functional    Neuro:  Patient demonstrate intact to light touch along anmol LE dermatomal pattern     Bed mobility  Bridging: Unable to assess  Rolling to Left: Maximum assistance  Rolling to Right: Maximum assistance  Supine to Sit: Unable to assess  Sit to Supine: Unable to assess  Bed Mobility Comments: patient declined sitting EOB this date stated he was too nervous and in pain in LLE    Transfers  Comment: unable to assess transfer patient declined to sit EOB    Ambulation  Comments: unable to assess patient declined    Activity Tolerance  Activity Tolerance: Patient tolerated evaluation without incident;Patient limited by fatigue;Patient limited by pain    Patient Education  Education Given To: Patient  Education Provided: Role of Therapy;Plan of Care  Education Method: Verbal  Barriers to Learning: None  Education Outcome: Verbalized understanding     ASSESSMENT:   Body Structures, Functions, Activity Limitations Requiring Skilled Therapeutic Intervention: Decreased functional mobility ; Decreased ROM; Decreased body mechanics; Decreased strength;Decreased endurance;Decreased balance; Increased pain;Decreased posture  Decision Making: High Complexity  History: high  Exam: Med  Clinical Presentation: Med    Treatment Diagnosis: mobility deficits, decreased strength, decreased ROM  Therapy Prognosis: Fair    DISCHARGE RECOMMENDATIONS:  Discharge Recommendations: Continue to assess pending progress, Patient would benefit from continued therapy after discharge, Therapy recommended at discharge    Assessment: patient is an 82 yo male presenting to acute hospital for LE weakness. patient presented with signficant A/PROM and strength deficits. patient evaluation was limited secondary to patient declining to complete transfers due to being \"too scared\" and pain in LE. Patient was maxA for rolling L and [facility] PT is indicated to improve strength, ROM and balance to improve functional mobility and increase indep in the home and community. At patient's CLOF he would be unsafe to return home.   Requires PT Follow-Up: Yes       PLAN OF CARE:  Physcial Therapy Plan  General Plan: 1 time a day 3-6 times a week  Current Treatment Recommendations: Strengthening, ROM, Balance training, Functional mobility training, Transfer training, Gait training, Stair training, Neuromuscular re-education, Manual, Pain management, Home exercise program, Safety education & training, Patient/Caregiver education & training, Modalities, Therapeutic activities    Safety Devices  Type of Devices: All fall risk precautions in place, Bed alarm in place, Call light within reach, Left in bed    Goals:  Long Term Goals  Long Term Goal 1: patient will complete bed mobility with with Nessa  Long Term Goal 2: patient will complete transfers with Nessa  Long Term Goal 3: patient will stand with LRD for x2 min with SBA  Long Term Goal 4: patient will ambulate >/=50' with LRD and supervision    [facility] (6 CLICK) BASIC MOBILITY  AM-PAC Inpatient Mobility Raw Score : 10     Therapy Time:   Individual   Time In 0952   Time Out 1006   Minutes 14   14 min evaluation    Sofia Jones PT, 03/11/23 at 10:29 AM       Definitions for assistance levels  Independent = pt does not require any physical supervision or assistance from another person for activity completion. Device may be needed.   Stand by assistance = pt requires verbal cues or instructions from another person, close to but not touching, to perform the activity  Minimal assistance= pt performs 75% or more of the activity; assistance is required to complete the activity  Moderate assistance= pt performs 50% of the activity; assistance is required to complete the activity  Maximal assistance = pt performs 25% of the activity; assistance is required to complete the activity  Dependent = pt requires total physical assistance to accomplish the task

## 2023-03-11 NOTE — PROGRESS NOTES
Physician Progress Note    3/11/2023   5:10 PM    Name:  Tom Sol  MRN:    [id number]      Day: 1     Admit Date: 3/10/2023  2:54 PM  PCP: Soraida Chávez MD    Code Status:  Full Code    Subjective:     Continues to have lower extremity weakness. No other complaints.     Current Facility-Administered Medications   Medication Dose Route Frequency Provider Last Rate Last Admin    cefTRIAXone (ROCEPHIN) 1,000 mg in sodium chloride 0.9 % 50 mL IVPB (mini-bag)  1,000 mg IntraVENous Q24H Ellis Wright, DO   Stopped at 03/11/23 0943    furosemide (LASIX) tablet 20 mg  20 mg Oral Daily Ellis Wright, DO   20 mg at 03/11/23 1607    metoprolol succinate (TOPROL XL) extended release tablet 50 mg  50 mg Oral Daily Ellis Wright, DO   50 mg at 03/11/23 1607    [START ON 3/12/2023] triamterene-hydroCHLOROthiazide (MAXZIDE-25) 37.5-25 MG per tablet 0.5 tablet  0.5 tablet Oral Daily Ellis Wright, DO        senna (SENOKOT) tablet 8.6 mg  1 tablet Oral Nightly Ellis Wright, DO        polyethylene glycol (GLYCOLAX) packet 17 g  17 g Oral Daily Ellis Wright, DO   17 g at 03/11/23 1607    acetaminophen (TYLENOL) tablet 1,000 mg  1,000 mg Oral 3 times per day Anne Dexter MD   1,000 mg at 03/11/23 1615    traMADol (ULTRAM) tablet 25 mg  25 mg Oral Q4H PRN Anne Dexter MD        Or    traMADol Rigo Certain) tablet 50 mg  50 mg Oral Q4H PRN Anne Dexter MD        sodium chloride flush 0.9 % injection 5-40 mL  5-40 mL IntraVENous 2 times per day Benna Fuad, APRN - CNP   10 mL at 03/11/23 0859    sodium chloride flush 0.9 % injection 5-40 mL  5-40 mL IntraVENous PRN Lacey Merida, APRN - CNP        0.9 % sodium chloride infusion  25 mL IntraVENous PRN Benna Dinuba, APRN - CNP        enoxaparin Sodium (LOVENOX) injection 30 mg  30 mg SubCUTAneous BID Benna Dinuba, APRN - CNP   30 mg at 03/11/23 0858    ondansetron (ZOFRAN-ODT) disintegrating tablet 4 mg  4 mg Oral Q8H PRN CHARLIE Bradford - LESLEY        Or ondansetron (ZOFRAN) injection 4 mg  4 mg IntraVENous Q6H PRN Humphrey Skiff, APRN - CNP        polyethylene glycol (GLYCOLAX) packet 17 g  17 g Oral Daily PRN Humphrey Skiff, APRN - CNP           Physical Examination:      Vitals:  BP (!) 122/54   Pulse 89   Temp 99.8 °F (37.7 °C)   Resp 19   Ht 5' 11\" (1.803 m)   Wt 256 lb (116.1 kg)   SpO2 96%   BMI 35.70 kg/m²   Temp (24hrs), Av.8 °F (37.7 °C), Min:99.8 °F (37.7 °C), Max:99.8 °F (37.7 °C)      General appearance: alert, cooperative and no distress. Obese  Mental Status: oriented to person, place and time and normal affect  Lungs: clear to auscultation bilaterally, normal effort  Heart: regular rate and rhythm, no murmur  Abdomen: soft, nontender, nondistended, bowel sounds present, no masses  Extremities: Trace edema. No redness, tenderness in the calves. Cap refill <2s  Skin: no gross lesions, rashes    Data:     Labs:  Recent Labs     03/10/23  1530 23  0528   WBC 12.4* 11.5*   HGB 14.6 13.2*    192     Recent Labs     03/10/23  1515 23  0528   * 135   K 4.2 4.1   CL 97 101   CO2 26 21   BUN 13 17   CREATININE 0.64* 0.71   GLUCOSE 141* 111*     Recent Labs     03/10/23  1515 23  0528   AST 33 34   ALT 12 11   BILITOT 1.8* 1.6*   ALKPHOS 109* 86       Assessment and Plan:        1. Lower extremity weakness  -Cause unclear-orthopedic surgery performing left knee aspiration to rule out acute prosthetic knee infection  -Empirically treat UTI  -TTE ordered  -Consult with neurology for further recommendations  -PT/OT    Obesity  Hypertension     Diet: ADULT DIET; Regular; No Added Salt (3-4 gm)  ADULT ORAL NUTRITION SUPPLEMENT; Lunch, Dinner;  Wound Healing Oral Supplement  Ppx: Lovenox  Full Code    37 minutes in total care time    Electronically signed by Layla Styles DO on 3/11/2023 at 5:10 PM

## 2023-03-11 NOTE — FLOWSHEET NOTE
Wound nurse consulted earlier this shift for evaluation and treatment of wounds to left/right buttock. Aspiration left knee performed by Dr. Eulalia Castro. Acewrap applied. Ice pack provided and patient medicated with tylenol. Denies pain at rest.  Specimens sent to lab.   Electronically signed by Chelsey Jett RN on 3/11/2023 at 4:33 PM

## 2023-03-11 NOTE — CONSULTS
Department of Orthopedic Surgery  Attending Consult Note        Reason for Consult: Left knee pain following previous hinged total knee arthroplasty with distal femoral replacement  Requesting Physician: Tevin ZIMMERMAN    CHIEF COMPLAINT: Acute urinary tract infection, left lower extremity pain with concern for prosthetic infection    History Obtained From:  patient, electronic medical record    HISTORY OF PRESENT ILLNESS:                The patient is a 81 y.o. male who underwent left total knee replacement with distal femoral replacement and hinged mechanism on 3/14/2022 by my partner, Dr. Seven Gonzalez. Patient notes that he has had knee pain over a 2-week course, coinciding with initiation of more aggressive knee range of motion as part of an exercise regimen. Has had warmth of bilateral lower extremities and denies any drainage from the left knee incision. Admitted for treatment of urinary tract infection and, given acute increase in knee discomfort, patient situation was discussed with Dr. Seven Gonzalez by the emergency department yesterday. Given acuity of situation, there is concern for potential hematogenous seeding of left knee replacement in the acute setting. Patient currently rates left knee pain 6/10, described as sharp, aggravated by range of motion. Has been initiated on empiric antibiotics (ceftriaxone) for management of urinary tract infection. Elevated erythrocyte sedimentation rate and C-reactive protein noted. No known history of gouty arthropathy. Patiently specifically notes that his symptoms have escalated over the last week, although he has had some element of knee discomfort for 2 weeks.     Past Medical History:        Diagnosis Date    Aneurysm artery, pulmonary ([facility] [address] 2022    not surgical    Benign prostatic hyperplasia with lower urinary tract symptoms     Chronic sinusitis     History of left knee replacement 03/15/2022    at [address]    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 HTN (hypertension)     Hx of carpal tunnel syndrome     Obesity     Other fracture of left femur, initial encounter for closed fracture ([facility] [address] 3/14/2022    Primary osteoarthritis of both knees      Past Surgical History:        Procedure Laterality Date    TOTAL KNEE ARTHROPLASTY Left 3/15/2022    LEFT KNEE DISTAL FEMUR REPLACEMENT performed by Lajuanda Schirmer, MD at [facility]     Current Medications:   Current Facility-Administered Medications: cefTRIAXone (ROCEPHIN) 1,000 mg in sodium chloride 0.9 % 50 mL IVPB (mini-bag), 1,000 mg, IntraVENous, Q24H  furosemide (LASIX) tablet 20 mg, 20 mg, Oral, Daily  metoprolol succinate (TOPROL XL) extended release tablet 50 mg, 50 mg, Oral, Daily  [START ON 3/12/2023] triamterene-hydroCHLOROthiazide (MAXZIDE-25) 37.5-25 MG per tablet 0.5 tablet, 0.5 tablet, Oral, Daily  senna (SENOKOT) tablet 8.6 mg, 1 tablet, Oral, Nightly  polyethylene glycol (GLYCOLAX) packet 17 g, 17 g, Oral, Daily  acetaminophen (TYLENOL) tablet 1,000 mg, 1,000 mg, Oral, 3 times per day  traMADol (ULTRAM) tablet 25 mg, 25 mg, Oral, Q4H PRN **OR** traMADol (ULTRAM) tablet 50 mg, 50 mg, Oral, Q4H PRN  sodium chloride flush 0.9 % injection 5-40 mL, 5-40 mL, IntraVENous, 2 times per day  sodium chloride flush 0.9 % injection 5-40 mL, 5-40 mL, IntraVENous, PRN  0.9 % sodium chloride infusion, 25 mL, IntraVENous, PRN  enoxaparin Sodium (LOVENOX) injection 30 mg, 30 mg, SubCUTAneous, BID  ondansetron (ZOFRAN-ODT) disintegrating tablet 4 mg, 4 mg, Oral, Q8H PRN **OR** ondansetron (ZOFRAN) injection 4 mg, 4 mg, IntraVENous, Q6H PRN  polyethylene glycol (GLYCOLAX) packet 17 g, 17 g, Oral, Daily PRN  Allergies:  Aspirin    Social History:   TOBACCO:   reports that he quit smoking about 41 years ago. His smoking use included cigarettes. He has quit using smokeless tobacco.  ETOH:   reports that he does not currently use alcohol.   Family History:       Problem Relation Age of Onset    Cancer Mother     Cancer Father REVIEW OF SYSTEMS:    CONSTITUTIONAL: Denies fevers, chills, other systemic symptoms. EYES: Denies eye pain, double vision  HEENT: Denies headache, sinus pain, sore throat  RESPIRATORY: Denies dyspnea or wheezing. CARDIOVASCULAR: Negative for chest pain, palpitations. GASTROINTESTINAL: Negative for abdominal pain, nausea, or vomiting. GENITOURINARY: Negative for dysuria despite urinary tract infection based on UA  INTEGUMENT/BREAST: Negative for drainage from left knee incision but does have some element of subtle erythema along the incision today. ALLERGIC/IMMUNOLOGIC: Positive for allergy to aspirin, negative for environmental allergies. ENDOCRINE: Negative for heat or cold intolerance. MUSCULOSKELETAL: Positive for left greater than right leg pain. Negative for upper extremity discomfort. NEUROLOGICAL: Negative for numbness or tingling of the left lower extremity. PHYSICAL EXAM:    VITALS:  BP (!) 127/59   Pulse 87   Temp 99.8 °F (37.7 °C)   Resp 19   Ht 5' 11\" (1.803 m)   Wt 256 lb (116.1 kg)   SpO2 96%   BMI 35.70 kg/m²   24HR INTAKE/OUTPUT:    Intake/Output Summary (Last 24 hours) at 3/11/2023 1606  Last data filed at 3/11/2023 1334  Gross per 24 hour   Intake 480 ml   Output 325 ml   Net 155 ml     CONSTITUTIONAL: Alert, oriented x3, cooperative with examination. EYES: EOMI, no nystagmus  ENT: Oral mucosa moist, tongue midline, nontender to palpation over the maxillary and ophthalmic sinuses. NECK: No JVD, no carotid bruits. HEMATOLOGIC/LYMPHATICS: No axillary or cervical lymphadenopathy. LUNGS: Equal and symmetrical inspiratory and expiratory effort, no wheezing or stridor. CARDIOVASCULAR: Normal S1 and S2, no murmurs or gallops. ABDOMEN: Abdomen soft, nontender, nondistended. GENITAL/URINARY: Nontender to palpation over the bladder. MUSCULOSKELETAL: Bilateral upper extremities without palpable step-off or deformity.   Right lower extremity demonstrates palpable warmth of the lower leg which is comparable with the left side. Negative Homans' sign bilaterally. Left lower extremity demonstrates he is able to wiggle toes. Left knee incision is well epithelialized. Has evidence of palpable effusion on ballottement. No palpable defect over the course the extensor mechanism. He is able to initiate maintain straight leg raise without extensor lag. Nontender to palpation over the quadricep muscle belly. Compartments of the leg are supple. NEUROLOGIC: Cranial nerves II-XII intact. Sensory intact light touch in the deep peroneal, superficial peroneal, common peroneal, saphenous, tibial, sural nerve distributions bilaterally. DP and popliteal pulses are 2+ with capillary refill less than 2 seconds. SKIN: Subtle erythema involving proximal extent of the left knee incision. No skin necrosis or contusion. Generalized edema bilateral lower extremities which is 1+ and associated with palpable warmth. DATA:    UA with moderate leukocyte esterase, negative nitrates, urine culture pending. Creatinine 0.71  WBC marginally elevated 11.5, hemoglobin 13.2, platelets 060,075  Erythrocyte sedimentation rate 63, C-reactive protein 141.3    I have independently reviewed and interpreted radiographs of the left femur and left knee, obtained yesterday through [facility] emergency department. These demonstrate hinged distal femoral component with intact cement mantle along cemented stem of the femoral and tibial components. Normal Insall-Salvati ratio. No evidence of fracture. Subtle periosteal reaction at the bone implant interface of the femoral component without sequestrum or involucrum, likely representing normal remodeling. IMPRESSION/RECOMMENDATIONS:    -Acute (less than 2-week) history of left knee pain in the setting of concurrent urinary tract infection, concern for acute prosthetic knee infection secondary to hematogenous seeding: Findings were discussed with the patient.   He has very discrete onset of discomfort less than 2 weeks ago notes that he was doing well prior to that time. Given degree of discomfort and clinical evidence suggestive of effusion, as well as elevated erythrocyte sedimentation rate and C-reactive protein (both of which could be elevated due to urinary tract infection alone), I have advocated diagnostic aspiration of the left knee to provide further information. Patient was agreeable to proceed in this fashion. Procedure: Diagnostic aspiration of left knee. Patient was apprised of the risk, benefits, alternatives, convalescence for diagnostic aspiration of left knee. Risk discussed included, but were not limited to, infection due to the aspiration itself, false positive or false negative results acquired from the aspirated fluid, contusion and discomfort at the aspiration site. The left knee was draped sterilely and prepped with Betadine solution. The superior lateral aspect of the left patella was identified and an 18-gauge spinal needle advanced toward the joint line from this entry point. Aspirated approximately 60 cc of fluid which was effectively straw with slight turbidity, no marcellus purulence and no sanguinous component. This was sent for stat cell count with differential and crystals, as well as anaerobic and aerobic culture. Compressive dressing was applied. We will initiate icing and patient will be maintained on ceftriaxone pending review of urine cultures, blood cultures, and synovial aspirate. If corrected synovial fluid cell count is greater than 3000 with elevated PMNs (greater than 60 to 80%), would presume presence of acute prosthetic infection. In that setting, may advocate isolated washout with antimicrobial solution and exchange of bearing materials due to presumed acuity of this infection as well as patient's advanced age and other medical comorbidities.   That having been said, if there is evidence of gram-negative organism or multidrug-resistant organisms such as MRSA, may also need to discuss potential role for explant of left total knee replacement with insertion of static antibiotic spacer.  That having been said, we will need to review results from the aspirate before determining whether surgical intervention is an appropriate consideration.  We will continue to follow with you.    Thank you for the opportunity to participate in care of this pleasant gentleman.    Tutu Chance MD  Orthopaedic Surgery

## 2023-03-11 NOTE — FLOWSHEET NOTE
3/11/23 @ 12 Notified Dr. Orlando Jaramillo of Neurology consult via [address] [place] [address]

## 2023-03-11 NOTE — PROGRESS NOTES
Comprehensive Nutrition Assessment    Type and Reason for Visit:  Initial, Positive Nutrition Screen, Wound    Nutrition Recommendations/Plan:   provide wound healing supplement BID   Continue ADULT DIET; Regular; No Added Salt (3-4 gm)     Malnutrition Assessment:  Malnutrition Status:  No malnutrition (03/11/23 1311)    Context:  Social/Environmental Circumstances     Findings of the 6 clinical characteristics of malnutrition:  Energy Intake:  No significant decrease in energy intake  Weight Loss:  No significant weight loss     Body Fat Loss:  No significant body fat loss     Muscle Mass Loss:  No significant muscle mass loss    Fluid Accumulation:  Unable to assess     Strength:  Not Performed    Nutrition Assessment:    Pt with increased nutrient needs for wounds. No evidence of wt loss. RD to provide wound healing supplement BID to help with wound healing and continue to monitor. Nutrition Related Findings:    Pt admitted for BLE weakness. Pmhx: HTN, BPH, OA, s/p left total knee arthroplasty. +2 LLE/ trace gen edema noted. Labs (3/11): Gluc=111-141. No BM documented. No appetite complaints. Wound Type: Open Wounds (buttocks)       Current Nutrition Intake & Therapies:    Average Meal Intake: 51-75%  Average Supplements Intake: None Ordered  ADULT DIET; Regular;  No Added Salt (3-4 gm)    Anthropometric Measures:  Height: 5' 11\" (180.3 cm)  Ideal Body Weight (IBW): 172 lbs (78 kg)    Admission Body Weight: 240 lb (108.9 kg) (stated)  Current Body Weight: 256 lb (116.1 kg) (3/10),   Weight Source: Bed Scale  Current BMI (kg/m2): 35.7  Usual Body Weight: 256 lb 6 oz (116.3 kg) (4/2022 bed)  % Weight Change (Calculated): -0.1                    BMI Categories: Obese Class 2 (BMI 35.0 -39.9)    Estimated Daily Nutrient Needs:  Energy Requirements Based On: Kcal/kg  Weight Used for Energy Requirements: Current  Energy (kcal/day): 1740-1972kcal (15-17kcal/kg)  Weight Used for Protein Requirements: Ideal  Protein (g/day): ~109-117g (1.4-1.5g/kg)  Method Used for Fluid Requirements: 1 ml/kcal  Fluid (ml/day): ~1900ml    Nutrition Diagnosis:   Increased nutrient needs related to increase demand for energy/nutrients as evidenced by wounds    Nutrition Interventions:   Food and/or Nutrient Delivery: Continue Current Diet, Start Oral Nutrition Supplement  Nutrition Education/Counseling: No recommendation at this time  Coordination of Nutrition Care: Continue to monitor while inpatient       Goals:     Goals: PO intake 75% or greater, other (specify)  Specify Other Goals: improved wound status    Nutrition Monitoring and Evaluation:   Behavioral-Environmental Outcomes: None Identified  Food/Nutrient Intake Outcomes: Food and Nutrient Intake, Supplement Intake  Physical Signs/Symptoms Outcomes: Biochemical Data, Weight, Skin, Meal Time Behavior    Discharge Planning:     Too soon to determine     Emmanuel Caal MS, RD, LD

## 2023-03-11 NOTE — PROGRESS NOTES
[facility] OCCUPATIONAL THERAPY EVALUATION - ACUTE     NAME: Gisel Rivas  : 1940 (81 y.o.)  MRN: [id number]  CODE STATUS: Full Code  Room: N017/E445-11    Date of Service: 3/11/2023    Patient Diagnosis(es): Weakness [R53.1]  Leg swelling [M79.89]  General weakness [R53.1]  Unable to ambulate [R26.2]   Patient Active Problem List    Diagnosis Date Noted    Weakness 03/10/2023    Supracondylar fracture of femur, left, closed, with routine healing, subsequent encounter 2022    Benign prostatic hyperplasia with lower urinary tract symptoms 2022    Class 2 drug-induced obesity with serious comorbidity and body mass index (BMI) of 37.0 to 37.9 in adult 2022    Chronic sinusitis 2022    Deviated nasal septum 2022    Hyperglycemia 2022    Hypertension 2022    Osteoarthritis 2022        Past Medical History:   Diagnosis Date    Aneurysm artery, 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    Benign prostatic hyperplasia with lower urinary tract symptoms     Chronic sinusitis     History of left knee replacement 03/15/2022    at [address]    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    HTN (hypertension)     Hx of carpal tunnel syndrome     Obesity     Other fracture of left femur, initial encounter for closed fracture ([facility] [address] 3/14/2022    Primary osteoarthritis of both knees      Past Surgical History:   Procedure Laterality Date    TOTAL KNEE ARTHROPLASTY Left 3/15/2022    LEFT KNEE DISTAL FEMUR REPLACEMENT performed by Jeferson Simon MD at [facility]  Restrictions/Precautions: Fall Risk              Safety Devices: Safety Devices  Type of Devices: All fall risk precautions in place; Bed alarm in place;Call light within reach; Left in bed     Patient's date of birth confirmed: Yes    General:  Patient assessed for rehabilitation services?: Yes    Subjective   \"As long as I don't move, I don't have pain. \"       Pain at start of treatment: No; 0/10 at rest    Pain at end of treatment: No    Location: NA   Description: Patient reports no pain when he doesn't move and that's why he does not want to get out of bed. Nursing notified: Yes  RN:   Intervention: Other: Patient said he doesn't need anything because he is not moving. Prior Level of Function:  Social/Functional History  Lives With: Alone  Type of Home: House  Home Layout: Two level, Able to Live on Main level with bedroom/bathroom  Home Access: Ramped entrance  Bathroom Shower/Tub: Walk-in shower  Bathroom Equipment: Grab bars in shower, Shower chair  Home Equipment: Thirza Sinning, rolling, Rollator  Has the patient had two or more falls in the past year or any fall with injury in the past year?: No  Receives Help From: Family  ADL Assistance: Independent  Homemaking Assistance: Independent  Ambulation Assistance: Independent  Transfer Assistance: Independent  Active : Yes  Occupation: Retired  Type of Occupation: construction    OBJECTIVE:     Orientation Status:  Orientation  Orientation Level: Oriented to place;Oriented to situation;Oriented to person;Disoriented to time (new day, unsure of month)    Observation:  Observation/Palpation  Observation: supine in bed but awake upon arrival, agreeable to OT evaluation, anmol LE edema and redness    Cognition Status:  Cognition  Overall Cognitive Status: WFL    Perception Status:  Perception  Overall Perceptual Status: WFL    Vision and Hearing Status:  Vision  Vision Exceptions: Wears glasses for reading (states he lost his glasses)  Hearing  Hearing: Within functional limits   Vision - Basic Assessment  Prior Vision: No visual deficits    GROSS ASSESSMENT AROM/PROM:  AROM: Within functional limits  PROM: Within functional limits    ROM:   LUE AROM (degrees)  LUE AROM : WNL  Left Hand AROM (degrees)  Left Hand AROM: WNL  RUE AROM (degrees)  RUE AROM : WNL  Right Hand AROM (degrees)  Right Hand AROM: WNL    UE STRENGTH:  Strength:  Within functional limits    UE COORDINATION:  Coordination: Within functional limits    UE TONE:  Tone: Normal    UE SENSATION:  Sensation: Intact    Hand Dominance:  Hand Dominance  Hand Dominance: Right    ADL Status:  ADL  Feeding: Independent  Grooming: Independent  UE Bathing: Setup  LE Bathing: Maximum assistance  UE Dressing: Moderate assistance  LE Dressing: Maximum assistance  Toileting: Maximum assistance    Functional Mobility:     Declined to get out of bed    Patient ambulated NT due to declined to get  OOB    Bed Mobility  Bed mobility  Bridging: Unable to assess  Rolling to Left: Maximum assistance  Rolling to Right: Maximum assistance  Supine to Sit: Unable to assess  Sit to Supine: Unable to assess  Bed Mobility Comments: patient declined sitting EOB this date stated he was too nervous and in pain in LLE    Seated and Standing Balance:   Declined to get OOB    Functional Endurance:  Activity Tolerance  Activity Tolerance: Patient limited by pain    D/C Recommendations:   TBA    Equipment Recommendations:   Continue to assess    OT Education:   Patient Education  Education Given To: Patient  Education Provided: Role of Therapy;Plan of Care  Education Method: Verbal  Barriers to Learning: None  Education Outcome: Verbalized understanding    OT Follow Up: yes          Assessment/Discharge Disposition:  Assessment: Patient is an 80 YO male from home alone who presents to [facility] with the above deficits which affect his ability to complete ADL/IADLs. He would benefit from continued OT to maximize [place] and safety at home.   Performance deficits / Impairments: Decreased functional mobility , Decreased strength, Decreased endurance, Decreased ADL status, Decreased high-level IADLs, Decreased balance  Discharge Recommendations: Continue to assess pending progress  Decision Making: High Complexity  History: moderate medical history  Exam: 6 areas of perfomrance deficits  Assistance / Modification: max assistance    [facility] Self care Score   How much help is needed for putting on and taking off regular lower body clothing?: Total  How much help is needed for bathing (which includes washing, rinsing, drying)?: A Lot  How much help is needed for toileting (which includes using toilet, bedpan, or urinal)?: A Lot  How much help is needed for putting on and taking off regular upper body clothing?: A Little  How much help is needed for taking care of personal grooming?: None  How much help for eating meals?: None  AM-[facility] Inpatient Daily Activity Raw Score: 16  AM-[facility]AC Inpatient ADL T-Scale Score : 35.96  ADL Inpatient CMS 0-100% Score: 53.32    Therapy key for assistance levels -   Independent/Mod I = Pt. is able to perform task with no assistance but may require a device   Stand by assistance = Pt. does not perform task at an independent level but does not need physical assistance, requires verbal cues  Minimal, Moderate, Maximal Assistance = Pt. requires physical assistance (25%, 50%, 75% assist from helper) for task but is able to actively participate in task   Dependent = Pt. requires total assistance with task and is not able to actively participate with task completion     Plan:  Occupational Therapy Plan  Times Per Week: 1-4 x/week  Therapy Duration:  (length of acute stay)  Current Treatment Recommendations: Strengthening, Functional mobility training, Endurance training, Safety education & training, Self-Care / ADL    Goals:   Patient will:    - Improve functional endurance to tolerate/complete 30 mins of ADL's  - Be Independent in UB ADLs   - Be Independent in LB ADLs  - Be Independent in ADL transfers without LOB  - Be Independent in toileting tasks  - Improve B UE strength and endurance to 4+/5 in order to participate in self-care activities as projected. - Access appropriate D/C site with as few architectural barriers as possible.     Patient Goal: Patient goals : \"to get back to myself\"     Discussed and agreed upon: Yes       Therapy Time:   Individual   Time In 1122   Time Out 1149   Minutes 27          Eval: 27 minutes     Electronically signed by:    Ioana Ray OT,   3/11/2023, 11:56 AM

## 2023-03-11 NOTE — PLAN OF CARE
Nutrition Problem #1: Increased nutrient needs  Intervention: Food and/or Nutrient Delivery: Continue Current Diet, Start Oral Nutrition Supplement  Nutritional  Goals: PO intake >75%.  Improved wound status

## 2023-03-11 NOTE — PROGRESS NOTES
Pt is A/O x4, edema to bilat LE, bilat buttocks wounds were found during assessment. Pt did not remember his home medications, said he woul ask his sister to bring them in in the morning. VSS  Pt was unable to stand to be transferred to [facility] by wheelchair, was transferred by bed.

## 2023-03-12 LAB
ALBUMIN SERPL-MCNC: 2.8 G/DL (ref 3.5–4.6)
ALP BLD-CCNC: 83 U/L (ref 35–104)
ALT SERPL-CCNC: 9 U/L (ref 0–41)
ANION GAP SERPL CALCULATED.3IONS-SCNC: 9 MEQ/L (ref 9–15)
AST SERPL-CCNC: 21 U/L (ref 0–40)
BILIRUB SERPL-MCNC: 1.4 MG/DL (ref 0.2–0.7)
BUN BLDV-MCNC: 21 MG/DL (ref 8–23)
CALCIUM SERPL-MCNC: 8.8 MG/DL (ref 8.5–9.9)
CHLORIDE BLD-SCNC: 103 MEQ/L (ref 95–107)
CO2: 26 MEQ/L (ref 20–31)
CREAT SERPL-MCNC: 0.66 MG/DL (ref 0.7–1.2)
GFR SERPL CREATININE-BSD FRML MDRD: >60 ML/MIN/{1.73_M2}
GLOBULIN: 4.1 G/DL (ref 2.3–3.5)
GLUCOSE BLD-MCNC: 122 MG/DL (ref 70–99)
POTASSIUM REFLEX MAGNESIUM: 4 MEQ/L (ref 3.4–4.9)
SODIUM BLD-SCNC: 138 MEQ/L (ref 135–144)
TOTAL CK: 248 U/L (ref 0–190)
TOTAL PROTEIN: 6.9 G/DL (ref 6.3–8)

## 2023-03-12 PROCEDURE — 1210000000 HC MED SURG R&B

## 2023-03-12 PROCEDURE — 6360000002 HC RX W HCPCS: Performed by: NURSE PRACTITIONER

## 2023-03-12 PROCEDURE — 2580000003 HC RX 258: Performed by: INTERNAL MEDICINE

## 2023-03-12 PROCEDURE — 2580000003 HC RX 258: Performed by: ORTHOPAEDIC SURGERY

## 2023-03-12 PROCEDURE — 6370000000 HC RX 637 (ALT 250 FOR IP): Performed by: ORTHOPAEDIC SURGERY

## 2023-03-12 PROCEDURE — 6370000000 HC RX 637 (ALT 250 FOR IP): Performed by: INTERNAL MEDICINE

## 2023-03-12 PROCEDURE — 36415 COLL VENOUS BLD VENIPUNCTURE: CPT

## 2023-03-12 PROCEDURE — 80053 COMPREHEN METABOLIC PANEL: CPT

## 2023-03-12 PROCEDURE — 6360000002 HC RX W HCPCS: Performed by: INTERNAL MEDICINE

## 2023-03-12 PROCEDURE — 99223 1ST HOSP IP/OBS HIGH 75: CPT | Performed by: INTERNAL MEDICINE

## 2023-03-12 PROCEDURE — 82550 ASSAY OF CK (CPK): CPT

## 2023-03-12 PROCEDURE — 6360000002 HC RX W HCPCS: Performed by: ORTHOPAEDIC SURGERY

## 2023-03-12 PROCEDURE — 2580000003 HC RX 258: Performed by: NURSE PRACTITIONER

## 2023-03-12 PROCEDURE — 82607 VITAMIN B-12: CPT

## 2023-03-12 PROCEDURE — 82746 ASSAY OF FOLIC ACID SERUM: CPT

## 2023-03-12 PROCEDURE — 99222 1ST HOSP IP/OBS MODERATE 55: CPT | Performed by: PSYCHIATRY & NEUROLOGY

## 2023-03-12 RX ADMIN — ENOXAPARIN SODIUM 30 MG: 100 INJECTION SUBCUTANEOUS at 09:41

## 2023-03-12 RX ADMIN — VANCOMYCIN HYDROCHLORIDE 1500 MG: 1.5 INJECTION, POWDER, LYOPHILIZED, FOR SOLUTION INTRAVENOUS at 16:41

## 2023-03-12 RX ADMIN — ENOXAPARIN SODIUM 30 MG: 100 INJECTION SUBCUTANEOUS at 21:40

## 2023-03-12 RX ADMIN — ACETAMINOPHEN 1000 MG: 500 TABLET ORAL at 05:27

## 2023-03-12 RX ADMIN — TRIAMTERENE AND HYDROCHLOROTHIAZIDE 0.5 TABLET: 37.5; 25 TABLET ORAL at 09:41

## 2023-03-12 RX ADMIN — FUROSEMIDE 20 MG: 20 TABLET ORAL at 09:42

## 2023-03-12 RX ADMIN — ACETAMINOPHEN 1000 MG: 500 TABLET ORAL at 21:39

## 2023-03-12 RX ADMIN — METOPROLOL SUCCINATE 50 MG: 50 TABLET, FILM COATED, EXTENDED RELEASE ORAL at 09:41

## 2023-03-12 RX ADMIN — SODIUM CHLORIDE, PRESERVATIVE FREE 10 ML: 5 INJECTION INTRAVENOUS at 21:40

## 2023-03-12 RX ADMIN — CEFTRIAXONE SODIUM 1000 MG: 1 INJECTION, POWDER, FOR SOLUTION INTRAMUSCULAR; INTRAVENOUS at 09:40

## 2023-03-12 RX ADMIN — POLYETHYLENE GLYCOL 3350 17 G: 17 POWDER, FOR SOLUTION ORAL at 09:41

## 2023-03-12 RX ADMIN — SODIUM CHLORIDE, PRESERVATIVE FREE 10 ML: 5 INJECTION INTRAVENOUS at 09:35

## 2023-03-12 RX ADMIN — ACETAMINOPHEN 1000 MG: 500 TABLET ORAL at 16:44

## 2023-03-12 ASSESSMENT — ENCOUNTER SYMPTOMS
COLOR CHANGE: 0
VOMITING: 0
BACK PAIN: 0
PHOTOPHOBIA: 0
NAUSEA: 0
SHORTNESS OF BREATH: 0
TROUBLE SWALLOWING: 0
CHOKING: 0

## 2023-03-12 ASSESSMENT — PAIN SCALES - GENERAL
PAINLEVEL_OUTOF10: 3
PAINLEVEL_OUTOF10: 3
PAINLEVEL_OUTOF10: 0
PAINLEVEL_OUTOF10: 3

## 2023-03-12 ASSESSMENT — PAIN DESCRIPTION - LOCATION
LOCATION: KNEE
LOCATION: LEG;BUTTOCKS

## 2023-03-12 ASSESSMENT — PAIN DESCRIPTION - DESCRIPTORS: DESCRIPTORS: ACHING

## 2023-03-12 ASSESSMENT — PAIN DESCRIPTION - ORIENTATION: ORIENTATION: LEFT

## 2023-03-12 NOTE — PROGRESS NOTES
Physician Progress Note    3/12/2023   5:14 PM    Name:  Johanne Godinez  MRN:    [id number]     IP Day: 2     Admit Date: 3/10/2023  2:54 PM  PCP: Nick Bean MD    Code Status:  Full Code    Subjective:     Continues to have lower extremity weakness. No other complaints.     Current Facility-Administered Medications   Medication Dose Route Frequency Provider Last Rate Last Admin    vancomycin (VANCOCIN) intermittent dosing (placeholder)   Other RX Placeholder Genaro Espinoza MD        vancomycin (VANCOCIN) 1,500 mg in sodium chloride 0.9 % 500 mL IVPB (ADDAVIAL)  1,500 mg IntraVENous Once Genaro Espinoza .3 mL/hr at 03/12/23 1641 1,500 mg at 03/12/23 1641    Followed by    Saintclair Muskrat ON 3/13/2023] vancomycin (VANCOCIN) 1,250 mg in sodium chloride 0.9 % 250 mL IVPB (ADDAVIAL)  1,250 mg IntraVENous Q12H Genaro Espinoza MD        cefTRIAXone (ROCEPHIN) 1,000 mg in sodium chloride 0.9 % 50 mL IVPB (mini-bag)  1,000 mg IntraVENous Q24H [place] Oms, DO   Stopped at 03/12/23 1010    furosemide (LASIX) tablet 20 mg  20 mg Oral Daily [place] Oms, DO   20 mg at 03/12/23 0942    metoprolol succinate (TOPROL XL) extended release tablet 50 mg  50 mg Oral Daily Davey Oms, DO   50 mg at 03/12/23 0941    triamterene-hydroCHLOROthiazide (MAXZIDE-25) 37.5-25 MG per tablet 0.5 tablet  0.5 tablet Oral Daily [place] Oms, DO   0.5 tablet at 03/12/23 0941    acetaminophen (TYLENOL) tablet 1,000 mg  1,000 mg Oral 3 times per day Genaro Espinoza MD   1,000 mg at 03/12/23 1644    traMADol (ULTRAM) tablet 25 mg  25 mg Oral Q4H PRN Genaro Espinoza MD        Or    traMADol Bobetta Bitter) tablet 50 mg  50 mg Oral Q4H PRN Genaro Espinoza MD        sodium chloride flush 0.9 % injection 5-40 mL  5-40 mL IntraVENous 2 times per day CHARLIE Moreira - CNP   10 mL at 03/12/23 0935    sodium chloride flush 0.9 % injection 5-40 mL  5-40 mL IntraVENous PRN CHARLIE Moreira CNP        0.9 % sodium chloride infusion  25 mL IntraVENous PRN Pamela Patria, APRN - CNP        enoxaparin Sodium (LOVENOX) injection 30 mg  30 mg SubCUTAneous BID Pamela Patria, APRN - CNP   30 mg at 23 0941    ondansetron (ZOFRAN-ODT) disintegrating tablet 4 mg  4 mg Oral Q8H PRN Pamela Patria, APRN - CNP        Or    ondansetron [facility]) injection 4 mg  4 mg IntraVENous Q6H PRN Pamela Patria, APRN - CNP        polyethylene glycol (GLYCOLAX) packet 17 g  17 g Oral Daily PRN Pamela Patria, APRN - CNP           Physical Examination:      Vitals:  BP (!) 122/54   Pulse 66   Temp 98.2 °F (36.8 °C) (Oral)   Resp 18   Ht 5' 11\" (1.803 m)   Wt 256 lb (116.1 kg)   SpO2 95%   BMI 35.70 kg/m²   Temp (24hrs), Av.4 °F (36.9 °C), Min:98.2 °F (36.8 °C), Max:98.6 °F (37 °C)      General appearance: alert, cooperative and no distress. Obese  Mental Status: oriented to person, place and time and normal affect  Lungs: clear to auscultation bilaterally, normal effort  Heart: regular rate and rhythm, no murmur  Abdomen: soft, nontender, nondistended, bowel sounds present, no masses  Extremities: Trace edema. No redness, tenderness in the calves. Cap refill <2s  Skin: no gross lesions, rashes    Data:     Labs:  Recent Labs     03/10/23  1530 23  0528   WBC 12.4* 11.5*   HGB 14.6 13.2*    192     Recent Labs     23  0528 23  05    138   K 4.1 4.0    103   CO2 21 26   BUN 17 21   CREATININE 0.71 0.66*   GLUCOSE 111* 122*     Recent Labs     23  0528 23  0527   AST 34 21   ALT 11 9   BILITOT 1.6* 1.4*   ALKPHOS 86 83       Assessment and Plan:        1. Lower extremity weakness suspect multifactorial-left knee prosthetic joint infection, E. coli UTI, possible spinal stenosis  -Appreciate neurology: MRI C and L-spine pending  -On IV vancomycin and ceftriaxone. Infectious disease consulted. Culture data pending  -Orthopedic surgery following.   Greater than 52,000 PMNs and aspirate suggestive of acute prosthetic joint infection of left knee. Surgical intervention is planned for the afternoon of 3/16 awaiting surgical plan  -TTE ordered  -PT/OT    2.  E. coli UTI    Obesity  Hypertension     Diet: ADULT DIET; Regular; No Added Salt (3-4 gm)  ADULT ORAL NUTRITION SUPPLEMENT; Lunch, Dinner;  Wound Healing Oral Supplement  Ppx: Lovenox  Full Code    37 minutes in total care time    Electronically signed by Lacy Marin DO on 3/12/2023 at 5:14 PM

## 2023-03-12 NOTE — CONSULTS
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 Findings were discussed with the patient including elevation of PMN count and differential.  Emphasized the typical threshold for diagnosis of prosthetic infection is 3000 with respect to PMN count ((greater than 52,000 in this patient's aspirate) and 60-80% PMNs with respect to differential (92% in this patient's aspirate) findings are consistent with prosthetic joint infection. Based on relative acuity of symptoms, this is also presumptively an acute infection potentially related to hematogenous seeding. Emphasized the patient that cultures are pending and, based on preliminary growth, situation may be amenable to polyethylene liner bearing exchange with chronic antibiotic suppression; if gram-negative organism identified or other multidrug-resistant organism (for example, MRSA), this may initiate discussion of formal explant with placement of static spacer and potential for second stage reimplantation. We will discuss this in greater detail as culture results and sensitivities become known. Plan will be for some form of surgical intervention, either liner exchange for formal explant, on afternoon of 3/16. We will place Infectious Disease service on consultation and place order for pharmacy to add empiric Vancomycin until we have a better sense of organism identification and sensitivities. I will revisit the patient tomorrow afternoon, review updated culture results, and likely undertake the formal process of informed consent at that time. SUBJECTIVE:  The patient notes that his comfort level is improved following aspiration of the left knee. Continues with compressive dressing.   Family members at bedside including his sister. No chest pain, nausea, or dyspnea. Patient states \"I want to get this taken care of\". OBJECTIVE    Physical    VITALS:  BP (!) 122/54   Pulse 66   Temp 98.2 °F (36.8 °C) (Oral)   Resp 18   Ht 5' 11\" (1.803 m)   Wt 256 lb (116.1 kg)   SpO2 95%   BMI 35.70 kg/m²   24HR INTAKE/OUTPUT:    Intake/Output Summary (Last 24 hours) at 3/12/2023 1357  Last data filed at 3/12/2023 0949  Gross per 24 hour   Intake --   Output 450 ml   Net -450 ml     Alert, oriented x3, cooperative with examination. Examination of the left lower extremity reveals erythema of the left lower leg comparable with contralateral side. Left knee incision and aspiration site are clean and dry. Small amount of residual effusion of the left knee is suggested. Able to range knee from -5 degrees extension to 90 degrees of flexion with pain at both extremes. Stable to varus and valgus stress. No palpable defect over the course the extensor mechanism. Sensory intact light touch in the deep peroneal, superficial peroneal, saphenous, tibial, sural nerve distributions. DP and popliteal pulses are 2+ with capillary refill less than 2 seconds. EHL, plantarflexion, dorsiflexion are 4+/5. Able to isometrically fire right quadriceps. Data    Right knee aspiration with greater than 52,000 PMNs, differential with 92% PMNs. Cultures pending. Culture analysis pending.   Current Inpatient Medications    Current Facility-Administered Medications: vancomycin (VANCOCIN) intermittent dosing (placeholder), , Other, RX Placeholder  cefTRIAXone (ROCEPHIN) 1,000 mg in sodium chloride 0.9 % 50 mL IVPB (mini-bag), 1,000 mg, IntraVENous, Q24H  furosemide (LASIX) tablet 20 mg, 20 mg, Oral, Daily  metoprolol succinate (TOPROL XL) extended release tablet 50 mg, 50 mg, Oral, Daily  triamterene-hydroCHLOROthiazide (MAXZIDE-25) 37.5-25 MG per tablet 0.5 tablet, 0.5 tablet, Oral, Daily  acetaminophen (TYLENOL) tablet 1,000 mg, 1,000 mg, Oral, 3 times per day  traMADol (ULTRAM) tablet 25 mg, 25 mg, Oral, Q4H PRN **OR** traMADol (ULTRAM) tablet 50 mg, 50 mg, Oral, Q4H PRN  sodium chloride flush 0.9 % injection 5-40 mL, 5-40 mL, IntraVENous, 2 times per day  sodium chloride flush 0.9 % injection 5-40 mL, 5-40 mL, IntraVENous, PRN  0.9 % sodium chloride infusion, 25 mL, IntraVENous, PRN  enoxaparin Sodium (LOVENOX) injection 30 mg, 30 mg, SubCUTAneous, BID  ondansetron (ZOFRAN-ODT) disintegrating tablet 4 mg, 4 mg, Oral, Q8H PRN **OR** ondansetron (ZOFRAN) injection 4 mg, 4 mg, IntraVENous, Q6H PRN  polyethylene glycol (GLYCOLAX) packet 17 g, 17 g, Oral, Daily PRN    Beatrice Ramos MD  Orthopaedic Surgery

## 2023-03-12 NOTE — CONSULTS
Subjective:      Patient ID: Halley Araiza is a 81 y.o. male who presents today for . Weakness to lower extremities. HPI 82-year right-handed gentleman admitted with weakness of the lower extremities. Patient reports that he was doing well with his walking though he still has lower extremity weakness from his knee surgery for which he has been battling for quite some time. He actually took a water pill and went to the bathroom several times Thursday night. Friday while he was on the commode he could not stand up and walk. He complained of lower extremity weakness. Denies any numbness. He does have some degree of swelling in the legs. He denies any back pain or neck pain. Patient does not have any new bladder dysfunction. Review of Systems   Constitutional:  Negative for fever. HENT:  Negative for ear pain, tinnitus and trouble swallowing. Eyes:  Negative for photophobia and visual disturbance. Respiratory:  Negative for choking and shortness of breath. Cardiovascular:  Negative for chest pain and palpitations. Gastrointestinal:  Negative for nausea and vomiting. Musculoskeletal:  Positive for gait problem and joint swelling. Negative for back pain, myalgias, neck pain and neck stiffness. Skin:  Negative for color change. Allergic/Immunologic: Negative for food allergies. Neurological:  Positive for weakness. Negative for dizziness, tremors, seizures, syncope, facial asymmetry, speech difficulty, light-headedness, numbness and headaches. Psychiatric/Behavioral:  Negative for behavioral problems, confusion, hallucinations and sleep disturbance.       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    HTN (hypertension)     Hx of carpal tunnel syndrome     Obesity     Other fracture of left femur, initial encounter for closed fracture ([address] 3/14/2022    Primary osteoarthritis of both knees      Past Surgical History:   Procedure Laterality Date    TOTAL KNEE ARTHROPLASTY Left 3/15/2022    LEFT KNEE DISTAL FEMUR REPLACEMENT performed by Cosmo Alcazar MD at [address] [place] [address] History     Socioeconomic History    Marital status: Single     Spouse name: Not on file    Number of children: Not on file    Years of education: Not on file    Highest education level: Not on file   Occupational History    Not on file   Tobacco Use    Smoking status: Former     Years: 40.00     Types: Cigarettes     Quit date: 18     Years since quittin.2    Smokeless tobacco: Former    Tobacco comments:     40 years ago per patient   Vaping Use    Vaping Use: Never used   Substance and Sexual Activity    Alcohol use: Not Currently    Drug use: Never    Sexual activity: Not Currently     Partners: Male   Other Topics Concern    Not on file   Social History Narrative    Alone, was     No children    Worked in construction, at the [employer], drove a truck     Social Determinants of Health     Financial Resource Strain: Not on file   Food Insecurity: Not on file   Transportation Needs: Not on file   Physical Activity: Not on file   Stress: Not on file   Social Connections: Not on file   Intimate Partner Violence: Not on file   Housing Stability: Not on file     Family History   Problem Relation Age of Onset    Cancer Mother     Cancer Father      Allergies   Allergen Reactions    Aspirin      Current Facility-Administered Medications   Medication Dose Route Frequency Provider Last Rate Last Admin    cefTRIAXone (ROCEPHIN) 1,000 mg in sodium chloride 0.9 % 50 mL IVPB (mini-bag)  1,000 mg IntraVENous Q24H Glorious Nations, DO   Stopped at 23 1010    furosemide (LASIX) tablet 20 mg  20 mg Oral Daily Glorious Nations, DO   20 mg at 23 2760 metoprolol succinate (TOPROL XL) extended release tablet 50 mg  50 mg Oral Daily Lacy Bars, DO   50 mg at 03/12/23 0941    triamterene-hydroCHLOROthiazide (MAXZIDE-25) 37.5-25 MG per tablet 0.5 tablet  0.5 tablet Oral Daily Lacy Bars, DO   0.5 tablet at 03/12/23 0941    acetaminophen (TYLENOL) tablet 1,000 mg  1,000 mg Oral 3 times per day Agnes Carrion MD   1,000 mg at 03/12/23 0527    traMADol (ULTRAM) tablet 25 mg  25 mg Oral Q4H PRN Agnes Carrion MD        Or    traMADol Orval Rickers) tablet 50 mg  50 mg Oral Q4H PRN Agnes Carrion MD        sodium chloride flush 0.9 % injection 5-40 mL  5-40 mL IntraVENous 2 times per day Amanda Sands, APRN - CNP   10 mL at 03/12/23 0935    sodium chloride flush 0.9 % injection 5-40 mL  5-40 mL IntraVENous PRN Amanda Sands, APRN - CNP        0.9 % sodium chloride infusion  25 mL IntraVENous PRN Amanda Sands, APRN - CNP        enoxaparin Sodium (LOVENOX) injection 30 mg  30 mg SubCUTAneous BID Amanda Sands, APRN - CNP   30 mg at 03/12/23 0941    ondansetron (ZOFRAN-ODT) disintegrating tablet 4 mg  4 mg Oral Q8H PRN Amanda Bullg, APRN - CNP        Or    ondansetron [facility]) injection 4 mg  4 mg IntraVENous Q6H PRN Amanda Sands, APRN - CNP        polyethylene glycol (GLYCOLAX) packet 17 g  17 g Oral Daily PRN Amanda Sands, APRN - CNP            Objective:   BP (!) 122/54   Pulse 66   Temp 98.2 °F (36.8 °C) (Oral)   Resp 18   Ht 5' 11\" (1.803 m)   Wt 256 lb (116.1 kg)   SpO2 95%   BMI 35.70 kg/m²     Physical Exam  Vitals reviewed. Eyes:      Pupils: Pupils are equal, round, and reactive to light. Cardiovascular:      Rate and Rhythm: Normal rate and regular rhythm. Heart sounds: No murmur heard. Pulmonary:      Effort: Pulmonary effort is normal.      Breath sounds: Normal breath sounds. Abdominal:      General: Bowel sounds are normal.   Musculoskeletal:         General: Normal range of motion. Cervical back: Normal range of motion. Skin:     General: Skin is warm. Neurological:      Mental Status: He is alert and oriented to person, place, and time. Cranial Nerves: No cranial nerve deficit. Sensory: No sensory deficit. Motor: No abnormal muscle tone. Coordination: Coordination normal.      Deep Tendon Reflexes: Reflexes are normal and symmetric. Babinski sign absent on the right side. Babinski sign absent on the left side. Comments: Examination notable for lower extremity weakness which is more proximal than distal and antigravity. He has some stiffness in his legs. Reflexes at the present and somewhat hyperreflexic in the upper extremities and present up to the level of his knees he has a definite present ankle reflex on the right. No definite sensory levels are noted he has 1+ degree of pedal edema gait is deferred. Psychiatric:         Mood and Affect: Mood normal.       XR FEMUR LEFT (MIN 2 VIEWS)    Result Date: 3/11/2023  EXAMINATION: XRAY VIEWS OF THE LEFT FEMUR 3/11/2023 1:11 am COMPARISON: None. HISTORY: ORDERING SYSTEM PROVIDED HISTORY: swelling and arthroplasty TECHNOLOGIST PROVIDED HISTORY: Reason for exam:->swelling and arthroplasty What reading provider will be dictating this exam?->CRC FINDINGS: Long-stem left total knee arthroplasty in position. No acute or concerning features. Femoral diaphysis and proximal femur intact. Soft tissues unremarkable. No acute disease. RECOMMENDATION: Careful clinical correlation and follow up recommended. XR KNEE LEFT (1-2 VIEWS)    Result Date: 3/11/2023  EXAMINATION: TWO XRAY VIEWS OF THE LEFT KNEE 3/11/2023 1:11 am COMPARISON: None. HISTORY: ORDERING SYSTEM PROVIDED HISTORY: swelling and arthroplasty TECHNOLOGIST PROVIDED HISTORY: Reason for exam:->swelling and arthroplasty What reading provider will be dictating this exam?->CRC FINDINGS: Long-stem hinged total knee arthroplasty in good position. No periprosthetic fracture.   No significant effusion detected. Soft tissues unremarkable. No acute disease. RECOMMENDATION: Careful clinical correlation and follow up recommended. CT Head W/O Contrast    Result Date: 3/10/2023  EXAMINATION: CT OF THE HEAD WITHOUT CONTRAST  3/10/2023 3:53 pm TECHNIQUE: CT of the head was performed without the administration of intravenous contrast. Automated exposure control, iterative reconstruction, and/or weight based adjustment of the mA/kV was utilized to reduce the radiation dose to as low as reasonably achievable. COMPARISON: None. HISTORY: ORDERING SYSTEM PROVIDED HISTORY: weakness lower ext lt >rt TECHNOLOGIST PROVIDED HISTORY: Reason for exam:->weakness lower ext lt >rt Has a \"code stroke\" or \"stroke alert\" been called? ->No Decision Support Exception - unselect if not a suspected or confirmed emergency medical condition->Emergency Medical Condition (MA) What reading provider will be dictating this exam?->CRC FINDINGS: BRAIN/VENTRICLES: No mass effect, edema or hemorrhage is seen. Mild volume loss is seen in the cerebrum with mild chronic microvascular ischemic changes. No hydrocephalus or extra-axial fluid is seen. ORBITS: The visualized portion of the orbits demonstrate no acute abnormality. SINUSES: Moderate mucosal thickening in the paranasal sinuses. Postoperative changes from prior sinonasal surgery are seen. The mastoids are clear. SOFT TISSUES/SKULL:  No acute abnormality of the visualized skull or soft tissues. No acute intracranial abnormality. XR CHEST PORTABLE    Result Date: 3/10/2023  EXAMINATION: ONE XRAY VIEW OF THE CHEST 3/10/2023 6:37 pm COMPARISON: 08/16/2022 HISTORY: ORDERING SYSTEM PROVIDED HISTORY: leg edema TECHNOLOGIST PROVIDED HISTORY: Reason for exam:->leg edema What reading provider will be dictating this exam?->CRC FINDINGS: The lungs are without acute focal process. There is no effusion or pneumothorax. The cardiomediastinal silhouette is without acute process.  The osseous structures are without acute process. No acute process. US DUP LOWER EXTREMITIES BILATERAL VENOUS    Result Date: 3/10/2023  EXAMINATION: DUPLEX VENOUS ULTRASOUND OF THE BILATERAL LOWER EXTREMITIES3/10/2023 4:03 pm TECHNIQUE: Duplex ultrasound using B-mode/gray scaled imaging, Doppler spectral analysis and color flow Doppler was obtained of the deep venous structures of the lower bilateral extremities. COMPARISON: Lower extremity venous duplex from March 16, 2022 HISTORY: ORDERING SYSTEM PROVIDED HISTORY: leg swelling and temp chnage lt TECHNOLOGIST PROVIDED HISTORY: Reason for exam:->leg swelling and temp chnage lt What reading provider will be dictating this exam?->CRC FINDINGS: The visualized veins of the bilateral lower extremities are patent and free of echogenic thrombus. The veins demonstrate good compressibility with normal color flow study and spectral analysis. No evidence of DVT in either lower extremity.        Lab Results   Component Value Date/Time    WBC 11.5 03/11/2023 05:28 AM    RBC 4.24 03/11/2023 05:28 AM    HGB 13.2 03/11/2023 05:28 AM    HCT 38.9 03/11/2023 05:28 AM    MCV 91.7 03/11/2023 05:28 AM    MCH 31.0 03/11/2023 05:28 AM    MCHC 33.8 03/11/2023 05:28 AM    RDW 14.7 03/11/2023 05:28 AM     03/11/2023 05:28 AM     Lab Results   Component Value Date/Time     03/12/2023 05:27 AM    K 4.0 03/12/2023 05:27 AM     03/12/2023 05:27 AM    CO2 26 03/12/2023 05:27 AM    BUN 21 03/12/2023 05:27 AM    CREATININE 0.66 03/12/2023 05:27 AM    GFRAA >60.0 03/16/2022 05:57 AM    LABGLOM >60.0 03/12/2023 05:27 AM    GLUCOSE 122 03/12/2023 05:27 AM    PROT 6.9 03/12/2023 05:27 AM    LABALBU 2.8 03/12/2023 05:27 AM    CALCIUM 8.8 03/12/2023 05:27 AM    BILITOT 1.4 03/12/2023 05:27 AM    ALKPHOS 83 03/12/2023 05:27 AM    AST 21 03/12/2023 05:27 AM    ALT 9 03/12/2023 05:27 AM     Lab Results   Component Value Date/Time    PROTIME 14.1 03/14/2022 04:05 PM    INR 1.1 03/14/2022 04:05 PM     No results found for: TSH, NGXLMEMR99, FOLATE, FERRITIN, IRON, TIBC, PTRFSAT, RETICCOUNT, TSH, FREET4  No results found for: TRIG, HDL, LDLCALC, LDLDIRECT, LABVLDL  No results found for: LABAMPH, BARBSCNU, LABBENZ, CANNAB, COCAINESCRN, LABMETH, OPIATESCREENURINE, PHENCYCLIDINESCREENURINE, PPXUR, ETOH  No results found for: LITHIUM, DILFRTOT, VALPROATE    Assessment:   Lower extremity weakness with paraparesis. Patient has had weakness in his legs due to a postoperative knee issues that have been going on quite some time. He was able to ambulate and on Thursday he had taken a water pill and go to the bathroom several times. Friday morning while he was on the commode he could not stand up and walk. Given the findings of my reflexes 1 would be concerned about his cervical spinal stenosis as well as lumbar spine stenosis. Recommend an MRI of the cervical and lumbar spine. Findings not Sastry of GBS given the reflexes. He may have some degree of apathy from before    Depending on the results of the same will further advise      Werner Ortiz MD, Eric Danielle, American Board of Psychiatry & Neurology  Board Certified in Vascular Neurology  Board Certified in Neuromuscular Medicine  Certified in [facility]:

## 2023-03-12 NOTE — CARE COORDINATION
Case Management Assessment  Initial Evaluation    Date/Time of Evaluation: 3/12/2023 4:23 PM  Assessment Completed by: Melinda Baez RN    If patient is discharged prior to next notation, then this note serves as note for discharge by case management. Patient Name: Kareen Masterson                   YOB: 1940  Diagnosis: Weakness [R53.1]  Leg swelling [M79.89]  General weakness [R53.1]  Unable to ambulate [R26.2]                   Date / Time: 3/10/2023  2:54 PM    Patient Admission Status: Inpatient   Readmission Risk (Low < 19, Mod (19-27), High > 27): Readmission Risk Score: 15.4    Current PCP: Hugh Villanueva MD  PCP verified by CM? Yes (DR. Nicolette Luciano)    Chart Reviewed: Yes      History Provided by: Patient  Patient Orientation: Alert and Oriented    Patient Cognition: Alert    Hospitalization in the last 30 days (Readmission):  No    If yes, Readmission Assessment in  Navigator will be completed. Advance Directives:      Code Status: Full Code   Patient's Primary Decision Maker is: Patient Declined (Legal Next of Kin Remains as Decision Maker)      Discharge Planning:    Patient lives with: Alone Type of Home: House  Primary Care Giver: Self  Patient Support Systems include: Friends/Neighbors, Family Members (FRIEND JONATHAN, SISTER JOSE)   Current Financial resources: None  Current community resources: None  Current services prior to admission: [facility])            Current DME: Walker            Type of Home Care services:  None    ADLS  Prior functional level: Independent in ADLs/IADLs, Assistance with the following:, Mobility (HAS WALKER)  Current functional level: Independent in ADLs/IADLs, Mobility, Assistance with the following: (HAS WALKER)    PT AM-PAC: 10 /24  OT AM-PAC: 16 /24    Family can provide assistance at DC: Yes  Would you like Case Management to discuss the discharge plan with any other family members/significant others, and if so, who?  No  Plans to Return to Present Housing: Unknown at present (TBD IF PATIENT WILL NEED SX D/T ? KNEE INFECTION ? SX NEEDS)  Other Identified Issues/Barriers to RETURNING to current housing: MAY NEED SX (ATB SPACER IN KNEE) MONITOR FOR IV ATB NEEDS  Potential Assistance needed at discharge: Leroy Gomez (IF PATIENT HAS SX--MAY NEED SNF FOR PT/OT OR IV ATB NEEDS)            Potential DME:    Patient expects to discharge to:  ([place] [facility] SNF IF INDICATED)  Plan for transportation at discharge:      Financial    Payor: Mg Moore / Plan: MEDICARE PART A AND B / Product Type: *No Product type* /     Does insurance require precert for SNF: No    Potential assistance Purchasing Medications: No  Meds-to-Beds request:        [facility] #[id number] Denice Michael, [address]  Phone: [phone] Fax: [phone] [address] [facility] [place], [address] [place] [address] [phone] - f [phone] [address] [facility] [address]  Phone: [phone] Fax: [phone]      Notes:    Factors facilitating achievement of predicted outcomes: Family support, Friend support, Motivated, Cooperative, Pleasant, Sense of humor, and Has needed Durable Medical Equipment at home    Barriers to discharge: [address] SNF VS [place] [address], MAY NEED IV ATB. MAY NEED KNEE SX--WILL THERAPY TO EVAL S/P SX FOR NEEDS. Additional Case Management Notes: PATIENT HERE TO HAVE HIS LT KNEE R/O FOR INFECTION. CX PENDING. PATIENT MAY NEED KNEE IMPLANT REMOVED AND ATB SPACER PLACED. PATIENT MAY ALSO NEED LT IV ATB. CM/LSW TO FOLLOW FOR NEEDS. PATIENT IS HOME ALONE. HE IS AGREEABLE TO GOING TO [facility] OR HAVE [facility] AT D/C FREEDOM OF CHOICE PROVIDED.      The Plan for Transition of Care is related to the following treatment goals of Weakness [R53.1]  Leg swelling [M79.89]  General weakness [R53.1]  Unable to ambulate [R26.2]    IF APPLICABLE: The Patient and/or patient representative Jeny Vasquez and his family were provided with a choice of provider and agrees with the discharge plan. Freedom of choice list with basic dialogue that supports the patient's individualized plan of care/goals and shares the quality data associated with the providers was provided to: Patient   Patient Representative Name:       The Patient and/or Patient Representative Agree with the Discharge Plan?  Yes    Yolette Bowers RN  Case Management Department

## 2023-03-12 NOTE — PROGRESS NOTES
[facility] Pharmacokinetic Monitoring Service - Vancomycin     Chava Tinajero is a 81 y.o. male starting on vancomycin therapy for Bone and joint infection. Pharmacy consulted by Dr. Shaun Cole for monitoring and adjustment. Target Concentration: Goal AUC/SAPNA 400-600 mg*hr/L    Additional Antimicrobials: Ceftriaxone    Pertinent Laboratory Values: Wt Readings from Last 1 Encounters:   03/10/23 256 lb (116.1 kg)     Temp Readings from Last 1 Encounters:   03/12/23 98.2 °F (36.8 °C) (Oral)     Estimated Creatinine Clearance: 114 mL/min (A) (based on SCr of 0.66 mg/dL (L)). Recent Labs     03/10/23  1530 03/11/23  0528 03/12/23  0527   CREATININE  --  0.71 0.66*   WBC 12.4* 11.5*  --      Procalcitonin   Date Value Ref Range Status   03/10/2023 0.14 0.00 - 0.15 ng/mL Final     Comment:     Suspected Sepsis:  Low likelihood of sepsis  <.50 ng/mL    Increased likelihood of sepsis 0.50-2.00 ng/mL  Antibiotics encouraged    High risk of sepsis/shock   >2.00 ng/mL  Antibiotics strongly encouraged    Suspected Lower Respiratory Tract Infections:  Low likelihood of bacterial infection  <0.24 ng/mL    Increased likelihood of bacterial infection >0.24 ng/mL  Antibiotics encouraged    With successful antibiotic therapy, PCT levels should decrease  rapidly. (Half-life of 24 to 36 hours.)    Procalcitonin values from samples collected within the first  6 hours of systemic infection may still be low. Retesting may be indicated. Values from day 1 and day 4 can be entered into the Change in  Procalcitonin Calculator to determine the patient's  Mortality Risk Prognosis  (www.[facility]-pct-calculator. com)    In healthy neonates, plasma Procalcitonin (PCT) concentrations  increase gradually after birth, reaching peak values at about  24 hours of age then decrease to normal values below 0.5 ng/mL  by 48-72 hours of age.        Pertinent Cultures:  Procedure Component Value Units Date/Time   Culture, Body Fluid [[id number]] Collected: 03/11/23 2030   Order Status: Sent Specimen: Body Fluid from Synovial Fluid Updated: 03/11/23 2030   Blood Culture 1 [[id number]] Collected: 03/10/23 1543   Order Status: Completed Specimen: Blood Updated: 03/11/23 2015    Blood Culture, Routine No Growth to date. Any change in status will be called. Narrative:     ORDER#: [id number]                          ORDERED BY: YOGI COATS   SOURCE: Blood                              COLLECTED:  03/10/23 15:43   ANTIBIOTICS AT NABOR.:                      RECEIVED :  03/10/23 15:43   Blood Culture 2 [[id number]] Collected: 03/10/23 1521   Order Status: Completed Specimen: Blood Updated: 03/11/23 2015    Blood Culture, Routine No Growth to date. Any change in status will be called. Narrative:     ORDER#: [id number]                          ORDERED BY: YOGI COATS   SOURCE: Blood                              COLLECTED:  03/10/23 15:21   ANTIBIOTICS AT NABOR.:                      RECEIVED :  03/10/23 15:43   Culture, Urine [[id number]] Collected: 03/11/23 0836   Order Status: Sent Specimen: Urine, clean catch Updated: 03/11/23 0836   MRSA Nasal Swab: N/A. Non-respiratory infection.     Plan:  Dosing recommendations based on Bayesian software  Start vancomycin 1500 mg loading dose (~13 mg/kg), followed by 1250 mg (~10 mg/kg) Q12H   Anticipated AUC of 473 mg/L.hr and trough concentration of 15.2 mg/L at steady state  Renal labs as indicated   Vancomycin concentration ordered for 03/14 @ 0500   Pharmacy will continue to monitor patient and adjust therapy as indicated    Thank you for the consult,    Conchita Oliva, PharmD  PGY1 Pharmacy Resident  3/12/2023 2:04 PM

## 2023-03-13 ENCOUNTER — APPOINTMENT (OUTPATIENT)
Dept: MRI IMAGING | Age: 83
End: 2023-03-13
Payer: MEDICARE

## 2023-03-13 PROBLEM — N39.0 URINARY TRACT INFECTION WITHOUT HEMATURIA: Status: ACTIVE | Noted: 2023-03-13

## 2023-03-13 PROBLEM — T84.50XA PROSTHETIC JOINT INFECTION (HCC): Status: ACTIVE | Noted: 2023-03-13

## 2023-03-13 LAB
ALBUMIN SERPL-MCNC: 2.9 G/DL (ref 3.5–4.6)
ALP BLD-CCNC: 89 U/L (ref 35–104)
ALT SERPL-CCNC: 9 U/L (ref 0–41)
ANION GAP SERPL CALCULATED.3IONS-SCNC: 11 MEQ/L (ref 9–15)
AST SERPL-CCNC: 19 U/L (ref 0–40)
BILIRUB SERPL-MCNC: 1 MG/DL (ref 0.2–0.7)
BUN BLDV-MCNC: 19 MG/DL (ref 8–23)
CALCIUM SERPL-MCNC: 8.4 MG/DL (ref 8.5–9.9)
CHLORIDE BLD-SCNC: 101 MEQ/L (ref 95–107)
CO2: 25 MEQ/L (ref 20–31)
CREAT SERPL-MCNC: 0.56 MG/DL (ref 0.7–1.2)
GFR SERPL CREATININE-BSD FRML MDRD: >60 ML/MIN/{1.73_M2}
GLOBULIN: 4.1 G/DL (ref 2.3–3.5)
GLUCOSE BLD-MCNC: 111 MG/DL (ref 70–99)
LV EF: 65 %
LVEF MODALITY: NORMAL
ORGANISM: ABNORMAL
POTASSIUM REFLEX MAGNESIUM: 3.8 MEQ/L (ref 3.4–4.9)
SODIUM BLD-SCNC: 137 MEQ/L (ref 135–144)
TOTAL PROTEIN: 7 G/DL (ref 6.3–8)
URINE CULTURE, ROUTINE: ABNORMAL
URINE CULTURE, ROUTINE: ABNORMAL
VITAMIN B-12: 252 PG/ML (ref 232–1245)

## 2023-03-13 PROCEDURE — 6360000002 HC RX W HCPCS: Performed by: NURSE PRACTITIONER

## 2023-03-13 PROCEDURE — 6360000002 HC RX W HCPCS: Performed by: INTERNAL MEDICINE

## 2023-03-13 PROCEDURE — 6360000002 HC RX W HCPCS: Performed by: ORTHOPAEDIC SURGERY

## 2023-03-13 PROCEDURE — APPSS30 APP SPLIT SHARED TIME 16-30 MINUTES: Performed by: NURSE PRACTITIONER

## 2023-03-13 PROCEDURE — 72148 MRI LUMBAR SPINE W/O DYE: CPT

## 2023-03-13 PROCEDURE — C8929 TTE W OR WO FOL WCON,DOPPLER: HCPCS

## 2023-03-13 PROCEDURE — 6370000000 HC RX 637 (ALT 250 FOR IP): Performed by: ORTHOPAEDIC SURGERY

## 2023-03-13 PROCEDURE — 72141 MRI NECK SPINE W/O DYE: CPT

## 2023-03-13 PROCEDURE — 6370000000 HC RX 637 (ALT 250 FOR IP): Performed by: INTERNAL MEDICINE

## 2023-03-13 PROCEDURE — 2580000003 HC RX 258: Performed by: NURSE PRACTITIONER

## 2023-03-13 PROCEDURE — 36415 COLL VENOUS BLD VENIPUNCTURE: CPT

## 2023-03-13 PROCEDURE — 6360000004 HC RX CONTRAST MEDICATION: Performed by: INTERNAL MEDICINE

## 2023-03-13 PROCEDURE — 99213 OFFICE O/P EST LOW 20 MIN: CPT

## 2023-03-13 PROCEDURE — 2580000003 HC RX 258: Performed by: INTERNAL MEDICINE

## 2023-03-13 PROCEDURE — 99232 SBSQ HOSP IP/OBS MODERATE 35: CPT | Performed by: INTERNAL MEDICINE

## 2023-03-13 PROCEDURE — 1210000000 HC MED SURG R&B

## 2023-03-13 PROCEDURE — 2580000003 HC RX 258: Performed by: ORTHOPAEDIC SURGERY

## 2023-03-13 PROCEDURE — 97535 SELF CARE MNGMENT TRAINING: CPT

## 2023-03-13 PROCEDURE — 80053 COMPREHEN METABOLIC PANEL: CPT

## 2023-03-13 PROCEDURE — 99232 SBSQ HOSP IP/OBS MODERATE 35: CPT | Performed by: PSYCHIATRY & NEUROLOGY

## 2023-03-13 RX ADMIN — ENOXAPARIN SODIUM 30 MG: 100 INJECTION SUBCUTANEOUS at 08:25

## 2023-03-13 RX ADMIN — VANCOMYCIN HYDROCHLORIDE 1250 MG: 1.25 INJECTION, POWDER, LYOPHILIZED, FOR SOLUTION INTRAVENOUS at 05:57

## 2023-03-13 RX ADMIN — ACETAMINOPHEN 1000 MG: 500 TABLET ORAL at 05:53

## 2023-03-13 RX ADMIN — PERFLUTREN 1.5 ML: 6.52 INJECTION, SUSPENSION INTRAVENOUS at 14:22

## 2023-03-13 RX ADMIN — VANCOMYCIN HYDROCHLORIDE 1250 MG: 1.25 INJECTION, POWDER, LYOPHILIZED, FOR SOLUTION INTRAVENOUS at 18:18

## 2023-03-13 RX ADMIN — CEFTRIAXONE SODIUM 1000 MG: 1 INJECTION, POWDER, FOR SOLUTION INTRAMUSCULAR; INTRAVENOUS at 08:32

## 2023-03-13 RX ADMIN — FUROSEMIDE 20 MG: 20 TABLET ORAL at 08:25

## 2023-03-13 RX ADMIN — METOPROLOL SUCCINATE 50 MG: 50 TABLET, FILM COATED, EXTENDED RELEASE ORAL at 08:25

## 2023-03-13 RX ADMIN — ACETAMINOPHEN 1000 MG: 500 TABLET ORAL at 20:01

## 2023-03-13 RX ADMIN — ACETAMINOPHEN 1000 MG: 500 TABLET ORAL at 13:43

## 2023-03-13 RX ADMIN — ENOXAPARIN SODIUM 30 MG: 100 INJECTION SUBCUTANEOUS at 20:00

## 2023-03-13 RX ADMIN — SODIUM CHLORIDE, PRESERVATIVE FREE 10 ML: 5 INJECTION INTRAVENOUS at 20:01

## 2023-03-13 RX ADMIN — SODIUM CHLORIDE, PRESERVATIVE FREE 10 ML: 5 INJECTION INTRAVENOUS at 08:26

## 2023-03-13 RX ADMIN — TRIAMTERENE AND HYDROCHLOROTHIAZIDE 0.5 TABLET: 37.5; 25 TABLET ORAL at 08:25

## 2023-03-13 ASSESSMENT — ENCOUNTER SYMPTOMS
TROUBLE SWALLOWING: 0
CHEST TIGHTNESS: 0
COLOR CHANGE: 0
WHEEZING: 0
BACK PAIN: 0
COUGH: 0
NAUSEA: 0
VOMITING: 0
SHORTNESS OF BREATH: 0

## 2023-03-13 ASSESSMENT — PAIN SCALES - GENERAL
PAINLEVEL_OUTOF10: 0
PAINLEVEL_OUTOF10: 2
PAINLEVEL_OUTOF10: 0
PAINLEVEL_OUTOF10: 4
PAINLEVEL_OUTOF10: 3
PAINLEVEL_OUTOF10: 2

## 2023-03-13 ASSESSMENT — PAIN DESCRIPTION - ORIENTATION: ORIENTATION: LEFT

## 2023-03-13 ASSESSMENT — PAIN DESCRIPTION - LOCATION
LOCATION: KNEE
LOCATION: LEG

## 2023-03-13 NOTE — DISCHARGE INSTR - COC
Continuity of Care Form    Patient Name: Felisa Gonsalez   :  1940  MRN:  [id number]    Admit date:  3/10/2023  Discharge date:  3/18/2023    Code Status Order: Full Code   Advance Directives:     Admitting Physician:  Aline Dobbins DO  PCP: Marlo Lozoya MD    Discharging Nurse: PD  [address] Unit/Room#: B883/B269-00  Discharging Unit Phone Number: [phone]    Emergency Contact:   Extended Emergency Contact Information  Primary Emergency Contact: Delgado Bah [address] Phone: [phone]  Work Phone: [phone]  Mobile Phone: [phone]  Relation: Brother/Sister    Past Surgical History:  Past Surgical History:   Procedure Laterality Date    TOTAL KNEE ARTHROPLASTY Left 3/15/2022    LEFT KNEE DISTAL FEMUR REPLACEMENT performed by Katia Agustin MD at [facility]       Immunization History: There is no immunization history on file for this patient.     Active Problems:  Patient Active Problem List   Diagnosis Code    Chronic sinusitis J32.9    Deviated nasal septum J34.2    Hyperglycemia R73.9    Hypertension I10    Osteoarthritis M19.90    Class 2 drug-induced obesity with serious comorbidity and body mass index (BMI) of 37.0 to 37.9 in adult E66.1, Z68.37    Benign prostatic hyperplasia with lower urinary tract symptoms N40.1    Supracondylar fracture of femur, left, closed, with routine healing, subsequent encounter S72.452D    General weakness R53.1       Isolation/Infection:   Isolation            No Isolation          Patient Infection Status       Infection Onset Added Last Indicated Last Indicated By Review Planned Expiration Resolved Resolved By    None active    Resolved    C-diff Rule Out 23 Clostridium Difficile Toxin/Antigen (Ordered)   23 Rule-Out Test Resulted            Nurse Assessment:  Last Vital Signs: BP (!) 132/59   Pulse 70   Temp 98.8 °F (37.1 °C) (Oral)   Resp 20   Ht 5' 11\" (1.803 m)   Wt 256 lb (116.1 kg) SpO2 97%   BMI 35.70 kg/m²     Last documented pain score (0-10 scale): Pain Level: 3  Last Weight:   Wt Readings from Last 1 Encounters:   03/10/23 256 lb (116.1 kg)     Mental Status:  oriented, alert, coherent, logical, thought processes intact, and able to concentrate and follow conversation    IV Access:  - PICC - site  R Upper Arm, insertion date: 3/17/2023    Nursing Mobility/ADLs:  Walking   Assisted  Transfer  Assisted  Bathing  Assisted  Dressing  Assisted  Toileting  Assisted  Feeding  Independent  Med [address]   Assisted  Med Delivery   whole    Wound Care Documentation and Therapy:  Wound 03/10/23 Open areas to buttocks, redness and bruising (Active)   Wound Etiology Pressure Unstageable 03/12/23 0745   Wound Cleansed Betadine/povidone iodine 03/11/23 0800   Dressing/Treatment Other (comment) 03/12/23 0745   Drainage Amount Small 03/13/23 0800   Number of days: 2       Incision 03/15/22 Knee Anterior; Left (Active)   Number of days: 363        Elimination:  Continence: Bowel: Yes  Bladder: Yes  Urinary Catheter: None   Colostomy/Ileostomy/Ileal Conduit: {YES / JI:70695}       Date of Last BM: 3/17/23    Intake/Output Summary (Last 24 hours) at 3/13/2023 1400  Last data filed at 3/13/2023 1338  Gross per 24 hour   Intake 240 ml   Output 1350 ml   Net -1110 ml     I/O last 3 completed shifts:  In: -   Out: 1250 [Urine:1250]    Safety Concerns:     History of Falls (last 30 days) and At Risk for Falls    Impairments/Disabilities:      Hearing    Nutrition Therapy:  Current Nutrition Therapy:   - Oral Diet:  General  - Oral Nutrition Supplement:  Wound Healing  twice a day    Routes of Feeding: Oral  Liquids: Thin Liquids  Daily Fluid Restriction: no  Last Modified Barium Swallow with Video (Video Swallowing Test): not done    Treatments at the Time of Hospital Discharge:   Respiratory Treatments:   Oxygen Therapy:  is not on home oxygen therapy.   Ventilator:    - No ventilator support    Rehab Therapies: Physical Therapy and Occupational Therapy  Weight Bearing Status/Restrictions: No weight bearing restrictions  Other Medical Equipment (for information only, NOT a DME order):  wheelchair, walker, bath bench, bedside commode, and hospital bed  Other Treatments: Dressing Change as directed. Wound care to bilateral buttocks. Patient's personal belongings (please select all that are sent with patient):  None    RN SIGNATURE:  Electronically signed by Bebeto Morgan RN on 3/18/23 at 12:46 PM EDT    CASE MANAGEMENT/SOCIAL WORK SECTION    Inpatient Status Date: 3/10/23    Readmission Risk Assessment Score:  Readmission Risk              Risk of Unplanned Readmission:  13           Discharging to Facility/ Agency   Name: [facility]  Address:  Phone:  Fax:    Dialysis Facility (if applicable)   Name:  Address:  Dialysis Schedule:  Phone:  Fax:    / signature: Electronically signed by RAZA Otero on 3/13/23 at 2:00 PM EDT    PHYSICIAN SECTION    Prognosis: {Prognosis:7380017545}    Condition at Discharge: [address] [place] [address] Patient Condition:[id number]}    Rehab Potential (if transferring to Rehab): {Prognosis:5462073964}    Recommended Labs or Other Treatments After Discharge: ***    Physician Certification: I certify the above information and transfer of Ruiz Hernandez  is necessary for the continuing treatment of the diagnosis listed and that he requires {Admit to Appropriate Level of Care:82031} for {GREATER/LESS:613188177} 30 days.      Update Admission H&P: {CHP DME Changes in VIQUQ:847204555}    PHYSICIAN SIGNATURE:  Electronically signed by Nina Zamora MD on 3/17/23 at 2:17 PM EDT

## 2023-03-13 NOTE — PROGRESS NOTES
Physical Therapy Missed Treatment   Facility/Department: [facility] MED SURG S336/P114-21    NAME: Marizol Dawkins    : 1940 (81 y.o.)  MRN: [id number]    Account: [de-identified]  Gender: male    Chart reviewed, attempted PT at 36. Patient unavailable 2° to:    [] Hold per nsg request    [x] Pt declined Alicia Engel was just in here. .. I don't know what we did. But I can't do anything right now. [x] Nsg notified   [] Other notified    [] Pt. . off floor for test/procedure. [] Pt. Unavailable       Will attempt PT treatment again at earliest convenience.       Electronically signed by Tommie Brandon PTA on 3/13/23 at 9:50 AM EDT

## 2023-03-13 NOTE — CARE COORDINATION
Pt will possibly be haveing surgical intervention on 3/16. Referral sent to [facility] for [employer] if needed. LSW spoke with the pt and his sister. They are both ok with Malcolm Jaeger or Carlo Salas when pt is ready for DC. LSW to follow for DC.

## 2023-03-13 NOTE — PROGRESS NOTES
Hospitalist Progress Note      PCP: Usman Rose MD    Date of Admission: 3/10/2023    Chief Complaint:  no acute events, afebrile, stable HD    Medications:  Reviewed    Infusion Medications    sodium chloride       Scheduled Medications    vancomycin (VANCOCIN) intermittent dosing (placeholder)   Other RX Placeholder    vancomycin  1,250 mg IntraVENous Q12H    cefTRIAXone (ROCEPHIN) IV  1,000 mg IntraVENous Q24H    furosemide  20 mg Oral Daily    metoprolol succinate  50 mg Oral Daily    triamterene-hydroCHLOROthiazide  0.5 tablet Oral Daily    acetaminophen  1,000 mg Oral 3 times per day    sodium chloride flush  5-40 mL IntraVENous 2 times per day    enoxaparin  30 mg SubCUTAneous BID     PRN Meds: traMADol **OR** traMADol, sodium chloride flush, sodium chloride, ondansetron **OR** ondansetron, polyethylene glycol      Intake/Output Summary (Last 24 hours) at 3/13/2023 1450  Last data filed at 3/13/2023 1338  Gross per 24 hour   Intake 770 ml   Output 1350 ml   Net -580 ml       Exam:    BP (!) 132/59   Pulse 70   Temp 98.8 °F (37.1 °C) (Oral)   Resp 20   Ht 5' 11\" (1.803 m)   Wt 256 lb (116.1 kg)   SpO2 97%   BMI 35.70 kg/m²     General appearance: appears stated age and cooperative. Respiratory:  clear to auscultation, bilaterally   Cardiovascular: Regular rate and rhythm, S1/S2. Abdomen: Soft, active bowel sounds. Musculoskeletal: left knee is covered withy dressing. Labs:   Recent Labs     03/10/23  1530 03/11/23  0528   WBC 12.4* 11.5*   HGB 14.6 13.2*   HCT 43.5 38.9*    192     Recent Labs     03/11/23  0528 03/12/23  0527 03/13/23  0547    138 137   K 4.1 4.0 3.8    103 101   CO2 21 26 25   BUN 17 21 19   CREATININE 0.71 0.66* 0.56*   CALCIUM 9.0 8.8 8.4*     Recent Labs     03/11/23  0528 03/12/23  0527 03/13/23  0547   AST 34 21 19   ALT 11 9 9   BILITOT 1.6* 1.4* 1.0*   ALKPHOS 86 83 89     No results for input(s): INR in the last 72 hours.   Recent Labs 03/12/23  1437   CKTOTAL 248*       Urinalysis:      Lab Results   Component Value Date/Time    NITRU Negative 03/10/2023 06:00 PM    WBCUA >100 03/10/2023 06:00 PM    BACTERIA MANY 03/10/2023 06:00 PM    RBCUA 20-50 03/10/2023 06:00 PM    BLOODU MODERATE 03/10/2023 06:00 PM    SPECGRAV 1.021 03/10/2023 06:00 PM    GLUCOSEU Negative 03/10/2023 06:00 PM       Radiology:  XR KNEE LEFT (1-2 VIEWS)   Final Result   No acute disease. RECOMMENDATION:   Careful clinical correlation and follow up recommended. XR FEMUR LEFT (MIN 2 VIEWS)   Final Result   No acute disease. RECOMMENDATION:   Careful clinical correlation and follow up recommended. XR CHEST PORTABLE   Final Result   No acute process. US DUP LOWER EXTREMITIES BILATERAL VENOUS   Final Result   No evidence of DVT in either lower extremity. CT Head W/O Contrast   Final Result   No acute intracranial abnormality. MRI CERVICAL SPINE WO CONTRAST    (Results Pending)   MRI LUMBAR SPINE WO CONTRAST    (Results Pending)           Assessment/Plan:    82 y/o female with history of HTN, obesity, s/p left TKA who presented with:     Bilateral LE weakness   - in the setting of left knee prosthetic joint infection, E. coli UTI, possible spinal stenosis  - s/p left knee aspiration on 3/11, fluid culture no growth  - blood cultures no growth, urine culture grew E. Coli  - MRI of C- and L-spine pending  - on IV Vancomycin and Ceftriaxone  - plan for surgical intervention on 3/16 per ortho  - ID consulted    HTN  - continue home meds    Diet: ADULT DIET; Regular; No Added Salt (3-4 gm)  ADULT ORAL NUTRITION SUPPLEMENT; Lunch, Dinner;  Wound Healing Oral Supplement    Code Status: Full Code      Disposition - SNF/acute rehab,  following          Electronically signed by Nnamdi Souza MD on 3/13/2023 at 2:50 PM

## 2023-03-13 NOTE — PROGRESS NOTES
Physical Therapy Med Surg Daily Treatment Note  Facility/Department: [facility]  Room: [facility]013-18       NAME: Palak Bachelor  : 1940 (81 y.o.)  MRN: [id number]  CODE STATUS: Full Code    Date of Service: 3/13/2023    Patient Diagnosis(es): Weakness [R53.1]  Leg swelling [M79.89]  General weakness [R53.1]  Unable to ambulate [R26.2]   Chief Complaint   Patient presents with    Extremity Weakness     Patient Active Problem List    Diagnosis Date Noted    General weakness 03/10/2023    Supracondylar fracture of femur, left, closed, with routine healing, subsequent encounter 2022    Benign prostatic hyperplasia with lower urinary tract symptoms 2022    Class 2 drug-induced obesity with serious comorbidity and body mass index (BMI) of 37.0 to 37.9 in adult 2022    Chronic sinusitis 2022    Deviated nasal septum 2022    Hyperglycemia 2022    Hypertension 2022    Osteoarthritis 2022        Past Medical History:   Diagnosis Date    Aneurysm artery, 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[facility] [address]     not surgical    Benign prostatic hyperplasia with lower urinary tract symptoms     Chronic sinusitis     History of left knee replacement 03/15/2022    at [address]    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    HTN (hypertension)     Hx of carpal tunnel syndrome     Obesity     Other fracture of left femur, initial encounter for closed fracture ([facility] [address] 3/14/2022    Primary osteoarthritis of both knees      Past Surgical History:   Procedure Laterality Date    TOTAL KNEE ARTHROPLASTY Left 3/15/2022    LEFT KNEE DISTAL FEMUR REPLACEMENT performed by Alber Shay MD at [facility]       Chart Reviewed: Yes    Restrictions:  Restrictions/Precautions: Fall Risk    SUBJECTIVE:   Subjective: I don't think I can do anything right now. I'm weak and tired. Pt will to try. Pain  Pain: 2/10 buttock pain pre and post session.      OBJECTIVE:        Bed mobility  Rolling to Left: Moderate assistance;Maximum assistance  Supine to Sit: Moderate assistance;Maximum assistance  Sit to Supine: Maximum assistance; Moderate assistance  Bed Mobility Comments: Bed flat with use of hand rails; increased time to complete; Pt reaching up to pull from therapist hand. vc's for hand placement and sequencing. Transfers  Sit to Stand: Maximum Assistance  Stand to Sit: Maximum Assistance  Comment: vc's for hand placement and step by step sequencing. Lifting assist required and increased cuing d/t increasing anxiety. Crouched posture initially then improving to full up right posture. Ambulation  Surface: Level tile  Device: Rolling Walker  Assistance: Minimal assistance  Quality of Gait: small shuffling step. side steps to [facility]  Distance: 2-3 steps  Comments: Increasing anxiety                              Activity Tolerance  Activity Tolerance: Patient tolerated treatment well;Patient limited by fatigue;Patient limited by endurance          ASSESSMENT   Assessment: Pt stood and ambulated to [facility] with [employer]. Pt required max assist with all activity to complete in safe manner. Pt with increasing anxiety and fear of falling. Discharge Recommendations:  Continue to assess pending progress, Patient would benefit from continued therapy after discharge, Therapy recommended at discharge         Goals  Long Term Goals  Long Term Goal 1: patient will complete bed mobility with with Nessa  Long Term Goal 2: patient will complete transfers with Nessa  Long Term Goal 3: patient will stand with LRD for x2 min with SBA  Long Term Goal 4: patient will ambulate >/=50' with LRD and supervision    PLAN    General Plan: 1 time a day 3-6 times a week  Safety Devices  Type of Devices:  All fall risk precautions in place, Bed alarm in place, Call light within reach, Left in bed, Nurse notified     [facility] (6 CLICK) [address] Mobility Raw Score : 10      Therapy Time   Individual   Time In 1425   Time Out 1451   Minutes 26      Bm/Trsf - 26 mins       Fred ShelleySANDRITA, 03/13/23 at 2:58 PM         Definitions for assistance levels  Independent = pt does not require any physical supervision or assistance from another person for activity completion. Device may be needed.   Stand by assistance = pt requires verbal cues or instructions from another person, close to but not touching, to perform the activity  Minimal assistance= pt performs 75% or more of the activity; assistance is required to complete the activity  Moderate assistance= pt performs 50% of the activity; assistance is required to complete the activity  Maximal assistance = pt performs 25% of the activity; assistance is required to complete the activity  Dependent = pt requires total physical assistance to accomplish the task

## 2023-03-13 NOTE — CONSULTS
Raudel Childers  1940  male  Medical Record Number: [id number]    Patient informed that I am an Infectious Disease physician and permission obtained from the patient to speak in front of any visitors prior to any discussion for HIPPA purposes. HPI: 76-year-old with BLE weakness. UTI with UC with E coli, sensitivities pending. MRI spine pending for now due to concern for stenosis  Found to have L knee PJI, ortho following. Currently on Vanco and Ceftriaxone.      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[facility] [address]     not surgical    Benign prostatic hyperplasia with lower urinary tract symptoms     Chronic sinusitis     History of left knee replacement 03/15/2022    at [facility]    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    HTN (hypertension)     Hx of carpal tunnel syndrome     Obesity     Other fracture of left femur, initial encounter for closed fracture ([facility] [address] 3/14/2022    Primary osteoarthritis of both knees        Past Surgical History:   Procedure Laterality Date    TOTAL KNEE ARTHROPLASTY Left 3/15/2022    LEFT KNEE DISTAL FEMUR REPLACEMENT [facility]     Socioeconomic History    Marital status: Single     Spouse name: Not on file    Number of children: Not on file    Years of education: Not on file    Highest education level: Not on file   Occupational History    Not on file   Tobacco Use    Smoking status: Former     Years: 40.00     Types: Cigarettes     Quit date: 18     Years since quittin.2    Smokeless tobacco: Former    Tobacco comments:     40 years ago per patient   Vaping Use    Vaping Use: Never used   Substance and Sexual Activity    Alcohol use: Not Currently    Drug use: Never    Sexual activity: Not Currently     Partners: Male   Other Topics Concern    Not on file Social History Narrative    Alone, was     No children    Worked in construction, at the [employer], drove a truck     Social Determinants of Health     Financial Resource Strain: Not on file   Food Insecurity: Not on file   Transportation Needs: Not on file   Physical Activity: Not on file   Stress: Not on file   Social Connections: Not on file   Intimate Partner Violence: Not on file   Housing Stability: Not on file       Family History   Problem Relation Age of Onset    Cancer Mother     Cancer Father        No current facility-administered medications on file prior to encounter. Current Outpatient Medications on File Prior to Encounter   Medication Sig Dispense Refill    docusate sodium (COLACE) 100 MG capsule Take 1 capsule by mouth 2 times daily (Patient taking differently: Take 100 mg by mouth 2 times daily Indications: Constipation )      furosemide (LASIX) 20 MG tablet Take 20 mg by mouth daily Indications: High Blood Pressure Disorder       triamterene-hydroCHLOROthiazide (MAXZIDE-25) 37.5-25 MG per tablet Take 0.5 tablets by mouth daily Indications: High Blood Pressure Disorder       metoprolol succinate (TOPROL XL) 50 MG extended release tablet Take 50 mg by mouth daily Indications: High Blood Pressure Disorder          Physical Exam:  S/p aspiration of the L knee  General: Patient appears in no acute distress, cooperative  Skin: no new rashes or erythema or induration  HEENT: EOMI, MMM, Neck is supple  Heart: S1 S2  Lungs: bilaterally clear to auscultation  Abdomen: soft, ND, NTTP, +BS  Extrem LE weakness  Neuro exam: CN II-XII intact      Labs: I have reviewed all lab results by electronic record, including most recent CBC, metabolic panel, and pertinent abnormalities were addressed from an infectious disease perspective. WBC trends are being monitored.     Lab Results   Component Value Date/Time     03/12/2023 05:27 AM    K 4.0 03/12/2023 05:27 AM     03/12/2023 05:27 AM    CO2 26 03/12/2023 05:27 AM    BUN 21 03/12/2023 05:27 AM    CREATININE 0.66 03/12/2023 05:27 AM    GLUCOSE 122 03/12/2023 05:27 AM    CALCIUM 8.8 03/12/2023 05:27 AM      Lab Results   Component Value Date    WBC 11.5 (H) 03/11/2023    HGB 13.2 (L) 03/11/2023    HCT 38.9 (L) 03/11/2023    MCV 91.7 03/11/2023     03/11/2023       Radiology:   I have reviewed imaging results per electronic record and most pertinent abnormalities are being addressed from an infectious disease standpoint. Assessment:  E coli UTI  Generalized weakness  L knee PJI  Possible spinal stenosis      Plan:  MRI pending   Ortho following - aspirate with evidence of PJI, CX pending. Surgery 3/16  Agree with Vanco and Ceftriaxone. Direct abx with cx results. Appreciate all surgical cx    This was a requested consult  Above is my noted response to the concern that prompted the consult. Communication directed toward referring primary.      Natasha Glez D.O.

## 2023-03-13 NOTE — FLOWSHEET NOTE
Am nursing  assessment completed. Pt :  awake and resting in bed             Alert and oriented. Breakfast: set up  RESP:     even and non labored      Lung sounds:    diminished                             Oxygen: room air  Complaints of: denies  Pain:denies  IV:  SL  TELE: none  Dressings:  left knee ace wrap  Precautions:              Falls:    60    Fly: 16  Chart and meds reviewed. Noted Labs:  na 137 k 3.8 bun 19 cr0.56  Plan for today: pt for MRI and echo    Call light in reach. NOTES:    1050 Dr Mina Dash by for rounds. Electronically signed by Edilberto Alegria RN on 3/13/2023 at 10:50 AM    [phone] wound care eval completed, specialty mattress ordered. Electronically signed by Edilberto Alegria RN on 3/13/2023 at 1:34 PM     [id number] [phone] 79 bedside echo. Electronically signed by Edilberto Alegria RN on 3/13/2023 at 2:14 PM    1504 Dr Farshad Taylor by for rounds. Electronically signed by Edilberto Alegria RN on 3/13/2023 at 3:04 PM    [id number] pt to MRI. Electronically signed by Edilberto Alegria RN on 3/13/2023 at 5:29 PM    1820 set up with dinner.  Electronically signed by Edilberto Alegria RN on 3/13/2023 at 6:35 PM

## 2023-03-13 NOTE — PROGRESS NOTES
Wound Ostomy Continence Nurse  Consult Note       NAME:  Doreen Matta  MEDICAL RECORD NUMBER:  [id number]  AGE: 81 y.o. GENDER: male  : 1940  TODAY'S DATE:  3/13/2023    Subjective   Reason for [address] Nurse Evaluation and Assessment: Deep Tissue Injury, bilateral buttocks      Doreen Matta is a 81 y.o. male referred by:   [x] Physician  [] Nursing  [] Other:     Wound Identification:  Wound Type: pressure  Contributing Factors: chronic pressure, decreased mobility, shear force, obesity, and malnutrition    Wound History: Patient reports he has had \"some bedsores to his buttock for a few months now. \" Attributed the wounds to a previous stay in a nursing home, but patient  admits to spending most of the day and night in a recliner chair, which is also where he sleeps  Current Wound Care Treatment:  recommending 1) continue pressure injury prevention interventions, including low air-loss mattress and waffle chair cushion 2) protective barrier cream for wound care of bilateral buttocks pressure injuries, apply 2x daily and as needed    Patient Goal of Care:  [x] Wound Healing  [] Odor Control  [] Palliative Care  [] Pain Control   [] Other:         PAST MEDICAL HISTORY        Diagnosis Date    Aneurysm artery, pulmonary ([facility] [address]     not surgical    Benign prostatic hyperplasia with lower urinary tract symptoms     Chronic sinusitis     History of left knee replacement 03/15/2022    at [facility]    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    HTN (hypertension)     Hx of carpal tunnel syndrome     Obesity     Other fracture of left femur, initial encounter for closed fracture ([address] 3/14/2022    Primary osteoarthritis of both knees        PAST SURGICAL HISTORY    Past Surgical History:   Procedure Laterality Date    TOTAL KNEE ARTHROPLASTY Left 3/15/2022    LEFT KNEE DISTAL FEMUR REPLACEMENT performed by Etienne Quintero MD at [facility] [address]    Family History   Problem Relation Age of Onset    Cancer Mother     Cancer Father        SOCIAL HISTORY    Social History     Tobacco Use    Smoking status: Former     Years: 40.00     Types: Cigarettes     Quit date:      Years since quittin.2    Smokeless tobacco: Former    Tobacco comments:     40 years ago per patient   Vaping Use    Vaping Use: Never used   Substance Use Topics    Alcohol use: Not Currently    Drug use: Never       ALLERGIES    Allergies   Allergen Reactions    Aspirin        MEDICATIONS    No current facility-administered medications on file prior to encounter.      Current Outpatient Medications on File Prior to Encounter   Medication Sig Dispense Refill    docusate sodium (COLACE) 100 MG capsule Take 1 capsule by mouth 2 times daily (Patient taking differently: Take 100 mg by mouth 2 times daily Indications: Constipation )      furosemide (LASIX) 20 MG tablet Take 20 mg by mouth daily Indications: High Blood Pressure Disorder       triamterene-hydroCHLOROthiazide (MAXZIDE-25) 37.5-25 MG per tablet Take 0.5 tablets by mouth daily Indications: High Blood Pressure Disorder       metoprolol succinate (TOPROL XL) 50 MG extended release tablet Take 50 mg by mouth daily Indications: High Blood Pressure Disorder          Objective    BP (!) 132/59   Pulse 70   Temp 98.8 °F (37.1 °C) (Oral)   Resp 20   Ht 5' 11\" (1.803 m)   Wt 256 lb (116.1 kg)   SpO2 97%   BMI 35.70 kg/m²     LABS:  WBC:    Lab Results   Component Value Date/Time    WBC 11.5 2023 05:28 AM     H/H:    Lab Results   Component Value Date/Time    HGB 13.2 2023 05:28 AM    HCT 38.9 2023 05:28 AM     PTT:    Lab Results   Component Value Date/Time    APTT 28.5 2022 04:05 PM   [APTT}  PT/INR:    Lab Results   Component Value Date/Time    PROTIME 14.1 2022 04:05 PM    INR 1.1 2022 04:05 PM     HgBA1c:  No results found for: LABA1C    Assessment   Fly Risk Score: Fly Scale Score: 16    Patient Active Problem List   Diagnosis Chronic sinusitis    Deviated nasal septum    Hyperglycemia    Hypertension    Osteoarthritis    Class 2 drug-induced obesity with serious comorbidity and body mass index (BMI) of 37.0 to 37.9 in adult    Benign prostatic hyperplasia with lower urinary tract symptoms    Supracondylar fracture of femur, left, closed, with routine healing, subsequent encounter    General weakness       Measurements:  Wound 03/10/23 Open areas to buttocks, redness and bruising (Active)   Wound Etiology Pressure Unstageable 03/12/23 0745   Wound Cleansed Betadine/povidone iodine 03/11/23 0800   Dressing/Treatment Other (comment) 03/12/23 0745   Drainage Amount Small 03/13/23 0800   Number of days: 2       Wound 03/10/23 Buttocks Left (Active)   Wound Image   03/13/23 1315   Wound Etiology Deep tissue/Injury 03/13/23 1315   Dressing Status New dressing applied 03/13/23 1315   Wound Cleansed Soap and water 03/13/23 1315   Dressing/Treatment Protective barrier 03/13/23 1315   Dressing Change Due 03/14/23 03/13/23 1315   Wound Length (cm) 4 cm 03/13/23 1315   Wound Width (cm) 7 cm 03/13/23 1315   Wound Surface Area (cm^2) 28 cm^2 03/13/23 1315   Wound Assessment Purple/maroon;Denuded 03/13/23 1315   Drainage Amount Small 03/13/23 1315   Drainage Description Serosanguinous 03/13/23 1315   Odor None 03/13/23 1315   Leela-wound Assessment Denuded; Maceration; Non-blanchable erythema 03/13/23 1315   Margins Undefined edges 03/13/23 1315   Number of days: 3       Wound 03/10/23 Buttocks Right (Active)   Wound Image   03/13/23 1315   Wound Etiology Deep tissue/Injury 03/13/23 1315   Dressing Status New dressing applied 03/13/23 1315   Wound Cleansed Soap and water 03/13/23 1315   Dressing/Treatment Protective barrier 03/13/23 1315   Dressing Change Due 03/14/23 03/13/23 1315   Wound Length (cm) 4 cm 03/13/23 1315   Wound Width (cm) 3 cm 03/13/23 1315   Wound Surface Area (cm^2) 12 cm^2 03/13/23 1315   Wound Assessment Purple/maroon;Denuded 03/13/23 1315   Drainage Amount Small 03/13/23 1315   Drainage Description Serosanguinous 03/13/23 1315   Odor None 03/13/23 1315   Roberto-wound Assessment Non-blanchable erythema;Denuded; Maceration 03/13/23 1315   Margins Undefined edges 03/13/23 1315   Number of days: 3     Incision 03/15/22 Knee Anterior; Left (Active)   Number of days: 363     Assessment:    Bilateral buttocks with DTI present, wounds are dark purple, denuded and have small amount of serosanguinous drainage. The immediate roberto wound areas is red, non-blanchable and macerated with denudation throughout. There is also a ring-like area of non-blanchable redness without the affected pressure injury area, reported by nursing as \"from toilet seat at home. \"    Plan   Plan of Care: Wound 03/10/23 Open areas to buttocks, redness and bruising-Dressing/Treatment:  (meplex)  Wound 03/10/23 Buttocks Left-Dressing/Treatment: Protective barrier  Wound 03/10/23 Buttocks Right-Dressing/Treatment: Protective barrier    Specialty Bed Required : Yes   [x] Low Air Loss   [] Pressure Redistribution  [] Fluid Immersion  [] Bariatric  [] Other:     Current Diet: ADULT DIET; Regular; No Added Salt (3-4 gm)  ADULT ORAL NUTRITION SUPPLEMENT; Lunch, Dinner;  Wound Healing Oral Supplement  Dietician consult:  N/A    Discharge Plan:  Placement for patient upon discharge: skilled nursing    Patient appropriate for Outpatient [address]: Yes - placed in disrach instruction    Referrals:  []   [] [address] [place] [address]  [] Supplies  [] Other    Patient/Caregiver Teaching: Pressure injury prevention, offloading, and getting out of recliner  Level of patient/caregiver understanding able to:   [x] Indicates understanding       [] Needs reinforcement  [] Unsuccessful      [] Verbal Understanding  [] Demonstrated understanding       [] No evidence of learning  [] Refused teaching         [] N/A       Electronically signed by KYLE Harrison, RN, Sharon Michel on 3/13/2023 at 2:23 PM

## 2023-03-13 NOTE — PROGRESS NOTES
[id number] [facility] Neurology Daily Progress Note  Name: Palak Duncan  Age: 81 y.o. Gender: male  CodeStatus: Full Code  Allergies: Aspirin    Chief Complaint:Extremity Weakness    Primary Care Provider: Ana Kumar MD  InpatientTreatment Team: Treatment Team: Attending Provider: Ritchie Jaimes MD; Consulting Physician: Yajaira Escobar MD; Consulting Physician: Chitra Bar MD; : Brooks Comer, RN; Registered Nurse: Harleen Morales RN; Registered Nurse: oK Pop RN; Utilization Reviewer: Delana Landau, RN  Admission Date: 3/10/2023      HPI   Pt seen and examined for neuro follow up. Alert and oriented x3, no acute distress, cooperative. Afebrile. Ongoing lower extremity weakness. Left knee Ace wrap in place. Mild pedal ankle edema. Seen and examined. Patient still continues to have lower extremity weakness and stiffness. Swelling is notable at his knee level. MRIs not been done. Vitals:    03/13/23 1138   BP: (!) 132/59   Pulse: 70   Resp: 20   Temp: 98.8 °F (37.1 °C)   SpO2: 97%        Review of Systems   Constitutional:  Negative for appetite change and fever. HENT:  Negative for hearing loss and trouble swallowing. Eyes:  Negative for visual disturbance. Respiratory:  Negative for cough, chest tightness, shortness of breath and wheezing. Cardiovascular:  Positive for leg swelling. Negative for chest pain and palpitations. Gastrointestinal:  Negative for nausea and vomiting. Genitourinary:  Negative for difficulty urinating. Musculoskeletal:  Positive for gait problem. Negative for back pain, neck pain and neck stiffness. Skin:  Negative for color change and rash. Neurological:  Positive for weakness. Negative for dizziness, tremors, seizures, syncope, facial asymmetry, speech difficulty, light-headedness, numbness and headaches. Psychiatric/Behavioral:  Negative for agitation, confusion and hallucinations. The patient is not nervous/anxious.         Physical Exam  Vitals and nursing note reviewed. Constitutional:       General: He is not in acute distress. Appearance: He is not diaphoretic. HENT:      Head: Normocephalic. Eyes:      Extraocular Movements: Extraocular movements intact. Pupils: Pupils are equal, round, and reactive to light. Cardiovascular:      Rate and Rhythm: Normal rate and regular rhythm. Pulmonary:      Effort: Pulmonary effort is normal. No respiratory distress. Breath sounds: Normal breath sounds. Skin:     General: Skin is warm and dry. Neurological:      Mental Status: He is alert and oriented to person, place, and time. Cranial Nerves: No cranial nerve deficit. Sensory: No sensory deficit. Motor: Weakness present.       Coordination: Coordination normal.      Gait: Gait abnormal.   As noted above with weakness in the lower extremities with intact reflexes          Medications:  Reviewed    Infusion Medications:    sodium chloride       Scheduled Medications:    vancomycin (VANCOCIN) intermittent dosing (placeholder)   Other RX Placeholder    vancomycin  1,250 mg IntraVENous Q12H    cefTRIAXone (ROCEPHIN) IV  1,000 mg IntraVENous Q24H    furosemide  20 mg Oral Daily    metoprolol succinate  50 mg Oral Daily    triamterene-hydroCHLOROthiazide  0.5 tablet Oral Daily    acetaminophen  1,000 mg Oral 3 times per day    sodium chloride flush  5-40 mL IntraVENous 2 times per day    enoxaparin  30 mg SubCUTAneous BID     PRN Meds: traMADol **OR** traMADol, sodium chloride flush, sodium chloride, ondansetron **OR** ondansetron, polyethylene glycol    Labs:   Recent Labs     03/10/23  1530 03/11/23  0528   WBC 12.4* 11.5*   HGB 14.6 13.2*   HCT 43.5 38.9*    192     Recent Labs     03/11/23  0528 03/12/23 0527 03/13/23  0547    138 137   K 4.1 4.0 3.8    103 101   CO2 21 26 25   BUN 17 21 19   CREATININE 0.71 0.66* 0.56*   CALCIUM 9.0 8.8 8.4*     Recent Labs     03/11/23 0528 03/12/23 0527 03/13/23  0547   AST 34 21 19   ALT 11 9 9   BILITOT 1.6* 1.4* 1.0*   ALKPHOS 86 83 89     No results for input(s): INR in the last 72 hours. Recent Labs     03/12/23  1437   CKTOTAL 248*       Urinalysis:   Lab Results   Component Value Date/Time    NITRU Negative 03/10/2023 06:00 PM    WBCUA >100 03/10/2023 06:00 PM    BACTERIA MANY 03/10/2023 06:00 PM    RBCUA 20-50 03/10/2023 06:00 PM    BLOODU MODERATE 03/10/2023 06:00 PM    SPECGRAV 1.021 03/10/2023 06:00 PM    GLUCOSEU Negative 03/10/2023 06:00 PM       Radiology:   Most recent    EEG No valid procedures specified. MRI of Brain No results found for this or any previous visit. No results found for this or any previous visit. MRA of the Head and Neck: No results found for this or any previous visit. No results found for this or any previous visit. No results found for this or any previous visit. CT of the Head: Results for orders placed during the hospital encounter of 03/10/23    CT Head W/O Contrast    Narrative  EXAMINATION:  CT OF THE HEAD WITHOUT CONTRAST  3/10/2023 3:53 pm    TECHNIQUE:  CT of the head was performed without the administration of intravenous  contrast. Automated exposure control, iterative reconstruction, and/or weight  based adjustment of the mA/kV was utilized to reduce the radiation dose to as  low as reasonably achievable. COMPARISON:  None. HISTORY:  ORDERING SYSTEM PROVIDED HISTORY: weakness lower ext lt >rt  TECHNOLOGIST PROVIDED HISTORY:  Reason for exam:->weakness lower ext lt >rt  Has a \"code stroke\" or \"stroke alert\" been called? ->No  Decision Support Exception - unselect if not a suspected or confirmed  emergency medical condition->Emergency Medical Condition (MA)  What reading provider will be dictating this exam?->CRC    FINDINGS:  BRAIN/VENTRICLES: No mass effect, edema or hemorrhage is seen.   Mild volume  loss is seen in the cerebrum with mild chronic microvascular ischemic  changes. No hydrocephalus or extra-axial fluid is seen. ORBITS: The visualized portion of the orbits demonstrate no acute abnormality. SINUSES: Moderate mucosal thickening in the paranasal sinuses. Postoperative  changes from prior sinonasal surgery are seen. The mastoids are clear. SOFT TISSUES/SKULL:  No acute abnormality of the visualized skull or soft  tissues. Impression  No acute intracranial abnormality. No results found for this or any previous visit. No results found for this or any previous visit. Carotid duplex: No results found for this or any previous visit. No results found for this or any previous visit. No results found for this or any previous visit. Echo No results found for this or any previous visit. Assessment/Plan:    3/12/23:  Lower extremity weakness with paraparesis. Patient has had weakness in his legs due to a postoperative knee issues that have been going on quite some time. He was able to ambulate and on Thursday he had taken a water pill and go to the bathroom several times. Friday morning while he was on the commode he could not stand up and walk. Given the findings of my reflexes 1 would be concerned about his cervical spinal stenosis as well as lumbar spine stenosis. Recommend an MRI of the cervical and lumbar spine. Findings not Sastry of GBS given the reflexes. 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 Orthopedic surgery and infectious disease following. Plans for orthopedic surgical intervention on 3/16/2023. E. coli UTI  CRP, sed rate 63  patient on IV ceftriaxone and Vanco.  MRI of the cervical and lumbar spine pending.     I have personally performed a face to face diagnostic evaluation on this patient, reviewed all data and investigations, and am the sole provider of all clinical decisions on the neurological status of this patient. Patient has some stiffness in the legs and swelling and this may be complicating the picture of his walking. His CRP and sed rate are elevated. Patient is on antibiotics. MRI of the lumbar spine and cervical spine are pending. Depending on the results of the same will further advise 60% time spent on evaluating patient      Werner Lloyd MD, [address] American Board of Psychiatry & Neurology  Board Certified in Vascular Neurology  Board Certified in Neuromuscular Medicine  Certified in Neurorehabilitation       Collaborating physicians: Dr Alexandre Lloyd    Electronically signed by CHARLIE Coronado CNP on 3/13/2023 at 12:01 PM

## 2023-03-14 ENCOUNTER — ANESTHESIA EVENT (OUTPATIENT)
Dept: OPERATING ROOM | Age: 83
End: 2023-03-14
Payer: MEDICARE

## 2023-03-14 PROBLEM — G99.2 STENOSIS OF CERVICAL SPINE WITH MYELOPATHY (HCC): Status: ACTIVE | Noted: 2023-03-14

## 2023-03-14 PROBLEM — M48.02 STENOSIS OF CERVICAL SPINE WITH MYELOPATHY (HCC): Status: ACTIVE | Noted: 2023-03-14

## 2023-03-14 LAB
ALBUMIN SERPL-MCNC: 2.4 G/DL (ref 3.5–4.6)
ALP BLD-CCNC: 105 U/L (ref 35–104)
ALT SERPL-CCNC: 11 U/L (ref 0–41)
ANION GAP SERPL CALCULATED.3IONS-SCNC: 14 MEQ/L (ref 9–15)
AST SERPL-CCNC: 23 U/L (ref 0–40)
BASOPHILS ABSOLUTE: 0 K/UL (ref 0–0.2)
BASOPHILS RELATIVE PERCENT: 0.6 %
BILIRUB SERPL-MCNC: 0.9 MG/DL (ref 0.2–0.7)
BUN BLDV-MCNC: 15 MG/DL (ref 8–23)
C-REACTIVE PROTEIN: 143.6 MG/L (ref 0–5)
CALCIUM SERPL-MCNC: 8.6 MG/DL (ref 8.5–9.9)
CHLORIDE BLD-SCNC: 97 MEQ/L (ref 95–107)
CO2: 22 MEQ/L (ref 20–31)
CREAT SERPL-MCNC: 0.5 MG/DL (ref 0.7–1.2)
CRYSTALS, FLUID: NEGATIVE
EOSINOPHILS ABSOLUTE: 0.3 K/UL (ref 0–0.7)
EOSINOPHILS RELATIVE PERCENT: 4.1 %
GFR SERPL CREATININE-BSD FRML MDRD: >60 ML/MIN/{1.73_M2}
GLOBULIN: 4.7 G/DL (ref 2.3–3.5)
GLUCOSE BLD-MCNC: 112 MG/DL (ref 70–99)
HCT VFR BLD CALC: 36.6 % (ref 42–52)
HEMOGLOBIN: 12.3 G/DL (ref 14–18)
LYMPHOCYTES ABSOLUTE: 1.1 K/UL (ref 1–4.8)
LYMPHOCYTES RELATIVE PERCENT: 16.6 %
MAGNESIUM: 2.1 MG/DL (ref 1.7–2.4)
MCH RBC QN AUTO: 30.8 PG (ref 27–31.3)
MCHC RBC AUTO-ENTMCNC: 33.8 % (ref 33–37)
MCV RBC AUTO: 91.4 FL (ref 79–92.2)
MONOCYTES ABSOLUTE: 0.9 K/UL (ref 0.2–0.8)
MONOCYTES RELATIVE PERCENT: 13.6 %
NEUTROPHILS ABSOLUTE: 4.4 K/UL (ref 1.4–6.5)
NEUTROPHILS RELATIVE PERCENT: 65.1 %
PDW BLD-RTO: 13.8 % (ref 11.5–14.5)
PLATELET # BLD: 228 K/UL (ref 130–400)
POTASSIUM REFLEX MAGNESIUM: 4.3 MEQ/L (ref 3.4–4.9)
PROCALCITONIN: 0.15 NG/ML (ref 0–0.15)
RBC # BLD: 4 M/UL (ref 4.7–6.1)
SODIUM BLD-SCNC: 133 MEQ/L (ref 135–144)
SPECIMEN TYPE: NORMAL
TOTAL PROTEIN: 7.1 G/DL (ref 6.3–8)
VANCOMYCIN RANDOM: 20.4 UG/ML (ref 10–40)
WBC # BLD: 6.7 K/UL (ref 4.8–10.8)

## 2023-03-14 PROCEDURE — 2580000003 HC RX 258: Performed by: ORTHOPAEDIC SURGERY

## 2023-03-14 PROCEDURE — 6370000000 HC RX 637 (ALT 250 FOR IP): Performed by: INTERNAL MEDICINE

## 2023-03-14 PROCEDURE — 6360000002 HC RX W HCPCS: Performed by: ORTHOPAEDIC SURGERY

## 2023-03-14 PROCEDURE — 97116 GAIT TRAINING THERAPY: CPT

## 2023-03-14 PROCEDURE — 97535 SELF CARE MNGMENT TRAINING: CPT

## 2023-03-14 PROCEDURE — 84145 PROCALCITONIN (PCT): CPT

## 2023-03-14 PROCEDURE — 83735 ASSAY OF MAGNESIUM: CPT

## 2023-03-14 PROCEDURE — 86140 C-REACTIVE PROTEIN: CPT

## 2023-03-14 PROCEDURE — 85025 COMPLETE CBC W/AUTO DIFF WBC: CPT

## 2023-03-14 PROCEDURE — 36415 COLL VENOUS BLD VENIPUNCTURE: CPT

## 2023-03-14 PROCEDURE — 1210000000 HC MED SURG R&B

## 2023-03-14 PROCEDURE — 6370000000 HC RX 637 (ALT 250 FOR IP): Performed by: ORTHOPAEDIC SURGERY

## 2023-03-14 PROCEDURE — 80202 ASSAY OF VANCOMYCIN: CPT

## 2023-03-14 PROCEDURE — 2580000003 HC RX 258: Performed by: INTERNAL MEDICINE

## 2023-03-14 PROCEDURE — APPSS15 APP SPLIT SHARED TIME 0-15 MINUTES: Performed by: NURSE PRACTITIONER

## 2023-03-14 PROCEDURE — 99222 1ST HOSP IP/OBS MODERATE 55: CPT | Performed by: ORTHOPAEDIC SURGERY

## 2023-03-14 PROCEDURE — 99232 SBSQ HOSP IP/OBS MODERATE 35: CPT | Performed by: INTERNAL MEDICINE

## 2023-03-14 PROCEDURE — 6360000002 HC RX W HCPCS: Performed by: NURSE PRACTITIONER

## 2023-03-14 PROCEDURE — 80053 COMPREHEN METABOLIC PANEL: CPT

## 2023-03-14 PROCEDURE — 2580000003 HC RX 258: Performed by: NURSE PRACTITIONER

## 2023-03-14 PROCEDURE — 99222 1ST HOSP IP/OBS MODERATE 55: CPT | Performed by: NEUROLOGICAL SURGERY

## 2023-03-14 PROCEDURE — 99212 OFFICE O/P EST SF 10 MIN: CPT

## 2023-03-14 PROCEDURE — 6360000002 HC RX W HCPCS: Performed by: INTERNAL MEDICINE

## 2023-03-14 RX ORDER — TRANEXAMIC ACID 650 MG/1
1300 TABLET ORAL ONCE
Status: COMPLETED | OUTPATIENT
Start: 2023-03-16 | End: 2023-03-16

## 2023-03-14 RX ORDER — SODIUM CHLORIDE, SODIUM LACTATE, POTASSIUM CHLORIDE, CALCIUM CHLORIDE 600; 310; 30; 20 MG/100ML; MG/100ML; MG/100ML; MG/100ML
INJECTION, SOLUTION INTRAVENOUS CONTINUOUS
Status: DISCONTINUED | OUTPATIENT
Start: 2023-03-15 | End: 2023-03-17

## 2023-03-14 RX ADMIN — VANCOMYCIN HYDROCHLORIDE 1250 MG: 1.25 INJECTION, POWDER, LYOPHILIZED, FOR SOLUTION INTRAVENOUS at 18:10

## 2023-03-14 RX ADMIN — ACETAMINOPHEN 1000 MG: 500 TABLET ORAL at 13:56

## 2023-03-14 RX ADMIN — ACETAMINOPHEN 1000 MG: 500 TABLET ORAL at 06:25

## 2023-03-14 RX ADMIN — METOPROLOL SUCCINATE 50 MG: 50 TABLET, FILM COATED, EXTENDED RELEASE ORAL at 09:42

## 2023-03-14 RX ADMIN — TRIAMTERENE AND HYDROCHLOROTHIAZIDE 0.5 TABLET: 37.5; 25 TABLET ORAL at 09:42

## 2023-03-14 RX ADMIN — ACETAMINOPHEN 1000 MG: 500 TABLET ORAL at 21:51

## 2023-03-14 RX ADMIN — FUROSEMIDE 20 MG: 20 TABLET ORAL at 09:42

## 2023-03-14 RX ADMIN — VANCOMYCIN HYDROCHLORIDE 1250 MG: 1.25 INJECTION, POWDER, LYOPHILIZED, FOR SOLUTION INTRAVENOUS at 06:02

## 2023-03-14 RX ADMIN — ENOXAPARIN SODIUM 30 MG: 100 INJECTION SUBCUTANEOUS at 09:43

## 2023-03-14 RX ADMIN — SODIUM CHLORIDE, PRESERVATIVE FREE 10 ML: 5 INJECTION INTRAVENOUS at 21:52

## 2023-03-14 RX ADMIN — CEFTRIAXONE SODIUM 1000 MG: 1 INJECTION, POWDER, FOR SOLUTION INTRAMUSCULAR; INTRAVENOUS at 09:48

## 2023-03-14 RX ADMIN — ENOXAPARIN SODIUM 30 MG: 100 INJECTION SUBCUTANEOUS at 21:50

## 2023-03-14 RX ADMIN — SODIUM CHLORIDE, PRESERVATIVE FREE 10 ML: 5 INJECTION INTRAVENOUS at 09:42

## 2023-03-14 ASSESSMENT — ENCOUNTER SYMPTOMS
BACK PAIN: 0
SHORTNESS OF BREATH: 0
CHEST TIGHTNESS: 0
VOMITING: 0
COLOR CHANGE: 0
TROUBLE SWALLOWING: 0
NAUSEA: 0
COUGH: 0
WHEEZING: 0
EYE PAIN: 0

## 2023-03-14 ASSESSMENT — PAIN SCALES - GENERAL
PAINLEVEL_OUTOF10: 0
PAINLEVEL_OUTOF10: 0
PAINLEVEL_OUTOF10: 2
PAINLEVEL_OUTOF10: 3

## 2023-03-14 ASSESSMENT — PAIN DESCRIPTION - ORIENTATION: ORIENTATION: LEFT

## 2023-03-14 ASSESSMENT — PAIN DESCRIPTION - LOCATION
LOCATION: BUTTOCKS
LOCATION: BUTTOCKS;LEG

## 2023-03-14 ASSESSMENT — PAIN DESCRIPTION - DESCRIPTORS: DESCRIPTORS: ACHING

## 2023-03-14 NOTE — H&P (VIEW-ONLY)
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 Cultures have been negative thus far with respect to synovial fluid. He demonstrated evidence of pansensitive E. coli from urine but cannot assume concordant infection of left knee with same organism. That having been said, if this is some form of low virulence infection with E. coli, history would suggest that this is acute and would be appropriate for attempt at polyethylene liner exchange, coupled with arthrotomy and drainage as well as placement of antibiotic laden calcium sulfite pellets. As such, I do not feel that full explant with placement of static antibiotic spacer is necessary, particularly in light of the patient's advanced age and medical comorbidities. Synovial aspirate has been negative but neutrophil count and differential from that aspirate are consistent with prosthetic infection, as is his symptomatology. Emphasized 60% incidence of culture-negative prosthetic joint infections, particularly when antibiotics have been administered prior to acquisition of synovial fluid via arthrocentesis (patient had received empiric ceftriaxone in the setting of management for urinary tract infection prior to knee aspiration). We have discussed the risk, benefits, alternatives, convalescence for arthrotomy and drainage of left knee with tibial polyethylene liner exchange (including exchange of axles and bushings), and placement of antibiotic laden calcium sulfate pellets.   The risk discussed included, but were not limited to, infection (persistent or novel), DVT, pulmonary embolism, hematoma formation, wound healing complications, intraoperative and postoperative fracture, postoperative instability, injury to the extensor mechanism or collateral ligaments, cardiopulmonary complications including myocardial infarction and respiratory insufficiency, neurovascular complications including femoral/sciatic/peroneal nerve palsy and cerebrovascular accident, vascular injury, acute and/or chronic postoperative pain, osteolysis, component wear, aseptic or septic loosening of components, anesthetic complications, COVID-specific risk, and death. We have also discussed potential for recurrent or new skin infection which could necessitate consideration of full explant and placement of a static antibiotic spacer in the future. We discussed plan for 6 weeks minimum intravenous antibiotic therapy following polyethylene liner exchange and potential likelihood for chronic oral antibiotic suppression thereafter. The patient has verbalized understanding of the global plan of care and would like to proceed. He has been tentatively scheduled for this procedure on 3/16 at 1:00 PM at [facility]. This will allow time for us to procure polyethylene liner, axial, and bushings for the [employer] GMRS tumor system. Maintain n.p.o. after midnight tomorrow. We will also of the patient hold anticoagulation for 12 to 24 hours prior to surgery. He will also be dosed with oral tranexamic acid prior to surgery. We will update cultures intraoperatively and monitor postoperative results, and appreciate Infectious Disease consultation and management in this regard. Continue empiric vancomycin and ceftriaxone as per Infectious Disease at this time. SUBJECTIVE patient notes stable clinical course, attempting to mobilize from bed to chair with physical therapy at this point. Culture findings have been reviewed. Surgical options were also reviewed and have been outlined in detail above.     OBJECTIVE    Physical    VITALS:  BP (!) 128/44   Pulse 81   Temp 98.8 °F (37.1 °C) (Oral)   Resp 18   Ht 5' 11\" (1.803 m)   Wt 256 lb (116.1 kg)   SpO2 95%   BMI 35.70 kg/m²   24HR INTAKE/OUTPUT:    Intake/Output Summary (Last 24 hours) at 3/14/2023 1210  Last data filed at 3/14/2023 1000  Gross per 24 hour   Intake 2010.52 ml   Output 1700 ml   Net 310.52 ml     Alert, oriented x3, cooperative with examination. Examination of the left lower extremity reveals knee with trace effusion at this point. He is able to initiate maintain a straight leg raise without extensor lag. Moderate discomfort throughout range of motion. Sensory intact light touch in the deep peroneal, superficial peroneal, common peroneal, saphenous, tibial, sural nerve distributions. DP and popliteal pulses are 2+ with capillary refill less than 2 seconds. EHL, plantarflexion, dorsiflexion, knee flexion, knee extension are 4+/5. Negative Homans' sign. Data    Salient results include negative culture from synovial fluid aspirate performed on 3/11. Markedly elevated neutrophil count and differential were previously reported with respect to that specimen. C-reactive protein was reported today, relatively stable at 143.6. Systemic white blood cell count 6.7.     Current Inpatient Medications    Current Facility-Administered Medications: vancomycin (VANCOCIN) intermittent dosing (placeholder), , Other, RX Placeholder  [COMPLETED] vancomycin (VANCOCIN) 1,500 mg in sodium chloride 0.9 % 500 mL IVPB (ADDAVIAL), 1,500 mg, IntraVENous, Once **FOLLOWED BY** vancomycin (VANCOCIN) 1,250 mg in sodium chloride 0.9 % 250 mL IVPB (ADDAVIAL), 1,250 mg, IntraVENous, Q12H  cefTRIAXone (ROCEPHIN) 1,000 mg in sodium chloride 0.9 % 50 mL IVPB (mini-bag), 1,000 mg, IntraVENous, Q24H  furosemide (LASIX) tablet 20 mg, 20 mg, Oral, Daily  metoprolol succinate (TOPROL XL) extended release tablet 50 mg, 50 mg, Oral, Daily  triamterene-hydroCHLOROthiazide (MAXZIDE-25) 37.5-25 MG per tablet 0.5 tablet, 0.5 tablet, Oral, Daily  acetaminophen (TYLENOL) tablet 1,000 mg, 1,000 mg, Oral, 3 times per day  traMADol (ULTRAM) tablet 25 mg, 25 mg, Oral, Q4H PRN **OR** traMADol (ULTRAM) tablet 50 mg, 50 mg, Oral, Q4H PRN  sodium chloride flush 0.9 % injection 5-40 mL, 5-40 mL, IntraVENous, 2 times per day  sodium chloride flush 0.9 % injection 5-40 mL, 5-40 mL, IntraVENous, PRN  0.9 % sodium chloride infusion, 25 mL, IntraVENous, PRN  enoxaparin Sodium (LOVENOX) injection 30 mg, 30 mg, SubCUTAneous, BID  ondansetron (ZOFRAN-ODT) disintegrating tablet 4 mg, 4 mg, Oral, Q8H PRN **OR** ondansetron (ZOFRAN) injection 4 mg, 4 mg, IntraVENous, Q6H PRN  polyethylene glycol (GLYCOLAX) packet 17 g, 17 g, Oral, Daily PRN    Genaro Espinoza MD  Orthopaedic Surgery

## 2023-03-14 NOTE — PROGRESS NOTES
Wound Ostomy Continence Nurse  Follow-up Progress Note       NAME:  Doreen Matta  MEDICAL RECORD NUMBER:  [id number]  AGE:  81 y.o. GENDER:  male  :  1940  TODAY'S DATE:  3/14/2023    Subjective:   Wound Identification:  Wound Type: pressure  Contributing Factors: chronic pressure, decreased mobility, shear force, obesity, malnutrition, incontinence of stool, and incontinence of urine        Patient Goal of Care:  [x] Wound Healing  [] Odor Control  [] Palliative Care  [] Pain Control   [] Other:     Objective:    BP (!) 130/52   Pulse 65   Temp 98.4 °F (36.9 °C) (Oral)   Resp 26   Ht 5' 11\" (1.803 m)   Wt 256 lb (116.1 kg)   SpO2 100%   BMI 35.70 kg/m²   Fly Risk Score: Fly Scale Score: 15  Assessment:   Measurements:  Wound 03/10/23 Open areas to buttocks, redness and bruising (Active)   Wound Etiology Pressure Unstageable 23 0745   Wound Cleansed Betadine/povidone iodine 23 0800   Dressing/Treatment Other (comment) 23 0745   Drainage Amount Small 23 0800   Number of days: 3       Wound 03/10/23 Buttocks Left (Active)   Wound Image   23 131   Wound Etiology Deep tissue/Injury 23 131   Dressing Status New dressing applied 23 131   Wound Cleansed Soap and water 23 131   Dressing/Treatment Protective barrier 23   Dressing Change Due 23 131   Wound Length (cm) 4 cm 23 131   Wound Width (cm) 7 cm 23 131   Wound Surface Area (cm^2) 28 cm^2 23 131   Wound Assessment Purple/maroon;Denuded 23 131   Drainage Amount Small 23 131   Drainage Description Serosanguinous 23 131   Odor None 23   Leela-wound Assessment Denuded; Maceration; Non-blanchable erythema 23   Margins Undefined edges 23 131   Number of days: 4       Wound 03/10/23 Buttocks Right (Active)   Wound Image   23 1316   Wound Etiology Deep tissue/Injury 03/13/23 1315   Dressing Status New dressing applied 03/13/23 1315   Wound Cleansed Soap and water 03/13/23 1315   Dressing/Treatment Protective barrier 03/13/23 1315   Dressing Change Due 03/14/23 03/13/23 1315   Wound Length (cm) 4 cm 03/13/23 1315   Wound Width (cm) 3 cm 03/13/23 1315   Wound Surface Area (cm^2) 12 cm^2 03/13/23 1315   Wound Assessment Purple/maroon;Denuded 03/13/23 1315   Drainage Amount Small 03/13/23 1315   Drainage Description Serosanguinous 03/13/23 1315   Odor None 03/13/23 1315   Leela-wound Assessment Non-blanchable erythema;Denuded; Maceration 03/13/23 1315   Margins Undefined edges 03/13/23 1315   Number of days: 4     Incision 03/15/22 Knee Anterior; Left (Active)   Number of days: 364     Assessment:    Follow-up wound assessment of bilateral buttocks today - patient able to assist in turning to the right side for skin assessment. Bilateral buttocks both look better than they did yesterday, less purple throughout, a small area of dark purple remains on each buttocks, could potentially evolve into unstageable (normal progression of DTI). Bilateral buttocks are also less firm today. The ring going around buttocks from toilet seat (present on admission) has opened exposing clean pink dermis. Areas of denudation throughout remain - will use Triad topical wound cream as this topical wound can has the ability to adhere to open/denuded areas better than the zinc paste. Continue strict offloading (waffle cushion and specialty support surface / low air-loss mattress in place). Patient reports he butt feels better than it did yesterday.     Plan:   Plan of Care: Wound 03/10/23 Open areas to buttocks, redness and bruising-Dressing/Treatment:  (meplex)  Wound 03/10/23 Buttocks Left-Dressing/Treatment: Protective barrier  Wound 03/10/23 Buttocks Right-Dressing/Treatment: Protective barrier    Specialty Bed Required : Yes   [x] Low Air Loss   [] Pressure Redistribution  [] Fluid Immersion  [] Bariatric  [] Other:     Current Diet: ADULT DIET; Regular; No Added Salt (3-4 gm)  ADULT ORAL NUTRITION SUPPLEMENT; Lunch, Dinner;  Wound Healing Oral Supplement  Diet NPO Exceptions are: Sips of Water with Meds  Dietician consult:  Yes    Discharge Plan:  Placement for patient upon discharge: skilled nursing   Patient appropriate for Outpatient [address]: Yes    Referrals:  []   [] [facility]  [] Supplies  [] Other    Patient/Caregiver Teaching:  Level of patient/caregiver understanding able to:   [] Indicates understanding       [] Needs reinforcement  [] Unsuccessful      [] Verbal Understanding  [] Demonstrated understanding       [] No evidence of learning  [] Refused teaching         [x] N/A       Electronically signed by  KYLE Cade, RN, Oumar Smith on 3/14/2023 at 2:54 PM

## 2023-03-14 NOTE — PROGRESS NOTES
Hospitalist Progress Note      PCP: Mike Orozco MD    Date of Admission: 3/10/2023    Chief Complaint:  no acute events, afebrile, stable HD    Medications:  Reviewed    Infusion Medications    [START ON 3/15/2023] lactated ringers IV soln      sodium chloride       Scheduled Medications    [START ON 3/16/2023] tranexamic acid  1,300 mg Oral Once    vancomycin (VANCOCIN) intermittent dosing (placeholder)   Other RX Placeholder    vancomycin  1,250 mg IntraVENous Q12H    cefTRIAXone (ROCEPHIN) IV  1,000 mg IntraVENous Q24H    furosemide  20 mg Oral Daily    metoprolol succinate  50 mg Oral Daily    triamterene-hydroCHLOROthiazide  0.5 tablet Oral Daily    acetaminophen  1,000 mg Oral 3 times per day    sodium chloride flush  5-40 mL IntraVENous 2 times per day    enoxaparin  30 mg SubCUTAneous BID     PRN Meds: traMADol **OR** traMADol, sodium chloride flush, sodium chloride, ondansetron **OR** ondansetron, polyethylene glycol      Intake/Output Summary (Last 24 hours) at 3/14/2023 1334  Last data filed at 3/14/2023 1000  Gross per 24 hour   Intake 2010.52 ml   Output 1700 ml   Net 310.52 ml         Exam:    BP (!) 128/44   Pulse 81   Temp 98.8 °F (37.1 °C) (Oral)   Resp 18   Ht 5' 11\" (1.803 m)   Wt 256 lb (116.1 kg)   SpO2 95%   BMI 35.70 kg/m²     General appearance: appears stated age and cooperative. Respiratory:  clear to auscultation, bilaterally   Cardiovascular: Regular rate and rhythm, S1/S2. Abdomen: Soft, active bowel sounds. Musculoskeletal: left knee is covered withy dressing.       Labs:   Recent Labs     03/14/23  0625   WBC 6.7   HGB 12.3*   HCT 36.6*          Recent Labs     03/12/23  0527 03/13/23  0547 03/14/23  0625    137 133*   K 4.0 3.8 4.3    101 97   CO2 26 25 22   BUN 21 19 15   CREATININE 0.66* 0.56* 0.50*   CALCIUM 8.8 8.4* 8.6       Recent Labs     03/12/23  0527 03/13/23  0547 03/14/23  0625   AST 21 19 23   ALT 9 9 11   BILITOT 1.4* 1.0* 0.9*   ALKPHOS 83 89 105*       No results for input(s): INR in the last 72 hours. Recent Labs     03/12/23  1437   CKTOTAL 248*         Urinalysis:      Lab Results   Component Value Date/Time    NITRU Negative 03/10/2023 06:00 PM    WBCUA >100 03/10/2023 06:00 PM    BACTERIA MANY 03/10/2023 06:00 PM    RBCUA 20-50 03/10/2023 06:00 PM    BLOODU MODERATE 03/10/2023 06:00 PM    SPECGRAV 1.021 03/10/2023 06:00 PM    GLUCOSEU Negative 03/10/2023 06:00 PM       Radiology:  MRI LUMBAR SPINE WO CONTRAST   Final Result   1. Multilevel degenerative disease greatest at L5-S1 and mild multilevel   facet hypertrophy. 2. Mild spinal canal stenosis at the L1-2 through L4-5 levels. 3. Multilevel neural foraminal as detailed above and greatest involving the   left L4 and right L5 neural foramina where it is moderate. MRI CERVICAL SPINE WO CONTRAST   Final Result   Disc and osteophytes result in narrowing of the neural foramina and stenosis   of the thecal sac as discussed above. XR KNEE LEFT (1-2 VIEWS)   Final Result   No acute disease. RECOMMENDATION:   Careful clinical correlation and follow up recommended. XR FEMUR LEFT (MIN 2 VIEWS)   Final Result   No acute disease. RECOMMENDATION:   Careful clinical correlation and follow up recommended. XR CHEST PORTABLE   Final Result   No acute process. US DUP LOWER EXTREMITIES BILATERAL VENOUS   Final Result   No evidence of DVT in either lower extremity. CT Head W/O Contrast   Final Result   No acute intracranial abnormality. Assessment/Plan:    82 y/o female with history of HTN, obesity, s/p left TKA who presented with:     Bilateral LE weakness   - in the setting of left knee prosthetic joint infection, E. coli UTI, possible spinal stenosis  - s/p left knee aspiration on 3/11, fluid culture no growth  - blood cultures no growth, urine culture grew E. Coli  - on IV Vancomycin and Ceftriaxone  - plan for surgical intervention on 3/16 per ortho  - ID consulted    HTN  - continue home meds    Diet: ADULT DIET; Regular; No Added Salt (3-4 gm)  ADULT ORAL NUTRITION SUPPLEMENT; Lunch, Dinner;  Wound Healing Oral Supplement  Diet NPO Exceptions are: Sips of Water with Meds    Code Status: Full Code      Disposition - SNF/acute rehab,  following          Electronically signed by Betsey Samayoa MD on 3/14/2023 at 1:34 PM

## 2023-03-14 NOTE — PROGRESS NOTES
52-year-old male with BLE weakness. Being treated for UTI. Found to have prosthetic joint infection. Physical Exam:     General: Patient appears in no acute distress, cooperative  Skin: no new rashes or erythema or induration  HEENT: EOMI, MMM, Neck is supple  Heart: S1 S2  Lungs: bilaterally clear to auscultation  Abdomen: soft, ND, NTTP, +BS  Extrem LE weakness  Neuro exam: CN II-XII intact, facial expressions symmertrical bilaterally, no slurred speech        Labs: I have reviewed all lab results by electronic record, including most recent CBC, metabolic panel, and pertinent abnormalities were addressed from an infectious disease perspective. WBC trends are being monitored. Lab Results   Component Value Date/Time      03/12/2023 05:27 AM     K 4.0 03/12/2023 05:27 AM      03/12/2023 05:27 AM     CO2 26 03/12/2023 05:27 AM     BUN 21 03/12/2023 05:27 AM     CREATININE 0.66 03/12/2023 05:27 AM     GLUCOSE 122 03/12/2023 05:27 AM     CALCIUM 8.8 03/12/2023 05:27 AM            Lab Results   Component Value Date     WBC 11.5 (H) 03/11/2023     HGB 13.2 (L) 03/11/2023     HCT 38.9 (L) 03/11/2023     MCV 91.7 03/11/2023      03/11/2023         Radiology:   I have reviewed imaging results per electronic record and most pertinent abnormalities are being addressed from an infectious disease standpoint. Assessment:  E coli UTI  Generalized weakness  L knee PJI  Possible spinal stenosis        Plan:  MRIs pending   Ortho following - aspirate with evidence of PJI, CX pending. Surgery 3/16  Agree with Vanco and Ceftriaxone. Direct abx with cx results.

## 2023-03-14 NOTE — PROGRESS NOTES
Physician Progress Note      PATIENT:               Bonnie Whittington  CSN #:                  [id number]  :                       1940  ADMIT DATE:       3/10/2023 2:54 PM  [address] [place] DATE:  RESPONDING  PROVIDER #:        Howie Vanessa MD          QUERY TEXT:    Pt admitted with  left knee prosthetic infection and UTI. Noted on admission   to have WBC 12.4, Lactic acid 2.9, .3, HR 91 and UC positive for E.   Coli. If possible, please document in the progress notes and discharge   summary if you are evaluating and/or treating any of the following: The medical record reflects the following:  Risk Factors:  left knee prosthetic infection and UTI  Clinical Indicators: WBC 12.4, Lactic acid 2.9, .3, HR 91; UC positive   for E. Coli; body fluid cx pending, BC pending  Treatment: Ortho consult, knee aspiration w/fluid cx, IV Rocephin, IV Vanco,   UA/UC, BCx2, labs and monitoring    Thank you,  Gerardo Cruz   Clinical Documentation Improvement Specialist  W: [phone]  Options provided:  -- Sepsis, present on admission  -- left knee prosthetic infection and UTI without Sepsis  -- Other - I will add my own diagnosis  -- Disagree - Not applicable / Not valid  -- Disagree - Clinically unable to determine / Unknown  -- Refer to Clinical Documentation Reviewer    PROVIDER RESPONSE TEXT:    Provider is clinically unable to determine a response to this query.     Query created by: Mandy Aguirre on 3/14/2023 11:18 AM      Electronically signed by:  Howie Vanessa MD 3/14/2023 6:32 PM

## 2023-03-14 NOTE — CONSULTS
Patient Name: Alexandro Kang  Admit Date: 3/10/2023  2:54 PM  MR #: [id number]  : 1940    Attending Physician: Jah Ramirez MD  Reason for consult: Cervical stenosis with myelopathy  M48.02, G99.2  History of Presenting Illness and Review of Systems     Alexandro Kang is a 81 y.o. male on hospital day 4 . History Of Present Illness:  3/10/2023 admitted for lower extremity weakness. That is post 1 year left knee surgery with some weakness. Rising up from the commode he was unable to stand or walk with bilateral lower extremity weakness. No numbness. No back or neck pain. Bladder bowel control intact.     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    HTN (hypertension)     Hx of carpal tunnel syndrome     Obesity     Other fracture of left femur, initial encounter for closed fracture ([facility] [address] 3/14/2022    Primary osteoarthritis of both knees      Past Surgical History:   Procedure Laterality Date    TOTAL KNEE ARTHROPLASTY Left 3/15/2022    LEFT KNEE DISTAL FEMUR REPLACEMENT performed by Madhuri Simeon MD at [address] [place] [address] History  Family History   Problem Relation Age of Onset    Cancer Mother     Cancer Father      [] Unable to obtain due to ventilated and/ or neurologic status    Social History     Socioeconomic History    Marital status: Single     Spouse name: Not on file    Number of children: Not on file    Years of education: Not on file    Highest education level: Not on file   Occupational History    Not on file   Tobacco Use    Smoking status: Former     Years: 40.00     Types: Cigarettes     Quit date: 18     Years since quittin.2    Smokeless tobacco: Former    Tobacco comments:     40 years ago per patient Vaping Use    Vaping Use: Never used   Substance and Sexual Activity    Alcohol use: Not Currently    Drug use: Never    Sexual activity: Not Currently     Partners: Male   Other Topics Concern    Not on file   Social History Narrative    Alone, was     No children    Worked in construction, at the [employer], drove a truck     Social Determinants of Health     Financial Resource Strain: Not on file   Food Insecurity: Not on file   Transportation Needs: Not on file   Physical Activity: Not on file   Stress: Not on file   Social Connections: Not on file   Intimate Partner Violence: Not on file   Housing Stability: Not on file      [] Unable to obtain due to ventilated and/ or neurologic status    Home Medications:      Medications Prior to Admission: docusate sodium (COLACE) 100 MG capsule, Take 1 capsule by mouth 2 times daily (Patient taking differently: Take 100 mg by mouth 2 times daily Indications: Constipation )  furosemide (LASIX) 20 MG tablet, Take 20 mg by mouth daily Indications: High Blood Pressure Disorder   triamterene-hydroCHLOROthiazide (MAXZIDE-25) 37.5-25 MG per tablet, Take 0.5 tablets by mouth daily Indications: High Blood Pressure Disorder   metoprolol succinate (TOPROL XL) 50 MG extended release tablet, Take 50 mg by mouth daily Indications: High Blood Pressure Disorder     Current Hospital Medications:     Scheduled Meds:   vancomycin (VANCOCIN) intermittent dosing (placeholder)   Other RX Placeholder    vancomycin  1,250 mg IntraVENous Q12H    cefTRIAXone (ROCEPHIN) IV  1,000 mg IntraVENous Q24H    furosemide  20 mg Oral Daily    metoprolol succinate  50 mg Oral Daily    triamterene-hydroCHLOROthiazide  0.5 tablet Oral Daily    acetaminophen  1,000 mg Oral 3 times per day    sodium chloride flush  5-40 mL IntraVENous 2 times per day    enoxaparin  30 mg SubCUTAneous BID     Continuous Infusions:   sodium chloride       PRN Meds:.traMADol **OR** traMADol, sodium chloride flush, sodium chloride, ondansetron **OR** ondansetron, polyethylene glycol  .   sodium chloride          Allergies: Allergies   Allergen Reactions    Aspirin           REVIEW OF SYSTEMS    (2-9 systems for level 4, 10 or more for level 5)     Review of Systems   Constitutional:  Negative for fever. HENT:  Negative for hearing loss. Eyes:  Negative for pain. Respiratory:  Negative for shortness of breath. Gastrointestinal:  Negative for nausea. Endocrine: Negative for heat intolerance. Genitourinary:  Negative for difficulty urinating. Musculoskeletal:  Positive for gait problem. Negative for back pain and neck pain. Skin:  Negative for rash. Allergic/Immunologic: Negative for immunocompromised state. Neurological:  Positive for weakness. Negative for headaches. Psychiatric/Behavioral:  Negative for sleep disturbance. Except as noted above the remainder of the review of systems was reviewed and negative. Physical Exam     BP (!) 128/44   Pulse 81   Temp 98.8 °F (37.1 °C) (Oral)   Resp 18   Ht 5' 11\" (1.803 m)   Wt 256 lb (116.1 kg)   SpO2 95%   BMI 35.70 kg/m²   Body mass index is 35.7 kg/m². Physical Exam  Vitals and nursing note reviewed. Constitutional:       Appearance: Normal appearance. HENT:      Head: Normocephalic and atraumatic. Right Ear: External ear normal.      Left Ear: External ear normal.      Nose: Nose normal.      Mouth/Throat:      Pharynx: Oropharynx is clear. Eyes:      Extraocular Movements: Extraocular movements intact. Cardiovascular:      Pulses: Normal pulses. Pulmonary:      Effort: Pulmonary effort is normal.   Abdominal:      Palpations: Abdomen is soft. Genitourinary:     Rectum: Normal.   Musculoskeletal:         General: Normal range of motion. Cervical back: Normal range of motion. Right lower leg: Edema present. Left lower leg: Edema present.       Comments: Left knee erythema and swelling    lower extremity weakness which is more proximal than distal and antigravity. He has some stiffness in his legs. Reflexes at the present and somewhat hyperreflexic in the upper extremities and present up to the level of his knees he has a definite present ankle reflex on the right. No definite sensory levels are noted he has 1+ degree of pedal edema gait is deferred. Skin:     General: Skin is warm. Neurological:      General: No focal deficit present. Psychiatric:         Mood and Affect: Mood normal.        I have reviewed all laboratory studies, reports, data, and pertinent images.     Objective Findings:     Vitals:   Vitals:    03/13/23 1138 03/13/23 1510 03/13/23 1954 03/14/23 0752   BP: (!) 132/59 (!) 116/53 (!) 144/52 (!) 128/44   Pulse: 70 78 77 81   Resp: 20 20 18 18   Temp: 98.8 °F (37.1 °C) 98.6 °F (37 °C) 99.1 °F (37.3 °C) 98.8 °F (37.1 °C)   TempSrc: Oral Oral Oral Oral   SpO2: 97% 96% 96% 95%   Weight:       Height:            Laboratory, Microbiology, Pathology, Radiology, Cardiology, Medications and Transcriptions reviewed  Scheduled Meds:   vancomycin (VANCOCIN) intermittent dosing (placeholder)   Other RX Placeholder    vancomycin  1,250 mg IntraVENous Q12H    cefTRIAXone (ROCEPHIN) IV  1,000 mg IntraVENous Q24H    furosemide  20 mg Oral Daily    metoprolol succinate  50 mg Oral Daily    triamterene-hydroCHLOROthiazide  0.5 tablet Oral Daily    acetaminophen  1,000 mg Oral 3 times per day    sodium chloride flush  5-40 mL IntraVENous 2 times per day    enoxaparin  30 mg SubCUTAneous BID     Continuous Infusions:   sodium chloride         Recent Labs     03/14/23  0625   WBC 6.7   HGB 12.3*   HCT 36.6*   MCV 91.4        Recent Labs     03/12/23  0527 03/13/23  0547 03/14/23  0625    137 133*   K 4.0 3.8 4.3    101 97   CO2 26 25 22   BUN 21 19 15   CREATININE 0.66* 0.56* 0.50*     Recent Labs     03/12/23  0527 03/13/23  0547 03/14/23  0625   AST 21 19 23   ALT 9 9 11   BILITOT 1.4* 1.0* 0.9* ALKPHOS 83 89 105*     No results for input(s): LIPASE, AMYLASE in the last 72 hours. Recent Labs     03/12/23  0527 03/13/23  0547 03/14/23  0625   PROT 6.9 7.0 7.1     XR FEMUR LEFT (MIN 2 VIEWS)    Result Date: 3/11/2023  EXAMINATION: XRAY VIEWS OF THE LEFT FEMUR 3/11/2023 1:11 am COMPARISON: None. HISTORY: ORDERING SYSTEM PROVIDED HISTORY: swelling and arthroplasty TECHNOLOGIST PROVIDED HISTORY: Reason for exam:->swelling and arthroplasty What reading provider will be dictating this exam?->CRC FINDINGS: Long-stem left total knee arthroplasty in position. No acute or concerning features. Femoral diaphysis and proximal femur intact. Soft tissues unremarkable. No acute disease. RECOMMENDATION: Careful clinical correlation and follow up recommended. XR KNEE LEFT (1-2 VIEWS)    Result Date: 3/11/2023  EXAMINATION: TWO XRAY VIEWS OF THE LEFT KNEE 3/11/2023 1:11 am COMPARISON: None. HISTORY: ORDERING SYSTEM PROVIDED HISTORY: swelling and arthroplasty TECHNOLOGIST PROVIDED HISTORY: Reason for exam:->swelling and arthroplasty What reading provider will be dictating this exam?->CRC FINDINGS: Long-stem hinged total knee arthroplasty in good position. No periprosthetic fracture. No significant effusion detected. Soft tissues unremarkable. No acute disease. RECOMMENDATION: Careful clinical correlation and follow up recommended. CT Head W/O Contrast    Result Date: 3/10/2023  EXAMINATION: CT OF THE HEAD WITHOUT CONTRAST  3/10/2023 3:53 pm TECHNIQUE: CT of the head was performed without the administration of intravenous contrast. Automated exposure control, iterative reconstruction, and/or weight based adjustment of the mA/kV was utilized to reduce the radiation dose to as low as reasonably achievable. COMPARISON: None.  HISTORY: ORDERING SYSTEM PROVIDED HISTORY: weakness lower ext lt >rt TECHNOLOGIST PROVIDED HISTORY: Reason for exam:->weakness lower ext lt >rt Has a \"code stroke\" or \"stroke alert\" been called? ->No Decision Support Exception - unselect if not a suspected or confirmed emergency medical condition->Emergency Medical Condition (MA) What reading provider will be dictating this exam?->CRC FINDINGS: BRAIN/VENTRICLES: No mass effect, edema or hemorrhage is seen. Mild volume loss is seen in the cerebrum with mild chronic microvascular ischemic changes. No hydrocephalus or extra-axial fluid is seen. ORBITS: The visualized portion of the orbits demonstrate no acute abnormality. SINUSES: Moderate mucosal thickening in the paranasal sinuses. Postoperative changes from prior sinonasal surgery are seen. The mastoids are clear. SOFT TISSUES/SKULL:  No acute abnormality of the visualized skull or soft tissues. No acute intracranial abnormality. MRI CERVICAL SPINE WO CONTRAST    Result Date: 3/13/2023  EXAMINATION: MRI OF THE CERVICAL SPINE WITHOUT CONTRAST 3/13/2023 5:08 pm TECHNIQUE: Multiplanar multisequence MRI of the cervical spine was performed without the administration of intravenous contrast. COMPARISON: None. HISTORY: ORDERING SYSTEM PROVIDED HISTORY: stenosis TECHNOLOGIST PROVIDED HISTORY: Reason for exam:->stenosis What is the sedation requirement?->None What reading provider will be dictating this exam?->CRC Initial evaluation. FINDINGS: BONES/ALIGNMENT: There is normal alignment of the spine. The vertebral body heights are maintained. The bone marrow signal appears unremarkable. There is degenerative disc disease with disc space narrowing and osteophytes C3-4, C4-5, C5-6, and C6-7. SPINAL CORD:  No abnormal signal is identified within the spinal cord. SOFT TISSUES: No paraspinal mass identified. There is a lesion in the posterior soft tissues posterior to C2-3 measuring 1.8 cm that follows signal intensity of fat and likely represents a lipoma. C2-C3:  The thecal sac and neural foramina are intact. C3-C4: The thecal sac is mildly stenotic measuring 9.5 mm.   The neural foramina are intact. C4-C5: There is a disc protrusion measuring 2-3 mm. There are small osteophytes. The thecal sac is moderately stenotic measuring 8.1 mm. C5-C6: 5 there is a disc protrusion measuring 3-4 mm toward the right narrowing the right neural foramen greater than the left. The thecal sac is moderately stenotic. C6-C7: There is a disc protrusion measuring 3-4 mm narrowing the left neural foramen greater than the right. The thecal sac is moderately stenotic measuring 8.5 mm. C7-T1:  The thecal sac and neural foramina are intact. Disc and osteophytes result in narrowing of the neural foramina and stenosis of the thecal sac as discussed above. MRI LUMBAR SPINE WO CONTRAST    Result Date: 3/13/2023  EXAMINATION: MRI OF THE LUMBAR SPINE WITHOUT CONTRAST, 3/13/2023 5:42 pm TECHNIQUE: Multiplanar multisequence MRI of the lumbar spine was performed without the administration of intravenous contrast. COMPARISON: None. HISTORY: ORDERING SYSTEM PROVIDED HISTORY: stenosis TECHNOLOGIST PROVIDED HISTORY: Reason for exam:->stenosis What is the sedation requirement?->None What reading provider will be dictating this exam?->CRC FINDINGS: BONES/ALIGNMENT: Vertebral heights are maintained. Alignment is normal. Lipid poor benign hemangioma is noted in the L5 vertebral body. Mild edematous degenerative plate changes are present at L2-3 and L3-4. There are chronic fatty degenerative changes at L5-S1. No suspect osseous lesion is evident. SPINAL CORD: The conus terminates normally at the L1 level. The visualized spinal cord has normal signal and morphology. No evidence of mass or abnormal fluid collection within the spinal canal. SOFT TISSUES: Paraspinal soft tissues are unremarkable. L1-L2: Mild disc height loss and desiccation. No left neural foraminal narrowing. Mild right neural foraminal narrowing secondary to disc bulge with endplate spurring.   Mild spinal canal stenosis secondary to disc bulge and mild facet and ligamentum flavum hypertrophy. L2-L3: Mild disc height loss and desiccation. Mild bilateral neural foraminal narrowing secondary to disc bulge with endplate spurring. Mild spinal canal stenosis secondary to disc bulge and mild facet and ligamentum flavum hypertrophy. L3-L4: Mild disc height loss and desiccation. Mild bilateral neural foraminal narrowing secondary to disc bulge and endplate spurring. Mild spinal canal stenosis secondary to disc bulge and mild facet and ligamentum flavum hypertrophy. L4-L5: Mild disc height loss and desiccation. Moderate left and mild right neural foraminal narrowing secondary to disc bulge and facet hypertrophy. Mild spinal canal stenosis secondary to disc bulge and facet and ligamentum hypertrophy. L5-S1: Severe disc height loss and desiccation. Mild left and moderate right neural foraminal narrowing secondary to disc bulge with endplate spurring and facet hypertrophy. No spinal canal stenosis. 1. Multilevel degenerative disease greatest at L5-S1 and mild multilevel facet hypertrophy. 2. Mild spinal canal stenosis at the L1-2 through L4-5 levels. 3. Multilevel neural foraminal as detailed above and greatest involving the left L4 and right L5 neural foramina where it is moderate. XR CHEST PORTABLE    Result Date: 3/10/2023  EXAMINATION: ONE XRAY VIEW OF THE CHEST 3/10/2023 6:37 pm COMPARISON: 08/16/2022 HISTORY: ORDERING SYSTEM PROVIDED HISTORY: leg edema TECHNOLOGIST PROVIDED HISTORY: Reason for exam:->leg edema What reading provider will be dictating this exam?->CRC FINDINGS: The lungs are without acute focal process. There is no effusion or pneumothorax. The cardiomediastinal silhouette is without acute process. The osseous structures are without acute process. No acute process.      US DUP LOWER EXTREMITIES BILATERAL VENOUS    Result Date: 3/10/2023  EXAMINATION: DUPLEX VENOUS ULTRASOUND OF THE BILATERAL LOWER EXTREMITIES3/10/2023 4:03 pm TECHNIQUE: Duplex ultrasound using B-mode/gray scaled imaging, Doppler spectral analysis and color flow Doppler was obtained of the deep venous structures of the lower bilateral extremities. COMPARISON: Lower extremity venous duplex from March 16, 2022 HISTORY: ORDERING SYSTEM PROVIDED HISTORY: leg swelling and temp chnage lt TECHNOLOGIST PROVIDED HISTORY: Reason for exam:->leg swelling and temp chnage lt What reading provider will be dictating this exam?->CRC FINDINGS: The visualized veins of the bilateral lower extremities are patent and free of echogenic thrombus. The veins demonstrate good compressibility with normal color flow study and spectral analysis. No evidence of DVT in either lower extremity. Impression:     Left prosthetic joint knee infection delayed.   3/14/2022 left distal femoral replacement and hinged mechanism  Moderate cervical stenosis [id number], no myelopathy myelomalacia evident on MRI study        Plan:   active left knee infection contraindicates neuro spine surgery  His weakness may improve with treatment of his left knee infection and physical therapy      Comments:     Neurosurgical follow-up on an outpatient basis      Electronically signed by Moshe Zhao MD on 3/14/2023 at 12:00 PM

## 2023-03-14 NOTE — CONSULTS
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 Cultures have been negative thus far with respect to synovial fluid. He demonstrated evidence of pansensitive E. coli from urine but cannot assume concordant infection of left knee with same organism. That having been said, if this is some form of low virulence infection with E. coli, history would suggest that this is acute and would be appropriate for attempt at polyethylene liner exchange, coupled with arthrotomy and drainage as well as placement of antibiotic laden calcium sulfite pellets. As such, I do not feel that full explant with placement of static antibiotic spacer is necessary, particularly in light of the patient's advanced age and medical comorbidities. Synovial aspirate has been negative but neutrophil count and differential from that aspirate are consistent with prosthetic infection, as is his symptomatology. Emphasized 60% incidence of culture-negative prosthetic joint infections, particularly when antibiotics have been administered prior to acquisition of synovial fluid via arthrocentesis (patient had received empiric ceftriaxone in the setting of management for urinary tract infection prior to knee aspiration). We have discussed the risk, benefits, alternatives, convalescence for arthrotomy and drainage of left knee with tibial polyethylene liner exchange (including exchange of axles and bushings), and placement of antibiotic laden calcium sulfate pellets.   The risk discussed included, but were not limited to, infection (persistent or novel), DVT, pulmonary embolism, hematoma formation, wound healing complications, intraoperative and postoperative fracture, postoperative instability, injury to the extensor mechanism or collateral ligaments, cardiopulmonary complications including myocardial infarction and respiratory insufficiency, neurovascular complications including femoral/sciatic/peroneal nerve palsy and cerebrovascular accident, vascular injury, acute and/or chronic postoperative pain, osteolysis, component wear, aseptic or septic loosening of components, anesthetic complications, COVID-specific risk, and death. We have also discussed potential for recurrent or new skin infection which could necessitate consideration of full explant and placement of a static antibiotic spacer in the future. We discussed plan for 6 weeks minimum intravenous antibiotic therapy following polyethylene liner exchange and potential likelihood for chronic oral antibiotic suppression thereafter. The patient has verbalized understanding of the global plan of care and would like to proceed. He has been tentatively scheduled for this procedure on 3/16 at 1:00 PM at [facility] [place]. This will allow time for us to procure polyethylene liner, axial, and bushings for the [employer] GMRS tumor system. Maintain n.p.o. after midnight tomorrow. We will also of the patient hold anticoagulation for 12 to 24 hours prior to surgery. He will also be dosed with oral tranexamic acid prior to surgery. We will update cultures intraoperatively and monitor postoperative results, and appreciate Infectious Disease consultation and management in this regard. Continue empiric vancomycin and ceftriaxone as per Infectious Disease at this time. SUBJECTIVE patient notes stable clinical course, attempting to mobilize from bed to chair with physical therapy at this point. Culture findings have been reviewed. Surgical options were also reviewed and have been outlined in detail above.     OBJECTIVE    Physical    VITALS:  BP (!) 128/44   Pulse 81   Temp 98.8 °F (37.1 °C) (Oral)   Resp 18   Ht 5' 11\" (1.803 m)   Wt 256 lb (116.1 kg)   SpO2 95%   BMI 35.70 kg/m²   24HR INTAKE/OUTPUT:    Intake/Output Summary (Last 24 hours) at 3/14/2023 1210  Last data filed at 3/14/2023 1000  Gross per 24 hour   Intake 2010.52 ml   Output 1700 ml   Net 310.52 ml     Alert, oriented x3, cooperative with examination. Examination of the left lower extremity reveals knee with trace effusion at this point. He is able to initiate maintain a straight leg raise without extensor lag. Moderate discomfort throughout range of motion. Sensory intact light touch in the deep peroneal, superficial peroneal, common peroneal, saphenous, tibial, sural nerve distributions. DP and popliteal pulses are 2+ with capillary refill less than 2 seconds. EHL, plantarflexion, dorsiflexion, knee flexion, knee extension are 4+/5. Negative Homans' sign. Data    Salient results include negative culture from synovial fluid aspirate performed on 3/11. Markedly elevated neutrophil count and differential were previously reported with respect to that specimen. C-reactive protein was reported today, relatively stable at 143.6. Systemic white blood cell count 6.7.     Current Inpatient Medications    Current Facility-Administered Medications: vancomycin (VANCOCIN) intermittent dosing (placeholder), , Other, RX Placeholder  [COMPLETED] vancomycin (VANCOCIN) 1,500 mg in sodium chloride 0.9 % 500 mL IVPB (ADDAVIAL), 1,500 mg, IntraVENous, Once **FOLLOWED BY** vancomycin (VANCOCIN) 1,250 mg in sodium chloride 0.9 % 250 mL IVPB (ADDAVIAL), 1,250 mg, IntraVENous, Q12H  cefTRIAXone (ROCEPHIN) 1,000 mg in sodium chloride 0.9 % 50 mL IVPB (mini-bag), 1,000 mg, IntraVENous, Q24H  furosemide (LASIX) tablet 20 mg, 20 mg, Oral, Daily  metoprolol succinate (TOPROL XL) extended release tablet 50 mg, 50 mg, Oral, Daily  triamterene-hydroCHLOROthiazide (MAXZIDE-25) 37.5-25 MG per tablet 0.5 tablet, 0.5 tablet, Oral, Daily  acetaminophen (TYLENOL) tablet 1,000 mg, 1,000 mg, Oral, 3 times per day  traMADol (ULTRAM) tablet 25 mg, 25 mg, Oral, Q4H PRN **OR** traMADol (ULTRAM) tablet 50 mg, 50 mg, Oral, Q4H PRN  sodium chloride flush 0.9 % injection 5-40 mL, 5-40 mL, IntraVENous, 2 times per day  sodium chloride flush 0.9 % injection 5-40 mL, 5-40 mL, IntraVENous, PRN  0.9 % sodium chloride infusion, 25 mL, IntraVENous, PRN  enoxaparin Sodium (LOVENOX) injection 30 mg, 30 mg, SubCUTAneous, BID  ondansetron (ZOFRAN-ODT) disintegrating tablet 4 mg, 4 mg, Oral, Q8H PRN **OR** ondansetron (ZOFRAN) injection 4 mg, 4 mg, IntraVENous, Q6H PRN  polyethylene glycol (GLYCOLAX) packet 17 g, 17 g, Oral, Daily PRN    Skip Chambers MD  Orthopaedic Surgery

## 2023-03-14 NOTE — PROGRESS NOTES
[facility] Neurology Daily Progress Note  Name: Felisa Gonsalez  Age: 81 y.o. Gender: male  CodeStatus: Full Code  Allergies: Aspirin    Chief Complaint:Extremity Weakness    Primary Care Provider: Marlo Lozoya MD  InpatientTreatment Team: Treatment Team: Attending Provider: Adonis Power MD; Consulting Physician: Samantha Pineda MD; Consulting Physician: Naeem Mcgraw MD; : Alberta Porter RN; Utilization Reviewer: Isabell Colbert RN; Sandra Redmond Nurse: Matthew Yarbrough, RN; Registered Nurse: Anderson Gay, RN; Registered Nurse: Magdalena Thomas, RN; Surgeon: Kamran Owusu MD; Consulting Physician: Yony Alvares MD  Admission Date: 3/10/2023      HPI   Pt seen and examined for neuro follow up. Alert and oriented x3, no acute distress, cooperative. Afebrile. Ongoing lower extremity weakness. Left knee Ace wrap in place. Mild pedal ankle edema. Exam as noted above. Patient history of lower extremity weakness which has not changed though he has significant knee issues. Still has neck discomfort and neck pain but denies any numbness in his upper extremities. Vitals:    03/14/23 0752   BP: (!) 128/44   Pulse: 81   Resp: 18   Temp: 98.8 °F (37.1 °C)   SpO2: 95%        Review of Systems   Constitutional:  Negative for appetite change and fever. HENT:  Negative for hearing loss and trouble swallowing. Eyes:  Negative for visual disturbance. Respiratory:  Negative for cough, chest tightness, shortness of breath and wheezing. Cardiovascular:  Positive for leg swelling. Negative for chest pain and palpitations. Gastrointestinal:  Negative for nausea and vomiting. Genitourinary:  Negative for difficulty urinating. Musculoskeletal:  Positive for gait problem. Negative for back pain, neck pain and neck stiffness. Skin:  Negative for color change and rash. Neurological:  Positive for weakness.  Negative for dizziness, tremors, seizures, syncope, facial asymmetry, speech difficulty, light-headedness, numbness and headaches. Psychiatric/Behavioral:  Negative for agitation, confusion and hallucinations. The patient is not nervous/anxious. Physical Exam  Vitals and nursing note reviewed. Constitutional:       General: He is not in acute distress. Appearance: He is not diaphoretic. HENT:      Head: Normocephalic. Eyes:      Extraocular Movements: Extraocular movements intact. Pupils: Pupils are equal, round, and reactive to light. Cardiovascular:      Rate and Rhythm: Normal rate and regular rhythm. Pulmonary:      Effort: Pulmonary effort is normal. No respiratory distress. Breath sounds: Normal breath sounds. Skin:     General: Skin is warm and dry. Neurological:      Mental Status: He is alert and oriented to person, place, and time. Cranial Nerves: No cranial nerve deficit. Sensory: No sensory deficit. Motor: Weakness present.       Coordination: Coordination normal.      Gait: Gait abnormal.   As noted above with weakness in the lower extremities with intact reflexes    Exam as noted above and unchanged      Medications:  Reviewed    Infusion Medications:    [START ON 3/15/2023] lactated ringers IV soln      sodium chloride       Scheduled Medications:    [START ON 3/16/2023] tranexamic acid  1,300 mg Oral Once    vancomycin (VANCOCIN) intermittent dosing (placeholder)   Other RX Placeholder    vancomycin  1,250 mg IntraVENous Q12H    cefTRIAXone (ROCEPHIN) IV  1,000 mg IntraVENous Q24H    furosemide  20 mg Oral Daily    metoprolol succinate  50 mg Oral Daily    triamterene-hydroCHLOROthiazide  0.5 tablet Oral Daily    acetaminophen  1,000 mg Oral 3 times per day    sodium chloride flush  5-40 mL IntraVENous 2 times per day    enoxaparin  30 mg SubCUTAneous BID     PRN Meds: traMADol **OR** traMADol, sodium chloride flush, sodium chloride, ondansetron **OR** ondansetron, polyethylene glycol    Labs:   Recent Labs     03/14/23  5216 WBC 6.7   HGB 12.3*   HCT 36.6*          Recent Labs     03/12/23  0527 03/13/23  0547 03/14/23  0625    137 133*   K 4.0 3.8 4.3    101 97   CO2 26 25 22   BUN 21 19 15   CREATININE 0.66* 0.56* 0.50*   CALCIUM 8.8 8.4* 8.6       Recent Labs     03/12/23  0527 03/13/23  0547 03/14/23  0625   AST 21 19 23   ALT 9 9 11   BILITOT 1.4* 1.0* 0.9*   ALKPHOS 83 89 105*       No results for input(s): INR in the last 72 hours. Recent Labs     03/12/23  1437   CKTOTAL 248*         Urinalysis:   Lab Results   Component Value Date/Time    NITRU Negative 03/10/2023 06:00 PM    WBCUA >100 03/10/2023 06:00 PM    BACTERIA MANY 03/10/2023 06:00 PM    RBCUA 20-50 03/10/2023 06:00 PM    BLOODU MODERATE 03/10/2023 06:00 PM    SPECGRAV 1.021 03/10/2023 06:00 PM    GLUCOSEU Negative 03/10/2023 06:00 PM       Radiology:   Most recent    EEG No valid procedures specified. MRI of Brain No results found for this or any previous visit. No results found for this or any previous visit. MRA of the Head and Neck: No results found for this or any previous visit. No results found for this or any previous visit. No results found for this or any previous visit. CT of the Head: Results for orders placed during the hospital encounter of 03/10/23    CT Head W/O Contrast    Narrative  EXAMINATION:  CT OF THE HEAD WITHOUT CONTRAST  3/10/2023 3:53 pm    TECHNIQUE:  CT of the head was performed without the administration of intravenous  contrast. Automated exposure control, iterative reconstruction, and/or weight  based adjustment of the mA/kV was utilized to reduce the radiation dose to as  low as reasonably achievable. COMPARISON:  None. HISTORY:  ORDERING SYSTEM PROVIDED HISTORY: weakness lower ext lt >rt  TECHNOLOGIST PROVIDED HISTORY:  Reason for exam:->weakness lower ext lt >rt  Has a \"code stroke\" or \"stroke alert\" been called? ->No  Decision Support Exception - unselect if not a suspected or confirmed  emergency medical condition->Emergency Medical Condition (MA)  What reading provider will be dictating this exam?->CRC    FINDINGS:  BRAIN/VENTRICLES: No mass effect, edema or hemorrhage is seen. Mild volume  loss is seen in the cerebrum with mild chronic microvascular ischemic  changes. No hydrocephalus or extra-axial fluid is seen. ORBITS: The visualized portion of the orbits demonstrate no acute abnormality. SINUSES: Moderate mucosal thickening in the paranasal sinuses. Postoperative  changes from prior sinonasal surgery are seen. The mastoids are clear. SOFT TISSUES/SKULL:  No acute abnormality of the visualized skull or soft  tissues. Impression  No acute intracranial abnormality. No results found for this or any previous visit. No results found for this or any previous visit. Carotid duplex: No results found for this or any previous visit. No results found for this or any previous visit. No results found for this or any previous visit. Echo No results found for this or any previous visit. Assessment/Plan:    3/12/23:  Lower extremity weakness with paraparesis. Patient has had weakness in his legs due to a postoperative knee issues that have been going on quite some time. He was able to ambulate and on Thursday he had taken a water pill and go to the bathroom several times. Friday morning while he was on the commode he could not stand up and walk. Given the findings of my reflexes 1 would be concerned about his cervical spinal stenosis as well as lumbar spine stenosis. Recommend an MRI of the cervical and lumbar spine. Findings not Sastry of GBS given the reflexes. 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 Orthopedic surgery and infectious disease following. Plans for orthopedic surgical intervention on 3/16/2023. E. coli UTI  CRP, sed rate 63  patient on IV ceftriaxone and Vanco.  MRI of the cervical and lumbar spine pending. I have personally performed a face to face diagnostic evaluation on this patient, reviewed all data and investigations, and am the sole provider of all clinical decisions on the neurological status of this patient. Patient has some stiffness in the legs and swelling and this may be complicating the picture of his walking. His CRP and sed rate are elevated. Patient is on antibiotics. MRI of the lumbar spine and cervical spine are pending. Depending on the results of the same will further advise 60% time spent on evaluating patient    3/14/2023:  MRI of cervical and lumbar spine completed and reviewed by Dr. Analilia Aldana. He was requesting neurosurgery input for cervical spinal canal stenosis. I have personally performed a face to face diagnostic evaluation on this patient, reviewed all data and investigations, and am the sole provider of all clinical decisions on the neurological status of this patient. Exam as noted above and patient has weakness in the lower extremities somewhat due to disuse secondary to significant knee issues though with increased reflexes and MRI that I reviewed shows at least a focal compression at one-point and there for neurosurgical evaluation. Lumbar spine MRI does not show anything significant. Findings discussed with the patient and 60% time spent on evaluating patient      Werner Aldana MD, [address], American Board of Psychiatry & Neurology  Board Certified in Vascular Neurology  Board Certified in Neuromuscular Medicine  Certified in Neurorehabilitation       Collaborating physicians: Dr Analilia Aldana    Electronically signed by CHARLIE Restrepo CNP on 3/14/2023 at 1:34 PM

## 2023-03-14 NOTE — PROGRESS NOTES
Physical Therapy Med Surg Daily Treatment Note  Facility/Department: [facility]  Room: [place] [address]       NAME: Denisse Yang  : 1940 (13 y.o.)  MRN: [id number]  CODE STATUS: Full Code    Date of Service: 3/14/2023    Patient Diagnosis(es): Weakness [R53.1]  Leg swelling [M79.89]  General weakness [R53.1]  Unable to ambulate [R26.2]   Chief Complaint   Patient presents with    Extremity Weakness     Patient Active Problem List    Diagnosis Date Noted    Stenosis of cervical spine with myelopathy ([address] 2023    Urinary tract infection without hematuria 2023    Prosthetic joint infection ([address] 2023    General weakness 03/10/2023    Supracondylar fracture of femur, left, closed, with routine healing, subsequent encounter 2022    Benign prostatic hyperplasia with lower urinary tract symptoms 2022    Class 2 drug-induced obesity with serious comorbidity and body mass index (BMI) of 37.0 to 37.9 in adult 2022    Chronic sinusitis 2022    Deviated nasal septum 2022    Hyperglycemia 2022    Hypertension 2022    Osteoarthritis 2022        Past Medical History:   Diagnosis Date    Aneurysm artery, 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    Benign prostatic hyperplasia with lower urinary tract symptoms     Chronic sinusitis     History of left knee replacement 03/15/2022    at [facility]    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    HTN (hypertension)     Hx of carpal tunnel syndrome     Obesity     Other fracture of left femur, initial encounter for closed fracture ([address] 3/14/2022    Primary osteoarthritis of both knees      Past Surgical History:   Procedure Laterality Date    TOTAL KNEE ARTHROPLASTY Left 3/15/2022    LEFT KNEE DISTAL FEMUR REPLACEMENT performed by Anila Roldan MD at [facility] 97 risk       SUBJECTIVE:   Subjective: \"Hey kid I don't know what I can do\"    Pain  Pain: generalized pain all over. not rated. agreeable to getting up.    OBJECTIVE:   Orientation  Overall Orientation Status: Within Functional Limits    Bed Mobility Training  Bed Mobility Training: Yes  Overall Level of Assistance: Moderate assistance  Interventions: Safety awareness training  Rolling: Minimum assistance  Supine to Sit: Moderate assistance  Sit to Supine: Moderate assistance;Assist X2  Scooting: Minimum assistance    Transfer Training  Transfer Training: Yes  Overall Level of Assistance: Minimum assistance;Assist X2;Moderate assistance  Interventions: Safety awareness training;Tactile cues;Verbal cues  Sit to Stand: Minimum assistance;Assist X2;Moderate assistance  Stand to Sit: Minimum assistance    Gait Training: Yes  Overall Level of Assistance: Minimum assistance;Assist X2  Distance (ft): 2 Feet sideways and fwd/bwd   Assistive Device: Walker, rolling  Interventions: Safety awareness training;Tactile cues;Verbal cues  Base of Support: Widened  Speed/Susan: Fluctuations  Step Length: Left shortened  Right Side Weight Bearing: As tolerated  Left Side Weight Bearing: As tolerated  Static standing with ww. 3x.                                        ASSESSMENT pt tolerated session well and was hesitant to get into chair but agreeable. Pt able to get out of bed from right side easier than left. Pt states his left knee is very painful with weight bearing. Dr. Chance came to see pt and discussed surgery for Thursday.         Discharge Recommendations:  Continue to assess pending progress, Patient would benefit from continued therapy after discharge, Therapy recommended at discharge         Goals  Long Term Goals  Long Term Goal 1: patient will complete bed mobility with with Nessa  Long Term Goal 2: patient will complete transfers with Nessa  Long Term Goal 3: patient will stand with LRD for x2 min with SBA  Long Term Goal 4: patient will ambulate >/=50' with LRD and supervision    PLAN    General Plan: 1 time a day 3-6 times a  week        [facility] (6 CLICK) BASIC MOBILITY  AM-PAC Inpatient Mobility Raw Score : 10     Therapy Time   Individual   Time In 1140   Time Out 1203   Minutes 23   5 minutes gt  18 minutes bed mob/transfers          Shi Alonzo PTA, 03/14/23 at 1:34 PM         Definitions for assistance levels  Independent = pt does not require any physical supervision or assistance from another person for activity completion. Device may be needed.   Stand by assistance = pt requires verbal cues or instructions from another person, close to but not touching, to perform the activity  Minimal assistance= pt performs 75% or more of the activity; assistance is required to complete the activity  Moderate assistance= pt performs 50% of the activity; assistance is required to complete the activity  Maximal assistance = pt performs 25% of the activity; assistance is required to complete the activity  Dependent = pt requires total physical assistance to accomplish the task

## 2023-03-14 NOTE — PROGRESS NOTES
[address] [facility] Pharmacokinetic Monitoring Service - Vancomycin    Consulting Provider: John Jackson   Indication: bone and joint infection  Target Concentration: Goal AUC/SAPNA 400-600 mg*hr/L  Day of Therapy: 3  Additional Antimicrobials: ceftriaxone    Pertinent Laboratory Values: Wt Readings from Last 1 Encounters:   03/10/23 256 lb (116.1 kg)     Temp Readings from Last 1 Encounters:   03/14/23 98.8 °F (37.1 °C) (Oral)     Estimated Creatinine Clearance: 150 mL/min (A) (based on SCr of 0.5 mg/dL (L)). Recent Labs     03/13/23  0547 03/14/23  0625   CREATININE 0.56* 0.50*   WBC  --  6.7     Procalcitonin:   Procalcitonin   Date Value Ref Range Status   03/14/2023 0.15 0.00 - 0.15 ng/mL Final     Comment:     Suspected Sepsis:  Low likelihood of sepsis  <.50 ng/mL    Increased likelihood of sepsis 0.50-2.00 ng/mL  Antibiotics encouraged    High risk of sepsis/shock   >2.00 ng/mL  Antibiotics strongly encouraged    Suspected Lower Respiratory Tract Infections:  Low likelihood of bacterial infection  <0.24 ng/mL    Increased likelihood of bacterial infection >0.24 ng/mL  Antibiotics encouraged    With successful antibiotic therapy, PCT levels should decrease  rapidly. (Half-life of 24 to 36 hours.)    Procalcitonin values from samples collected within the first  6 hours of systemic infection may still be low. Retesting may be indicated. Values from day 1 and day 4 can be entered into the Change in  Procalcitonin Calculator to determine the patient's  Mortality Risk Prognosis  ([email or web address])    In healthy neonates, plasma Procalcitonin (PCT) concentrations  increase gradually after birth, reaching peak values at about  24 hours of age then decrease to normal values below 0.5 ng/mL  by 48-72 hours of age.        Recent vancomycin administrations                     vancomycin (VANCOCIN) 1,250 mg in sodium chloride 0.9 % 250 mL IVPB (ADDAVIAL) (mg) 1,250 mg New Bag 03/13/23 0555 vancomycin (VANCOCIN) 1,500 mg in sodium chloride 0.9 % 500 mL IVPB (ADDAVIAL) (mg) 1,500 mg New Bag 03/12/23 1641                  Plan:  Current dosing regimen of vanco 1250mg IV q12h remains appropriate and predicted to be therapeutic ; , tr 14.7  Renal function improvement  [facility] updated. Last level drawn while infusion running. Unable to submit  Repeat vancomycin concentration ordered for 3/16/23 @ 0500   Pharmacy will continue to monitor patient and adjust therapy as indicated    Thank you for the consult,  NATO Hammond [facility]  3/14/2023 11:17 AM

## 2023-03-14 NOTE — FLOWSHEET NOTE
Am nursing  assessment completed. Pt :  awake and resting in bed             Alert and oriented. Breakfast: set up  RESP:     even and non labored      Lung sounds:    diminished                             Oxygen: room air  Complaints of: denies  Pain:discomfort from buttocks  IV:  new iv SL inserted  TELE: none  Dressings:  left knee ace wrap  Precautions:   Specialty mattress           Falls:    60    Fly: 16  Chart and meds reviewed. Noted Labs:  na 133 k 4.6 bun 15 cr 0.50 w 6.7 hgb 12.3  Plan for today: new consult for neurosurgery     Call light in reach. Secure message sent to Dr Carrie Duke. To see pt later today for update on ortho and surgery plans. Electronically signed by Christina Hays RN on 3/14/2023 at 10:05 AM    1130 incont stool. Pericare and reposition. Gown change. Electronically signed by Christina Hays RN on 3/14/2023 at 11:30 AM    1200 Dr Carrie Duke by for rounds. Pt up to chair with therapy. Dr Clemencia Ortiz by for rounds. Electronically signed by Christina Hays RN on 3/14/2023 at 12:16 PM    [phone] returned to bed. Wound care by to round on buttocks.  Electronically signed by Christina Hays RN on 3/14/2023 at 1:46 PM

## 2023-03-15 PROBLEM — R26.2 UNABLE TO AMBULATE: Status: ACTIVE | Noted: 2023-03-15

## 2023-03-15 LAB
ALBUMIN SERPL-MCNC: 2.6 G/DL (ref 3.5–4.6)
ALP SERPL-CCNC: 121 U/L (ref 35–104)
ALT SERPL-CCNC: 12 U/L (ref 0–41)
ANION GAP SERPL CALCULATED.3IONS-SCNC: 8 MEQ/L (ref 9–15)
AST SERPL-CCNC: 20 U/L (ref 0–40)
BACTERIA BLD CULT: NORMAL
BACTERIA BLD CULT: NORMAL
BASOPHILS ABSOLUTE: 0 K/UL (ref 0–0.2)
BASOPHILS RELATIVE PERCENT: 0.5 %
BILIRUB SERPL-MCNC: 0.7 MG/DL (ref 0.2–0.7)
BUN SERPL-MCNC: 14 MG/DL (ref 8–23)
C-REACTIVE PROTEIN: 156.8 MG/L (ref 0–5)
CALCIUM SERPL-MCNC: 8.9 MG/DL (ref 8.5–9.9)
CHLORIDE SERPL-SCNC: 100 MEQ/L (ref 95–107)
CO2 SERPL-SCNC: 26 MEQ/L (ref 20–31)
CREAT SERPL-MCNC: 0.58 MG/DL (ref 0.7–1.2)
EOSINOPHILS ABSOLUTE: 0.3 K/UL (ref 0–0.7)
EOSINOPHILS RELATIVE PERCENT: 5.1 %
GLOBULIN SER CALC-MCNC: 4.6 G/DL (ref 2.3–3.5)
GLUCOSE SERPL-MCNC: 115 MG/DL (ref 70–99)
HCT VFR BLD CALC: 35.8 % (ref 42–52)
HEMOGLOBIN: 12.3 G/DL (ref 14–18)
LYMPHOCYTES ABSOLUTE: 1.2 K/UL (ref 1–4.8)
LYMPHOCYTES RELATIVE PERCENT: 17 %
MCH RBC QN AUTO: 30.8 PG (ref 27–31.3)
MCHC RBC AUTO-ENTMCNC: 34.3 % (ref 33–37)
MCV RBC AUTO: 90 FL (ref 79–92.2)
MONOCYTES ABSOLUTE: 0.8 K/UL (ref 0.2–0.8)
MONOCYTES RELATIVE PERCENT: 12.5 %
NEUTROPHILS ABSOLUTE: 4.4 K/UL (ref 1.4–6.5)
NEUTROPHILS RELATIVE PERCENT: 64.9 %
PDW BLD-RTO: 14.3 % (ref 11.5–14.5)
PLATELET # BLD: 241 K/UL (ref 130–400)
POTASSIUM SERPL-SCNC: 4.1 MEQ/L (ref 3.4–4.9)
PROT SERPL-MCNC: 7.2 G/DL (ref 6.3–8)
RBC # BLD: 3.98 M/UL (ref 4.7–6.1)
SODIUM SERPL-SCNC: 134 MEQ/L (ref 135–144)
WBC # BLD: 6.8 K/UL (ref 4.8–10.8)

## 2023-03-15 PROCEDURE — 99232 SBSQ HOSP IP/OBS MODERATE 35: CPT | Performed by: PSYCHIATRY & NEUROLOGY

## 2023-03-15 PROCEDURE — 85025 COMPLETE CBC W/AUTO DIFF WBC: CPT

## 2023-03-15 PROCEDURE — 2580000003 HC RX 258: Performed by: ORTHOPAEDIC SURGERY

## 2023-03-15 PROCEDURE — 86140 C-REACTIVE PROTEIN: CPT

## 2023-03-15 PROCEDURE — 80053 COMPREHEN METABOLIC PANEL: CPT

## 2023-03-15 PROCEDURE — 2580000003 HC RX 258: Performed by: NURSE PRACTITIONER

## 2023-03-15 PROCEDURE — 99232 SBSQ HOSP IP/OBS MODERATE 35: CPT | Performed by: INTERNAL MEDICINE

## 2023-03-15 PROCEDURE — APPSS15 APP SPLIT SHARED TIME 0-15 MINUTES: Performed by: NURSE PRACTITIONER

## 2023-03-15 PROCEDURE — 97116 GAIT TRAINING THERAPY: CPT

## 2023-03-15 PROCEDURE — 6360000002 HC RX W HCPCS: Performed by: ORTHOPAEDIC SURGERY

## 2023-03-15 PROCEDURE — 36415 COLL VENOUS BLD VENIPUNCTURE: CPT

## 2023-03-15 PROCEDURE — 97535 SELF CARE MNGMENT TRAINING: CPT

## 2023-03-15 PROCEDURE — 6360000002 HC RX W HCPCS: Performed by: INTERNAL MEDICINE

## 2023-03-15 PROCEDURE — 2580000003 HC RX 258: Performed by: INTERNAL MEDICINE

## 2023-03-15 PROCEDURE — 6370000000 HC RX 637 (ALT 250 FOR IP): Performed by: ORTHOPAEDIC SURGERY

## 2023-03-15 PROCEDURE — 1210000000 HC MED SURG R&B

## 2023-03-15 PROCEDURE — 6370000000 HC RX 637 (ALT 250 FOR IP): Performed by: INTERNAL MEDICINE

## 2023-03-15 RX ADMIN — ACETAMINOPHEN 1000 MG: 500 TABLET ORAL at 15:34

## 2023-03-15 RX ADMIN — FUROSEMIDE 20 MG: 20 TABLET ORAL at 10:14

## 2023-03-15 RX ADMIN — ACETAMINOPHEN 1000 MG: 500 TABLET ORAL at 06:28

## 2023-03-15 RX ADMIN — VANCOMYCIN HYDROCHLORIDE 1250 MG: 1.25 INJECTION, POWDER, LYOPHILIZED, FOR SOLUTION INTRAVENOUS at 06:35

## 2023-03-15 RX ADMIN — CEFTRIAXONE SODIUM 1000 MG: 1 INJECTION, POWDER, FOR SOLUTION INTRAMUSCULAR; INTRAVENOUS at 10:11

## 2023-03-15 RX ADMIN — TRIAMTERENE AND HYDROCHLOROTHIAZIDE 0.5 TABLET: 37.5; 25 TABLET ORAL at 10:14

## 2023-03-15 RX ADMIN — ACETAMINOPHEN 1000 MG: 500 TABLET ORAL at 22:08

## 2023-03-15 RX ADMIN — SODIUM CHLORIDE, PRESERVATIVE FREE 10 ML: 5 INJECTION INTRAVENOUS at 10:15

## 2023-03-15 RX ADMIN — ENOXAPARIN SODIUM 30 MG: 100 INJECTION SUBCUTANEOUS at 20:05

## 2023-03-15 RX ADMIN — METOPROLOL SUCCINATE 50 MG: 50 TABLET, FILM COATED, EXTENDED RELEASE ORAL at 10:14

## 2023-03-15 RX ADMIN — VANCOMYCIN HYDROCHLORIDE 1250 MG: 1.25 INJECTION, POWDER, LYOPHILIZED, FOR SOLUTION INTRAVENOUS at 19:16

## 2023-03-15 RX ADMIN — SODIUM CHLORIDE, POTASSIUM CHLORIDE, SODIUM LACTATE AND CALCIUM CHLORIDE: 600; 310; 30; 20 INJECTION, SOLUTION INTRAVENOUS at 20:10

## 2023-03-15 RX ADMIN — ENOXAPARIN SODIUM 30 MG: 100 INJECTION SUBCUTANEOUS at 10:14

## 2023-03-15 RX ADMIN — SODIUM CHLORIDE, PRESERVATIVE FREE 10 ML: 5 INJECTION INTRAVENOUS at 20:06

## 2023-03-15 ASSESSMENT — PAIN DESCRIPTION - DESCRIPTORS
DESCRIPTORS: DISCOMFORT
DESCRIPTORS: ACHING

## 2023-03-15 ASSESSMENT — PAIN SCALES - GENERAL
PAINLEVEL_OUTOF10: 3
PAINLEVEL_OUTOF10: 2
PAINLEVEL_OUTOF10: 2
PAINLEVEL_OUTOF10: 4

## 2023-03-15 ASSESSMENT — ENCOUNTER SYMPTOMS
VOMITING: 0
TROUBLE SWALLOWING: 0
BACK PAIN: 0
NAUSEA: 0
COLOR CHANGE: 0
WHEEZING: 0
COUGH: 0
CHEST TIGHTNESS: 0
SHORTNESS OF BREATH: 0

## 2023-03-15 ASSESSMENT — PAIN DESCRIPTION - LOCATION
LOCATION: LEG
LOCATION: KNEE
LOCATION: BUTTOCKS

## 2023-03-15 ASSESSMENT — PAIN DESCRIPTION - ORIENTATION
ORIENTATION: RIGHT;LEFT
ORIENTATION: LEFT

## 2023-03-15 NOTE — PROGRESS NOTES
Physician Progress Note      PATIENT:               India Domingo  CSN #:                  [id number]  :                       1940  ADMIT DATE:       3/10/2023 2:54 PM  [address] [place] DATE:  RESPONDING  PROVIDER #:        Emma Lovett MD          QUERY TEXT:    Pt admitted with  left knee prosthetic infection and UTI. Noted on admission   to have WBC 12.4, Lactic acid 2.9, .3, HR 91 and UC positive for E.   Coli. If possible, please document in the progress notes and discharge   summary if you are evaluating and/or treating any of the following: The medical record reflects the following:  Risk Factors:  left knee prosthetic infection and UTI  Clinical Indicators: WBC 12.4, Lactic acid 2.9, .3-156.8; HR 91; UC   positive for E. Coli; body fluid cx pending, BC pending  Treatment: Ortho consult, knee aspiration w/fluid cx, IV Rocephin, IV Vanco,   UA/UC, BCx2, labs and monitoring    Thank you,  Hamilton Walsh   Clinical Documentation Improvement Specialist  W: [phone]  Options provided:  -- Sepsis, present on admission  -- left knee prosthetic infection and UTI without Sepsis  -- Other - I will add my own diagnosis  -- Disagree - Not applicable / Not valid  -- Disagree - Clinically unable to determine / Unknown  -- Refer to Clinical Documentation Reviewer    PROVIDER RESPONSE TEXT:    Provider is clinically unable to determine a response to this query.     Query created by: Jessica Simmons on 3/15/2023 1:39 PM      Electronically signed by:  Emma Lovett MD 3/15/2023 5:40 PM

## 2023-03-15 NOTE — PROGRESS NOTES
62-year-old male with BLE weakness. Being treated for UTI. Found to have prosthetic joint infection. Physical Exam:     General: Patient appears in no acute distress, cooperative  Skin: no new rashes or erythema or induration  HEENT: EOMI, MMM, Neck is supple  Heart: S1 S2  Lungs: bilaterally clear to auscultation  Abdomen: soft, ND, NTTP, +BS  Extrem LE weakness  Neuro exam: CN II-XII intact, facial expressions symmertrical bilaterally, no slurred speech        Labs: I have reviewed all lab results by electronic record, including most recent CBC, metabolic panel, and pertinent abnormalities were addressed from an infectious disease perspective. WBC trends are being monitored. Lab Results   Component Value Date/Time      03/12/2023 05:27 AM     K 4.0 03/12/2023 05:27 AM      03/12/2023 05:27 AM     CO2 26 03/12/2023 05:27 AM     BUN 21 03/12/2023 05:27 AM     CREATININE 0.66 03/12/2023 05:27 AM     GLUCOSE 122 03/12/2023 05:27 AM     CALCIUM 8.8 03/12/2023 05:27 AM            Lab Results   Component Value Date     WBC 11.5 (H) 03/11/2023     HGB 13.2 (L) 03/11/2023     HCT 38.9 (L) 03/11/2023     MCV 91.7 03/11/2023      03/11/2023         Radiology:   I have reviewed imaging results per electronic record and most pertinent abnormalities are being addressed from an infectious disease standpoint. Assessment:  E coli UTI  Generalized weakness  L knee PJI  Possible spinal stenosis        Plan:    Ortho following - aspirate with evidence of PJI, CX pending. Surgery 3/16  Agree with Vanco and Ceftriaxone. Direct abx with cx results.

## 2023-03-15 NOTE — PROGRESS NOTES
Physical Therapy Med Surg Daily Treatment Note  Facility/Department: [facility]  [id number]       NAME: Mane Heart  : 1940 (89 y.o.)  MRN: [id number]  CODE STATUS: Full Code    Date of Service: 3/15/2023    Patient Diagnosis(es): Weakness [R53.1]  Leg swelling [M79.89]  General weakness [R53.1]  Unable to ambulate [R26.2]   Chief Complaint   Patient presents with    Extremity Weakness     Patient Active Problem List    Diagnosis Date Noted    Stenosis of cervical spine with myelopathy ([facility] [address] 2023    Urinary tract infection without hematuria 2023    Prosthetic joint infection ([facility] [address] 2023    General weakness 03/10/2023    Supracondylar fracture of femur, left, closed, with routine healing, subsequent encounter 2022    Benign prostatic hyperplasia with lower urinary tract symptoms 2022    Class 2 drug-induced obesity with serious comorbidity and body mass index (BMI) of 37.0 to 37.9 in adult 2022    Chronic sinusitis 2022    Deviated nasal septum 2022    Hyperglycemia 2022    Hypertension 2022    Osteoarthritis 2022        Past Medical History:   Diagnosis Date    Aneurysm artery, pulmonary ([facility] [address]     not surgical    Benign prostatic hyperplasia with lower urinary tract symptoms     Chronic sinusitis     History of left knee replacement 03/15/2022    at [facility]    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    HTN (hypertension)     Hx of carpal tunnel syndrome     Obesity     Other fracture of left femur, initial encounter for closed fracture ([facility] [address] 3/14/2022    Primary osteoarthritis of both knees      Past Surgical History:   Procedure Laterality Date    TOTAL KNEE ARTHROPLASTY Left 3/15/2022    LEFT KNEE DISTAL FEMUR REPLACEMENT performed by Kinjal Quevedo MD at [address] [place] [address],[address]: fall risk       SUBJECTIVE:   Subjective: \"I just hope I feel better after my surgery\"    Pain  Pain: pain in left knee with bed mobility movement. not rated. \"uncomfortable\"    OBJECTIVE:   Orientation  Overall Orientation Status: Within Functional Limits  Cognition  Overall Cognitive Status: WFL    Bed Mobility Training  Bed Mobility Training: Yes  Overall Level of Assistance: Contact-guard assistance;Minimum assistance (from right side)  Interventions: Safety awareness training  Rolling: Stand-by assistance;Contact-guard assistance  Supine to Sit: Contact-guard assistance;Minimum assistance  Scooting: Contact-guard assistance;Stand-by assistance    Transfer Training  Transfer Training: Yes  Overall Level of Assistance: Minimum assistance; Moderate assistance  Interventions: Safety awareness training; Tactile cues; Verbal cues  Sit to Stand: Minimum assistance; Moderate assistance  Stand to Sit: Minimum assistance    Gait Training: Yes  Overall Level of Assistance: Minimum assistance (2nd person for IV management only.)  Distance (ft): 3 Feet  Assistive Device: Walker, rolling  Interventions: Safety awareness training; Tactile cues; Verbal cues  Base of Support: Widened  Speed/Susan: Fluctuations  Step Length: Left shortened  Gait Abnormalities: Antalgic  Right Side Weight Bearing: As tolerated  Left Side Weight Bearing: As tolerated                                         ASSESSMENT pt improving with all aspects of his mobility. Pt tolerated static standing and seated static balance this date. Pt agreeable to getting up in chair. C/o being dizzy with initial supine to sit motion but is goes away after a few minutes.          Discharge Recommendations:  Continue to assess pending progress, Patient would benefit from continued therapy after discharge, Therapy recommended at discharge         Goals  Long Term Goals  Long Term Goal 1: patient will complete bed mobility with with Nessa  Long Term Goal 2: patient will complete transfers with Nessa  Long Term Goal 3: patient will stand with LRD for x2 min with SBA  Long Term Goal 4: patient will ambulate >/=50' with LRD and supervision    PLAN    General Plan: 1 time a day 3-6 times a week        AMPAC (6 CLICK) BASIC MOBILITY  AM-PAC Inpatient Mobility Raw Score : 12     Therapy Time   Individual   Time In  1005   Time Out 1028   Minutes  23   5 minutes gt  18 minutes bed mob/transfers/standing. Shi Alonzo PTA, 03/15/23 at 12:31 PM         Definitions for assistance levels  Independent = pt does not require any physical supervision or assistance from another person for activity completion. Device may be needed.   Stand by assistance = pt requires verbal cues or instructions from another person, close to but not touching, to perform the activity  Minimal assistance= pt performs 75% or more of the activity; assistance is required to complete the activity  Moderate assistance= pt performs 50% of the activity; assistance is required to complete the activity  Maximal assistance = pt performs 25% of the activity; assistance is required to complete the activity  Dependent = pt requires total physical assistance to accomplish the task

## 2023-03-15 NOTE — PROGRESS NOTES
Hospitalist Progress Note      PCP: Hugh Villanueva MD    Date of Admission: 3/10/2023    Chief Complaint:  no acute events, afebrile, stable HD    Medications:  Reviewed    Infusion Medications    lactated ringers IV soln      sodium chloride       Scheduled Medications    [START ON 3/16/2023] tranexamic acid  1,300 mg Oral Once    vancomycin (VANCOCIN) intermittent dosing (placeholder)   Other RX Placeholder    vancomycin  1,250 mg IntraVENous Q12H    cefTRIAXone (ROCEPHIN) IV  1,000 mg IntraVENous Q24H    furosemide  20 mg Oral Daily    metoprolol succinate  50 mg Oral Daily    triamterene-hydroCHLOROthiazide  0.5 tablet Oral Daily    acetaminophen  1,000 mg Oral 3 times per day    sodium chloride flush  5-40 mL IntraVENous 2 times per day    enoxaparin  30 mg SubCUTAneous BID     PRN Meds: traMADol **OR** traMADol, sodium chloride flush, sodium chloride, ondansetron **OR** ondansetron, polyethylene glycol      Intake/Output Summary (Last 24 hours) at 3/15/2023 1411  Last data filed at 3/14/2023 1850  Gross per 24 hour   Intake 360 ml   Output 650 ml   Net -290 ml         Exam:    BP (!) 122/56   Pulse 78   Temp 98.3 °F (36.8 °C)   Resp 19   Ht 5' 11\" (1.803 m)   Wt 256 lb (116.1 kg)   SpO2 98%   BMI 35.70 kg/m²     General appearance: appears stated age and cooperative. Respiratory:  clear to auscultation, bilaterally   Cardiovascular: Regular rate and rhythm, S1/S2. Abdomen: Soft, active bowel sounds. Musculoskeletal: left knee is covered withy dressing.       Labs:   Recent Labs     03/14/23  0625 03/15/23  0533   WBC 6.7 6.8   HGB 12.3* 12.3*   HCT 36.6* 35.8*    241       Recent Labs     03/13/23  0547 03/14/23  0625 03/15/23  0533    133* 134*   K 3.8 4.3 4.1    97 100   CO2 25 22 26   BUN 19 15 14   CREATININE 0.56* 0.50* 0.58*   CALCIUM 8.4* 8.6 8.9       Recent Labs     03/13/23  0547 03/14/23  0625 03/15/23  0533   AST 19 23 20   ALT 9 11 12   BILITOT 1.0* 0.9* 0.7   ALKPHOS 89 105* 121*       No results for input(s): INR in the last 72 hours. Recent Labs     03/12/23  1437   CKTOTAL 248*         Urinalysis:      Lab Results   Component Value Date/Time    NITRU Negative 03/10/2023 06:00 PM    WBCUA >100 03/10/2023 06:00 PM    BACTERIA MANY 03/10/2023 06:00 PM    RBCUA 20-50 03/10/2023 06:00 PM    BLOODU MODERATE 03/10/2023 06:00 PM    SPECGRAV 1.021 03/10/2023 06:00 PM    GLUCOSEU Negative 03/10/2023 06:00 PM       Radiology:  MRI LUMBAR SPINE WO CONTRAST   Final Result   1. Multilevel degenerative disease greatest at L5-S1 and mild multilevel   facet hypertrophy. 2. Mild spinal canal stenosis at the L1-2 through L4-5 levels. 3. Multilevel neural foraminal as detailed above and greatest involving the   left L4 and right L5 neural foramina where it is moderate. MRI CERVICAL SPINE WO CONTRAST   Final Result   Disc and osteophytes result in narrowing of the neural foramina and stenosis   of the thecal sac as discussed above. XR KNEE LEFT (1-2 VIEWS)   Final Result   No acute disease. RECOMMENDATION:   Careful clinical correlation and follow up recommended. XR FEMUR LEFT (MIN 2 VIEWS)   Final Result   No acute disease. RECOMMENDATION:   Careful clinical correlation and follow up recommended. XR CHEST PORTABLE   Final Result   No acute process. US DUP LOWER EXTREMITIES BILATERAL VENOUS   Final Result   No evidence of DVT in either lower extremity. CT Head W/O Contrast   Final Result   No acute intracranial abnormality. US RETROPERITONEAL COMPLETE    (Results Pending)           Assessment/Plan:    82 y/o female with history of HTN, obesity, s/p left TKA who presented with:     Bilateral LE weakness   - in the setting of left knee prosthetic joint infection, E. coli UTI, possible spinal stenosis  - s/p left knee aspiration on 3/11, fluid culture no growth  - blood cultures no growth, urine culture grew E. Coli  - on IV Vancomycin and Ceftriaxone  - plan for surgical intervention on 3/16 per ortho  - ID following    HTN  - continue home meds    Diet: ADULT DIET; Regular; No Added Salt (3-4 gm)  ADULT ORAL NUTRITION SUPPLEMENT; Lunch, Dinner;  Wound Healing Oral Supplement  Diet NPO Exceptions are: Sips of Water with Meds  Diet NPO Exceptions are: Sips of Water with Meds    Code Status: Full Code      Disposition - SNF/acute rehab,  following          Electronically signed by Jose Francisco Hebert MD on 3/15/2023 at 2:11 PM

## 2023-03-15 NOTE — PROGRESS NOTES
[facility] Neurology Daily Progress Note  Name: Mane Heart  Age: 81 y.o. Gender: male  CodeStatus: Full Code  Allergies: Aspirin    Chief Complaint:Extremity Weakness    Primary Care Provider: Maddison Ghosh MD  InpatientTreatment Team: Treatment Team: Attending Provider: Mika Aguilar MD; Consulting Physician: Ruth Romero MD; Consulting Physician: Junie Cooper MD; Utilization Reviewer: Valerie Ross RN; [facility] Nurse: Matthew Powell RN; Surgeon: Delaney Aguilar MD; Consulting Physician: Brittney Wayne MD; Registered Nurse: Daniel Hardy, RN; Patient Care Tech: Lova Lust; Registered Nurse: Bryant Vargas, RN; Patient Care Tech: [place] Reel  Admission Date: 3/10/2023      HPI   Pt seen and examined for neuro follow up. Alert and oriented x3, no acute distress, cooperative. Afebrile. Ongoing lower extremity weakness. Left knee Ace wrap in place. Mild pedal ankle edema. Sitting up in chair. Exam as noted above. Patient continues to have lower extremity weakness and neck pain. His knee issues appears to be complex. Neurosurgery consultation noted        Vitals:    03/15/23 1014   BP: (!) 122/56   Pulse: 78   Resp:    Temp:    SpO2:         Review of Systems   Constitutional:  Negative for appetite change and fever. HENT:  Negative for hearing loss and trouble swallowing. Eyes:  Negative for visual disturbance. Respiratory:  Negative for cough, chest tightness, shortness of breath and wheezing. Cardiovascular:  Positive for leg swelling. Negative for chest pain and palpitations. Gastrointestinal:  Negative for nausea and vomiting. Genitourinary:  Negative for difficulty urinating. Musculoskeletal:  Positive for gait problem. Negative for back pain, neck pain and neck stiffness. Skin:  Negative for color change and rash. Neurological:  Positive for weakness.  Negative for dizziness, tremors, seizures, syncope, facial asymmetry, speech difficulty, light-headedness, numbness and headaches. Psychiatric/Behavioral:  Negative for agitation, confusion and hallucinations. The patient is not nervous/anxious. Physical Exam  Vitals and nursing note reviewed. Constitutional:       General: He is not in acute distress. Appearance: He is not diaphoretic. HENT:      Head: Normocephalic. Eyes:      Extraocular Movements: Extraocular movements intact. Pupils: Pupils are equal, round, and reactive to light. Cardiovascular:      Rate and Rhythm: Normal rate and regular rhythm. Pulmonary:      Effort: Pulmonary effort is normal. No respiratory distress. Breath sounds: Normal breath sounds. Skin:     General: Skin is warm and dry. Neurological:      Mental Status: He is alert and oriented to person, place, and time. Cranial Nerves: No cranial nerve deficit. Sensory: No sensory deficit. Motor: Weakness present.       Coordination: Coordination normal.      Gait: Gait abnormal.   As noted above with weakness in the lower extremities with intact reflexes exam is unchanged from my evaluation yesterday    Exam as noted above and unchanged      Medications:  Reviewed    Infusion Medications:    lactated ringers IV soln      sodium chloride       Scheduled Medications:    [START ON 3/16/2023] tranexamic acid  1,300 mg Oral Once    vancomycin (VANCOCIN) intermittent dosing (placeholder)   Other RX Placeholder    vancomycin  1,250 mg IntraVENous Q12H    cefTRIAXone (ROCEPHIN) IV  1,000 mg IntraVENous Q24H    furosemide  20 mg Oral Daily    metoprolol succinate  50 mg Oral Daily    triamterene-hydroCHLOROthiazide  0.5 tablet Oral Daily    acetaminophen  1,000 mg Oral 3 times per day    sodium chloride flush  5-40 mL IntraVENous 2 times per day    enoxaparin  30 mg SubCUTAneous BID     PRN Meds: traMADol **OR** traMADol, sodium chloride flush, sodium chloride, ondansetron **OR** ondansetron, polyethylene glycol    Labs:   Recent Labs     03/14/23  4215 03/15/23  0533   WBC 6.7 6.8   HGB 12.3* 12.3*   HCT 36.6* 35.8*    241       Recent Labs     03/13/23  0547 03/14/23 0625 03/15/23  0533    133* 134*   K 3.8 4.3 4.1    97 100   CO2 25 22 26   BUN 19 15 14   CREATININE 0.56* 0.50* 0.58*   CALCIUM 8.4* 8.6 8.9       Recent Labs     03/13/23  0547 03/14/23 0625 03/15/23  0533   AST 19 23 20   ALT 9 11 12   BILITOT 1.0* 0.9* 0.7   ALKPHOS 89 105* 121*       No results for input(s): INR in the last 72 hours. Recent Labs     03/12/23  1437   CKTOTAL 248*         Urinalysis:   Lab Results   Component Value Date/Time    NITRU Negative 03/10/2023 06:00 PM    WBCUA >100 03/10/2023 06:00 PM    BACTERIA MANY 03/10/2023 06:00 PM    RBCUA 20-50 03/10/2023 06:00 PM    BLOODU MODERATE 03/10/2023 06:00 PM    SPECGRAV 1.021 03/10/2023 06:00 PM    GLUCOSEU Negative 03/10/2023 06:00 PM       Radiology:   Most recent    EEG No valid procedures specified. MRI of Brain No results found for this or any previous visit. No results found for this or any previous visit. MRA of the Head and Neck: No results found for this or any previous visit. No results found for this or any previous visit. No results found for this or any previous visit. CT of the Head: Results for orders placed during the hospital encounter of 03/10/23    CT Head W/O Contrast    Narrative  EXAMINATION:  CT OF THE HEAD WITHOUT CONTRAST  3/10/2023 3:53 pm    TECHNIQUE:  CT of the head was performed without the administration of intravenous  contrast. Automated exposure control, iterative reconstruction, and/or weight  based adjustment of the mA/kV was utilized to reduce the radiation dose to as  low as reasonably achievable. COMPARISON:  None.     HISTORY:  ORDERING SYSTEM PROVIDED HISTORY: weakness lower ext lt >rt  TECHNOLOGIST PROVIDED HISTORY:  Reason for exam:->weakness lower ext lt >rt  Has a \"code stroke\" or \"stroke alert\" been called? ->No  Decision Support Exception - unselect if not a suspected or confirmed  emergency medical condition->Emergency Medical Condition (MA)  What reading provider will be dictating this exam?->CRC    FINDINGS:  BRAIN/VENTRICLES: No mass effect, edema or hemorrhage is seen. Mild volume  loss is seen in the cerebrum with mild chronic microvascular ischemic  changes. No hydrocephalus or extra-axial fluid is seen. ORBITS: The visualized portion of the orbits demonstrate no acute abnormality. SINUSES: Moderate mucosal thickening in the paranasal sinuses. Postoperative  changes from prior sinonasal surgery are seen. The mastoids are clear. SOFT TISSUES/SKULL:  No acute abnormality of the visualized skull or soft  tissues. Impression  No acute intracranial abnormality. No results found for this or any previous visit. No results found for this or any previous visit. Carotid duplex: No results found for this or any previous visit. No results found for this or any previous visit. No results found for this or any previous visit. Echo No results found for this or any previous visit. Assessment/Plan:    3/12/23:  Lower extremity weakness with paraparesis. Patient has had weakness in his legs due to a postoperative knee issues that have been going on quite some time. He was able to ambulate and on Thursday he had taken a water pill and go to the bathroom several times. Friday morning while he was on the commode he could not stand up and walk. Given the findings of my reflexes 1 would be concerned about his cervical spinal stenosis as well as lumbar spine stenosis. Recommend an MRI of the cervical and lumbar spine. Findings not Sastry of GBS given the reflexes.   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 Orthopedic surgery and infectious disease following. Plans for orthopedic surgical intervention on 3/16/2023. E. coli UTI  CRP, sed rate 63  patient on IV ceftriaxone and Vanco.  MRI of the cervical and lumbar spine pending. I have personally performed a face to face diagnostic evaluation on this patient, reviewed all data and investigations, and am the sole provider of all clinical decisions on the neurological status of this patient. Patient has some stiffness in the legs and swelling and this may be complicating the picture of his walking. His CRP and sed rate are elevated. Patient is on antibiotics. MRI of the lumbar spine and cervical spine are pending. Depending on the results of the same will further advise 60% time spent on evaluating patient    3/14/2023:  MRI of cervical and lumbar spine completed and reviewed by Dr. Walterine Babinski. He was requesting neurosurgery input for cervical spinal canal stenosis. I have personally performed a face to face diagnostic evaluation on this patient, reviewed all data and investigations, and am the sole provider of all clinical decisions on the neurological status of this patient. Exam as noted above and patient has weakness in the lower extremities somewhat due to disuse secondary to significant knee issues though with increased reflexes and MRI that I reviewed shows at least a focal compression at one-point and there for neurosurgical evaluation. Lumbar spine MRI does not show anything significant. Findings discussed with the patient and 60% time spent on evaluating patient    3/15/2023:  Neurosurgery evaluation complete. Patient not a candidate for surgery at this time given ongoing infection and can follow-up outpatient. Will follow at a distance.     I have personally performed a face to face diagnostic evaluation on this patient, reviewed all data and investigations, and am the sole provider of all clinical decisions on the neurological status of this patient. Patient's neurosurgical evaluation completed and surgery is not recommended though it may require further intervention at some point. He is not a candidate at this time. Patient's main issues appears to be his knee which is being drained tomorrow depending on the results will further advise. I truly feel that he is likely to require surgical intervention at some point in time. 60% time spent on evaluating patient      Werner Raygoza MD, [address] American Board of Psychiatry & Neurology  Board Certified in Vascular Neurology  Board Certified in Neuromuscular Medicine  Certified in Neurorehabilitation       Collaborating physicians: Dr Sammie Raygoza    Electronically signed by CHARLIE Villarreal CNP on 3/15/2023 at 1:24 PM

## 2023-03-15 NOTE — PROGRESS NOTES
[facility] Pharmacokinetic Monitoring Service - Vancomycin    Consulting Provider: Brian Monte   Indication: bone and joint infection  Target Concentration: Goal AUC/SAPNA 400-600 mg*hr/L  Day of Therapy: 4  Additional Antimicrobials: ceftriaxone    Pertinent Laboratory Values: Wt Readings from Last 1 Encounters:   03/10/23 256 lb (116.1 kg)     Temp Readings from Last 1 Encounters:   03/15/23 98.3 °F (36.8 °C)     Estimated Creatinine Clearance: 129 mL/min (A) (based on SCr of 0.58 mg/dL (L)).   Recent Labs     03/14/23  0625 03/15/23  0533   CREATININE 0.50* 0.58*   WBC 6.7 6.8       Recent vancomycin administrations                     vancomycin (VANCOCIN) 1,250 mg in sodium chloride 0.9 % 250 mL IVPB (ADDAVIAL) (mg) 1,250 mg New Bag 03/15/23 0635     1,250 mg New Bag 03/14/23 1810     1,250 mg New Bag  0602     1,250 mg New Bag 03/13/23 1818     1,250 mg New Bag  0557    vancomycin (VANCOCIN) 1,500 mg in sodium chloride 0.9 % 500 mL IVPB (ADDAVIAL) (mg) 1,500 mg New Bag 03/12/23 1641                  Plan:  Current dosing regimen is therapeutic  Continue current dose Vancomycin 1250 mg IV q 12hours  InsightRX updated, predicted values to be , trough 14.7  Renal function is stable  Repeat vancomycin concentration ordered for 03/16/23 @ 0500   Pharmacy will continue to monitor patient and adjust therapy as indicated    Thank you for the consult,  Prudencio De Santiago, [facility]  3/15/2023 2:02 PM

## 2023-03-15 NOTE — PROGRESS NOTES
45-year-old male with BLE weakness. Being treated for UTI. Found to have prosthetic joint infection. Physical Exam:     General: Patient appears in no acute distress, cooperative  Skin: no new rashes or erythema or induration  HEENT: EOMI, MMM, Neck is supple  Heart: S1 S2  Lungs: bilaterally clear to auscultation  Abdomen: soft, ND, NTTP, +BS  Extrem LE weakness  Neuro exam: CN II-XII intact, facial expressions symmertrical bilaterally, no slurred speech        Labs: I have reviewed all lab results by electronic record, including most recent CBC, metabolic panel, and pertinent abnormalities were addressed from an infectious disease perspective. WBC trends are being monitored. Lab Results   Component Value Date/Time      03/12/2023 05:27 AM     K 4.0 03/12/2023 05:27 AM      03/12/2023 05:27 AM     CO2 26 03/12/2023 05:27 AM     BUN 21 03/12/2023 05:27 AM     CREATININE 0.66 03/12/2023 05:27 AM     GLUCOSE 122 03/12/2023 05:27 AM     CALCIUM 8.8 03/12/2023 05:27 AM            Lab Results   Component Value Date     WBC 11.5 (H) 03/11/2023     HGB 13.2 (L) 03/11/2023     HCT 38.9 (L) 03/11/2023     MCV 91.7 03/11/2023      03/11/2023         Radiology:   I have reviewed imaging results per electronic record and most pertinent abnormalities are being addressed from an infectious disease standpoint. Assessment:  E coli UTI  Generalized weakness  L knee PJI  Possible spinal stenosis        Plan:    Ortho following - aspirate with evidence of PJI, CX pending. Surgery 3/16  Agree with Vanco and Ceftriaxone. Direct abx with cx results.

## 2023-03-16 ENCOUNTER — APPOINTMENT (OUTPATIENT)
Dept: ULTRASOUND IMAGING | Age: 83
End: 2023-03-16
Payer: MEDICARE

## 2023-03-16 ENCOUNTER — ANESTHESIA (OUTPATIENT)
Dept: OPERATING ROOM | Age: 83
End: 2023-03-16
Payer: MEDICARE

## 2023-03-16 LAB
ALBUMIN SERPL-MCNC: 2.6 G/DL (ref 3.5–4.6)
ALP SERPL-CCNC: 141 U/L (ref 35–104)
ALT SERPL-CCNC: 15 U/L (ref 0–41)
ANION GAP SERPL CALCULATED.3IONS-SCNC: 9 MEQ/L (ref 9–15)
AST SERPL-CCNC: 28 U/L (ref 0–40)
BASOPHILS # BLD: 0.1 K/UL (ref 0–0.2)
BASOPHILS NFR BLD: 1 %
BILIRUB SERPL-MCNC: 0.5 MG/DL (ref 0.2–0.7)
BUN SERPL-MCNC: 16 MG/DL (ref 8–23)
CALCIUM SERPL-MCNC: 8.7 MG/DL (ref 8.5–9.9)
CHLORIDE SERPL-SCNC: 103 MEQ/L (ref 95–107)
CO2 SERPL-SCNC: 27 MEQ/L (ref 20–31)
CREAT SERPL-MCNC: 0.58 MG/DL (ref 0.7–1.2)
CRP SERPL HS-MCNC: 152.2 MG/L (ref 0–5)
EOSINOPHIL # BLD: 0.4 K/UL (ref 0–0.7)
EOSINOPHIL NFR BLD: 5.1 %
ERYTHROCYTE [DISTWIDTH] IN BLOOD BY AUTOMATED COUNT: 14.1 % (ref 11.5–14.5)
GLOBULIN SER CALC-MCNC: 4.3 G/DL (ref 2.3–3.5)
GLUCOSE SERPL-MCNC: 110 MG/DL (ref 70–99)
HCT VFR BLD AUTO: 35 % (ref 42–52)
HGB BLD-MCNC: 12 G/DL (ref 14–18)
LYMPHOCYTES # BLD: 1.2 K/UL (ref 1–4.8)
LYMPHOCYTES NFR BLD: 16.9 %
MCH RBC QN AUTO: 31.8 PG (ref 27–31.3)
MCHC RBC AUTO-ENTMCNC: 34.3 % (ref 33–37)
MCV RBC AUTO: 92.8 FL (ref 79–92.2)
MONOCYTES # BLD: 0.9 K/UL (ref 0.2–0.8)
MONOCYTES NFR BLD: 12.9 %
NEUTROPHILS # BLD: 4.5 K/UL (ref 1.4–6.5)
NEUTS SEG NFR BLD: 64.1 %
PLATELET # BLD AUTO: 240 K/UL (ref 130–400)
POTASSIUM SERPL-SCNC: 4 MEQ/L (ref 3.4–4.9)
PROT SERPL-MCNC: 6.9 G/DL (ref 6.3–8)
RBC # BLD AUTO: 3.77 M/UL (ref 4.7–6.1)
SODIUM SERPL-SCNC: 139 MEQ/L (ref 135–144)
VANCOMYCIN TROUGH SERPL-MCNC: 13.1 UG/ML (ref 10–20)
WBC # BLD AUTO: 7.1 K/UL (ref 4.8–10.8)

## 2023-03-16 PROCEDURE — 85025 COMPLETE CBC W/AUTO DIFF WBC: CPT

## 2023-03-16 PROCEDURE — 7100000001 HC PACU RECOVERY - ADDTL 15 MIN: Performed by: ORTHOPAEDIC SURGERY

## 2023-03-16 PROCEDURE — 3600000014 HC SURGERY LEVEL 4 ADDTL 15MIN: Performed by: ORTHOPAEDIC SURGERY

## 2023-03-16 PROCEDURE — 3700000001 HC ADD 15 MINUTES (ANESTHESIA): Performed by: ORTHOPAEDIC SURGERY

## 2023-03-16 PROCEDURE — 2500000003 HC RX 250 WO HCPCS

## 2023-03-16 PROCEDURE — 2580000003 HC RX 258: Performed by: ORTHOPAEDIC SURGERY

## 2023-03-16 PROCEDURE — 6370000000 HC RX 637 (ALT 250 FOR IP): Performed by: INTERNAL MEDICINE

## 2023-03-16 PROCEDURE — 76770 US EXAM ABDO BACK WALL COMP: CPT

## 2023-03-16 PROCEDURE — 2709999900 HC NON-CHARGEABLE SUPPLY: Performed by: ORTHOPAEDIC SURGERY

## 2023-03-16 PROCEDURE — 6360000002 HC RX W HCPCS

## 2023-03-16 PROCEDURE — 86140 C-REACTIVE PROTEIN: CPT

## 2023-03-16 PROCEDURE — 6360000002 HC RX W HCPCS: Performed by: NURSE ANESTHETIST, CERTIFIED REGISTERED

## 2023-03-16 PROCEDURE — 80053 COMPREHEN METABOLIC PANEL: CPT

## 2023-03-16 PROCEDURE — 0S9D0ZZ DRAINAGE OF LEFT KNEE JOINT, OPEN APPROACH: ICD-10-PCS | Performed by: ORTHOPAEDIC SURGERY

## 2023-03-16 PROCEDURE — 1210000000 HC MED SURG R&B

## 2023-03-16 PROCEDURE — 6360000002 HC RX W HCPCS: Performed by: NURSE PRACTITIONER

## 2023-03-16 PROCEDURE — 2580000003 HC RX 258: Performed by: INTERNAL MEDICINE

## 2023-03-16 PROCEDURE — 6370000000 HC RX 637 (ALT 250 FOR IP): Performed by: ORTHOPAEDIC SURGERY

## 2023-03-16 PROCEDURE — C1776 JOINT DEVICE (IMPLANTABLE): HCPCS | Performed by: ORTHOPAEDIC SURGERY

## 2023-03-16 PROCEDURE — 7100000000 HC PACU RECOVERY - FIRST 15 MIN: Performed by: ORTHOPAEDIC SURGERY

## 2023-03-16 PROCEDURE — 0SUW09Z SUPPLEMENT LEFT KNEE JOINT, TIBIAL SURFACE WITH LINER, OPEN APPROACH: ICD-10-PCS | Performed by: ORTHOPAEDIC SURGERY

## 2023-03-16 PROCEDURE — 3700000000 HC ANESTHESIA ATTENDED CARE: Performed by: ORTHOPAEDIC SURGERY

## 2023-03-16 PROCEDURE — 36415 COLL VENOUS BLD VENIPUNCTURE: CPT

## 2023-03-16 PROCEDURE — 6360000002 HC RX W HCPCS: Performed by: INTERNAL MEDICINE

## 2023-03-16 PROCEDURE — 2500000003 HC RX 250 WO HCPCS: Performed by: NURSE ANESTHETIST, CERTIFIED REGISTERED

## 2023-03-16 PROCEDURE — 6360000002 HC RX W HCPCS: Performed by: STUDENT IN AN ORGANIZED HEALTH CARE EDUCATION/TRAINING PROGRAM

## 2023-03-16 PROCEDURE — 6360000002 HC RX W HCPCS: Performed by: ORTHOPAEDIC SURGERY

## 2023-03-16 PROCEDURE — 80202 ASSAY OF VANCOMYCIN: CPT

## 2023-03-16 PROCEDURE — 2720000010 HC SURG SUPPLY STERILE: Performed by: ORTHOPAEDIC SURGERY

## 2023-03-16 PROCEDURE — 27486 REVISE/REPLACE KNEE JOINT: CPT | Performed by: ORTHOPAEDIC SURGERY

## 2023-03-16 PROCEDURE — 87075 CULTR BACTERIA EXCEPT BLOOD: CPT

## 2023-03-16 PROCEDURE — 3600000004 HC SURGERY LEVEL 4 BASE: Performed by: ORTHOPAEDIC SURGERY

## 2023-03-16 PROCEDURE — 87070 CULTURE OTHR SPECIMN AEROBIC: CPT

## 2023-03-16 PROCEDURE — 0SPD09Z REMOVAL OF LINER FROM LEFT KNEE JOINT, OPEN APPROACH: ICD-10-PCS | Performed by: ORTHOPAEDIC SURGERY

## 2023-03-16 DEVICE — MRH KNEE BUMPER INSERT
Type: IMPLANTABLE DEVICE | Site: KNEE | Status: FUNCTIONAL
Brand: MRH

## 2023-03-16 DEVICE — MRH KNEE FEMORAL BUSHING
Type: IMPLANTABLE DEVICE | Site: KNEE | Status: FUNCTIONAL
Brand: MRH

## 2023-03-16 DEVICE — MRH KNEE TIBIAL INSERT
Type: IMPLANTABLE DEVICE | Site: KNEE | Status: FUNCTIONAL
Brand: MRH

## 2023-03-16 DEVICE — MRH TIBIAL ROTATING COMPONENT
Type: IMPLANTABLE DEVICE | Site: KNEE | Status: FUNCTIONAL
Brand: MRH

## 2023-03-16 DEVICE — MODULAR ROTATING HINGE KNEE-AXLE
Type: IMPLANTABLE DEVICE | Site: KNEE | Status: FUNCTIONAL
Brand: MRH

## 2023-03-16 DEVICE — MRH KNEE TIBIAL SLEEVE
Type: IMPLANTABLE DEVICE | Site: KNEE | Status: FUNCTIONAL
Brand: MRH

## 2023-03-16 RX ORDER — UREA 10 %
1 LOTION (ML) TOPICAL DAILY
Qty: 60 TABLET | Refills: 0 | Status: SHIPPED | OUTPATIENT
Start: 2023-03-16

## 2023-03-16 RX ORDER — KETOROLAC TROMETHAMINE 30 MG/ML
30 INJECTION, SOLUTION INTRAMUSCULAR; INTRAVENOUS ONCE
Status: COMPLETED | OUTPATIENT
Start: 2023-03-16 | End: 2023-03-16

## 2023-03-16 RX ORDER — SODIUM CHLORIDE 0.9 % (FLUSH) 0.9 %
5-40 SYRINGE (ML) INJECTION PRN
Status: DISCONTINUED | OUTPATIENT
Start: 2023-03-16 | End: 2023-03-16

## 2023-03-16 RX ORDER — FENTANYL CITRATE 0.05 MG/ML
50 INJECTION, SOLUTION INTRAMUSCULAR; INTRAVENOUS EVERY 5 MIN PRN
Status: DISCONTINUED | OUTPATIENT
Start: 2023-03-16 | End: 2023-03-16

## 2023-03-16 RX ORDER — SODIUM CHLORIDE 9 MG/ML
25 INJECTION, SOLUTION INTRAVENOUS PRN
Status: DISCONTINUED | OUTPATIENT
Start: 2023-03-16 | End: 2023-03-16

## 2023-03-16 RX ORDER — OXYCODONE HYDROCHLORIDE 5 MG/1
5 TABLET ORAL PRN
Status: DISCONTINUED | OUTPATIENT
Start: 2023-03-16 | End: 2023-03-16

## 2023-03-16 RX ORDER — SODIUM CHLORIDE 0.9 % (FLUSH) 0.9 %
5-40 SYRINGE (ML) INJECTION EVERY 12 HOURS SCHEDULED
Status: DISCONTINUED | OUTPATIENT
Start: 2023-03-16 | End: 2023-03-18 | Stop reason: HOSPADM

## 2023-03-16 RX ORDER — OXYCODONE HYDROCHLORIDE 5 MG/1
10 TABLET ORAL EVERY 4 HOURS PRN
Status: DISCONTINUED | OUTPATIENT
Start: 2023-03-16 | End: 2023-03-18 | Stop reason: HOSPADM

## 2023-03-16 RX ORDER — DOCUSATE SODIUM 100 MG/1
100 CAPSULE, LIQUID FILLED ORAL 2 TIMES DAILY
Qty: 60 CAPSULE | Refills: 0 | Status: SHIPPED | OUTPATIENT
Start: 2023-03-16 | End: 2023-04-15

## 2023-03-16 RX ORDER — SODIUM CHLORIDE 0.9 % (FLUSH) 0.9 %
5-40 SYRINGE (ML) INJECTION PRN
Status: DISCONTINUED | OUTPATIENT
Start: 2023-03-16 | End: 2023-03-18 | Stop reason: HOSPADM

## 2023-03-16 RX ORDER — OXYCODONE HYDROCHLORIDE 5 MG/1
2.5 TABLET ORAL EVERY 4 HOURS PRN
Status: DISCONTINUED | OUTPATIENT
Start: 2023-03-16 | End: 2023-03-18 | Stop reason: HOSPADM

## 2023-03-16 RX ORDER — VANCOMYCIN HYDROCHLORIDE 1 G/20ML
INJECTION, POWDER, LYOPHILIZED, FOR SOLUTION INTRAVENOUS PRN
Status: DISCONTINUED | OUTPATIENT
Start: 2023-03-16 | End: 2023-03-16 | Stop reason: ALTCHOICE

## 2023-03-16 RX ORDER — TRAMADOL HYDROCHLORIDE 50 MG/1
50 TABLET ORAL EVERY 6 HOURS PRN
Qty: 42 TABLET | Refills: 0 | Status: SHIPPED | OUTPATIENT
Start: 2023-03-16 | End: 2023-03-21 | Stop reason: SDUPTHER

## 2023-03-16 RX ORDER — PROPOFOL 10 MG/ML
INJECTION, EMULSION INTRAVENOUS PRN
Status: DISCONTINUED | OUTPATIENT
Start: 2023-03-16 | End: 2023-03-16 | Stop reason: SDUPTHER

## 2023-03-16 RX ORDER — OXYCODONE HYDROCHLORIDE 5 MG/1
5 TABLET ORAL EVERY 4 HOURS PRN
Status: DISCONTINUED | OUTPATIENT
Start: 2023-03-16 | End: 2023-03-18 | Stop reason: HOSPADM

## 2023-03-16 RX ORDER — DIPHENHYDRAMINE HYDROCHLORIDE 50 MG/ML
12.5 INJECTION INTRAMUSCULAR; INTRAVENOUS
Status: DISCONTINUED | OUTPATIENT
Start: 2023-03-16 | End: 2023-03-16

## 2023-03-16 RX ORDER — PROCHLORPERAZINE EDISYLATE 5 MG/ML
5 INJECTION INTRAMUSCULAR; INTRAVENOUS
Status: DISCONTINUED | OUTPATIENT
Start: 2023-03-16 | End: 2023-03-16

## 2023-03-16 RX ORDER — PANTOPRAZOLE SODIUM 20 MG/1
20 TABLET, DELAYED RELEASE ORAL
Status: DISCONTINUED | OUTPATIENT
Start: 2023-03-17 | End: 2023-03-18 | Stop reason: HOSPADM

## 2023-03-16 RX ORDER — ROCURONIUM BROMIDE 10 MG/ML
INJECTION, SOLUTION INTRAVENOUS PRN
Status: DISCONTINUED | OUTPATIENT
Start: 2023-03-16 | End: 2023-03-16 | Stop reason: SDUPTHER

## 2023-03-16 RX ORDER — LIDOCAINE HYDROCHLORIDE 20 MG/ML
INJECTION, SOLUTION INTRAVENOUS PRN
Status: DISCONTINUED | OUTPATIENT
Start: 2023-03-16 | End: 2023-03-16 | Stop reason: SDUPTHER

## 2023-03-16 RX ORDER — SODIUM CHLORIDE 9 MG/ML
INJECTION, SOLUTION INTRAVENOUS PRN
Status: DISCONTINUED | OUTPATIENT
Start: 2023-03-16 | End: 2023-03-18 | Stop reason: HOSPADM

## 2023-03-16 RX ORDER — DEXAMETHASONE SODIUM PHOSPHATE 4 MG/ML
INJECTION, SOLUTION INTRA-ARTICULAR; INTRALESIONAL; INTRAMUSCULAR; INTRAVENOUS; SOFT TISSUE PRN
Status: DISCONTINUED | OUTPATIENT
Start: 2023-03-16 | End: 2023-03-16 | Stop reason: SDUPTHER

## 2023-03-16 RX ORDER — LABETALOL HYDROCHLORIDE 5 MG/ML
10 INJECTION, SOLUTION INTRAVENOUS
Status: DISCONTINUED | OUTPATIENT
Start: 2023-03-16 | End: 2023-03-16

## 2023-03-16 RX ORDER — OXYCODONE HYDROCHLORIDE 5 MG/1
10 TABLET ORAL PRN
Status: DISCONTINUED | OUTPATIENT
Start: 2023-03-16 | End: 2023-03-16

## 2023-03-16 RX ORDER — FENTANYL CITRATE 0.05 MG/ML
25 INJECTION, SOLUTION INTRAMUSCULAR; INTRAVENOUS EVERY 5 MIN PRN
Status: DISCONTINUED | OUTPATIENT
Start: 2023-03-16 | End: 2023-03-16

## 2023-03-16 RX ORDER — L. ACIDOPHILUS/L.BULGARICUS 1MM CELL
1 TABLET ORAL DAILY
Status: DISCONTINUED | OUTPATIENT
Start: 2023-03-17 | End: 2023-03-18 | Stop reason: HOSPADM

## 2023-03-16 RX ORDER — ENOXAPARIN SODIUM 100 MG/ML
30 INJECTION SUBCUTANEOUS 2 TIMES DAILY
Qty: 18 ML | Refills: 0 | Status: SHIPPED | OUTPATIENT
Start: 2023-03-17 | End: 2023-04-16

## 2023-03-16 RX ORDER — ACETAMINOPHEN 325 MG/1
650 TABLET ORAL EVERY 6 HOURS
Qty: 240 TABLET | Refills: 0 | Status: SHIPPED | OUTPATIENT
Start: 2023-03-16 | End: 2023-04-15

## 2023-03-16 RX ORDER — HYDRALAZINE HYDROCHLORIDE 20 MG/ML
10 INJECTION INTRAMUSCULAR; INTRAVENOUS
Status: DISCONTINUED | OUTPATIENT
Start: 2023-03-16 | End: 2023-03-16

## 2023-03-16 RX ORDER — FENTANYL CITRATE 50 UG/ML
INJECTION, SOLUTION INTRAMUSCULAR; INTRAVENOUS PRN
Status: DISCONTINUED | OUTPATIENT
Start: 2023-03-16 | End: 2023-03-16 | Stop reason: SDUPTHER

## 2023-03-16 RX ORDER — ONDANSETRON 2 MG/ML
4 INJECTION INTRAMUSCULAR; INTRAVENOUS
Status: DISCONTINUED | OUTPATIENT
Start: 2023-03-16 | End: 2023-03-16

## 2023-03-16 RX ORDER — ENOXAPARIN SODIUM 100 MG/ML
30 INJECTION SUBCUTANEOUS 2 TIMES DAILY
Status: DISCONTINUED | OUTPATIENT
Start: 2023-03-16 | End: 2023-03-18 | Stop reason: HOSPADM

## 2023-03-16 RX ORDER — CEFAZOLIN SODIUM IN 0.9 % NACL 2 G/100 ML
2000 PLASTIC BAG, INJECTION (ML) INTRAVENOUS ONCE
Status: COMPLETED | OUTPATIENT
Start: 2023-03-16 | End: 2023-03-16

## 2023-03-16 RX ORDER — SODIUM CHLORIDE 0.9 % (FLUSH) 0.9 %
5-40 SYRINGE (ML) INJECTION EVERY 12 HOURS SCHEDULED
Status: DISCONTINUED | OUTPATIENT
Start: 2023-03-16 | End: 2023-03-16

## 2023-03-16 RX ORDER — SENNA AND DOCUSATE SODIUM 50; 8.6 MG/1; MG/1
1 TABLET, FILM COATED ORAL DAILY
Status: DISCONTINUED | OUTPATIENT
Start: 2023-03-17 | End: 2023-03-18 | Stop reason: HOSPADM

## 2023-03-16 RX ORDER — TOBRAMYCIN 1.2 G/30ML
INJECTION, POWDER, LYOPHILIZED, FOR SOLUTION INTRAVENOUS PRN
Status: DISCONTINUED | OUTPATIENT
Start: 2023-03-16 | End: 2023-03-16 | Stop reason: ALTCHOICE

## 2023-03-16 RX ORDER — SODIUM CHLORIDE, SODIUM LACTATE, POTASSIUM CHLORIDE, CALCIUM CHLORIDE 600; 310; 30; 20 MG/100ML; MG/100ML; MG/100ML; MG/100ML
INJECTION, SOLUTION INTRAVENOUS CONTINUOUS
Status: DISCONTINUED | OUTPATIENT
Start: 2023-03-16 | End: 2023-03-16

## 2023-03-16 RX ADMIN — ROCURONIUM BROMIDE 40 MG: 10 INJECTION, SOLUTION INTRAVENOUS at 13:24

## 2023-03-16 RX ADMIN — PROPOFOL 150 MG: 10 INJECTION, EMULSION INTRAVENOUS at 13:24

## 2023-03-16 RX ADMIN — Medication 2000 MG: at 13:31

## 2023-03-16 RX ADMIN — SUGAMMADEX 200 MG: 100 INJECTION, SOLUTION INTRAVENOUS at 14:50

## 2023-03-16 RX ADMIN — TRAMADOL HYDROCHLORIDE 50 MG: 50 TABLET, COATED ORAL at 17:31

## 2023-03-16 RX ADMIN — TRANEXAMIC ACID 1300 MG: 650 TABLET ORAL at 12:27

## 2023-03-16 RX ADMIN — ENOXAPARIN SODIUM 30 MG: 100 INJECTION SUBCUTANEOUS at 21:25

## 2023-03-16 RX ADMIN — METOPROLOL SUCCINATE 50 MG: 50 TABLET, FILM COATED, EXTENDED RELEASE ORAL at 09:29

## 2023-03-16 RX ADMIN — CEFTRIAXONE SODIUM 1000 MG: 1 INJECTION, POWDER, FOR SOLUTION INTRAMUSCULAR; INTRAVENOUS at 09:50

## 2023-03-16 RX ADMIN — KETOROLAC TROMETHAMINE 30 MG: 30 INJECTION, SOLUTION INTRAMUSCULAR; INTRAVENOUS at 21:24

## 2023-03-16 RX ADMIN — TRIAMTERENE AND HYDROCHLOROTHIAZIDE 0.5 TABLET: 37.5; 25 TABLET ORAL at 09:29

## 2023-03-16 RX ADMIN — ACETAMINOPHEN 1000 MG: 500 TABLET ORAL at 21:25

## 2023-03-16 RX ADMIN — ROCURONIUM BROMIDE 10 MG: 10 INJECTION, SOLUTION INTRAVENOUS at 13:48

## 2023-03-16 RX ADMIN — ACETAMINOPHEN 1000 MG: 500 TABLET ORAL at 17:30

## 2023-03-16 RX ADMIN — ROCURONIUM BROMIDE 10 MG: 10 INJECTION, SOLUTION INTRAVENOUS at 14:26

## 2023-03-16 RX ADMIN — ROCURONIUM BROMIDE 10 MG: 10 INJECTION, SOLUTION INTRAVENOUS at 14:05

## 2023-03-16 RX ADMIN — SODIUM CHLORIDE, POTASSIUM CHLORIDE, SODIUM LACTATE AND CALCIUM CHLORIDE: 600; 310; 30; 20 INJECTION, SOLUTION INTRAVENOUS at 04:06

## 2023-03-16 RX ADMIN — LIDOCAINE HYDROCHLORIDE 60 MG: 20 INJECTION, SOLUTION INTRAVENOUS at 13:24

## 2023-03-16 RX ADMIN — FENTANYL CITRATE 50 MCG: 0.05 INJECTION, SOLUTION INTRAMUSCULAR; INTRAVENOUS at 15:58

## 2023-03-16 RX ADMIN — VANCOMYCIN HYDROCHLORIDE 1250 MG: 1.25 INJECTION, POWDER, LYOPHILIZED, FOR SOLUTION INTRAVENOUS at 06:31

## 2023-03-16 RX ADMIN — SODIUM CHLORIDE, POTASSIUM CHLORIDE, SODIUM LACTATE AND CALCIUM CHLORIDE: 600; 310; 30; 20 INJECTION, SOLUTION INTRAVENOUS at 14:35

## 2023-03-16 RX ADMIN — SODIUM CHLORIDE, POTASSIUM CHLORIDE, SODIUM LACTATE AND CALCIUM CHLORIDE: 600; 310; 30; 20 INJECTION, SOLUTION INTRAVENOUS at 21:26

## 2023-03-16 RX ADMIN — ACETAMINOPHEN 1000 MG: 500 TABLET ORAL at 06:29

## 2023-03-16 RX ADMIN — DEXAMETHASONE SODIUM PHOSPHATE 4 MG: 4 INJECTION, SOLUTION INTRAMUSCULAR; INTRAVENOUS at 13:38

## 2023-03-16 RX ADMIN — FENTANYL CITRATE 50 MCG: 0.05 INJECTION, SOLUTION INTRAMUSCULAR; INTRAVENOUS at 16:18

## 2023-03-16 RX ADMIN — OXYCODONE HYDROCHLORIDE 10 MG: 5 TABLET ORAL at 22:44

## 2023-03-16 RX ADMIN — VANCOMYCIN HYDROCHLORIDE 1250 MG: 1.25 INJECTION, POWDER, LYOPHILIZED, FOR SOLUTION INTRAVENOUS at 17:42

## 2023-03-16 RX ADMIN — FENTANYL CITRATE 25 MCG: 50 INJECTION, SOLUTION INTRAMUSCULAR; INTRAVENOUS at 15:20

## 2023-03-16 RX ADMIN — SODIUM CHLORIDE, PRESERVATIVE FREE 10 ML: 5 INJECTION INTRAVENOUS at 21:26

## 2023-03-16 RX ADMIN — FUROSEMIDE 20 MG: 20 TABLET ORAL at 09:29

## 2023-03-16 RX ADMIN — FENTANYL CITRATE 25 MCG: 50 INJECTION, SOLUTION INTRAMUSCULAR; INTRAVENOUS at 14:59

## 2023-03-16 ASSESSMENT — PAIN DESCRIPTION - DESCRIPTORS
DESCRIPTORS: SHARP;STABBING;THROBBING
DESCRIPTORS: SHOOTING;SHARP;THROBBING
DESCRIPTORS: STABBING
DESCRIPTORS: ACHING
DESCRIPTORS: STABBING
DESCRIPTORS: SHOOTING
DESCRIPTORS: SHOOTING
DESCRIPTORS: ACHING
DESCRIPTORS: STABBING
DESCRIPTORS: SHARP;SHOOTING;THROBBING

## 2023-03-16 ASSESSMENT — PAIN DESCRIPTION - FREQUENCY
FREQUENCY: CONTINUOUS
FREQUENCY: INTERMITTENT

## 2023-03-16 ASSESSMENT — PAIN DESCRIPTION - ORIENTATION
ORIENTATION: LEFT

## 2023-03-16 ASSESSMENT — PAIN DESCRIPTION - LOCATION
LOCATION: KNEE
LOCATION: LEG
LOCATION: KNEE

## 2023-03-16 ASSESSMENT — PAIN SCALES - GENERAL
PAINLEVEL_OUTOF10: 8
PAINLEVEL_OUTOF10: 10
PAINLEVEL_OUTOF10: 10
PAINLEVEL_OUTOF10: 1
PAINLEVEL_OUTOF10: 9
PAINLEVEL_OUTOF10: 8
PAINLEVEL_OUTOF10: 10
PAINLEVEL_OUTOF10: 7
PAINLEVEL_OUTOF10: 8
PAINLEVEL_OUTOF10: 10
PAINLEVEL_OUTOF10: 9

## 2023-03-16 ASSESSMENT — PAIN - FUNCTIONAL ASSESSMENT
PAIN_FUNCTIONAL_ASSESSMENT: PREVENTS OR INTERFERES SOME ACTIVE ACTIVITIES AND ADLS
PAIN_FUNCTIONAL_ASSESSMENT: PREVENTS OR INTERFERES WITH ALL ACTIVE AND SOME PASSIVE ACTIVITIES
PAIN_FUNCTIONAL_ASSESSMENT: PREVENTS OR INTERFERES WITH MANY ACTIVE NOT PASSIVE ACTIVITIES
PAIN_FUNCTIONAL_ASSESSMENT: PREVENTS OR INTERFERES WITH ALL ACTIVE AND SOME PASSIVE ACTIVITIES
PAIN_FUNCTIONAL_ASSESSMENT: PREVENTS OR INTERFERES SOME ACTIVE ACTIVITIES AND ADLS

## 2023-03-16 ASSESSMENT — PAIN DESCRIPTION - ONSET: ONSET: ON-GOING

## 2023-03-16 ASSESSMENT — PAIN DESCRIPTION - PAIN TYPE
TYPE: SURGICAL PAIN
TYPE: SURGICAL PAIN;CHRONIC PAIN

## 2023-03-16 NOTE — OP NOTE
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    Post-Op Diagnosis: Same       Procedure(s):  LEFT KNEE ARTHROTOMY WITH POLYETHYLENE LINER EXCHANGE AND PLACEMENT OF TOPICAL ANTIBIOTICS    Surgeon(s):  Estella Fermin MD    Assistant:  First Assistant: Alexandria Schneider    Anesthesia: Choice    Estimated Blood Loss (mL): 444     Complications: None    Specimens:   ID Type Source Tests Collected by Time Destination   1 : synovial fluid for culture left knee Swab Joint, Knee CULTURE, SURGICAL Estella Fermin MD 3/16/2023 1350    2 : synovial tissue left knee for culture Tissue Joint, Knee CULTURE, SURGICAL Estella Fermin MD 3/16/2023 1355    3 : patellar tissue left knee for culture Tissue Joint, Knee CULTURE, SURGICAL Estella Fermin MD 3/16/2023 1401        Implants:  Implant Name Type Inv.  Item Serial No.  Lot No. LRB No. Used Action   INSERT TIB M/L SZ 2 [id number] KNEE MOD ROT HNG MONOGRAM - [id number]  INSERT TIB M/L SZ 2 [id number] KNEE MOD ROT HNG MONOGRAM  [employer] [facility] [id number] Left 1 Implanted   INSERT TIB 3DEG DST KNEE POLY BUMPER MOD ROT HNG GMRS - [id number]  INSERT TIB 3DEG DST KNEE POLY BUMPER MOD ROT HNG GMRS  [employer] [facility] [id number] Left 1 Implanted   BUSHING FEM UNIV KNEE PLOY MOD PEDRO STEM ROT HNG REV - [id number]  BUSHING FEM UNIV KNEE PLOY MOD PEDRO STEM ROT HNG REV  [employer] [facility] [id number] Left 1 Implanted   BUSHING FEM UNIV KNEE PLOY MOD PEDRO STEM ROT HNG REV - [id number]  BUSHING FEM UNIV KNEE PLOY MOD PEDRO STEM ROT HNG REV  [employer] [facility] [id number] Left 1 Implanted   SLEEVE TIB STD UNIV POLYETH MOD PEDRO STEM ROT HNG REV - [id number]  SLEEVE TIB STD UNIV POLYETH MOD PEDRO STEM ROT HNG REV  [employer] [facility] [id number] Left 1 Implanted   AXLE TIB PROX KNEE MOD ROT HNG MONOGRAM - [id number]  AXLE TIB PROX KNEE MOD ROT HNG MONOGRAM  [employer] [id number] Left 1 Implanted   COMPONENT TIB XSM-XL KNEE POLYETH MOD PEDRO STEM ROT HNG REV - [id number]  COMPONENT TIB XSM-XL KNEE POLYETH MOD PEDRO STEM ROT HNG REV  [employer] [id number] Left 1 Implanted         Drains: * No LDAs found *    Findings: Turbid synovial fluid without marcellus purulence, no abscess, no soft tissue necrosis. Fibrinous debris within posterior recess. Detailed Description of Procedure: The patient is a pleasant 81-year-old gentleman now approximately 1 year out from complex left total knee replacement with hinged implants and distal femoral replacement in the setting of distal femur fracture. He developed a 2-week course of rapidly progressive knee pain and was concurrently diagnosed with urinary tract infection. Diagnostic aspiration of left knee was performed and demonstrated greater than 52,000 PMNs and 92% neutrophils, consistent with acute prosthetic infection. Cultures did not demonstrate definitive organism. We discussed the risk, benefits, alternatives, convalescence for revision left total knee replacement with exchange of tibial polyethylene liner and hinge mechanism. Risk discussed included, but were not limited to, infection (persistent or novel), DVT, pulmonary embolism, hematoma formation, wound healing complications, intraoperative and postoperative fracture, postoperative instability, injury to the extensor mechanism or collateral ligaments, cardiopulmonary complications including myocardial infarction and respiratory insufficiency, neurovascular complications including femoral/sciatic/peroneal nerve palsy and cerebrovascular accident, vascular injury, arthrofibrosis, acute and/or chronic postoperative pain, osteolysis, component wear, septic or aseptic loosening of components, need for further surgery, anesthetic complications, COVID-specific risk, and death.   Patient verbalized understanding and agreed to proceed. Emphasized potential for recalcitrant infection and need for postoperative IV antibiotic therapy and potential indefinite antibiotic suppression with an oral agent. Operative technique:  The patient was identified in the preoperative holding area through direct interview, as well as review of his chart materials and identification band. The correct surgical site (left knee) was marked with indelible ink following confirmation with the patient. He was transported operative theater and general anesthetic administered. Well-padded left thigh tourniquet was placed but not utilized for the case ultimately. All bony prominences were padded and a bump placed under the left hip. Left lower extremity was prepped and draped in the usual sterile fashion. Surgical timeout was performed to confirm patient identity, laterality of the procedure, availability of appropriate implants, and availability of pertinent imaging studies. The inferior two thirds of the incision was utilized for the approach. Sharp dissection was carried down through the subcutaneous layer and all bleeders were cauterized. Medial parapatellar arthrotomy was recreated and small amount of turbid synovial fluid was evident and sent for anaerobic and aerobic culture. At this point we debrided soft tissue from the medial and lateral gutters although there was no purulent debris and residual synovium was minimal.  We then bluntly dissected the medial lateral gutter using a Flynn elevator and sponge. We then dissected around the peripatellar region to expose the patellar button which demonstrated some fibrous overgrowth. Limited medial release was performed and we also debulked the infrapatellar fat pad to allow direct access to the tibial polyethylene. We noticed that the femoral and tibial components were well fixed and this was also evident after removal of the polyethylene liner.   This point we removed the bumper followed by the axle and femoral bushings. We then subluxed the tibia to allow us to remove the yoke, tibial polyethylene, and tibial bushing. This point the knee was placed in distraction using DeMayo leg sun we utilized a rongeur to debride the posterior recess. Once we have been obtained adequate debridement the wound was irrigated with 3 L of normal saline via pulsatile lavage. We then rinsed the wound with Betadine solution for 3 minutes, then rinsed with sterile saline. Final wash consisted of Irrisept solution with a dwell time 3 minutes, then rinsed with sterile saline via pulsatile lavage. This point we confirmed that all bleeders were adequately cauterized. We distributed 2 g of vancomycin powder and 1.2 g of tobramycin powder along the medial lateral gutter and along the posterior recess. This point the tibial bushing was placed with the knee in flexion. We then placed the tibial polyethylene and engages in the locking mechanism. The tibial yoke was then placed and rotated into appropriate position. Tibia was then reduced under the retained femoral component after placing the femoral bushings. Axle was placed to the appropriate aperture and rotated to allow for engagement of the bumper which was then impacted. Knee was taken into mid flexion. The medial parapatellar arthrotomy was reapproximated with #1 PDS suture placed in figure-of-eight fashion. It should be noted that we did perform lateral release with high crusting technique given tendency toward lateral tilt of the patella. We then reapproximated the deep subcutaneous and deep dermal layers with 2-0 Monocryl placed in inverted simple fashion. Skin was reapproximated with 3-0 nylon placed and closely spaced simple fashion. Wound was irrigated and patted dry.   We cover the incision with a Mepilex dressing and then overwrapped this with ABD pad, 4 and 6 inch cast padding, and a 6 inch double Ace including the foot.  The patient was transported the PACU in stable condition. Specimens of synovial fluid, suprapatellar synovial tissue, and peripatellar tissue were sent for anaerobic and aerobic culture. All sponge and instrument counts were correct at the end of the case. Assistants:  No qualified resident was available to assist with the case. Daina Headley was utilized as first assistant on the case. She assisted with maintenance of retractors to facilitate exposure, superficial and deep layers of the wound closure, application of final dressing. Postoperative care: The patient will be allowed to bear weight as tolerated on the left lower extremity with a walker for assistance, no pivoting on the left lower extremity. He will be evaluated by physical and Occupational Therapy and we will maintain Ace wrap and compressive dressing during the day. Would have him avoid pivoting on the left lower extremity to prevent tension along the incision. We will monitor intraoperative cultures for organism identification and sensitivities. If cultures are unrevealing, would favor 6 weeks of broad-spectrum IV antibiotic therapy via PICC on the basis of clinical presentation and elevated indices on synovial fluid aspiration, both consistent with acute prosthetic infection. The patient will be evaluated by Physical and Occupational Therapy and may benefit from stay in the skilled nursing facility for purposes of administering antibiotics. Postoperative DVT prophylaxis will consist of Lovenox 30 mg subcutaneous injection every 12 hours, with first dose to be given this evening. The patient will follow-up with me through [address] [place] [address] in 10 to 14 days.     Electronically signed by Antonio Munoz MD on 3/16/2023 at 3:53 PM

## 2023-03-16 NOTE — BRIEF OP NOTE
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    Post-Op Diagnosis: Same       Procedure(s):  LEFT KNEE ARTHROTOMY WITH POLYETHYLENE LINER EXCHANGE AND PLACEMENT OF TOPICAL ANTIBIOTICS    Surgeon(s):  Antonio Munoz MD    Assistant:  First Assistant: Perla Wright    Anesthesia: Choice    Estimated Blood Loss (mL): 368     Complications: None    Specimens:   ID Type Source Tests Collected by Time Destination   1 : synovial fluid for culture left knee Swab Joint, Knee CULTURE, SURGICAL Antonio Munoz MD 3/16/2023 1350    2 : synovial tissue left knee for culture Tissue Joint, Knee CULTURE, SURGICAL Antonio Munoz MD 3/16/2023 1355    3 : patellar tissue left knee for culture Tissue Joint, Knee CULTURE, SURGICAL Antonio Munoz MD 3/16/2023 1401        Implants:  Implant Name Type Inv.  Item Serial No.  Lot No. LRB No. Used Action   INSERT TIB M/L SZ 2 [id number] KNEE MOD ROT HNG MONOGRAM - [id number]  INSERT TIB M/L SZ 2 [id number] KNEE MOD ROT HNG MONOGRAM  [employer] [facility] [id number] Left 1 Implanted   INSERT TIB 3DEG DST KNEE POLY BUMPER MOD ROT HNG GMRS - [id number]  INSERT TIB 3DEG DST KNEE POLY BUMPER MOD ROT HNG GMRS  [employer] [facility] [id number] Left 1 Implanted   BUSHING FEM UNIV KNEE PLOY MOD PEDRO STEM ROT HNG REV - [id number]  BUSHING FEM UNIV KNEE PLOY MOD PEDRO STEM ROT HNG REV  [employer] [facility] [id number] Left 1 Implanted   BUSHING FEM UNIV KNEE PLOY MOD PEDRO STEM ROT HNG REV - [id number]  BUSHING FEM UNIV KNEE PLOY MOD PEDRO STEM ROT HNG REV  [employer] [facility] [id number] Left 1 Implanted   SLEEVE TIB STD UNIV POLYETH MOD PEDRO STEM ROT HNG REV - [id number]  SLEEVE TIB STD UNIV POLYETH MOD PEDRO STEM ROT HNG REV  [employer] [facility] [id number] Left 1 Implanted   AXLE TIB PROX KNEE MOD ROT HNG MONOGRAM - [id number]  AXLE TIB PROX KNEE MOD ROT HNG MONOGRAM  [employer] HOW- [id number] Left 1 Implanted   COMPONENT TIB XSM-XL KNEE POLYETH MOD PEDRO STEM ROT HNG REV - [id number]  COMPONENT TIB XSM-XL KNEE POLYETH MOD PEDRO STEM ROT HNG REV  [employer] HOW- [id number] Left 1 Implanted         Drains: * No LDAs found *    Findings: Turbid synovial fluid without marcellus purulence, no abscess, no soft tissue necrosis. Fibrinous debris within posterior recess.     Electronically signed by Estella Fermin MD on 3/16/2023 at 3:51 PM

## 2023-03-16 NOTE — DISCHARGE INSTRUCTIONS
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  -No pivoting on left foot/ankle, such as for transfers from bed to chair.  -Avoid squatting, kneeling, crouching, lunge activities.  -No driving. Diet:  -Regular diet.  -Take protein supplement (e.g., Boost, Ensure, [place] Instant Breakfast if not diabetic; Glucerna if diabetic) 1 serving by mouth 3 times daily to promote incision healing and resolution of swelling. Dressing Care:  -Mepilex dressing to remain in place on left knee until follow-up appointment with Dr. Usama Blanacs. -Shower or sponge bathe with Mepilex dressing in place (Ace wrap and ABD pad removed), then pat dry, then overwrap Mepilex with new ABD pad and 6 inch Ace bandage for compression.  -No tub bathing. Medications:  -IMPORTANT: Administer ENOXAPARIN (LOVENOX) 30 mg subcutaneous injection EVERY 12 HOURS for prevention of blood clots. Continue on a regular schedule for 30 days. .  -Take pantoprazole (Protonix) 20 mg tablet by mouth once daily. This is an antacid medication to prevent side effects associated with enoxaparin therapy. Continue on a regular schedule for 30 days.  -Intravenous antibiotics via PICC line, as per recommendations of Infectious Disease service    Additional Instructions:  -May apply ice packs to left knee for 15-20 minutes every 4 hours while awake, as needed to control inflammation/swelling.     Follow-up:  -Will need to be seen in Dr. Griselda Raza office ([facility]) between 3/26 and 3/30 for postoperative assessment and incision check following surgery for left knee prosthetic infection. Please call [phone] within 2-3 days following discharge from the hospital to schedule this appointment. Edison Loo MD  Orthopedic Surgery, Specialist in Hip and Knee Reconstruction and Revision    [facility]  [address] [facility]    [facility]  [address] [place] [address] Dr HOWELL, [address]

## 2023-03-16 NOTE — FLOWSHEET NOTE
Pt states that nothing I gave him is relieving the pain. I do not want to medicate him with more fentanyl due to his advanced age. Laying in bed moaning and saying \"OMG\". Ice remains on knee.  RH

## 2023-03-16 NOTE — FLOWSHEET NOTE
Down for surgery at this time via bed.   Electronically signed by Halley Rhodes RN on 3/16/2023 at 12:04 PM

## 2023-03-16 NOTE — INTERVAL H&P NOTE
Update History & Physical    The patient's History and Physical of March 14, 2023 was reviewed with the patient and I examined the patient. There was no change. The surgical site was confirmed by the patient and me. Plan: The risks, benefits, expected outcome, and alternative to the recommended procedure have been discussed with the patient. Patient understands and wants to proceed with the procedure.      Electronically signed by Cathy Gore MD on 3/16/2023 at 1:17 PM

## 2023-03-16 NOTE — ANESTHESIA POSTPROCEDURE EVALUATION
Department of Anesthesiology  Postprocedure Note    Patient: Raudel Childers  MRN: [id number]  YOB: 1940  Date of evaluation: 3/16/2023      Procedure Summary     Date: 03/16/23 Room / Location: [address]    Anesthesia Start: 1167 Anesthesia Stop: 1399    Procedure: LEFT KNEE ARTHROTOMY WITH POLYETHYLENE LINER EXCHANGE AND PLACEMENT OF TOPICAL ANTIBIOTICS (Left) Diagnosis:       Pain in prosthetic joint, initial encounter ([facility] [place] [facility].)      (Pain in prosthetic joint, initial encounter [facility]) [N98.58EK])    Surgeons: Kaitlynn Gresham MD Responsible Provider: Raymundo Mendiola MD    Anesthesia Type: general ASA Status: 3          Anesthesia Type: No value filed.     Markus Phase I: Markus Score: 10    Markus Phase II:        Anesthesia Post Evaluation    Patient location during evaluation: PACU  Patient participation: complete - patient participated  Level of consciousness: awake and alert  Airway patency: patent  Nausea & Vomiting: no nausea and no vomiting  Complications: no  Cardiovascular status: blood pressure returned to baseline and hemodynamically stable  Respiratory status: acceptable  Hydration status: euvolemic

## 2023-03-16 NOTE — PROGRESS NOTES
[facility] Pharmacokinetic Monitoring Service - Vancomycin    Consulting Provider: Dr Serenity Bloom   Indication: bone and joint infection  Target Concentration: Goal AUC/SAPNA 400-600 mg*hr/L  Day of Therapy: 5  Additional Antimicrobials: Rocephin    Pertinent Laboratory Values: Wt Readings from Last 1 Encounters:   03/10/23 256 lb (116.1 kg)     Temp Readings from Last 1 Encounters:   03/16/23 98.7 °F (37.1 °C) (Oral)     Estimated Creatinine Clearance: 129 mL/min (A) (based on SCr of 0.58 mg/dL (L)).   Recent Labs     03/15/23  0533 03/16/23  0517   CREATININE 0.58* 0.58*   WBC 6.8 7.1           Recent vancomycin administrations                     vancomycin (VANCOCIN) 1,250 mg in sodium chloride 0.9 % 250 mL IVPB (ADDAVIAL) (mg) 1,250 mg New Bag 03/16/23 0631     1,250 mg New Bag 03/15/23 1916     1,250 mg New Bag  0635     1,250 mg New Bag 03/14/23 1810     1,250 mg New Bag  0602     1,250 mg New Bag 03/13/23 1818                    Assessment:  Date/Time Current Dose Concentration Timing of Concentration (h) AUC   03/17/23 1250 mg IV q 12 13.1 random 419   Note: Serum concentrations collected for AUC dosing may appear elevated if collected in close proximity to the dose administered, this is not necessarily an indication of toxicity    Plan:  Current dosing regimen is therapeutic  Continue current dose  Repeat vancomycin concentration recommended in 7 days as 2 therapeutic levels and drug at steady state, unless renal function changes-will monitor daily  Pharmacy will continue to monitor patient and adjust therapy as indicated    Thank you for the consult,  Pat Del Rosario [facility]  3/16/2023 10:16 AM

## 2023-03-16 NOTE — FLOWSHEET NOTE
Spoke with Margaret, surgery, report given. All am medications may be administered as clarified with anesthesia.   Electronically signed by Bryant Vargas RN on 3/16/2023 at 8:02 AM

## 2023-03-16 NOTE — FLOWSHEET NOTE
Returned to room at 1700, s/p left knee arthrotomy with polyethylene liner exchange and placement of topical antibiotics under general anesthesia. Fully awake/alert, oriented x4. Denied SOB/CP.  VSS. Oxygen via nasal cannula decreased from 3L to 2L/min. Voided post surgery. LLE dressing/acewrap CDI; ice pack provided. Toes warm/dry/mobile. Incentive spirometer setup and patient educated on use. Consumed light dinner. Refused to dangle edge of bed due to pain. C/o 10/10 pain after administering ultram 50mg. Messaged Dr. Shaun Cole for possible breakthrough pain medication. Nightshift RN aware.   Electronically signed by Gasper Hyman RN on 3/16/2023 at 7:56 PM

## 2023-03-16 NOTE — PROGRESS NOTES
Hospitalist Progress Note      PCP: Kyler Leal MD    Date of Admission: 3/10/2023    Chief Complaint:  no acute events, afebrile, stable HD    Medications:  Reviewed    Infusion Medications    lactated ringers IV soln 125 mL/hr at 03/16/23 0406    sodium chloride       Scheduled Medications    ceFAZolin  2,000 mg IntraVENous Once    vancomycin (VANCOCIN) intermittent dosing (placeholder)   Other RX Placeholder    vancomycin  1,250 mg IntraVENous Q12H    cefTRIAXone (ROCEPHIN) IV  1,000 mg IntraVENous Q24H    furosemide  20 mg Oral Daily    metoprolol succinate  50 mg Oral Daily    triamterene-hydroCHLOROthiazide  0.5 tablet Oral Daily    acetaminophen  1,000 mg Oral 3 times per day    sodium chloride flush  5-40 mL IntraVENous 2 times per day     PRN Meds: traMADol **OR** traMADol, sodium chloride flush, sodium chloride, ondansetron **OR** ondansetron, polyethylene glycol      Intake/Output Summary (Last 24 hours) at 3/16/2023 1243  Last data filed at 3/16/2023 0958  Gross per 24 hour   Intake --   Output 875 ml   Net -875 ml         Exam:    BP (!) 114/55   Pulse 61   Temp 99.6 °F (37.6 °C) (Temporal)   Resp 20   Ht 5' 11\" (1.803 m)   Wt 256 lb (116.1 kg)   SpO2 95%   BMI 35.70 kg/m²     General appearance: appears stated age and cooperative. Respiratory:  clear to auscultation, bilaterally   Cardiovascular: Regular rate and rhythm, S1/S2. Abdomen: Soft, active bowel sounds. Musculoskeletal: left knee is covered withy dressing.       Labs:   Recent Labs     03/14/23  0625 03/15/23  0533 03/16/23  0517   WBC 6.7 6.8 7.1   HGB 12.3* 12.3* 12.0*   HCT 36.6* 35.8* 35.0*    241 240       Recent Labs     03/14/23  0625 03/15/23  0533 03/16/23  0517   * 134* 139   K 4.3 4.1 4.0   CL 97 100 103   CO2 22 26 27   BUN 15 14 16   CREATININE 0.50* 0.58* 0.58*   CALCIUM 8.6 8.9 8.7       Recent Labs     03/14/23 0625 03/15/23  0533 03/16/23  0517   AST 23 20 28   ALT 11 12 15   BILITOT 0.9* 0.7 0.5 ALKPHOS 105* 121* 141*       No results for input(s): INR in the last 72 hours. No results for input(s): Estle Carrow in the last 72 hours. Urinalysis:      Lab Results   Component Value Date/Time    NITRU Negative 03/10/2023 06:00 PM    WBCUA >100 03/10/2023 06:00 PM    BACTERIA MANY 03/10/2023 06:00 PM    RBCUA 20-50 03/10/2023 06:00 PM    BLOODU MODERATE 03/10/2023 06:00 PM    SPECGRAV 1.021 03/10/2023 06:00 PM    GLUCOSEU Negative 03/10/2023 06:00 PM       Radiology:  [address] 157   Final Result   1. Left renal cyst   2. Prostate enlargement         MRI LUMBAR SPINE WO CONTRAST   Final Result   1. Multilevel degenerative disease greatest at L5-S1 and mild multilevel   facet hypertrophy. 2. Mild spinal canal stenosis at the L1-2 through L4-5 levels. 3. Multilevel neural foraminal as detailed above and greatest involving the   left L4 and right L5 neural foramina where it is moderate. MRI CERVICAL SPINE WO CONTRAST   Final Result   Disc and osteophytes result in narrowing of the neural foramina and stenosis   of the thecal sac as discussed above. XR KNEE LEFT (1-2 VIEWS)   Final Result   No acute disease. RECOMMENDATION:   Careful clinical correlation and follow up recommended. XR FEMUR LEFT (MIN 2 VIEWS)   Final Result   No acute disease. RECOMMENDATION:   Careful clinical correlation and follow up recommended. XR CHEST PORTABLE   Final Result   No acute process. US DUP LOWER EXTREMITIES BILATERAL VENOUS   Final Result   No evidence of DVT in either lower extremity. CT Head W/O Contrast   Final Result   No acute intracranial abnormality.                  Assessment/Plan:    80 y/o female with history of HTN, obesity, s/p left TKA who presented with:     Bilateral LE weakness   - in the setting of left knee prosthetic joint infection, E. coli UTI, possible spinal stenosis  - s/p left knee aspiration on 3/11, fluid culture no growth  - blood cultures no growth, urine culture grew E. Coli  - on IV Vancomycin and Ceftriaxone  - s/p arthrotomy with liner exchange on 3/16 per Ortho  - MRI of the spine showed cervical stenosis, follow up as outpatient per neurosurgery  - ID and neurology following    HTN  - continue home meds    Diet: Diet NPO Exceptions are: Sips of Water with Meds    Code Status: Full Code      Disposition - SNF/acute rehab,  following          Electronically signed by Ida Price MD on 3/16/2023 at 12:43 PM

## 2023-03-16 NOTE — ANESTHESIA PRE PROCEDURE
Department of Anesthesiology  Preprocedure Note       Name:  Arelis Height   Age:  81 y.o.  :  1940                                          MRN:  [id number]         Date:  3/16/2023      Surgeon: Aidan Galvez):  Skip Chambers MD    Procedure: Procedure(s):  LEFT KNEE ARTHROTOMY WITH POLYETHYLENE LINER EXCHANGE KEY DEMAYO LEG BAIRD, (1) ASSIST  ROOM 291 REQUESTING 1:00 PM-WAGNER    Medications prior to admission:   Prior to Admission medications    Medication Sig Start Date End Date Taking?  Authorizing Provider   docusate sodium (COLACE) 100 MG capsule Take 1 capsule by mouth 2 times daily  Patient taking differently: Take 100 mg by mouth 2 times daily Indications: Constipation  3/18/22   Chantell Simeon MD   furosemide (LASIX) 20 MG tablet Take 20 mg by mouth daily Indications: High Blood Pressure Disorder     Historical Provider, MD   triamterene-hydroCHLOROthiazide (MAXZIDE-25) 37.5-25 MG per tablet Take 0.5 tablets by mouth daily Indications: High Blood Pressure Disorder     Historical Provider, MD   metoprolol succinate (TOPROL XL) 50 MG extended release tablet Take 50 mg by mouth daily Indications: High Blood Pressure Disorder     Historical Provider, MD       Current medications:    Current Facility-Administered Medications   Medication Dose Route Frequency Provider Last Rate Last Admin    ceFAZolin (ANCEF) 2000 mg in 0.9% sodium chloride 100 mL IVPB  2,000 mg IntraVENous Once Avanell Jaguar Born, APRN - CNP        lactated ringers IV soln infusion   IntraVENous Continuous Skip Chambers  mL/hr at 23 0406 New Bag at 23 0406    tranexamic acid (LYSTEDA) tablet 1,300 mg  1,300 mg Oral Once Skip Chambers MD        vancomycin [facility]) intermittent dosing (placeholder)   Other RX Placeholder Skip Chambers MD        vancomycin (VANCOCIN) 1,250 mg in sodium chloride 0.9 % 250 mL IVPB (ADDAVIAL)  1,250 mg IntraVENous Q12H Skip Chambers MD   Stopped at 23 0801    cefTRIAXone (ROCEPHIN) 1,000 mg in sodium chloride 0.9 % 50 mL IVPB (mini-bag)  1,000 mg IntraVENous Q24H Domitila Glendale, DO   Stopped at 03/16/23 1020    furosemide (LASIX) tablet 20 mg  20 mg Oral Daily Domitila Glendale, DO   20 mg at 03/16/23 2960    metoprolol succinate (TOPROL XL) extended release tablet 50 mg  50 mg Oral Daily Domitila Glendale, DO   50 mg at 03/16/23 0929    triamterene-hydroCHLOROthiazide (MAXZIDE-25) 37.5-25 MG per tablet 0.5 tablet  0.5 tablet Oral Daily Domitila Glendale, DO   0.5 tablet at 03/16/23 9210    acetaminophen (TYLENOL) tablet 1,000 mg  1,000 mg Oral 3 times per day Jmimy Navarro MD   1,000 mg at 03/16/23 1779    traMADol (ULTRAM) tablet 25 mg  25 mg Oral Q4H PRN Jimmy Navarro MD        Or   [place] [facility]) tablet 50 mg  50 mg Oral Q4H PRN Jimmy Navarro MD        sodium chloride flush 0.9 % injection 5-40 mL  5-40 mL IntraVENous 2 times per day Dorothe Crystal, APRN - CNP   10 mL at 03/15/23 2006    sodium chloride flush 0.9 % injection 5-40 mL  5-40 mL IntraVENous PRN Dorothe Crystal, APRN - CNP        0.9 % sodium chloride infusion  25 mL IntraVENous PRN Dorothe Crystal, APRN - CNP        ondansetron (ZOFRAN-ODT) disintegrating tablet 4 mg  4 mg Oral Q8H PRN Dorothe Crystal, APRN - CNP        Or    ondansetron [facility]) injection 4 mg  4 mg IntraVENous Q6H PRN Dorothe Crystal, APRN - CNP        polyethylene glycol (GLYCOLAX) packet 17 g  17 g Oral Daily PRN Dorothe Crystal, APRN - CNP           Allergies:     Allergies   Allergen Reactions    Aspirin        Problem List:    Patient Active Problem List   Diagnosis Code    Chronic sinusitis J32.9    Deviated nasal septum J34.2    Hyperglycemia R73.9    Hypertension I10    Osteoarthritis M19.90    Class 2 drug-induced obesity with serious comorbidity and body mass index (BMI) of 37.0 to 37.9 in adult E66.1, Z68.37    Benign prostatic hyperplasia with lower urinary tract symptoms N40.1    Supracondylar fracture of femur, left, closed, with routine healing, subsequent encounter S72.452D    General weakness R53.1    Urinary tract infection without hematuria N39.0    Prosthetic joint infection ([facility] [address] T84.50XA    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) M48.02, G99.2    Unable to ambulate R26.2       Past Medical History:        Diagnosis Date    Aneurysm artery, pulmonary ([address]     not surgical    Benign prostatic hyperplasia with lower urinary tract symptoms     Chronic sinusitis     History of left knee replacement 03/15/2022    at [facility] 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    HTN (hypertension)     Hx of carpal tunnel syndrome     Obesity     Other fracture of left femur, initial encounter for closed fracture ([facility] [address] 3/14/2022    Primary osteoarthritis of both knees        Past Surgical History:        Procedure Laterality Date    TOTAL KNEE ARTHROPLASTY Left 3/15/2022    LEFT KNEE DISTAL FEMUR REPLACEMENT performed by Dane Oden MD at [place] [address] History:    Social History     Tobacco Use    Smoking status: Former     Years: 40.00     Types: Cigarettes     Quit date:      Years since quittin.2    Smokeless tobacco: Former    Tobacco comments:     40 years ago per patient   Substance Use Topics    Alcohol use: Not Currently                                Counseling given: Not Answered  Tobacco comments: 40 years ago per patient      Vital Signs (Current):   Vitals:    03/15/23 1014 03/15/23 1940 23 0738 23 1211   BP: (!) 122/56 (!) 131/56 (!) 127/58 (!) 114/55   Pulse: 78 80 76 61   Resp:  17 16 20   Temp:  98.6 °F (37 °C) 98.7 °F (37.1 °C) 99.6 °F (37.6 °C)   TempSrc:  Oral Oral Temporal   SpO2:  97% 96% 95%   Weight:       Height:                                                  BP Readings from Last 3 Encounters:   23 (!) 114/55   23 134/64   04/15/22 137/73       NPO Status: Time of last liquid consumption: 2330 Time of last solid consumption: 1945                        Date of last liquid consumption: 03/15/23                        Date of last solid food consumption: 03/15/23    BMI:   Wt Readings from Last 3 Encounters:   03/10/23 256 lb (116.1 kg)   01/27/23 240 lb (108.9 kg)   09/12/22 243 lb (110.2 kg)     Body mass index is 35.7 kg/m². CBC:   Lab Results   Component Value Date/Time    WBC 7.1 03/16/2023 05:17 AM    RBC 3.77 03/16/2023 05:17 AM    HGB 12.0 03/16/2023 05:17 AM    HCT 35.0 03/16/2023 05:17 AM    MCV 92.8 03/16/2023 05:17 AM    RDW 14.1 03/16/2023 05:17 AM     03/16/2023 05:17 AM       CMP:   Lab Results   Component Value Date/Time     03/16/2023 05:17 AM    K 4.0 03/16/2023 05:17 AM     03/16/2023 05:17 AM    CO2 27 03/16/2023 05:17 AM    BUN 16 03/16/2023 05:17 AM    CREATININE 0.58 03/16/2023 05:17 AM    GFRAA >60.0 03/16/2022 05:57 AM    LABGLOM >60.0 03/16/2023 05:17 AM    GLUCOSE 110 03/16/2023 05:17 AM    PROT 6.9 03/16/2023 05:17 AM    CALCIUM 8.7 03/16/2023 05:17 AM    BILITOT 0.5 03/16/2023 05:17 AM    ALKPHOS 141 03/16/2023 05:17 AM    AST 28 03/16/2023 05:17 AM    ALT 15 03/16/2023 05:17 AM       POC Tests: No results for input(s): POCGLU, POCNA, POCK, POCCL, POCBUN, POCHEMO, POCHCT in the last 72 hours.     Coags:   Lab Results   Component Value Date/Time    PROTIME 14.1 03/14/2022 04:05 PM    INR 1.1 03/14/2022 04:05 PM    APTT 28.5 03/14/2022 04:05 PM       HCG (If Applicable): No results found for: PREGTESTUR, PREGSERUM, HCG, HCGQUANT     ABGs: No results found for: PHART, PO2ART, YYO5PNI, GTL7PZB, BEART, Q8BJTWST     Type & Screen (If Applicable):  No results found for: LABABO, LABRH    Drug/Infectious Status (If Applicable):  No results found for: HIV, HEPCAB    COVID-19 Screening (If Applicable): No results found for: COVID19        Anesthesia Evaluation  Patient summary reviewed and Nursing notes reviewed no history of anesthetic complications: Airway: Mallampati: II  TM distance: >3 FB   Neck ROM: full  Mouth opening: > = 3 FB   Dental: normal exam         Pulmonary:Negative Pulmonary ROS and normal exam                               Cardiovascular:Negative CV ROS  Exercise tolerance: good (>4 METS),   (+) hypertension:,       ECG reviewed               Beta Blocker:  Dose within 24 Hrs      ROS comment: ECHO 3/13/23:  EF 65%  Mild TR  RVSP 31  Otherwise normal     Neuro/Psych:   Negative Neuro/Psych ROS              GI/Hepatic/Renal: Neg GI/Hepatic/Renal ROS            Endo/Other: Negative Endo/Other ROS             Pt had PAT visit. Abdominal:             Vascular: negative vascular ROS. Other Findings:           Anesthesia Plan      general     ASA 3     (ETT)  Induction: intravenous. MIPS: Postoperative opioids intended and Prophylactic antiemetics administered. Anesthetic plan and risks discussed with patient. Plan discussed with CRNA.     Attending anesthesiologist reviewed and agrees with Pre Eval content                Emma Fernandez MD   3/16/2023

## 2023-03-16 NOTE — PLAN OF CARE
Problem: Pain  Goal: Verbalizes/displays adequate comfort level or baseline comfort level  Outcome: Progressing     Problem: Skin/Tissue Integrity  Goal: Absence of new skin breakdown  Description: 1. Monitor for areas of redness and/or skin breakdown  2. Assess vascular access sites hourly  3. Every 4-6 hours minimum:  Change oxygen saturation probe site  4. Every 4-6 hours:  If on nasal continuous positive airway pressure, respiratory therapy assess nares and determine need for appliance change or resting period.   Outcome: Progressing     Problem: Safety - Adult  Goal: Free from fall injury  Outcome: Progressing     Problem: Nutrition Deficit:  Goal: Optimize nutritional status  Outcome: Progressing     Problem: Neurosensory - Adult  Goal: Achieves stable or improved neurological status  Outcome: Progressing  Goal: Achieves maximal functionality and self care  Outcome: Progressing     Problem: Neurosensory - Adult  Goal: Achieves stable or improved neurological status  Outcome: Progressing  Goal: Achieves maximal functionality and self care  Outcome: Progressing     Problem: Respiratory - Adult  Goal: Achieves optimal ventilation and oxygenation  Outcome: Progressing     Problem: Cardiovascular - Adult  Goal: Maintains optimal cardiac output and hemodynamic stability  Outcome: Progressing     Problem: Skin/Tissue Integrity - Adult  Goal: Incisions, wounds, or drain sites healing without S/S of infection  Outcome: Progressing     Problem: Musculoskeletal - Adult  Goal: Return mobility to safest level of function  Outcome: Progressing  Goal: Return ADL status to a safe level of function  Outcome: Progressing     Problem: Gastrointestinal - Adult  Goal: Maintains or returns to baseline bowel function  Outcome: Progressing  Goal: Maintains adequate nutritional intake  Outcome: Progressing     Problem: Genitourinary - Adult  Goal: Absence of urinary retention  Outcome: Progressing     Problem: Infection - Adult  Goal: Absence of infection at discharge  Outcome: Progressing     Problem: Metabolic/Fluid and Electrolytes - Adult  Goal: Electrolytes maintained within normal limits  Outcome: Progressing     Problem: Hematologic - Adult  Goal: Maintains hematologic stability  Outcome: Progressing

## 2023-03-17 ENCOUNTER — APPOINTMENT (OUTPATIENT)
Dept: INTERVENTIONAL RADIOLOGY/VASCULAR | Age: 83
End: 2023-03-17
Payer: MEDICARE

## 2023-03-17 LAB
ALBUMIN SERPL-MCNC: 2.3 G/DL (ref 3.5–4.6)
ALP SERPL-CCNC: 124 U/L (ref 35–104)
ALT SERPL-CCNC: 13 U/L (ref 0–41)
ANION GAP SERPL CALCULATED.3IONS-SCNC: 8 MEQ/L (ref 9–15)
AST SERPL-CCNC: 22 U/L (ref 0–40)
BACTERIA FLD AEROBE CULT: ABNORMAL
BILIRUB SERPL-MCNC: 0.4 MG/DL (ref 0.2–0.7)
BUN SERPL-MCNC: 15 MG/DL (ref 8–23)
CALCIUM SERPL-MCNC: 8.4 MG/DL (ref 8.5–9.9)
CHLORIDE SERPL-SCNC: 102 MEQ/L (ref 95–107)
CO2 SERPL-SCNC: 26 MEQ/L (ref 20–31)
CREAT SERPL-MCNC: 0.56 MG/DL (ref 0.7–1.2)
ERYTHROCYTE [DISTWIDTH] IN BLOOD BY AUTOMATED COUNT: 13.9 % (ref 11.5–14.5)
GLOBULIN SER CALC-MCNC: 4.2 G/DL (ref 2.3–3.5)
GLUCOSE SERPL-MCNC: 112 MG/DL (ref 70–99)
HCT VFR BLD AUTO: 32.7 % (ref 42–52)
HGB BLD-MCNC: 11.3 G/DL (ref 14–18)
MCH RBC QN AUTO: 31.2 PG (ref 27–31.3)
MCHC RBC AUTO-ENTMCNC: 34.5 % (ref 33–37)
MCV RBC AUTO: 90.6 FL (ref 79–92.2)
ORGANISM: ABNORMAL
PLATELET # BLD AUTO: 248 K/UL (ref 130–400)
POTASSIUM SERPL-SCNC: 4.5 MEQ/L (ref 3.4–4.9)
PROT SERPL-MCNC: 6.5 G/DL (ref 6.3–8)
RBC # BLD AUTO: 3.61 M/UL (ref 4.7–6.1)
SODIUM SERPL-SCNC: 136 MEQ/L (ref 135–144)
WBC # BLD AUTO: 6.2 K/UL (ref 4.8–10.8)

## 2023-03-17 PROCEDURE — 6370000000 HC RX 637 (ALT 250 FOR IP): Performed by: ORTHOPAEDIC SURGERY

## 2023-03-17 PROCEDURE — 6370000000 HC RX 637 (ALT 250 FOR IP): Performed by: INTERNAL MEDICINE

## 2023-03-17 PROCEDURE — 97163 PT EVAL HIGH COMPLEX 45 MIN: CPT

## 2023-03-17 PROCEDURE — 80053 COMPREHEN METABOLIC PANEL: CPT

## 2023-03-17 PROCEDURE — 97166 OT EVAL MOD COMPLEX 45 MIN: CPT

## 2023-03-17 PROCEDURE — 36573 INSJ PICC RS&I 5 YR+: CPT

## 2023-03-17 PROCEDURE — 6360000002 HC RX W HCPCS: Performed by: ORTHOPAEDIC SURGERY

## 2023-03-17 PROCEDURE — 2709999900 IR PICC WO SQ PORT/PUMP > 5 YEARS

## 2023-03-17 PROCEDURE — 36415 COLL VENOUS BLD VENIPUNCTURE: CPT

## 2023-03-17 PROCEDURE — 2580000003 HC RX 258: Performed by: ORTHOPAEDIC SURGERY

## 2023-03-17 PROCEDURE — 97162 PT EVAL MOD COMPLEX 30 MIN: CPT

## 2023-03-17 PROCEDURE — 99232 SBSQ HOSP IP/OBS MODERATE 35: CPT | Performed by: INTERNAL MEDICINE

## 2023-03-17 PROCEDURE — 2700000000 HC OXYGEN THERAPY PER DAY

## 2023-03-17 PROCEDURE — 85027 COMPLETE CBC AUTOMATED: CPT

## 2023-03-17 PROCEDURE — 51798 US URINE CAPACITY MEASURE: CPT

## 2023-03-17 PROCEDURE — 2500000003 HC RX 250 WO HCPCS: Performed by: INTERNAL MEDICINE

## 2023-03-17 PROCEDURE — APPSS15 APP SPLIT SHARED TIME 0-15 MINUTES: Performed by: NURSE PRACTITIONER

## 2023-03-17 PROCEDURE — 1210000000 HC MED SURG R&B

## 2023-03-17 RX ORDER — SODIUM CHLORIDE 0.9 % (FLUSH) 0.9 %
5-40 SYRINGE (ML) INJECTION PRN
Status: DISCONTINUED | OUTPATIENT
Start: 2023-03-17 | End: 2023-03-18 | Stop reason: HOSPADM

## 2023-03-17 RX ORDER — SODIUM CHLORIDE 9 MG/ML
INJECTION, SOLUTION INTRAVENOUS PRN
Status: DISCONTINUED | OUTPATIENT
Start: 2023-03-17 | End: 2023-03-18 | Stop reason: HOSPADM

## 2023-03-17 RX ORDER — SODIUM CHLORIDE 9 MG/ML
250 INJECTION, SOLUTION INTRAVENOUS ONCE
Status: DISCONTINUED | OUTPATIENT
Start: 2023-03-17 | End: 2023-03-18 | Stop reason: HOSPADM

## 2023-03-17 RX ORDER — LIDOCAINE HYDROCHLORIDE 20 MG/ML
5 INJECTION, SOLUTION INFILTRATION; PERINEURAL ONCE
Status: COMPLETED | OUTPATIENT
Start: 2023-03-17 | End: 2023-03-17

## 2023-03-17 RX ORDER — SODIUM CHLORIDE 0.9 % (FLUSH) 0.9 %
5-40 SYRINGE (ML) INJECTION EVERY 12 HOURS SCHEDULED
Status: DISCONTINUED | OUTPATIENT
Start: 2023-03-17 | End: 2023-03-18 | Stop reason: HOSPADM

## 2023-03-17 RX ADMIN — ENOXAPARIN SODIUM 30 MG: 100 INJECTION SUBCUTANEOUS at 23:06

## 2023-03-17 RX ADMIN — TRIAMTERENE AND HYDROCHLOROTHIAZIDE 0.5 TABLET: 37.5; 25 TABLET ORAL at 10:06

## 2023-03-17 RX ADMIN — LIDOCAINE HYDROCHLORIDE 5 ML: 20 INJECTION, SOLUTION INFILTRATION; PERINEURAL at 12:10

## 2023-03-17 RX ADMIN — SENNOSIDES AND DOCUSATE SODIUM 1 TABLET: 50; 8.6 TABLET ORAL at 10:05

## 2023-03-17 RX ADMIN — ACETAMINOPHEN 1000 MG: 500 TABLET ORAL at 14:32

## 2023-03-17 RX ADMIN — VANCOMYCIN HYDROCHLORIDE 1250 MG: 1.25 INJECTION, POWDER, LYOPHILIZED, FOR SOLUTION INTRAVENOUS at 05:36

## 2023-03-17 RX ADMIN — OXYCODONE HYDROCHLORIDE 10 MG: 5 TABLET ORAL at 10:08

## 2023-03-17 RX ADMIN — HYDROMORPHONE HYDROCHLORIDE 0.5 MG: 1 INJECTION, SOLUTION INTRAMUSCULAR; INTRAVENOUS; SUBCUTANEOUS at 00:23

## 2023-03-17 RX ADMIN — OXYCODONE HYDROCHLORIDE 10 MG: 5 TABLET ORAL at 18:41

## 2023-03-17 RX ADMIN — SODIUM CHLORIDE: 9 INJECTION, SOLUTION INTRAVENOUS at 12:06

## 2023-03-17 RX ADMIN — Medication 1 TABLET: at 10:05

## 2023-03-17 RX ADMIN — ACETAMINOPHEN 1000 MG: 500 TABLET ORAL at 05:33

## 2023-03-17 RX ADMIN — ENOXAPARIN SODIUM 30 MG: 100 INJECTION SUBCUTANEOUS at 10:06

## 2023-03-17 RX ADMIN — SODIUM CHLORIDE, POTASSIUM CHLORIDE, SODIUM LACTATE AND CALCIUM CHLORIDE: 600; 310; 30; 20 INJECTION, SOLUTION INTRAVENOUS at 05:37

## 2023-03-17 RX ADMIN — FUROSEMIDE 20 MG: 20 TABLET ORAL at 10:05

## 2023-03-17 RX ADMIN — METOPROLOL SUCCINATE 50 MG: 50 TABLET, FILM COATED, EXTENDED RELEASE ORAL at 10:06

## 2023-03-17 RX ADMIN — ACETAMINOPHEN 1000 MG: 500 TABLET ORAL at 23:05

## 2023-03-17 RX ADMIN — PANTOPRAZOLE SODIUM 20 MG: 20 TABLET, DELAYED RELEASE ORAL at 05:33

## 2023-03-17 ASSESSMENT — ENCOUNTER SYMPTOMS
COLOR CHANGE: 0
COUGH: 0
SHORTNESS OF BREATH: 0
BACK PAIN: 0
WHEEZING: 0
NAUSEA: 0
TROUBLE SWALLOWING: 0
CHEST TIGHTNESS: 0
VOMITING: 0

## 2023-03-17 ASSESSMENT — PAIN DESCRIPTION - DESCRIPTORS
DESCRIPTORS: ACHING;THROBBING
DESCRIPTORS: ACHING;STABBING
DESCRIPTORS: STABBING

## 2023-03-17 ASSESSMENT — PAIN DESCRIPTION - ORIENTATION
ORIENTATION: LEFT

## 2023-03-17 ASSESSMENT — PAIN - FUNCTIONAL ASSESSMENT
PAIN_FUNCTIONAL_ASSESSMENT: PREVENTS OR INTERFERES SOME ACTIVE ACTIVITIES AND ADLS
PAIN_FUNCTIONAL_ASSESSMENT: PREVENTS OR INTERFERES SOME ACTIVE ACTIVITIES AND ADLS

## 2023-03-17 ASSESSMENT — PAIN DESCRIPTION - LOCATION
LOCATION: LEG;KNEE
LOCATION: KNEE
LOCATION: KNEE
LOCATION: KNEE;LEG
LOCATION: LEG;KNEE

## 2023-03-17 ASSESSMENT — PAIN SCALES - GENERAL
PAINLEVEL_OUTOF10: 0
PAINLEVEL_OUTOF10: 0
PAINLEVEL_OUTOF10: 2
PAINLEVEL_OUTOF10: 3
PAINLEVEL_OUTOF10: 7
PAINLEVEL_OUTOF10: 8
PAINLEVEL_OUTOF10: 4
PAINLEVEL_OUTOF10: 5
PAINLEVEL_OUTOF10: 0
PAINLEVEL_OUTOF10: 6
PAINLEVEL_OUTOF10: 6

## 2023-03-17 NOTE — PROGRESS NOTES
[facility] OCCUPATIONAL THERAPY EVALUATION - ACUTE     NAME: Chava Tinajero  : 1940 (81 y.o.)  MRN: [id number]  CODE STATUS: Full Code  Room: N384/X798-85    Date of Service: 3/17/2023    Patient Diagnosis(es): Weakness [R53.1]  Leg swelling [M79.89]  General weakness [R53.1]  Unable to ambulate [R26.2]   Patient Active Problem List    Diagnosis Date Noted    Unable to ambulate 03/15/2023    Stenosis of cervical spine with myelopathy ([facility] [address] 2023    Urinary tract infection without hematuria 2023    Prosthetic joint infection ([facility] [address] 2023    General weakness 03/10/2023    Supracondylar fracture of femur, left, closed, with routine healing, subsequent encounter 2022    Benign prostatic hyperplasia with lower urinary tract symptoms 2022    Class 2 drug-induced obesity with serious comorbidity and body mass index (BMI) of 37.0 to 37.9 in adult 2022    Chronic sinusitis 2022    Deviated nasal septum 2022    Hyperglycemia 2022    Hypertension 2022    Osteoarthritis 2022        Past Medical History:   Diagnosis Date    Aneurysm artery, pulmonary ([facility] [address]     not surgical    Benign prostatic hyperplasia with lower urinary tract symptoms     Chronic sinusitis     History of left knee [facility] 03/15/2022    [facility]    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    HTN (hypertension)     Hx of carpal tunnel syndrome     Obesity     Other fracture of left femur, initial encounter for closed fracture ([address] 3/14/2022    Primary osteoarthritis of both knees      Past Surgical History:   Procedure Laterality Date    REVISION TOTAL KNEE ARTHROPLASTY Left 3/16/2023    LEFT KNEE ARTHROTOMY WITH POLYETHYLENE LINER EXCHANGE AND PLACEMENT OF TOPICAL ANTIBIOTICS performed by Cathy Gore MD at [address] [place] [address] Left 3/15/2022    LEFT KNEE DISTAL FEMUR REPLACEMENT performed by Joo Cline MD at [facility] Restrictions  Restrictions/Precautions: Fall Risk, Weight Bearing      Left Lower Extremity Weight Bearing: Weight Bearing As Tolerated       Safety Devices: Safety Devices  Type of Devices: All fall risk precautions in place; Bed alarm in place;Call light within reach; Left in bed     Patient's date of birth confirmed: Yes    General:  Patient assessed for rehabilitation services?: Yes    Subjective          Pain at start of treatment: Yes: Pt. unable to rate pain-      Pain at end of treatment: Yes: Pt. unable to rate pain-      Location:  L LE  Description: constant with movement  Intervention: Cold applied    Prior Level of Function:  Social/Functional History  Lives With: Alone  Type of Home: House  Home Layout: Two level, Able to Live on Main level with bedroom/bathroom  Home Access: Ramped entrance  Bathroom Shower/Tub: Walk-in shower  Bathroom Equipment: Grab bars in shower, Shower chair  Home Equipment: Elby Brigitte, rolling, Rollator  Has the patient had two or more falls in the past year or any fall with injury in the past year?: No  Receives Help From: Family  ADL Assistance: Independent  Homemaking Assistance: Independent  Ambulation Assistance: Independent (RW)  Transfer Assistance: Independent  Active : Yes  Occupation: Retired  Type of Occupation: construction  Additional Comments: sleeps in [address]:     Orientation Status:  Orientation  Overall Orientation Status: Within Functional Limits    Observation:  Observation/Palpation  Observation: pleasant, cooperative, ACE wrap LLE. no acute distress noted on O2 NC.     Cognition Status:  Cognition  Overall Cognitive Status: WFL    Perception Status:       Vision and Hearing Status:  Vision  Vision Exceptions: Wears glasses for reading  Hearing  Hearing: Exceptions to [facility]  Hearing Exceptions: Hard of hearing/hearing concerns        GROSS ASSESSMENT AROM/PROM:  AROM: Within functional limits       ROM:   LUE AROM (degrees)  LUE AROM : WFL  Left Hand AROM (degrees)  Left Hand AROM: WFL  RUE AROM (degrees)  RUE AROM : WFL  Right Hand AROM (degrees)  Right Hand AROM: WFL    UE STRENGTH:  Strength: Generally decreased, functional    UE COORDINATION:  Coordination: Within functional limits    UE TONE:  Tone: Normal    UE SENSATION:  Sensation: Intact    Hand Dominance:  Hand Dominance  Hand Dominance: Right    ADL Status:  ADL  Feeding: Modified independent   Grooming: Setup  UE Bathing: Stand by assistance  LE Bathing: Dependent/Total  UE Dressing: Minimal assistance  LE Dressing: Dependent/Total  Toileting: Dependent/Total  Additional Comments: simulated ADL seated EOB. levels as anticipated  Toilet Transfers  Toilet Transfer: Dependent/Total  Toilet Transfers Comments: anticipated level, unable to safely assess    Functional Mobility:    Transfers  Sit to stand: Dependent/Total  Stand to sit: Dependent/Total  Transfer Comments: unable to achieve full stand    Patient ambulated  unable to safely progress to full stand at this time. Bed Mobility  Bed mobility  Supine to Sit: Dependent/Total  Sit to Supine: Dependent/Total    Seated and Standing Balance:  Balance  Sitting: Impaired (SUP)  Standing:  (unable to get to full stand)    Functional Endurance:  Activity Tolerance  Activity Tolerance: Patient limited by pain    D/C Recommendations:  OT D/C RECOMMENDATIONS  REQUIRES OT FOLLOW-UP: Yes    Equipment Recommendations:  OT Equipment Recommendations  Other: continue to assess    OT Education:   Patient Education  Education Given To: Patient  Education Provided: Role of Therapy;Plan of Care  Education Method: Verbal  Barriers to Learning: None  Education Outcome: Verbalized understanding    OT Follow Up:   OT D/C RECOMMENDATIONS  REQUIRES OT FOLLOW-UP: Yes       Assessment/Discharge Disposition:  Assessment: Pt is an 81 yr old male presenting to [facility] s/p L knee surgery.  Pt would benefit from occupational therapy services to maximize safety and independence with ADL's, improve overall strength/endurance and balance for functional tasks.   Performance deficits / Impairments: Decreased functional mobility , Decreased strength, Decreased endurance, Decreased ADL status, Decreased safe awareness, Decreased balance, Decreased posture  Prognosis: Fair  Discharge Recommendations: Continue to assess pending progress  Decision Making: High Complexity  History: mod complex  Exam: 7 perf deficits  Assistance / Modification: dep    [facility] Self care Score   How much help is needed for putting on and taking off regular lower body clothing?: Total  How much help is needed for bathing (which includes washing, rinsing, drying)?: A Lot  How much help is needed for toileting (which includes using toilet, bedpan, or urinal)?: Total  How much help is needed for putting on and taking off regular upper body clothing?: A Little  How much help is needed for taking care of personal grooming?: A Little  How much help for eating meals?: A Little  [facility]  AM-PAC Inpatient ADL T-Scale Score : 32.03  ADL Inpatient CMS 0-100% Score: 63.03    Therapy key for assistance levels -   Independent/Mod I = Pt. is able to perform task with no assistance but may require a device   Stand by assistance = Pt. does not perform task at an independent level but does not need physical assistance, requires verbal cues  Minimal, Moderate, Maximal Assistance = Pt. requires physical assistance (25%, 50%, 75% assist from helper) for task but is able to actively participate in task   Dependent = Pt. requires total assistance with task and is not able to actively participate with task completion     Plan:  Occupational Therapy Plan  Times Per Week: 1-4x/wk  Therapy Duration:  (length of acute stay)  Current Treatment Recommendations: Strengthening, Balance training, Functional mobility training, Endurance training, Patient/Caregiver education & training, Safety education & training, Equipment evaluation, education, & procurement, Self-Care / ADL    Goals:   Patient will:    - Improve functional endurance to tolerate/complete 30 mins of ADL's  - Be DEEPA in UB ADLs   - Be Tiffany in LB ADLs  - Be ModA in ADL transfers without LOB  - Be ModA in toileting tasks  - Improve B UE strength and endurance to 5/5 in order to participate in self-care activities as projected. - Access appropriate D/C site with as few architectural barriers as possible.     Patient Goal: Patient goals : to get stonger and be able to work outside     Discussed and agreed upon: Yes Comments:       Therapy Time:   Individual   Time In 1024   Time Out 1100   Minutes 36          Eval: 36 minutes     Electronically signed by:    Irl Ganser, OT,   3/17/2023, 11:11 AM

## 2023-03-17 NOTE — PROGRESS NOTES
Comprehensive Nutrition Assessment    Type and Reason for Visit:  Reassess    Nutrition Recommendations/Plan:   Food and/or Nutrient Delivery: Continue Current Diet, Continue Oral Nutrition Supplement     Malnutrition Assessment:  Malnutrition Status:  No malnutrition (03/11/23 1311)      Nutrition Assessment:    Pt continues with increased nutrient needs for wound healing. To continue to provide wound healing supplement BID, and continue to monitor. Nutrition Related Findings:    Pmhx: HTN, BPH, OA; admitted for BLE weaknesss, s/p left total knee arthroplasty 3/15/22. Surrgery 3/16/23 for LEFT KNEE ARTHROTOMY.  +2 RLE and trace Gen&RUE edema noted. Last BM noted 3/17. Wound Type: Open Wounds (buttocks)       Current Nutrition Intake & Therapies:    Average Meal Intake: %  ADULT DIET;  Regular  ADULT NUTRITIONAL SUPPLEMENT; lunch, dinner; wound healing supplement    Anthropometric Measures:  Height: 5' 11\" (180.3 cm)  Ideal Body Weight (IBW): 172 lbs (78 kg)    Admission Body Weight: 240 lb (108.9 kg) (stated)  Current Body Weight: 256 lb (116.1 kg) (3/10),  Weight Source: Bed Scale  Current BMI (kg/m2): 35.7  Usual Body Weight: 256 lb 6 oz (116.3 kg) (4/2022 bed)  % Weight Change (Calculated): -0.1                    BMI Categories: Obese Class 2 (BMI 35.0 -39.9)    Estimated Daily Nutrient Needs:  Energy Requirements Based On: Kcal/kg  Weight Used for Energy Requirements: Current  Energy (kcal/day): 1740-1972kcal (15-17kcal/kg)  Weight Used for Protein Requirements: Ideal  Protein (g/day): ~109-117g (1.4-1.5g/kg)  Method Used for Fluid Requirements: 1 ml/kcal  Fluid (ml/day): ~1900ml    Nutrition Diagnosis:   Increased nutrient needs related to increase demand for energy/nutrients as evidenced by wounds    Nutrition Interventions:   Food and/or Nutrient Delivery: Continue Current Diet, Continue Oral Nutrition Supplement  Nutrition Education/Counseling: No recommendation at this time  Coordination of Nutrition Care: Continue to monitor while inpatient       Goals:     Goals: PO intake 75% or greater, other (specify)  Specify Other Goals: improved wound status    Nutrition Monitoring and Evaluation:   Behavioral-Environmental Outcomes: None Identified  Food/Nutrient Intake Outcomes: Food and Nutrient Intake, Supplement Intake  Physical Signs/Symptoms Outcomes: Biochemical Data, Weight, Skin, Meal Time Behavior    Leesa Ferrer RD, LD

## 2023-03-17 NOTE — CARE COORDINATION
Met with patient, freedom of choice provided. Patient would prefer to go to [facility] over [facility]. LSW calling referral and awaiting to see if they can accept the patient today.

## 2023-03-17 NOTE — DISCHARGE SUMMARY
Hospital Medicine Discharge Summary    Giseal Cramer  :  1940  MRN:  [id number]    Admit date:  3/10/2023  Discharge date:  3/18/2023    Admitting Physician:  Jaswinder Marks DO  Primary Care Physician:  Randy Booth MD      Discharge Diagnoses:    Principal Problem:    General weakness  Active Problems:    Urinary tract infection without hematuria    Prosthetic joint infection ([address]    Stenosis of cervical spine with myelopathy ([address]    Unable to ambulate  Resolved Problems:    * No resolved hospital problems. *      Hospital Course:   Gisela Cramer is a 81 y.o. male that was admitted and treated at [facility] for the following medical issues:     Bilateral LE weakness   - in the setting of left knee prosthetic joint infection, spinal stenosis  - s/p left knee aspiration on 3/11,   - fluid and urine culture grew pansensitive E. Coli  - treated with IV Vancomycin and Ceftriaxone  - s/p arthrotomy with liner exchange on 3/16 per Ortho  - MRI of the spine showed cervical stenosis, follow up as outpatient per neurosurgery  - plan to continue prolonged course of IV Rocephin via PICC line on d/c per ID      Disposition - SNF      Patient was seen by the following consultants while admitted to [facility]:   Consults:  [address]    Significant Diagnostic Studies:    XR FEMUR LEFT (MIN 2 VIEWS)    Result Date: 3/11/2023  EXAMINATION: XRAY VIEWS OF THE LEFT FEMUR 3/11/2023 1:11 am COMPARISON: None. HISTORY: ORDERING SYSTEM PROVIDED HISTORY: swelling and arthroplasty TECHNOLOGIST PROVIDED HISTORY: Reason for exam:->swelling and arthroplasty What reading provider will be dictating this exam?->CRC FINDINGS: Long-stem left total knee arthroplasty in position. No acute or concerning features. Femoral diaphysis and proximal femur intact.   Soft tissues unremarkable. No acute disease. RECOMMENDATION: Careful clinical correlation and follow up recommended. XR KNEE LEFT (1-2 VIEWS)    Result Date: 3/11/2023  EXAMINATION: TWO XRAY VIEWS OF THE LEFT KNEE 3/11/2023 1:11 am COMPARISON: None. HISTORY: ORDERING SYSTEM PROVIDED HISTORY: swelling and arthroplasty TECHNOLOGIST PROVIDED HISTORY: Reason for exam:->swelling and arthroplasty What reading provider will be dictating this exam?->CRC FINDINGS: Long-stem hinged total knee arthroplasty in good position. No periprosthetic fracture. No significant effusion detected. Soft tissues unremarkable. No acute disease. RECOMMENDATION: Careful clinical correlation and follow up recommended. CT Head W/O Contrast    Result Date: 3/10/2023  EXAMINATION: CT OF THE HEAD WITHOUT CONTRAST  3/10/2023 3:53 pm TECHNIQUE: CT of the head was performed without the administration of intravenous contrast. Automated exposure control, iterative reconstruction, and/or weight based adjustment of the mA/kV was utilized to reduce the radiation dose to as low as reasonably achievable. COMPARISON: None. HISTORY: ORDERING SYSTEM PROVIDED HISTORY: weakness lower ext lt >rt TECHNOLOGIST PROVIDED HISTORY: Reason for exam:->weakness lower ext lt >rt Has a \"code stroke\" or \"stroke alert\" been called? ->No Decision Support Exception - unselect if not a suspected or confirmed emergency medical condition->Emergency Medical Condition (MA) What reading provider will be dictating this exam?->CRC FINDINGS: BRAIN/VENTRICLES: No mass effect, edema or hemorrhage is seen. Mild volume loss is seen in the cerebrum with mild chronic microvascular ischemic changes. No hydrocephalus or extra-axial fluid is seen. ORBITS: The visualized portion of the orbits demonstrate no acute abnormality. SINUSES: Moderate mucosal thickening in the paranasal sinuses. Postoperative changes from prior sinonasal surgery are seen. The mastoids are clear.  SOFT TISSUES/SKULL:  No acute abnormality of the visualized skull or soft tissues. No acute intracranial abnormality. XR CHEST PORTABLE    Result Date: 3/10/2023  EXAMINATION: ONE XRAY VIEW OF THE CHEST 3/10/2023 6:37 pm COMPARISON: 08/16/2022 HISTORY: ORDERING SYSTEM PROVIDED HISTORY: leg edema TECHNOLOGIST PROVIDED HISTORY: Reason for exam:->leg edema What reading provider will be dictating this exam?->CRC FINDINGS: The lungs are without acute focal process. There is no effusion or pneumothorax. The cardiomediastinal silhouette is without acute process. The osseous structures are without acute process. No acute process. US DUP LOWER EXTREMITIES BILATERAL VENOUS    Result Date: 3/10/2023  EXAMINATION: DUPLEX VENOUS ULTRASOUND OF THE BILATERAL LOWER EXTREMITIES3/10/2023 4:03 pm TECHNIQUE: Duplex ultrasound using B-mode/gray scaled imaging, Doppler spectral analysis and color flow Doppler was obtained of the deep venous structures of the lower bilateral extremities. COMPARISON: Lower extremity venous duplex from March 16, 2022 HISTORY: ORDERING SYSTEM PROVIDED HISTORY: leg swelling and temp chnage lt TECHNOLOGIST PROVIDED HISTORY: Reason for exam:->leg swelling and temp chnage lt What reading provider will be dictating this exam?->CRC FINDINGS: The visualized veins of the bilateral lower extremities are patent and free of echogenic thrombus. The veins demonstrate good compressibility with normal color flow study and spectral analysis. No evidence of DVT in either lower extremity. Discharge Medications:         Medication List        START taking these medications      acetaminophen 325 MG tablet  Commonly known as: TYLENOL  Take 2 tablets by mouth in the morning and 2 tablets at noon and 2 tablets in the evening and 2 tablets before bedtime. Continue on a regular schedule for 30 days. .     cefTRIAXone  infusion  Commonly known as: ROCEPHIN  Infuse 2,000 mg intravenously every 24 hours enoxaparin Sodium 30 MG/0.3ML injection  Commonly known as: LOVENOX  Inject 0.3 mLs into the skin 2 times daily for 60 doses . Continue on a regular schedule for 30 days. Lactobacillus Tabs  Take 1 tablet by mouth daily . Continue on a regular schedule for 60 days. traMADol 50 MG tablet  Commonly known as: ULTRAM  Take 1 tablet by mouth every 6 hours as needed for Pain for up to 7 days. Max Daily Amount: 200 mg            CHANGE how you take these medications      docusate sodium 100 MG capsule  Commonly known as: COLACE  Take 1 capsule by mouth 2 times daily . Continue for 30 days. What changed: additional instructions            CONTINUE taking these medications      furosemide 20 MG tablet  Commonly known as: LASIX     metoprolol succinate 50 MG extended release tablet  Commonly known as: TOPROL XL     triamterene-hydroCHLOROthiazide 37.5-25 MG per tablet  Commonly known as: MAXZIDE-25               Where to Get Your Medications        You can get these medications from any pharmacy    Bring a paper prescription for each of these medications  acetaminophen 325 MG tablet  cefTRIAXone  infusion  docusate sodium 100 MG capsule  enoxaparin Sodium 30 MG/0.3ML injection  Lactobacillus Tabs  traMADol 50 MG tablet         Disposition:   Discharged to SNF. Condition at discharge: Pt was medically stable at the time of discharge. Activity: activity as tolerated, fall precautions. Total time taken for discharging this patient: 40 minutes. Greater than 70% of time was spent focused exclusively on this patient. Time was taken to review chart, discuss plans with consultants, reconciling medications, discussing plan answering questions with patient.      Signed:  Betsey Samayoa MD  3/17/2023, 2:18 PM  ----------------------------------------------------------------------------------------------------------------------    Felisa Gonsalez,     Please return to ER or call 911 if you develop any significant signs or symptoms. I may not have addressed all of your medical illnesses or the abnormal blood work or imaging therefore please ask your PCP Ana Kumar MD and other out patient specialists and providers  to obtain [facility] record entirely to follow up on all of the abnormal labs, imaging and findings that I have and have not addressed during your hospitalization. Discharging you from the hospital does not mean that your medical care ends here and now. You may still need additional work up, investigation, monitoring, and treatment to be handled from this point on by outside providers including your PCP, Ana Kumar MD , Specialists and other healthcare providers. Please review your list of discharge medications prior to resuming medications you might still have at home, as the medications you need to be taking, dosages or how often you must take them may have changed. For medication questions, contact your retail pharmacy and your PCP, Ana Kumar MD .     ** I STRONGLY RECOMMEND that you follow up with Ana Kumar MD within 3 to 5 days for a post hospitalization evaluation. This specific office visit is covered by your insurance, and is not the same as your annual doctor visit/ check up. This office visit is important, as it may prevent need for repeat and/or future hospitalizations. **    Your medical team at [facility] ([facility] appreciates the opportunity to work with you to get well!     Sincerely,  Ida Price MD

## 2023-03-17 NOTE — PROGRESS NOTES
81-year-old male with BLE weakness. Being treated for UTI. Found to have prosthetic joint infection. Physical Exam:     General: Patient appears in no acute distress, cooperative  Skin: no new rashes or erythema or induration  HEENT: EOMI, MMM, Neck is supple  Heart: S1 S2  Lungs: bilaterally clear to auscultation  Abdomen: soft, ND, NTTP, +BS  Extrem LE weakness  Neuro exam: CN II-XII intact, facial expressions symmertrical bilaterally, no slurred speech        Labs: I have reviewed all lab results by electronic record, including most recent CBC, metabolic panel, and pertinent abnormalities were addressed from an infectious disease perspective. WBC trends are being monitored. Lab Results   Component Value Date/Time      03/12/2023 05:27 AM     K 4.0 03/12/2023 05:27 AM      03/12/2023 05:27 AM     CO2 26 03/12/2023 05:27 AM     BUN 21 03/12/2023 05:27 AM     CREATININE 0.66 03/12/2023 05:27 AM     GLUCOSE 122 03/12/2023 05:27 AM     CALCIUM 8.8 03/12/2023 05:27 AM            Lab Results   Component Value Date     WBC 11.5 (H) 03/11/2023     HGB 13.2 (L) 03/11/2023     HCT 38.9 (L) 03/11/2023     MCV 91.7 03/11/2023      03/11/2023         Radiology:   I have reviewed imaging results per electronic record and most pertinent abnormalities are being addressed from an infectious disease standpoint. Assessment:  E coli UTI  Generalized weakness  L knee PJI  Possible spinal stenosis      Aspirate now with ecoli like urine,?active prostatitis.  US retroeprtioneum, no obstruction  Plan:  D/c vancomycin  Surgical cultures pending but may not grow as has been on antibiotics, presumably e coli solo pathogen from aspirate  Bladder scan for retention prior to discharge    Plan for long term ceftriaxone, will leave script  Follow up with us  PICC

## 2023-03-17 NOTE — PLAN OF CARE
Nutrition Problem #1: Increased nutrient needs  Intervention: Food and/or Nutrient Delivery: Continue Current Diet, Continue Oral Nutrition Supplement

## 2023-03-17 NOTE — PROGRESS NOTES
Hospitalist Progress Note      PCP: Reji Garcia MD    Date of Admission: 3/10/2023    Chief Complaint:  no acute events, afebrile, stable HD    Medications:  Reviewed    Infusion Medications    sodium chloride      sodium chloride      sodium chloride 250 mL/hr at 03/17/23 1206    lactated ringers IV soln 125 mL/hr at 03/17/23 0655     Scheduled Medications    [START ON 3/18/2023] cefTRIAXone (ROCEPHIN) IV  2,000 mg IntraVENous Q24H    sodium chloride flush  5-40 mL IntraVENous 2 times per day    enoxaparin  30 mg SubCUTAneous BID    sodium chloride flush  5-40 mL IntraVENous 2 times per day    pantoprazole  20 mg Oral QAM AC    sennosides-docusate sodium  1 tablet Oral Daily    lactobacillus acidophilus  1 tablet Oral Daily    furosemide  20 mg Oral Daily    metoprolol succinate  50 mg Oral Daily    triamterene-hydroCHLOROthiazide  0.5 tablet Oral Daily    acetaminophen  1,000 mg Oral 3 times per day    sodium chloride flush  5-40 mL IntraVENous 2 times per day     PRN Meds: sodium chloride flush, sodium chloride, sodium chloride flush, sodium chloride, oxyCODONE **OR** oxyCODONE **OR** oxyCODONE, HYDROmorphone, sodium chloride flush, ondansetron **OR** ondansetron, polyethylene glycol      Intake/Output Summary (Last 24 hours) at 3/17/2023 1246  Last data filed at 3/17/2023 2363  Gross per 24 hour   Intake 3811.81 ml   Output 1775 ml   Net 2036.81 ml         Exam:    BP (!) 126/57   Pulse 63   Temp 97.3 °F (36.3 °C) (Oral)   Resp 16   Ht 5' 11\" (1.803 m)   Wt 256 lb (116.1 kg)   SpO2 99%   BMI 35.70 kg/m²     General appearance: appears stated age and cooperative. Respiratory:  clear to auscultation, bilaterally   Cardiovascular: Regular rate and rhythm, S1/S2. Abdomen: Soft, active bowel sounds. Musculoskeletal: left knee is covered with dressing.       Labs:   Recent Labs     03/15/23  0533 03/16/23  0517 03/17/23  0536   WBC 6.8 7.1 6.2   HGB 12.3* 12.0* 11.3*   HCT 35.8* 35.0* 32.7*    240 248       Recent Labs     03/15/23  0533 03/16/23  0517 03/17/23  0535   * 139 136   K 4.1 4.0 4.5    103 102   CO2 26 27 26   BUN 14 16 15   CREATININE 0.58* 0.58* 0.56*   CALCIUM 8.9 8.7 8.4*       Recent Labs     03/15/23  0533 03/16/23  0517 03/17/23  0535   AST 20 28 22   ALT 12 15 13   BILITOT 0.7 0.5 0.4   ALKPHOS 121* 141* 124*       No results for input(s): INR in the last 72 hours. No results for input(s): Ana Earnest in the last 72 hours. Urinalysis:      Lab Results   Component Value Date/Time    NITRU Negative 03/10/2023 06:00 PM    WBCUA >100 03/10/2023 06:00 PM    BACTERIA MANY 03/10/2023 06:00 PM    RBCUA 20-50 03/10/2023 06:00 PM    BLOODU MODERATE 03/10/2023 06:00 PM    SPECGRAV 1.021 03/10/2023 06:00 PM    GLUCOSEU Negative 03/10/2023 06:00 PM       Radiology:  [address] [facility]   Final Result   1. Left renal cyst   2. Prostate enlargement         MRI LUMBAR SPINE WO CONTRAST   Final Result   1. Multilevel degenerative disease greatest at L5-S1 and mild multilevel   facet hypertrophy. 2. Mild spinal canal stenosis at the L1-2 through L4-5 levels. 3. Multilevel neural foraminal as detailed above and greatest involving the   left L4 and right L5 neural foramina where it is moderate. MRI CERVICAL SPINE WO CONTRAST   Final Result   Disc and osteophytes result in narrowing of the neural foramina and stenosis   of the thecal sac as discussed above. XR KNEE LEFT (1-2 VIEWS)   Final Result   No acute disease. RECOMMENDATION:   Careful clinical correlation and follow up recommended. XR FEMUR LEFT (MIN 2 VIEWS)   Final Result   No acute disease. RECOMMENDATION:   Careful clinical correlation and follow up recommended. XR CHEST PORTABLE   Final Result   No acute process. US DUP LOWER EXTREMITIES BILATERAL VENOUS   Final Result   No evidence of DVT in either lower extremity.          CT Head W/O Contrast   Final Result No acute intracranial abnormality. IR PICC WO SQ PORT/PUMP > 5 YEARS    (Results Pending)           Assessment/Plan:    80 y/o female with history of HTN, obesity, s/p left TKA who presented with:     Bilateral LE weakness   - in the setting of left knee prosthetic joint infection, E. coli UTI, possible spinal stenosis  - s/p left knee aspiration on 3/11,   - fluid and urine culture grew pansensitive E. Coli  - treated with IV Vancomycin and Ceftriaxone  - s/p arthrotomy with liner exchange on 3/16 per Ortho  - MRI of the spine showed cervical stenosis, follow up as outpatient per neurosurgery  - plan to continue IV Rocephin via PICC line on d/c per ID    HTN  - continue home meds    Diet: ADULT DIET;  Regular    Code Status: Full Code      Disposition - SNF today          Electronically signed by Alexandra Fish MD on 3/17/2023 at 12:46 PM

## 2023-03-17 NOTE — CARE COORDINATION
LSW sent the referral to [facility] and they have accepted the pt for admission tomorrow. [id number] completed.

## 2023-03-17 NOTE — PLAN OF CARE
See OT evaluation for all goals and OT POC.  Electronically signed by Yony Kang OT on 3/17/2023 at 11:14 AM

## 2023-03-17 NOTE — PROGRESS NOTES
[facility] Neurology Daily Progress Note  Name: Jose E Escamilla  Age: 81 y.o. Gender: male  CodeStatus: Full Code  Allergies: Aspirin    Chief Complaint:Extremity Weakness    Primary Care Provider: Cruzito Arias MD  InpatientTreatment Team: Treatment Team: Attending Provider: Daren Harvey MD; Consulting Physician: Tess Keller MD; Consulting Physician: Brian Maldonado MD; Wound/Ostomy Nurse: Nnamdi Flores RN; Surgeon: Yakelin Elena MD; Patient Care Tech: [facility]; Registered Nurse: Norma Davila RN; Patient Care Tech: [facility]; Registered Nurse: Magalie Fernandez RN; LPN: Je Brandt LPN; Utilization Reviewer: Kanwal Olivier RN  Admission Date: 3/10/2023      HPI   Pt seen and examined for neuro follow up. Alert and oriented x3, no acute distress, cooperative. Afebrile. Ongoing lower extremity weakness. Patient had operative intervention for prosthetic joint infection on 3/16/2023. PICC line placed today. Plan for long-term ceftriaxone. Exam as noted above. The patient is in no acute distress and operative procedure for his knee. He is aware of his walking abilities. Vitals:    03/17/23 0716   BP: (!) 126/57   Pulse: 63   Resp: 16   Temp: 97.3 °F (36.3 °C)   SpO2: 99%        Review of Systems   Constitutional:  Negative for appetite change and fever. HENT:  Negative for hearing loss and trouble swallowing. Eyes:  Negative for visual disturbance. Respiratory:  Negative for cough, chest tightness, shortness of breath and wheezing. Cardiovascular:  Positive for leg swelling. Negative for chest pain and palpitations. Gastrointestinal:  Negative for nausea and vomiting. Genitourinary:  Negative for difficulty urinating. Musculoskeletal:  Positive for gait problem. Negative for back pain, neck pain and neck stiffness. Skin:  Negative for color change and rash. Neurological:  Positive for weakness.  Negative for dizziness, tremors, seizures, syncope, facial asymmetry, speech difficulty, light-headedness, numbness and headaches. Psychiatric/Behavioral:  Negative for agitation, confusion and hallucinations. The patient is not nervous/anxious. Physical Exam  Vitals and nursing note reviewed. Constitutional:       General: He is not in acute distress. Appearance: He is not diaphoretic. HENT:      Head: Normocephalic. Eyes:      Extraocular Movements: Extraocular movements intact. Pupils: Pupils are equal, round, and reactive to light. Cardiovascular:      Rate and Rhythm: Normal rate and regular rhythm. Pulmonary:      Effort: Pulmonary effort is normal. No respiratory distress. Breath sounds: Normal breath sounds. Skin:     General: Skin is warm and dry. Neurological:      Mental Status: He is alert and oriented to person, place, and time. Cranial Nerves: No cranial nerve deficit. Sensory: No sensory deficit. Motor: Weakness present. Coordination: Coordination normal.      Gait: Gait abnormal.   Patient's knee was drained but his gait has not changed as yet.         Medications:  Reviewed    Infusion Medications:    sodium chloride      sodium chloride      sodium chloride 250 mL/hr at 03/17/23 1206     Scheduled Medications:    [START ON 3/18/2023] cefTRIAXone (ROCEPHIN) IV  2,000 mg IntraVENous Q24H    sodium chloride flush  5-40 mL IntraVENous 2 times per day    enoxaparin  30 mg SubCUTAneous BID    sodium chloride flush  5-40 mL IntraVENous 2 times per day    pantoprazole  20 mg Oral QAM AC    sennosides-docusate sodium  1 tablet Oral Daily    lactobacillus acidophilus  1 tablet Oral Daily    furosemide  20 mg Oral Daily    metoprolol succinate  50 mg Oral Daily    triamterene-hydroCHLOROthiazide  0.5 tablet Oral Daily    acetaminophen  1,000 mg Oral 3 times per day    sodium chloride flush  5-40 mL IntraVENous 2 times per day     PRN Meds: sodium chloride flush, sodium chloride, sodium chloride flush, sodium chloride, oxyCODONE **OR** oxyCODONE **OR** oxyCODONE, HYDROmorphone, sodium chloride flush, ondansetron **OR** ondansetron, polyethylene glycol    Labs:   Recent Labs     03/15/23  0533 03/16/23  0517 03/17/23  0536   WBC 6.8 7.1 6.2   HGB 12.3* 12.0* 11.3*   HCT 35.8* 35.0* 32.7*    240 248       Recent Labs     03/15/23  0533 03/16/23  0517 03/17/23  0535   * 139 136   K 4.1 4.0 4.5    103 102   CO2 26 27 26   BUN 14 16 15   CREATININE 0.58* 0.58* 0.56*   CALCIUM 8.9 8.7 8.4*       Recent Labs     03/15/23  0533 03/16/23  0517 03/17/23  0535   AST 20 28 22   ALT 12 15 13   BILITOT 0.7 0.5 0.4   ALKPHOS 121* 141* 124*       No results for input(s): INR in the last 72 hours. No results for input(s): Bernard Ebbs in the last 72 hours. Urinalysis:   Lab Results   Component Value Date/Time    NITRU Negative 03/10/2023 06:00 PM    WBCUA >100 03/10/2023 06:00 PM    BACTERIA MANY 03/10/2023 06:00 PM    RBCUA 20-50 03/10/2023 06:00 PM    BLOODU MODERATE 03/10/2023 06:00 PM    SPECGRAV 1.021 03/10/2023 06:00 PM    GLUCOSEU Negative 03/10/2023 06:00 PM       Radiology:   Most recent    EEG No valid procedures specified. MRI of Brain No results found for this or any previous visit. No results found for this or any previous visit. MRA of the Head and Neck: No results found for this or any previous visit. No results found for this or any previous visit. No results found for this or any previous visit.                             CT of the Head: Results for orders placed during the hospital encounter of 03/10/23    CT Head W/O Contrast    Narrative  EXAMINATION:  CT OF THE HEAD WITHOUT CONTRAST  3/10/2023 3:53 pm    TECHNIQUE:  CT of the head was performed without the administration of intravenous  contrast. Automated exposure control, iterative reconstruction, and/or weight  based adjustment of the mA/kV was utilized to reduce the radiation dose to as  low as reasonably achievable.    COMPARISON:  None.    HISTORY:  ORDERING SYSTEM PROVIDED HISTORY: weakness lower ext lt >rt  TECHNOLOGIST PROVIDED HISTORY:  Reason for exam:->weakness lower ext lt >rt  Has a \"code stroke\" or \"stroke alert\" been called?->No  Decision Support Exception - unselect if not a suspected or confirmed  emergency medical condition->Emergency Medical Condition (MA)  What reading provider will be dictating this exam?->CRC    FINDINGS:  BRAIN/VENTRICLES: No mass effect, edema or hemorrhage is seen.  Mild volume  loss is seen in the cerebrum with mild chronic microvascular ischemic  changes.  No hydrocephalus or extra-axial fluid is seen.    ORBITS: The visualized portion of the orbits demonstrate no acute abnormality.    SINUSES: Moderate mucosal thickening in the paranasal sinuses.  Postoperative  changes from prior sinonasal surgery are seen.  The mastoids are clear.    SOFT TISSUES/SKULL:  No acute abnormality of the visualized skull or soft  tissues.    Impression  No acute intracranial abnormality.  No results found for this or any previous visit.  No results found for this or any previous visit.      Carotid duplex: No results found for this or any previous visit.  No results found for this or any previous visit.  No results found for this or any previous visit.      Echo No results found for this or any previous visit.            Assessment/Plan:    3/12/23:  Lower extremity weakness with paraparesis.  Patient has had weakness in his legs due to a postoperative knee issues that have been going on quite some time.  He was able to ambulate and on Thursday he had taken a water pill and go to the bathroom several times.  Friday morning while he was on the commode he could not stand up and walk.  Given the findings of my reflexes 1 would be concerned about his cervical spinal stenosis as well as lumbar spine stenosis.  Recommend an MRI of the cervical and lumbar spine.  Findings not Sastry of GBS  given the reflexes. 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 Orthopedic surgery and infectious disease following. Plans for orthopedic surgical intervention on 3/16/2023. E. coli UTI  CRP, sed rate 63  patient on IV ceftriaxone and Vanco.  MRI of the cervical and lumbar spine pending. I have personally performed a face to face diagnostic evaluation on this patient, reviewed all data and investigations, and am the sole provider of all clinical decisions on the neurological status of this patient. Patient has some stiffness in the legs and swelling and this may be complicating the picture of his walking. His CRP and sed rate are elevated. Patient is on antibiotics. MRI of the lumbar spine and cervical spine are pending. Depending on the results of the same will further advise 60% time spent on evaluating patient    3/14/2023:  MRI of cervical and lumbar spine completed and reviewed by Dr. Graciela Interiano. He was requesting neurosurgery input for cervical spinal canal stenosis. I have personally performed a face to face diagnostic evaluation on this patient, reviewed all data and investigations, and am the sole provider of all clinical decisions on the neurological status of this patient. Exam as noted above and patient has weakness in the lower extremities somewhat due to disuse secondary to significant knee issues though with increased reflexes and MRI that I reviewed shows at least a focal compression at one-point and there for neurosurgical evaluation. Lumbar spine MRI does not show anything significant. Findings discussed with the patient and 60% time spent on evaluating patient    3/15/2023:  Neurosurgery evaluation complete.   Patient not a candidate for surgery at this time given ongoing infection and can follow-up outpatient. Will follow at a distance. I have personally performed a face to face diagnostic evaluation on this patient, reviewed all data and investigations, and am the sole provider of all clinical decisions on the neurological status of this patient. Patient's neurosurgical evaluation completed and surgery is not recommended though it may require further intervention at some point. He is not a candidate at this time. Patient's main issues appears to be his knee which is being drained tomorrow depending on the results will further advise. I truly feel that he is likely to require surgical intervention at some point in time. 60% time spent on evaluating patient      3/17/2023:  Lower extremity weakness  Moderate cervical spine stenosis without any need for surgical intervention per neurosurgery. 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 Patient now with IV PICC line with plans for long-term ceftriaxone  Okay to DC from neurology standpoint. Neurology to sign off. Call with questions or concerns. I have personally performed a face to face diagnostic evaluation on this patient, reviewed all data and investigations, and am the sole provider of all clinical decisions on the neurological status of this patient. The knee was drained but we have not attempt to walk him as yet. Patient does have moderate degree of cervical spinal stenosis and may has some myelopathic signs. Patient may require further evaluation as an outpatient. We will sign off and see him outpatient once he is done with the skilled nursing facility. I did discuss this with him that he make a follow-up. 60% time spent on evaluating pt myself      Werner oMrton MD, Shruthi Howard, American Board of Psychiatry & Neurology  Board Certified in Vascular Neurology  Board Certified in Neuromuscular Medicine  Certified in Neurorehabilitation     Collaborating physicians:  Tuan    Electronically signed by CHARLIE Hines CNP on 3/17/2023 at 2:37 PM

## 2023-03-17 NOTE — PROGRESS NOTES
Assessment completed. A&O x4. Medicated with Oxycodone for 8/10 pain to left knee. Pt had a large BM on bed pan. Noted wounds to buttocks. Small amount of sanguinous drainage from wound on right buttocks. Cleanse buttocks with soap and water. Applied protective barrier ointment. Dr Flor Bartlett readjusted IV anitbiotics. Pt will have PICC placement today for long term IV antibiotic therapy. Pt made aware and agreeable. In bed with alarm activated. Call light in reach. Electronically signed by Jonhnie Preciado LPN on 4/40/4564 at 69:79 AM      Pt transported to [facility] for PICC placement via bed. No s/s of distress. Electronically signed by Johnnie Preciado LPN on 9/18/1642 at 83:28 AM      Pt voided 125 ml in urinal. Bladder scanned for 21 ml. Electronically signed by Johnnie Preciado LPN on 0/49/8121 at 8:24 PM      Pt does not wear O2 at home. Currently on 1 L of O2. Pulse ox 100% on 1 L of O2. Pt requested to remove O2 and see how he does. O2 removed. Came in 30 mins later to check pulse ox. Pulse ox 97% on RA. Will continue to monitor. Electronically signed by Johnnie Preciado LPN on 8/74/8279 at 7:72 PM      Removed old post op drsg from left knee. Surgical drsg intact. Noted no drainage. Note 2+ edema to LLE. Applied ABDs with ace wrap to left knee.  Electronically signed by Johnnie Preciado LPN on 2/73/4025 at 6:99 PM

## 2023-03-17 NOTE — PROGRESS NOTES
Physical Therapy Med Surg Initial Assessment  Facility/Department: [facility]  Room: [facility]/H430-55     NAME: Doreen Matta  : 1940 (45 y.o.)  MRN: [id number]  CODE STATUS: Full Code    Date of Service: 3/17/2023    Patient Diagnosis(es): Weakness [R53.1]  Leg swelling [M79.89]  General weakness [R53.1]  Unable to ambulate [R26.2]   Chief Complaint   Patient presents with    Extremity Weakness     Patient Active Problem List    Diagnosis Date Noted    Unable to ambulate 03/15/2023    Stenosis of cervical spine with myelopathy ([facility] [address] 2023    Urinary tract infection without hematuria 2023    Prosthetic joint infection ([facility] [address] 2023    General weakness 03/10/2023    Supracondylar fracture of femur, left, closed, with routine healing, subsequent encounter 2022    Benign prostatic hyperplasia with lower urinary tract symptoms 2022    Class 2 drug-induced obesity with serious comorbidity and body mass index (BMI) of 37.0 to 37.9 in adult 2022    Chronic sinusitis 2022    Deviated nasal septum 2022    Hyperglycemia 2022    Hypertension 2022    Osteoarthritis 2022      Past Medical History:   Diagnosis Date    Aneurysm artery, 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    Benign prostatic hyperplasia with lower urinary tract symptoms     Chronic sinusitis     History of left knee replacement 03/15/2022    at [place]    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    HTN (hypertension)     Hx of carpal tunnel syndrome     Obesity     Other fracture of left femur, initial encounter for closed fracture ([facility] [address] 3/14/2022    Primary osteoarthritis of both knees      Past Surgical History:   Procedure Laterality Date    REVISION TOTAL KNEE ARTHROPLASTY Left 3/16/2023    LEFT KNEE ARTHROTOMY WITH POLYETHYLENE LINER EXCHANGE AND PLACEMENT OF TOPICAL ANTIBIOTICS performed by Jay Heart MD at [address] [place] [address] Left 3/15/2022    LEFT KNEE DISTAL FEMUR REPLACEMENT performed by Nandini Rm MD at [facility] OR     Chart Reviewed: Yes  Patient assessed for rehabilitation services?: Yes  Family / Caregiver Present: No    Restrictions:  Restrictions/Precautions: Fall Risk, Weight Bearing  Lower Extremity Weight Bearing Restrictions  Left Lower Extremity Weight Bearing: Weight Bearing As Tolerated     SUBJECTIVE:   Subjective: \"I don't know how I am going to be able to bend it (L knee)\"    Pain   Pre treatment screening: \"a lot\"  Location:L knee  Description:  Onset/duration: post op  []  Pt declined intervention  [x]  Pt able to proceed PT session  [x]  RN or physician notified  []  RN called to bedside to administer meds. Post treatment screening improves at rest, pt did not rate,pt does not appear to be in any distress.  Limb positioned for comfort       Prior Level of Function:  Social/Functional History  Lives With: Alone  Type of Home: House  Home Layout: Two level, Able to Live on Main level with bedroom/bathroom  Home Access: Ramped entrance  Bathroom Shower/Tub: Walk-in shower  Bathroom Equipment: Grab bars in shower, Shower chair  Home Equipment: [place], rolling, Rollator  Has the patient had two or more falls in the past year or any fall with injury in the past year?: No  Receives Help From: Family  ADL Assistance: Independent  Homemaking Assistance: Independent  Ambulation Assistance: Independent (RW)  Transfer Assistance: Independent  Active : Yes  Occupation: Retired  Type of Occupation: construction  Additional Comments: sleeps in [address]:   [address]: Impaired  Vision Exceptions: Wears glasses for reading  Hearing: Exceptions to [facility]  Hearing Exceptions: Hard of hearing/hearing concerns    Cognition:  Overall Orientation Status: Within Functional Limits  Orientation Level: Oriented to place, Oriented to situation, Oriented to person, Disoriented to time (new day, unsure of month)  Follows Commands: Within Functional Limits  Overall Cognitive Status: WFL    Observation/Palpation  Observation: pleasant, cooperative, ACE wrap LLE. no acute distress noted on O2 NC.    ROM:  AROM: Generally decreased, functional (hip and knees obtained 90/90 seated position bilaterally)  PROM: Generally decreased, functional    Strength:  Strength: Generally decreased, functional    Neuro:  Balance  Sitting - Static: Fair;+ (SBA)  Sitting - Dynamic: Fair  Standing - Static: Poor (unable to achieve full upright posture despite 2 person assist)     Tone: Normal  Coordination: Within functional limits     Bed mobility  Supine to Sit: Dependent/Total;2 Person assistance  Sit to Supine: Dependent/Total;2 Person assistance  Bed Mobility Comments: HOB elevarted, use od bedrails, good effort with R LE to reach EOB, step by step cues for sequencing    Transfers  Sit to Stand: Maximum Assistance  Stand to Sit: Maximum Assistance  2 person assist  Partial sit->stand, able to clear buttock from bed with use of bedrail and lety used as sling. VC for LE placement, anterior wt shift, and use of UES and R LE effectively. Activity Tolerance  Activity Tolerance: Patient tolerated treatment well;Patient limited by fatigue;Patient limited by endurance      Patient Education  Education Given To: Patient  Education Provided: Role of Therapy;Plan of Care  Education Provided Comments: WBAT  Education Method: Verbal  Barriers to Learning: None  Education Outcome: Verbalized understanding       ASSESSMENT:   Body Structures, Functions, Activity Limitations Requiring Skilled Therapeutic Intervention: Decreased functional mobility ; Decreased ROM; Decreased body mechanics; Decreased strength;Decreased endurance;Decreased balance; Increased pain;Decreased posture  Decision Making: High Complexity  History: high  Exam: high  Clinical Presentation: high    Therapy Prognosis: Fair         DISCHARGE RECOMMENDATIONS:  Discharge Recommendations: Patient would benefit from continued therapy after discharge    Assessment: Pt demonstrates the above deficits and decline in functional mobility status placing them at increased risk for falls. Pt is s/p L knee arthrotomy polyethylene liner exchange. Pt would benefit from physical therapy to address above deficits and allow for safe return home at highest level of function, decrease risk for falls, and improve QOL. Requires PT Follow-Up: Yes       PLAN OF CARE:  Physcial Therapy Plan  General Plan: 1 time a day 7 days a week  Current Treatment Recommendations: Strengthening, ROM, Balance training, Functional mobility training, Transfer training, Gait training, Stair training, Neuromuscular re-education, Manual, Pain management, Home exercise program, Safety education & training, Patient/Caregiver education & training, Modalities, Therapeutic activities    Safety Devices  Type of Devices: All fall risk precautions in place, Bed alarm in place, Call light within reach, Left in bed    Goals:  Patient Goals : to go home  [address] Term Goal 1: patient will complete bed mobility with with Nessa  Long Term Goal 2: patient will complete transfers with Nessa  Long Term Goal 3: patient will stand with LRD for x2 min with SBA  Long Term Goal 4: patient will progress to pregait and gait activities with 2ww and Mod A  Long Term Goal 5: SBA for HEP to improve LE strength, ROM, and activity toleracne    [facility] (6 CLICK) BASIC MOBILITY  AM-PAC Inpatient Mobility Raw Score : 7     Therapy Time:   Individual   Time In 1023   Time Out 1103   Minutes 40       Bed mob/transfer 12 min    Francis Epps PT, 03/17/23 at 11:17 AM         Definitions for assistance levels  Independent = pt does not require any physical supervision or assistance from another person for activity completion. Device may be needed.   Stand by assistance = pt requires verbal cues or instructions from another person, close to but not touching, to perform the activity  Minimal assistance= pt performs 75% or more of the activity; assistance is required to complete the activity  Moderate assistance= pt performs 50% of the activity; assistance is required to complete the activity  Maximal assistance = pt performs 25% of the activity; assistance is required to complete the activity  Dependent = pt requires total physical assistance to accomplish the task

## 2023-03-18 VITALS
HEIGHT: 71 IN | SYSTOLIC BLOOD PRESSURE: 123 MMHG | OXYGEN SATURATION: 96 % | TEMPERATURE: 98.6 F | HEART RATE: 71 BPM | DIASTOLIC BLOOD PRESSURE: 55 MMHG | WEIGHT: 256 LBS | RESPIRATION RATE: 18 BRPM | BODY MASS INDEX: 35.84 KG/M2

## 2023-03-18 PROCEDURE — 6360000002 HC RX W HCPCS: Performed by: INTERNAL MEDICINE

## 2023-03-18 PROCEDURE — 6370000000 HC RX 637 (ALT 250 FOR IP): Performed by: INTERNAL MEDICINE

## 2023-03-18 PROCEDURE — 97535 SELF CARE MNGMENT TRAINING: CPT

## 2023-03-18 PROCEDURE — 6370000000 HC RX 637 (ALT 250 FOR IP): Performed by: ORTHOPAEDIC SURGERY

## 2023-03-18 PROCEDURE — 6360000002 HC RX W HCPCS: Performed by: ORTHOPAEDIC SURGERY

## 2023-03-18 PROCEDURE — 2700000000 HC OXYGEN THERAPY PER DAY

## 2023-03-18 PROCEDURE — 97112 NEUROMUSCULAR REEDUCATION: CPT

## 2023-03-18 PROCEDURE — 97116 GAIT TRAINING THERAPY: CPT

## 2023-03-18 PROCEDURE — 2580000003 HC RX 258: Performed by: INTERNAL MEDICINE

## 2023-03-18 RX ADMIN — ACETAMINOPHEN 1000 MG: 500 TABLET ORAL at 06:24

## 2023-03-18 RX ADMIN — METOPROLOL SUCCINATE 50 MG: 50 TABLET, FILM COATED, EXTENDED RELEASE ORAL at 09:09

## 2023-03-18 RX ADMIN — ACETAMINOPHEN 1000 MG: 500 TABLET ORAL at 13:20

## 2023-03-18 RX ADMIN — ENOXAPARIN SODIUM 30 MG: 100 INJECTION SUBCUTANEOUS at 08:54

## 2023-03-18 RX ADMIN — PANTOPRAZOLE SODIUM 20 MG: 20 TABLET, DELAYED RELEASE ORAL at 06:24

## 2023-03-18 RX ADMIN — Medication 1 TABLET: at 08:53

## 2023-03-18 RX ADMIN — SENNOSIDES AND DOCUSATE SODIUM 1 TABLET: 50; 8.6 TABLET ORAL at 08:54

## 2023-03-18 RX ADMIN — CEFTRIAXONE 2000 MG: 2 INJECTION, POWDER, FOR SOLUTION INTRAMUSCULAR; INTRAVENOUS at 08:59

## 2023-03-18 RX ADMIN — TRIAMTERENE AND HYDROCHLOROTHIAZIDE 0.5 TABLET: 37.5; 25 TABLET ORAL at 08:53

## 2023-03-18 RX ADMIN — SODIUM CHLORIDE, PRESERVATIVE FREE 10 ML: 5 INJECTION INTRAVENOUS at 09:09

## 2023-03-18 RX ADMIN — FUROSEMIDE 20 MG: 20 TABLET ORAL at 08:54

## 2023-03-18 ASSESSMENT — PAIN DESCRIPTION - DESCRIPTORS
DESCRIPTORS: ACHING
DESCRIPTORS: THROBBING

## 2023-03-18 ASSESSMENT — PAIN DESCRIPTION - LOCATION
LOCATION: KNEE
LOCATION: KNEE

## 2023-03-18 ASSESSMENT — PAIN DESCRIPTION - ORIENTATION
ORIENTATION: LEFT
ORIENTATION: LEFT

## 2023-03-18 ASSESSMENT — PAIN SCALES - GENERAL: PAINLEVEL_OUTOF10: 3

## 2023-03-18 NOTE — PROGRESS NOTES
Hospitalist Progress Note      PCP: Mateus Herring MD    Date of Admission: 3/10/2023    Chief Complaint:  no acute events, afebrile, stable HD    Medications:  Reviewed    Infusion Medications    sodium chloride      sodium chloride      sodium chloride 250 mL/hr at 03/17/23 1206     Scheduled Medications    cefTRIAXone (ROCEPHIN) IV  2,000 mg IntraVENous Q24H    sodium chloride flush  5-40 mL IntraVENous 2 times per day    enoxaparin  30 mg SubCUTAneous BID    sodium chloride flush  5-40 mL IntraVENous 2 times per day    pantoprazole  20 mg Oral QAM AC    sennosides-docusate sodium  1 tablet Oral Daily    lactobacillus acidophilus  1 tablet Oral Daily    furosemide  20 mg Oral Daily    metoprolol succinate  50 mg Oral Daily    triamterene-hydroCHLOROthiazide  0.5 tablet Oral Daily    acetaminophen  1,000 mg Oral 3 times per day    sodium chloride flush  5-40 mL IntraVENous 2 times per day     PRN Meds: sodium chloride flush, sodium chloride, sodium chloride flush, sodium chloride, oxyCODONE **OR** oxyCODONE **OR** oxyCODONE, HYDROmorphone, sodium chloride flush, ondansetron **OR** ondansetron, polyethylene glycol      Intake/Output Summary (Last 24 hours) at 3/18/2023 1203  Last data filed at 3/18/2023 2700  Gross per 24 hour   Intake --   Output 825 ml   Net -825 ml         Exam:    BP (!) 123/55   Pulse 71   Temp 98.6 °F (37 °C) (Oral)   Resp 18   Ht 5' 11\" (1.803 m)   Wt 256 lb (116.1 kg)   SpO2 96%   BMI 35.70 kg/m²     General appearance: appears stated age and cooperative. Respiratory:  clear to auscultation, bilaterally   Cardiovascular: Regular rate and rhythm, S1/S2. Abdomen: Soft, active bowel sounds. Musculoskeletal: left knee is covered with dressing.       Labs:   Recent Labs     03/16/23  0517 03/17/23  0536   WBC 7.1 6.2   HGB 12.0* 11.3*   HCT 35.0* 32.7*    248       Recent Labs     03/16/23  0517 03/17/23  0535    136   K 4.0 4.5    102   CO2 27 26   BUN 16 15 CREATININE 0.58* 0.56*   CALCIUM 8.7 8.4*       Recent Labs     03/16/23  0517 03/17/23  0535   AST 28 22   ALT 15 13   BILITOT 0.5 0.4   ALKPHOS 141* 124*       No results for input(s): INR in the last 72 hours. No results for input(s): [place] in the last 72 hours. Urinalysis:      Lab Results   Component Value Date/Time    NITRU Negative 03/10/2023 06:00 PM    WBCUA >100 03/10/2023 06:00 PM    BACTERIA MANY 03/10/2023 06:00 PM    RBCUA 20-50 03/10/2023 06:00 PM    BLOODU MODERATE 03/10/2023 06:00 PM    SPECGRAV 1.021 03/10/2023 06:00 PM    GLUCOSEU Negative 03/10/2023 06:00 PM       Radiology:  [address] [facility]   Final Result   1. Left renal cyst   2. Prostate enlargement         MRI LUMBAR SPINE WO CONTRAST   Final Result   1. Multilevel degenerative disease greatest at L5-S1 and mild multilevel   facet hypertrophy. 2. Mild spinal canal stenosis at the L1-2 through L4-5 levels. 3. Multilevel neural foraminal as detailed above and greatest involving the   left L4 and right L5 neural foramina where it is moderate. MRI CERVICAL SPINE WO CONTRAST   Final Result   Disc and osteophytes result in narrowing of the neural foramina and stenosis   of the thecal sac as discussed above. XR KNEE LEFT (1-2 VIEWS)   Final Result   No acute disease. RECOMMENDATION:   Careful clinical correlation and follow up recommended. XR FEMUR LEFT (MIN 2 VIEWS)   Final Result   No acute disease. RECOMMENDATION:   Careful clinical correlation and follow up recommended. XR CHEST PORTABLE   Final Result   No acute process. US DUP LOWER EXTREMITIES BILATERAL VENOUS   Final Result   No evidence of DVT in either lower extremity. CT Head W/O Contrast   Final Result   No acute intracranial abnormality.          IR PICC WO SQ PORT/PUMP > 5 YEARS    (Results Pending)           Assessment/Plan:    82 y/o female with history of HTN, obesity, s/p left TKA who presented with:     Bilateral LE weakness   - in the setting of left knee prosthetic joint infection, E. coli UTI, possible spinal stenosis  - s/p left knee aspiration on 3/11,   - fluid and urine culture grew pansensitive E. Coli  - treated with IV Vancomycin and Ceftriaxone  - s/p arthrotomy with liner exchange on 3/16 per Ortho  - MRI of the spine showed cervical stenosis, follow up as outpatient per neurosurgery  - plan to continue IV Rocephin via PICC line on d/c per ID    HTN  - continue home meds    Diet: ADULT DIET; Regular  ADULT ORAL NUTRITION SUPPLEMENT; Lunch, Dinner;  Wound Healing Oral Supplement    Code Status: Full Code      Disposition - SNF today          Electronically signed by Soham Michel MD on 3/18/2023 at 12:03 PM

## 2023-03-18 NOTE — PROGRESS NOTES
Physical Therapy Med Surg Daily Treatment Note  Facility/Department: [facility]  Room: [place] [id number]       NAME: Chava Tinajero  : 1940 (78 y.o.)  MRN: [id number]  CODE STATUS: Full Code    Date of Service: 3/18/2023    Patient Diagnosis(es): Weakness [R53.1]  Leg swelling [M79.89]  General weakness [R53.1]  Unable to ambulate [R26.2]   Chief Complaint   Patient presents with    Extremity Weakness     Patient Active Problem List    Diagnosis Date Noted    Unable to ambulate 03/15/2023    Stenosis of cervical spine with myelopathy ([facility] 2023    Urinary tract infection without hematuria 2023    Prosthetic joint infection ([facility] 2023    General weakness 03/10/2023    Supracondylar fracture of femur, left, closed, with routine healing, subsequent encounter 2022    Benign prostatic hyperplasia with lower urinary tract symptoms 2022    Class 2 drug-induced obesity with serious comorbidity and body mass index (BMI) of 37.0 to 37.9 in adult 2022    Chronic sinusitis 2022    Deviated nasal septum 2022    Hyperglycemia 2022    Hypertension 2022    Osteoarthritis 2022        Past Medical History:   Diagnosis Date    Aneurysm artery, pulmonary ([facility]     not surgical    Benign prostatic hyperplasia with lower urinary tract symptoms     Chronic sinusitis     History of left knee replacement 03/15/2022    at [facility]    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    HTN (hypertension)     Hx of carpal tunnel syndrome     Obesity     Other fracture of left femur, initial encounter for closed fracture ([facility] 3/14/2022    Primary osteoarthritis of both knees      Past Surgical History:   Procedure Laterality Date    REVISION TOTAL KNEE ARTHROPLASTY Left 3/16/2023    LEFT KNEE ARTHROTOMY WITH POLYETHYLENE LINER EXCHANGE AND PLACEMENT OF TOPICAL ANTIBIOTICS performed by Cathy Gore MD at [address] [place] [address] Left 3/15/2022 LEFT KNEE DISTAL FEMUR REPLACEMENT performed by Sumi Saravia MD at [facility]            Restrictions: fall risk       SUBJECTIVE:   Subjective: \"I just want to get better and go home\"    Pain  Pain: pain in left knee with bed mobility and with weight bearing. reports its tolerable. Did not rate pain     OBJECTIVE:   Orientation  Overall Orientation Status: Within Functional Limits  Cognition  Overall Cognitive Status: WFL    Bed Mobility Training  Bed Mobility Training: Yes  Overall Level of Assistance: Stand-by assistance (from right side with use of bed rail)  Interventions: Verbal cues  Rolling: Stand-by assistance  Supine to Sit: Stand-by assistance  Sit to Supine: Moderate assistance  Scooting: Stand-by assistance  Lots of time needed for task. Transfer Training  Transfer Training: Yes  Overall Level of Assistance: Minimum assistance  Interventions: Safety awareness training; Tactile cues; Verbal cues  Sit to Stand: Minimum assistance  Stand to Sit: Minimum assistance    Gait Training: Yes  Overall Level of Assistance: Minimum assistance;Contact-guard assistance  Distance (ft): 3 Feet  Assistive Device: Walker, rolling  Interventions: Safety awareness training; Tactile cues; Verbal cues  Base of Support: Widened  Speed/Susan: Fluctuations  Step Length: Left shortened  Gait Abnormalities: Antalgic  Right Side Weight Bearing: As tolerated  Left Side Weight Bearing: As tolerated       Static standing at bedside with ww. Pt needed bed elevated for easier transition to stand from bed. Pt able to weight shift. Vitals  SpO2: 96 %  O2 Device: None (Room air)          ASSESSMENT pt improving with his mobility but is fearful of falling injuring himself. Agreeable to getting up and to chair. Frequent urinal use during session.          Discharge Recommendations:  Patient would benefit from continued therapy after discharge         Goals  Long Term Goals  Long Term Goal 1: patient will complete bed mobility with with Nessa  Long Term Goal 2: patient will complete transfers with Nessa  Long Term Goal 3: patient will stand with LRD for x2 min with SBA  Long Term Goal 4: patient will progress to pregait and gait activities with 2ww and Mod A  Long Term Goal 5: SBA for HEP to improve LE strength, ROM, and activity toleracne  Patient Goals   Patient Goals : to go home    PLAN    General Plan: 1 time a day 7 days a week        AMPAC (6 CLICK) BASIC MOBILITY  AM-PAC Inpatient Mobility Raw Score : 12     Therapy Time   Individual   Time In 0938   Time Out 1016   Minutes 38   8 minutes neuro  5 minutes gt  25 minutes bed mob/transfers          Mel Nation PTA, 03/18/23 at 10:30 AM         Definitions for assistance levels  Independent = pt does not require any physical supervision or assistance from another person for activity completion. Device may be needed.   Stand by assistance = pt requires verbal cues or instructions from another person, close to but not touching, to perform the activity  Minimal assistance= pt performs 75% or more of the activity; assistance is required to complete the activity  Moderate assistance= pt performs 50% of the activity; assistance is required to complete the activity  Maximal assistance = pt performs 25% of the activity; assistance is required to complete the activity  Dependent = pt requires total physical assistance to accomplish the task

## 2023-03-18 NOTE — CARE COORDINATION
[facility] SET UP FOR 1PM FOR [facility]. PT AND ADMISSIONS INFORMED.

## 2023-03-18 NOTE — PROGRESS NOTES
Pt assisted with positioning. Has been voiding via urinal.  Reports minimal pain at this time. Denies need for pain medication. Call light in reach. Bed alarm on.

## 2023-03-19 PROBLEM — T84.84XA ARTIFICIAL JOINT PAIN (HCC): Status: ACTIVE | Noted: 2023-03-19

## 2023-03-19 PROBLEM — M48.00 SPINAL STENOSIS: Status: ACTIVE | Noted: 2023-03-19

## 2023-03-19 LAB
CULTURE SURGICAL: NORMAL

## 2023-03-20 LAB
ALBUMIN SERPL-MCNC: 2.6 G/DL (ref 3.5–4.6)
ALP SERPL-CCNC: 124 U/L (ref 35–104)
ALT SERPL-CCNC: 15 U/L (ref 0–41)
ANION GAP SERPL CALCULATED.3IONS-SCNC: 9 MEQ/L (ref 9–15)
AST SERPL-CCNC: 22 U/L (ref 0–40)
BASOPHILS # BLD: 0 K/UL (ref 0–0.2)
BASOPHILS NFR BLD: 0.6 %
BILIRUB SERPL-MCNC: 0.3 MG/DL (ref 0.2–0.7)
BUN SERPL-MCNC: 9 MG/DL (ref 8–23)
CALCIUM SERPL-MCNC: 8.6 MG/DL (ref 8.5–9.9)
CHLORIDE SERPL-SCNC: 101 MEQ/L (ref 95–107)
CO2 SERPL-SCNC: 29 MEQ/L (ref 20–31)
CREAT SERPL-MCNC: 0.5 MG/DL (ref 0.7–1.2)
CRP SERPL HS-MCNC: 72.7 MG/L (ref 0–5)
EOSINOPHIL # BLD: 0.4 K/UL (ref 0–0.7)
EOSINOPHIL NFR BLD: 6.5 %
ERYTHROCYTE [DISTWIDTH] IN BLOOD BY AUTOMATED COUNT: 14.4 % (ref 11.5–14.5)
GLOBULIN SER CALC-MCNC: 4.2 G/DL (ref 2.3–3.5)
GLUCOSE SERPL-MCNC: 109 MG/DL (ref 70–99)
HCT VFR BLD AUTO: 32.4 % (ref 42–52)
HGB BLD-MCNC: 11 G/DL (ref 14–18)
LYMPHOCYTES # BLD: 1.1 K/UL (ref 1–4.8)
LYMPHOCYTES NFR BLD: 20.6 %
MCH RBC QN AUTO: 31 PG (ref 27–31.3)
MCHC RBC AUTO-ENTMCNC: 33.9 % (ref 33–37)
MCV RBC AUTO: 91.3 FL (ref 79–92.2)
MONOCYTES # BLD: 0.6 K/UL (ref 0.2–0.8)
MONOCYTES NFR BLD: 11.2 %
NEUTROPHILS # BLD: 3.4 K/UL (ref 1.4–6.5)
NEUTS SEG NFR BLD: 61.1 %
PLATELET # BLD AUTO: 346 K/UL (ref 130–400)
POTASSIUM SERPL-SCNC: 3.9 MEQ/L (ref 3.4–4.9)
PROT SERPL-MCNC: 6.8 G/DL (ref 6.3–8)
RBC # BLD AUTO: 3.55 M/UL (ref 4.7–6.1)
REASON FOR REJECTION: NORMAL
REJECTED TEST: NORMAL
SODIUM SERPL-SCNC: 139 MEQ/L (ref 135–144)
WBC # BLD AUTO: 5.5 K/UL (ref 4.8–10.8)

## 2023-03-20 NOTE — PROGRESS NOTES
Physical Therapy  Facility/Department: [facility] MED SURG [id number]  Physical Therapy Discharge      NAME: Nadeem Benson    : 1940 (78 y.o.)  MRN: [id number]    Account: [de-identified]  Gender: male      Patient has been discharged from acute care hospital. DC patient from current PT program.      Electronically signed by Tess Schulz PT on 3/20/23 at 9:02 AM EDT

## 2023-03-21 ENCOUNTER — OFFICE VISIT (OUTPATIENT)
Dept: GERIATRIC MEDICINE | Age: 83
End: 2023-03-21

## 2023-03-21 DIAGNOSIS — G89.18 ACUTE POST-OPERATIVE PAIN: Primary | ICD-10-CM

## 2023-03-21 DIAGNOSIS — R53.1 GENERAL WEAKNESS: ICD-10-CM

## 2023-03-21 DIAGNOSIS — R26.2 DIFFICULTY IN WALKING: ICD-10-CM

## 2023-03-21 DIAGNOSIS — T84.50XA INFECTION OF PROSTHETIC JOINT, INITIAL ENCOUNTER (HCC): ICD-10-CM

## 2023-03-21 DIAGNOSIS — I10 PRIMARY HYPERTENSION: ICD-10-CM

## 2023-03-21 RX ORDER — TRAMADOL HYDROCHLORIDE 50 MG/1
50 TABLET ORAL EVERY 6 HOURS PRN
Qty: 42 TABLET | Refills: 0 | Status: SHIPPED | OUTPATIENT
Start: 2023-03-21 | End: 2023-04-04

## 2023-03-21 RX ORDER — PANTOPRAZOLE SODIUM 20 MG/1
20 TABLET, DELAYED RELEASE ORAL DAILY
COMMUNITY

## 2023-03-22 LAB
CULTURE SURGICAL: NORMAL
ERYTHROCYTE [SEDIMENTATION RATE] IN BLOOD BY WESTERGREN METHOD: 92 MM (ref 0–20)

## 2023-03-23 ENCOUNTER — OFFICE VISIT (OUTPATIENT)
Dept: GERIATRIC MEDICINE | Age: 83
End: 2023-03-23

## 2023-03-23 DIAGNOSIS — T84.50XA INFECTION OF PROSTHETIC JOINT, INITIAL ENCOUNTER (HCC): ICD-10-CM

## 2023-03-23 DIAGNOSIS — A04.72 C. DIFFICILE COLITIS: ICD-10-CM

## 2023-03-23 DIAGNOSIS — G89.18 ACUTE POST-OPERATIVE PAIN: Primary | ICD-10-CM

## 2023-03-23 LAB
ALBUMIN SERPL-MCNC: 2.7 G/DL (ref 3.5–4.6)
ALP SERPL-CCNC: 127 U/L (ref 35–104)
ALT SERPL-CCNC: 15 U/L (ref 0–41)
ANION GAP SERPL CALCULATED.3IONS-SCNC: 13 MEQ/L (ref 9–15)
AST SERPL-CCNC: 20 U/L (ref 0–40)
BASOPHILS # BLD: 0 K/UL (ref 0–0.2)
BASOPHILS NFR BLD: 0.5 %
BILIRUB SERPL-MCNC: 0.3 MG/DL (ref 0.2–0.7)
BUN SERPL-MCNC: 11 MG/DL (ref 8–23)
C DIFF TOX GENS STL QL NAA+PROBE: ABNORMAL
CALCIUM SERPL-MCNC: 8.9 MG/DL (ref 8.5–9.9)
CHLORIDE SERPL-SCNC: 101 MEQ/L (ref 95–107)
CO2 SERPL-SCNC: 25 MEQ/L (ref 20–31)
CREAT SERPL-MCNC: 0.59 MG/DL (ref 0.7–1.2)
CRP SERPL HS-MCNC: 83.6 MG/L (ref 0–5)
EOSINOPHIL # BLD: 0.3 K/UL (ref 0–0.7)
EOSINOPHIL NFR BLD: 4.6 %
ERYTHROCYTE [DISTWIDTH] IN BLOOD BY AUTOMATED COUNT: 14.7 % (ref 11.5–14.5)
GLOBULIN SER CALC-MCNC: 4.5 G/DL (ref 2.3–3.5)
GLUCOSE SERPL-MCNC: 111 MG/DL (ref 70–99)
HCT VFR BLD AUTO: 33.2 % (ref 42–52)
HGB BLD-MCNC: 11.3 G/DL (ref 14–18)
LYMPHOCYTES # BLD: 1.2 K/UL (ref 1–4.8)
LYMPHOCYTES NFR BLD: 16.4 %
MCH RBC QN AUTO: 31 PG (ref 27–31.3)
MCHC RBC AUTO-ENTMCNC: 34.2 % (ref 33–37)
MCV RBC AUTO: 90.8 FL (ref 79–92.2)
MONOCYTES # BLD: 0.8 K/UL (ref 0.2–0.8)
MONOCYTES NFR BLD: 10.5 %
NEUTROPHILS # BLD: 5.1 K/UL (ref 1.4–6.5)
NEUTS SEG NFR BLD: 68 %
PLATELET # BLD AUTO: 376 K/UL (ref 130–400)
POTASSIUM SERPL-SCNC: 3.9 MEQ/L (ref 3.4–4.9)
PROT SERPL-MCNC: 7.2 G/DL (ref 6.3–8)
RBC # BLD AUTO: 3.66 M/UL (ref 4.7–6.1)
SODIUM SERPL-SCNC: 139 MEQ/L (ref 135–144)
WBC # BLD AUTO: 7.5 K/UL (ref 4.8–10.8)

## 2023-03-24 ENCOUNTER — OFFICE VISIT (OUTPATIENT)
Dept: GERIATRIC MEDICINE | Age: 83
End: 2023-03-24

## 2023-03-24 DIAGNOSIS — R53.1 GENERAL WEAKNESS: Primary | ICD-10-CM

## 2023-03-24 DIAGNOSIS — M15.9 PRIMARY OSTEOARTHRITIS INVOLVING MULTIPLE JOINTS: ICD-10-CM

## 2023-03-24 DIAGNOSIS — G99.2 STENOSIS OF CERVICAL SPINE WITH MYELOPATHY (HCC): ICD-10-CM

## 2023-03-24 DIAGNOSIS — T84.50XD INFECTION OF PROSTHETIC JOINT, SUBSEQUENT ENCOUNTER: ICD-10-CM

## 2023-03-24 DIAGNOSIS — M48.02 STENOSIS OF CERVICAL SPINE WITH MYELOPATHY (HCC): ICD-10-CM

## 2023-03-28 LAB
ALBUMIN SERPL-MCNC: 2.6 G/DL (ref 3.5–4.6)
ALP SERPL-CCNC: 115 U/L (ref 35–104)
ALT SERPL-CCNC: 13 U/L (ref 0–41)
ANION GAP SERPL CALCULATED.3IONS-SCNC: 9 MEQ/L (ref 9–15)
AST SERPL-CCNC: 20 U/L (ref 0–40)
BASOPHILS # BLD: 0 K/UL (ref 0–0.2)
BASOPHILS NFR BLD: 2 %
BILIRUB SERPL-MCNC: 0.3 MG/DL (ref 0.2–0.7)
BUN SERPL-MCNC: 9 MG/DL (ref 8–23)
CALCIUM SERPL-MCNC: 8.9 MG/DL (ref 8.5–9.9)
CHLORIDE SERPL-SCNC: 100 MEQ/L (ref 95–107)
CO2 SERPL-SCNC: 27 MEQ/L (ref 20–31)
CREAT SERPL-MCNC: 0.5 MG/DL (ref 0.7–1.2)
CRP SERPL HS-MCNC: 27.1 MG/L (ref 0–5)
EOSINOPHIL # BLD: 0.2 K/UL (ref 0–0.7)
EOSINOPHIL NFR BLD: 3 %
ERYTHROCYTE [DISTWIDTH] IN BLOOD BY AUTOMATED COUNT: 14.4 % (ref 11.5–14.5)
GLOBULIN SER CALC-MCNC: 4.8 G/DL (ref 2.3–3.5)
GLUCOSE SERPL-MCNC: 109 MG/DL (ref 70–99)
HCT VFR BLD AUTO: 32.7 % (ref 42–52)
HGB BLD-MCNC: 10.9 G/DL (ref 14–18)
LYMPHOCYTES # BLD: 0.8 K/UL (ref 1–4.8)
LYMPHOCYTES NFR BLD: 8 %
MCH RBC QN AUTO: 30.2 PG (ref 27–31.3)
MCHC RBC AUTO-ENTMCNC: 33.2 % (ref 33–37)
MCV RBC AUTO: 90.8 FL (ref 79–92.2)
MONOCYTES # BLD: 0.5 K/UL (ref 0.2–0.8)
MONOCYTES NFR BLD: 5.7 %
MYELOCYTES NFR BLD MANUAL: 2 %
NEUTROPHILS # BLD: 6.3 K/UL (ref 1.4–6.5)
NEUTS BAND NFR BLD MANUAL: 2 %
NEUTS SEG NFR BLD: 78 %
PLATELET # BLD AUTO: 205 K/UL (ref 130–400)
PLATELET BLD QL SMEAR: NORMAL
POTASSIUM SERPL-SCNC: 4.2 MEQ/L (ref 3.4–4.9)
PROT SERPL-MCNC: 7.4 G/DL (ref 6.3–8)
RBC # BLD AUTO: 3.61 M/UL (ref 4.7–6.1)
SMUDGE CELLS BLD QL SMEAR: 1
SODIUM SERPL-SCNC: 136 MEQ/L (ref 135–144)
VARIANT LYMPHS NFR BLD: 2 %
WBC # BLD AUTO: 7.7 K/UL (ref 4.8–10.8)

## 2023-03-29 LAB
FOLATE: 5.3 NG/ML
VITAMIN B-12: 252 PG/ML (ref 232–1245)

## 2023-03-30 ENCOUNTER — OFFICE VISIT (OUTPATIENT)
Dept: GERIATRIC MEDICINE | Age: 83
End: 2023-03-30

## 2023-03-30 DIAGNOSIS — N40.1 BENIGN PROSTATIC HYPERPLASIA WITH URINARY RETENTION: ICD-10-CM

## 2023-03-30 DIAGNOSIS — M15.9 PRIMARY OSTEOARTHRITIS INVOLVING MULTIPLE JOINTS: ICD-10-CM

## 2023-03-30 DIAGNOSIS — I10 PRIMARY HYPERTENSION: ICD-10-CM

## 2023-03-30 DIAGNOSIS — R33.8 BENIGN PROSTATIC HYPERPLASIA WITH URINARY RETENTION: ICD-10-CM

## 2023-03-30 DIAGNOSIS — A04.72 C. DIFFICILE COLITIS: Primary | ICD-10-CM

## 2023-03-30 DIAGNOSIS — S42.412D: ICD-10-CM

## 2023-04-02 NOTE — PROGRESS NOTES
SNF PROGRESS NOTE      Cc- Diarrhea       Patient is a Fedeella Roldanet 81 y.o. male who is being seen at [facility]. He is in isolation for C diff colitis.          Past Medical History:   Diagnosis Date    Aneurysm artery, pulmonary ([address] 2022    not surgical    Benign prostatic hyperplasia with lower urinary tract symptoms     Chronic sinusitis     History of left knee replacement 03/15/2022    at [facility]    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    HTN (hypertension)     Hx of carpal tunnel syndrome     Obesity     Other fracture of left femur, initial encounter for closed fracture ([address] 3/14/2022    Primary osteoarthritis of both knees      Aspirin    VS reviewed    Gen- Alert and oriented x 3   Heart- RRR no murmur no LE edema   Lungs- CTA b/l no resp distress RA oxygen   Abd- bs x 4     R UE PICC     Lab Results   Component Value Date/Time     03/28/2023 07:37 AM    K 4.2 03/28/2023 07:37 AM    K 4.5 03/17/2023 05:35 AM     03/28/2023 07:37 AM    CO2 27 03/28/2023 07:37 AM    BUN 9 03/28/2023 07:37 AM    CREATININE 0.50 03/28/2023 07:37 AM    GLUCOSE 109 03/28/2023 07:37 AM    CALCIUM 8.9 03/28/2023 07:37 AM      Lab Results   Component Value Date    WBC 7.7 03/28/2023    HGB 10.9 (L) 03/28/2023    HCT 32.7 (L) 03/28/2023    MCV 90.8 03/28/2023     03/28/2023                   Patient Active Problem List   Diagnosis    Chronic sinusitis    Deviated nasal septum    Hyperglycemia    Hypertension    Osteoarthritis    Class 2 drug-induced obesity with serious comorbidity and body mass index (BMI) of 37.0 to 37.9 in adult    Benign prostatic hyperplasia with lower urinary tract symptoms    Supracondylar fracture of femur, left, closed, with routine healing, subsequent encounter    General weakness    Urinary tract infection without hematuria    Prosthetic joint infection ([address]    Stenosis of cervical spine with myelopathy ([address]    Unable to

## 2023-04-04 ENCOUNTER — OFFICE VISIT (OUTPATIENT)
Dept: GERIATRIC MEDICINE | Age: 83
End: 2023-04-04
Payer: MEDICARE

## 2023-04-04 DIAGNOSIS — A04.72 C. DIFFICILE COLITIS: ICD-10-CM

## 2023-04-04 DIAGNOSIS — T84.50XA INFECTION OF PROSTHETIC JOINT, INITIAL ENCOUNTER (HCC): ICD-10-CM

## 2023-04-04 DIAGNOSIS — L89.320 PRESSURE INJURY OF LEFT BUTTOCK, UNSTAGEABLE (HCC): ICD-10-CM

## 2023-04-04 DIAGNOSIS — L89.310 PRESSURE INJURY OF RIGHT BUTTOCK, UNSTAGEABLE (HCC): ICD-10-CM

## 2023-04-04 DIAGNOSIS — R53.1 GENERAL WEAKNESS: ICD-10-CM

## 2023-04-04 DIAGNOSIS — G89.18 ACUTE POST-OPERATIVE PAIN: Primary | ICD-10-CM

## 2023-04-04 LAB
ALBUMIN SERPL-MCNC: 3.1 G/DL (ref 3.5–4.6)
ALP SERPL-CCNC: 109 U/L (ref 35–104)
ALT SERPL-CCNC: 10 U/L (ref 0–41)
ANION GAP SERPL CALCULATED.3IONS-SCNC: 14 MEQ/L (ref 9–15)
AST SERPL-CCNC: 20 U/L (ref 0–40)
BASOPHILS # BLD: 0 K/UL (ref 0–0.2)
BASOPHILS NFR BLD: 0.5 %
BILIRUB SERPL-MCNC: 0.5 MG/DL (ref 0.2–0.7)
BUN SERPL-MCNC: 11 MG/DL (ref 8–23)
CALCIUM SERPL-MCNC: 9.4 MG/DL (ref 8.5–9.9)
CHLORIDE SERPL-SCNC: 99 MEQ/L (ref 95–107)
CO2 SERPL-SCNC: 26 MEQ/L (ref 20–31)
CREAT SERPL-MCNC: 0.51 MG/DL (ref 0.7–1.2)
CRP SERPL HS-MCNC: 18.4 MG/L (ref 0–5)
EOSINOPHIL # BLD: 0.3 K/UL (ref 0–0.7)
EOSINOPHIL NFR BLD: 5.4 %
ERYTHROCYTE [DISTWIDTH] IN BLOOD BY AUTOMATED COUNT: 14.9 % (ref 11.5–14.5)
ERYTHROCYTE [SEDIMENTATION RATE] IN BLOOD BY WESTERGREN METHOD: 83 MM (ref 0–20)
GLOBULIN SER CALC-MCNC: 4.8 G/DL (ref 2.3–3.5)
GLUCOSE SERPL-MCNC: 118 MG/DL (ref 70–99)
HCT VFR BLD AUTO: 37.8 % (ref 42–52)
HGB BLD-MCNC: 12.8 G/DL (ref 14–18)
LYMPHOCYTES # BLD: 1.4 K/UL (ref 1–4.8)
LYMPHOCYTES NFR BLD: 22.3 %
MCH RBC QN AUTO: 30.3 PG (ref 27–31.3)
MCHC RBC AUTO-ENTMCNC: 34 % (ref 33–37)
MCV RBC AUTO: 89.1 FL (ref 79–92.2)
MONOCYTES # BLD: 0.7 K/UL (ref 0.2–0.8)
MONOCYTES NFR BLD: 10.9 %
NEUTROPHILS # BLD: 3.9 K/UL (ref 1.4–6.5)
NEUTS SEG NFR BLD: 60.9 %
PLATELET # BLD AUTO: 290 K/UL (ref 130–400)
POTASSIUM SERPL-SCNC: 4.3 MEQ/L (ref 3.4–4.9)
PROT SERPL-MCNC: 7.9 G/DL (ref 6.3–8)
RBC # BLD AUTO: 4.24 M/UL (ref 4.7–6.1)
SODIUM SERPL-SCNC: 139 MEQ/L (ref 135–144)
WBC # BLD AUTO: 6.3 K/UL (ref 4.8–10.8)

## 2023-04-04 PROCEDURE — 1124F ACP DISCUSS-NO DSCNMKR DOCD: CPT | Performed by: NURSE PRACTITIONER

## 2023-04-04 PROCEDURE — 99309 SBSQ NF CARE MODERATE MDM 30: CPT | Performed by: NURSE PRACTITIONER

## 2023-04-08 LAB
ALBUMIN SERPL-MCNC: 3 G/DL (ref 3.5–4.6)
ALP SERPL-CCNC: 101 U/L (ref 35–104)
ALT SERPL-CCNC: 11 U/L (ref 0–41)
ANION GAP SERPL CALCULATED.3IONS-SCNC: 11 MEQ/L (ref 9–15)
AST SERPL-CCNC: 19 U/L (ref 0–40)
BASOPHILS # BLD: 0 K/UL (ref 0–0.2)
BASOPHILS NFR BLD: 0.6 %
BILIRUB SERPL-MCNC: 0.5 MG/DL (ref 0.2–0.7)
BUN SERPL-MCNC: 10 MG/DL (ref 8–23)
CALCIUM SERPL-MCNC: 9.1 MG/DL (ref 8.5–9.9)
CHLORIDE SERPL-SCNC: 94 MEQ/L (ref 95–107)
CO2 SERPL-SCNC: 28 MEQ/L (ref 20–31)
CREAT SERPL-MCNC: 0.44 MG/DL (ref 0.7–1.2)
CRP SERPL HS-MCNC: 23.5 MG/L (ref 0–5)
EOSINOPHIL # BLD: 0.4 K/UL (ref 0–0.7)
EOSINOPHIL NFR BLD: 7.9 %
ERYTHROCYTE [DISTWIDTH] IN BLOOD BY AUTOMATED COUNT: 14.7 % (ref 11.5–14.5)
ERYTHROCYTE [SEDIMENTATION RATE] IN BLOOD BY WESTERGREN METHOD: 85 MM (ref 0–20)
GLOBULIN SER CALC-MCNC: 4.6 G/DL (ref 2.3–3.5)
GLUCOSE SERPL-MCNC: 110 MG/DL (ref 70–99)
HCT VFR BLD AUTO: 36.6 % (ref 42–52)
HGB BLD-MCNC: 12.4 G/DL (ref 14–18)
LYMPHOCYTES # BLD: 1.1 K/UL (ref 1–4.8)
LYMPHOCYTES NFR BLD: 20.8 %
MCH RBC QN AUTO: 30.2 PG (ref 27–31.3)
MCHC RBC AUTO-ENTMCNC: 34 % (ref 33–37)
MCV RBC AUTO: 89 FL (ref 79–92.2)
MONOCYTES # BLD: 0.6 K/UL (ref 0.2–0.8)
MONOCYTES NFR BLD: 10.3 %
NEUTROPHILS # BLD: 3.3 K/UL (ref 1.4–6.5)
NEUTS SEG NFR BLD: 60.4 %
PLATELET # BLD AUTO: 258 K/UL (ref 130–400)
POTASSIUM SERPL-SCNC: 4 MEQ/L (ref 3.4–4.9)
PROT SERPL-MCNC: 7.6 G/DL (ref 6.3–8)
RBC # BLD AUTO: 4.11 M/UL (ref 4.7–6.1)
SODIUM SERPL-SCNC: 133 MEQ/L (ref 135–144)
WBC # BLD AUTO: 5.5 K/UL (ref 4.8–10.8)

## 2023-04-13 ENCOUNTER — OFFICE VISIT (OUTPATIENT)
Dept: GERIATRIC MEDICINE | Age: 83
End: 2023-04-13
Payer: MEDICARE

## 2023-04-13 DIAGNOSIS — I10 PRIMARY HYPERTENSION: Primary | ICD-10-CM

## 2023-04-13 DIAGNOSIS — R53.1 GENERAL WEAKNESS: ICD-10-CM

## 2023-04-13 DIAGNOSIS — R23.8 SKIN BREAKDOWN: ICD-10-CM

## 2023-04-13 PROCEDURE — 99308 SBSQ NF CARE LOW MDM 20: CPT | Performed by: INTERNAL MEDICINE

## 2023-04-13 PROCEDURE — 1124F ACP DISCUSS-NO DSCNMKR DOCD: CPT | Performed by: INTERNAL MEDICINE

## 2023-04-14 PROBLEM — I27.20 PULMONARY HYPERTENSION, UNSPECIFIED (HCC): Status: ACTIVE | Noted: 2023-04-14

## 2023-04-18 ENCOUNTER — TELEPHONE (OUTPATIENT)
Dept: ORTHOPEDIC SURGERY | Age: 83
End: 2023-04-18

## 2023-04-18 LAB
ALBUMIN SERPL-MCNC: 3.1 G/DL (ref 3.5–4.6)
ALP SERPL-CCNC: 101 U/L (ref 35–104)
ALT SERPL-CCNC: 10 U/L (ref 0–41)
ANION GAP SERPL CALCULATED.3IONS-SCNC: 9 MEQ/L (ref 9–15)
AST SERPL-CCNC: 21 U/L (ref 0–40)
BASOPHILS # BLD: 0 K/UL (ref 0–0.2)
BASOPHILS NFR BLD: 0.7 %
BILIRUB SERPL-MCNC: 0.4 MG/DL (ref 0.2–0.7)
BUN SERPL-MCNC: 19 MG/DL (ref 8–23)
CALCIUM SERPL-MCNC: 9.4 MG/DL (ref 8.5–9.9)
CHLORIDE SERPL-SCNC: 97 MEQ/L (ref 95–107)
CO2 SERPL-SCNC: 29 MEQ/L (ref 20–31)
CREAT SERPL-MCNC: 0.47 MG/DL (ref 0.7–1.2)
CRP SERPL HS-MCNC: 17.3 MG/L (ref 0–5)
EOSINOPHIL # BLD: 0.6 K/UL (ref 0–0.7)
EOSINOPHIL NFR BLD: 11.4 %
ERYTHROCYTE [DISTWIDTH] IN BLOOD BY AUTOMATED COUNT: 14.6 % (ref 11.5–14.5)
ERYTHROCYTE [SEDIMENTATION RATE] IN BLOOD BY WESTERGREN METHOD: 80 MM (ref 0–20)
GLOBULIN SER CALC-MCNC: 4.6 G/DL (ref 2.3–3.5)
GLUCOSE SERPL-MCNC: 102 MG/DL (ref 70–99)
HCT VFR BLD AUTO: 36.5 % (ref 42–52)
HGB BLD-MCNC: 12.5 G/DL (ref 14–18)
LYMPHOCYTES # BLD: 1.3 K/UL (ref 1–4.8)
LYMPHOCYTES NFR BLD: 24.5 %
MCH RBC QN AUTO: 30 PG (ref 27–31.3)
MCHC RBC AUTO-ENTMCNC: 34.3 % (ref 33–37)
MCV RBC AUTO: 87.5 FL (ref 79–92.2)
MONOCYTES # BLD: 0.6 K/UL (ref 0.2–0.8)
MONOCYTES NFR BLD: 11.5 %
NEUTROPHILS # BLD: 2.7 K/UL (ref 1.4–6.5)
NEUTS SEG NFR BLD: 51.9 %
PLATELET # BLD AUTO: 236 K/UL (ref 130–400)
POTASSIUM SERPL-SCNC: 4.3 MEQ/L (ref 3.4–4.9)
PROT SERPL-MCNC: 7.7 G/DL (ref 6.3–8)
RBC # BLD AUTO: 4.17 M/UL (ref 4.7–6.1)
SODIUM SERPL-SCNC: 135 MEQ/L (ref 135–144)
WBC # BLD AUTO: 5.2 K/UL (ref 4.8–10.8)

## 2023-04-18 NOTE — TELEPHONE ENCOUNTER
Attempted to reach patient, unable to leave VM patiet need not have VM set up on his phone. Patient needs appointment scheduled with Fatoumata Menjivar PA-C for a post-op appt. Patient had surgery with Dr. Oliva Toney.  Patient to only see Fatoumata Menjivar PA-C.

## 2023-04-19 ENCOUNTER — HOSPITAL ENCOUNTER (OUTPATIENT)
Dept: ORTHOPEDIC SURGERY | Age: 83
Discharge: HOME | End: 2023-04-21
Payer: MEDICARE

## 2023-04-19 ENCOUNTER — OFFICE VISIT (OUTPATIENT)
Dept: ORTHOPEDIC SURGERY | Age: 83
End: 2023-04-19

## 2023-04-19 VITALS
HEIGHT: 70 IN | SYSTOLIC BLOOD PRESSURE: 126 MMHG | DIASTOLIC BLOOD PRESSURE: 70 MMHG | HEART RATE: 68 BPM | BODY MASS INDEX: 32.35 KG/M2 | TEMPERATURE: 97.6 F | OXYGEN SATURATION: 96 % | WEIGHT: 226 LBS

## 2023-04-19 DIAGNOSIS — S72.8X2A OTHER FRACTURE OF LEFT FEMUR, INITIAL ENCOUNTER FOR CLOSED FRACTURE (HCC): ICD-10-CM

## 2023-04-19 DIAGNOSIS — S72.8X2A OTHER FRACTURE OF LEFT FEMUR, INITIAL ENCOUNTER FOR CLOSED FRACTURE (HCC): Primary | ICD-10-CM

## 2023-04-19 PROCEDURE — 73562 X-RAY EXAM OF KNEE 3: CPT

## 2023-04-19 NOTE — PATIENT INSTRUCTIONS
PT/OT: Continue with weightbearing as tolerated. Up With assistance at all times with a walker, needs to be wearing his brace when ambulating. Encourage active and passive range of motion of the knee with flexion and extension with resistance training as he tolerates. Continue with antibiotics as directed by infectious disease team.  Monitor for reoccurrence of infection which includes fevers, chills, night sweats, drainage of the wound. If such occurs please call our office immediately. Continue current pain regimen as well as icing every 4 hours   Continue with DVT prophylaxis for another 3 weeks. He will follow up with Dr. Jose Gamble at [facility]) for further care at this point.

## 2023-04-20 ENCOUNTER — OFFICE VISIT (OUTPATIENT)
Dept: INFECTIOUS DISEASES | Age: 83
End: 2023-04-20

## 2023-04-20 ENCOUNTER — TELEPHONE (OUTPATIENT)
Dept: INFECTIOUS DISEASES | Age: 83
End: 2023-04-20

## 2023-04-20 VITALS — TEMPERATURE: 97.9 F | HEART RATE: 70 BPM | SYSTOLIC BLOOD PRESSURE: 126 MMHG | DIASTOLIC BLOOD PRESSURE: 60 MMHG

## 2023-04-20 DIAGNOSIS — A04.72 C. DIFFICILE COLITIS: ICD-10-CM

## 2023-04-20 DIAGNOSIS — T84.54XD INFECTION ASSOCIATED WITH INTERNAL LEFT KNEE PROSTHESIS, SUBSEQUENT ENCOUNTER: Primary | ICD-10-CM

## 2023-04-20 DIAGNOSIS — G89.29 CHRONIC PAIN OF LEFT KNEE: ICD-10-CM

## 2023-04-20 DIAGNOSIS — M25.462 PAIN AND SWELLING OF LEFT KNEE: ICD-10-CM

## 2023-04-20 DIAGNOSIS — M25.562 PAIN AND SWELLING OF LEFT KNEE: ICD-10-CM

## 2023-04-20 DIAGNOSIS — M25.562 CHRONIC PAIN OF LEFT KNEE: ICD-10-CM

## 2023-04-20 PROBLEM — G89.18 ACUTE POST-OPERATIVE PAIN: Status: ACTIVE | Noted: 2023-04-20

## 2023-04-20 ASSESSMENT — PATIENT HEALTH QUESTIONNAIRE - PHQ9
SUM OF ALL RESPONSES TO PHQ9 QUESTIONS 1 & 2: 0
1. LITTLE INTEREST OR PLEASURE IN DOING THINGS: 0
SUM OF ALL RESPONSES TO PHQ QUESTIONS 1-9: 0
SUM OF ALL RESPONSES TO PHQ QUESTIONS 1-9: 0
2. FEELING DOWN, DEPRESSED OR HOPELESS: 0
SUM OF ALL RESPONSES TO PHQ QUESTIONS 1-9: 0
SUM OF ALL RESPONSES TO PHQ QUESTIONS 1-9: 0

## 2023-04-20 NOTE — TELEPHONE ENCOUNTER
CALL TO [facility]~3:55pm. NO ONE AVAILABLE TO TAKE MY CALL. UPDATE: Patient's End Date with IV Abx Changed. End date will be 4/25/23 and Pull picc. F/u with ID will be in 4-weeks, (Not 2-weeks). New Orders Faxed to SNF. Attached to 4/20/23 ID OV w/ Dr Jasmin Paz. Per Dr Jasmin Paz, she has spoken to Dr Liane Toney and he wants patient Off Abx for about 2 weeks prior to being seen/or next procedure.

## 2023-04-20 NOTE — PROGRESS NOTES
04/18/2023     Lab Results   Component Value Date/Time     04/18/2023 05:30 AM    K 4.3 04/18/2023 05:30 AM    K 4.5 03/17/2023 05:35 AM    CL 97 04/18/2023 05:30 AM    CO2 29 04/18/2023 05:30 AM    BUN 19 04/18/2023 05:30 AM    CREATININE 0.47 04/18/2023 05:30 AM    GLUCOSE 102 04/18/2023 05:30 AM    CALCIUM 9.4 04/18/2023 05:30 AM        WBC trends are being monitored. Antibiotic doses are being adjusted per most recent renal labs. Vitals:    04/20/23 1231   BP: 126/60   Pulse: 70   Temp: 97.9 °F (36.6 °C)         Patient Active Problem List   Diagnosis    Chronic sinusitis    Deviated nasal septum    Hyperglycemia    Hypertension    Osteoarthritis    Class 2 drug-induced obesity with serious comorbidity and body mass index (BMI) of 37.0 to 37.9 in adult    Benign prostatic hyperplasia with lower urinary tract symptoms    Supracondylar fracture of femur, left, closed, with routine healing, subsequent encounter    General weakness    Urinary tract infection without hematuria    Prosthetic joint infection ([address]    Stenosis of cervical spine with myelopathy ([address]    Unable to ambulate    Artificial joint pain ([address]    Spinal stenosis    Pulmonary hypertension, unspecified ([address]     Patient is awake and alert, NAD  Ears look ok. No thrush in oropharynx  Neck supple  Chest equal expansion, clear  Heart S1S2  Abdomen ND, NTTP  In the wheelchair. The L knee is swollena nd warm to the touch. No wound dehiscence no erythema. The R knee is chronically mildly swollen,   Extrem no new changes, no pain  Expressions symmetrical  No obvious rashes. Mood and affect appropriate. ASSESSMENT:  L Knee PJI s/p left knee arthrotomy with poly exchange - no cultures positive from the original surgery  L knee pain,  persistent  E coli UTI - resolved. C diff colitis    PLAN:  Continue Vanco PO for C diff  DC IV abx on April 25 and hold abx for repeat L knee aspiration during next ortho visit.    Lactobacillus

## 2023-04-21 ASSESSMENT — ENCOUNTER SYMPTOMS
CONSTIPATION: 1
SHORTNESS OF BREATH: 1

## 2023-04-21 NOTE — PROGRESS NOTES
Name: Liset Hardin St: [facility]  [facility]  K[place]  Date: 3/21/2023     Subjective:     Chief Complaint   Patient presents with    Follow-up       HPI  Daniela Gross is a 81 y.o. male being seen today for evaluation of acute postop pain of the left knee status post infection of the joint, inability to ambulate, and generalized weakness. Resident admitted from [facility] after being treated for infection in the left prosthetic knee joint, status post arthrotomy 3/16/2023, left knee aspiration grew pansensitive E. Coli. He was admitted to [facility] for acute rehab secondary to debility conditioning and for IV antibiotics. He has medical history of hypertension, BPH, spinal stenosis, osteoarthritis and is post left total knee replacement 3/15/2022. Resident is weightbearing as tolerated, stable assisted with a walker for therapy. He is weak, difficulty weightbearing on the left lower extremity, complains of left knee pain 6-10 on a scale of 10, exacerbated with therapy relieved with rest and pain medication. He is currently on tramadol for pain control. He is on Lovenox for DVT prophylaxis, on ceftriaxone and vancomycin for infection with repeat blood work ordered. He admitted with pressure ulcers on the right and left buttock, being followed by the wound team until resolved. Resident admits to sitting on a toilet for 14 hours before his sister discovered he needed assistance. He states, \"I was too weak to get off the toilet. \" Blood pressure is controlled on Metoprolol,  triamterene HCTZ, and Lasix. Resident denies complaints of chest pain chest palpitations irregular heartbeat lightheadedness dizziness dyspnea at rest nausea vomiting constipation hematuria dysuria.     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    not surgical    Benign prostatic hyperplasia with lower urinary tract symptoms

## 2023-04-22 NOTE — PROGRESS NOTES
Hyperglycemia    Primary hypertension    Osteoarthritis    Class 2 drug-induced obesity with serious comorbidity and body mass index (BMI) of 37.0 to 37.9 in adult    Benign prostatic hyperplasia with lower urinary tract symptoms    Supracondylar fracture of femur, left, closed, with routine healing, subsequent encounter    General weakness    Urinary tract infection without hematuria    Prosthetic joint infection ([address]    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)    Difficulty in walking    Artificial joint pain ([address]    Spinal stenosis    Pulmonary hypertension, unspecified ([address]    Acute post-operative pain         PLAN:   Diagnosis Orders   1. General weakness        2. Infection of prosthetic joint, subsequent encounter        3. Stenosis of cervical spine with myelopathy ([address]        4. Primary osteoarthritis involving multiple joints          Physical therapy outpatient therapy complete course identified therapy follow-up orthopedics. Monitoring surgical sites. Nutritional support. Follow BMP CBC pending at this time. Local surgical site care. Reorientation patient jefferson at risk for delirium at risk for falls. Please note orders entered on site at facility after discussion with appropriate facility nursing/therapy/ / nutritional staff. Current longstanding medical problems and acute medical issues addressed with staff. Available data and data elements in on site paper chart reviewed and analyzed. Current external consultant notes reviewed in on site chart. Ordered laboratory testing and imaging will be reviewed when available. This patient's need for psychiatric medication has been reviewed. Will consider further adjustment and possible further evaluations by mental health services. 134

## 2023-04-23 VITALS
RESPIRATION RATE: 18 BRPM | OXYGEN SATURATION: 97 % | DIASTOLIC BLOOD PRESSURE: 53 MMHG | HEART RATE: 76 BPM | SYSTOLIC BLOOD PRESSURE: 108 MMHG | TEMPERATURE: 97.3 F

## 2023-04-23 PROBLEM — A04.72 C. DIFFICILE COLITIS: Status: ACTIVE | Noted: 2023-04-23

## 2023-04-25 LAB — CRP SERPL HS-MCNC: 9.2 MG/L (ref 0–5)

## 2023-04-28 ENCOUNTER — OFFICE VISIT (OUTPATIENT)
Dept: GERIATRIC MEDICINE | Age: 83
End: 2023-04-28

## 2023-04-28 DIAGNOSIS — G99.2 STENOSIS OF CERVICAL SPINE WITH MYELOPATHY (HCC): Primary | ICD-10-CM

## 2023-04-28 DIAGNOSIS — M48.02 STENOSIS OF CERVICAL SPINE WITH MYELOPATHY (HCC): Primary | ICD-10-CM

## 2023-05-02 LAB — CRP SERPL HS-MCNC: 7.7 MG/L (ref 0–5)

## 2023-05-04 PROBLEM — L89.310 PRESSURE INJURY OF RIGHT BUTTOCK, UNSTAGEABLE (HCC): Status: ACTIVE | Noted: 2023-05-04

## 2023-05-04 PROBLEM — L89.320 PRESSURE INJURY OF LEFT BUTTOCK, UNSTAGEABLE (HCC): Status: ACTIVE | Noted: 2023-05-04

## 2023-05-04 NOTE — PROGRESS NOTES
Name: Liset Hardin St: [facility]  Keshia Elaine  Date: 4/4/2023     Subjective:     Chief Complaint   Patient presents with    Follow-up       HPI  Gretchen Cool is a 81 y.o. male being seen today for evaluation of rehab progress, generalized weakness, pressure injury to right and left buttock, postop pain of the left knee, and C. difficile infection. Resident is participating in physical and outpatient therapy, still making progress. Resident is weightbearing as tolerated, assisted with a walker for therapy. He is weak, difficulty weightbearing on the left lower extremity, complains of left knee pain 5 on a scale of 10, exacerbated with therapy relieved with rest and pain medication. He is currently on tramadol for pain control. He is on ceftriaxone and vancomycin for knee infection and weekly blood work. Nursing report liquid stools and diarrhea reduce in frequency, still on Flagyl, no cramping or flatulence or abdominal pain. Pressure ulcers to right and left buttock be managed by the wound team, still has significant amount of nonviable tissue and wound bed, no evidence of infection. Resident denies complaints of chest pain chest palpitations irregular heartbeat lightheadedness dizziness dyspnea at rest nausea vomiting constipation hematuria dysuria. Vital signs stable no fever.     Past Medical History:   Diagnosis Date    Aneurysm artery, pulmonary ([address] 2022    not surgical    Benign prostatic hyperplasia with lower urinary tract symptoms     Chronic sinusitis     History of left knee replacement 03/15/2022    at [facility]    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    HTN (hypertension)     Hx of carpal tunnel syndrome     Obesity     Other fracture of left femur, initial encounter for closed fracture ([address] 3/14/2022    Primary osteoarthritis of both knees          Allergies:  Reviewed in nursing facility records  Medications:  Reviewed and

## 2023-05-09 LAB
ALBUMIN SERPL-MCNC: 3.4 G/DL (ref 3.5–4.6)
ALP SERPL-CCNC: 102 U/L (ref 35–104)
ALT SERPL-CCNC: 19 U/L (ref 0–41)
ANION GAP SERPL CALCULATED.3IONS-SCNC: 13 MEQ/L (ref 9–15)
AST SERPL-CCNC: 30 U/L (ref 0–40)
BILIRUB SERPL-MCNC: 0.7 MG/DL (ref 0.2–0.7)
BUN SERPL-MCNC: 17 MG/DL (ref 8–23)
CALCIUM SERPL-MCNC: 8.9 MG/DL (ref 8.5–9.9)
CHLORIDE SERPL-SCNC: 98 MEQ/L (ref 95–107)
CO2 SERPL-SCNC: 25 MEQ/L (ref 20–31)
CREAT SERPL-MCNC: 0.48 MG/DL (ref 0.7–1.2)
CRP SERPL HS-MCNC: 8.5 MG/L (ref 0–5)
GLOBULIN SER CALC-MCNC: 3.8 G/DL (ref 2.3–3.5)
GLUCOSE SERPL-MCNC: 100 MG/DL (ref 70–99)
POTASSIUM SERPL-SCNC: 4.1 MEQ/L (ref 3.4–4.9)
PROT SERPL-MCNC: 7.2 G/DL (ref 6.3–8)
SODIUM SERPL-SCNC: 136 MEQ/L (ref 135–144)

## 2023-05-10 NOTE — PROGRESS NOTES
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[employer], drove a truck     Social Determinants of Health     Financial Resource Strain: Not on file   Food Insecurity: Not on file   Transportation Needs: Not on file   Physical Activity: Not on file   Stress: Not on file   Social Connections: Not on file   Intimate Partner Violence: Not on file   Housing Stability: Not on file         No results found for: LABA1C  No results found for: EAG    Lab Results   Component Value Date/Time     05/09/2023 06:26 AM    K 4.1 05/09/2023 06:26 AM    K 4.5 03/17/2023 05:35 AM    CL 98 05/09/2023 06:26 AM    CO2 25 05/09/2023 06:26 AM    BUN 17 05/09/2023 06:26 AM    CREATININE 0.48 05/09/2023 06:26 AM    GLUCOSE 100 05/09/2023 06:26 AM    CALCIUM 8.9 05/09/2023 06:26 AM        No results found for: CHOL  No results found for: TRIG  No results found for: HDL  No results found for: LDLCHOLESTEROL, LDLCALC  No results found for: LABVLDL, VLDL  No results found for: CHOLHDLRATIO    No results found for: TSHFT4, TSH    Lab Results   Component Value Date    WBC 5.2 04/18/2023    HGB 12.5 (L) 04/18/2023    HCT 36.5 (L) 04/18/2023    MCV 87.5 04/18/2023     04/18/2023       Please note orders entered on site at facility after discussion with appropriate facility nursing/therapy/ / nutritional staff. Current longstanding medical problems and acute medical issues addressed with staff. Available data and data elements in on site paper chart reviewed and analyzed. Current external consultant notes reviewed in on site chart. Ordered laboratory testing and imaging will be reviewed when available.

## 2023-05-12 ENCOUNTER — OFFICE VISIT (OUTPATIENT)
Dept: GERIATRIC MEDICINE | Age: 83
End: 2023-05-12

## 2023-05-12 DIAGNOSIS — R52 PAIN: ICD-10-CM

## 2023-05-12 DIAGNOSIS — M15.9 PRIMARY OSTEOARTHRITIS INVOLVING MULTIPLE JOINTS: ICD-10-CM

## 2023-05-12 DIAGNOSIS — R26.2 DIFFICULTY IN WALKING: ICD-10-CM

## 2023-05-12 DIAGNOSIS — R53.1 GENERAL WEAKNESS: Primary | ICD-10-CM

## 2023-05-12 NOTE — PROGRESS NOTES
Name: Liset Hardin St: [facility]   Keshia gaytan  Date: 5/12/2023     Subjective:     Chief Complaint   Patient presents with    Follow-up       HPI  Elver Miller is a 81 y.o. male being seen today for evaluation of rehab progress, difficulty walking and muscle weakness, pain secondary to osteoarthritis and left knee pain secondary to infection. Is up in recliner chair, well-developed, in no acute distress. He is alert and orient x3 cooperative with care. Complain that his Tylenol has been discontinued, he has tramadol but prefers Tylenol for pain control. He has pain in the left knee due to the infection and pain in multiple joints osteoarthritis, will initiate Tylenol as needed. Left leg incision is healed, left knee is still slightly swollen, and temperature is normal, no warmth, mild erythema. Resident has appoint with Dr. Rosalva Mcneill today. Vital signs stable no fever. He is ambulating to the bathroom with front wheel walker, states \"cannot go to the bathroom without it. \"  Gait still slightly unsteady, needs assist of 1 with mobility transfers toileting. Not comfortable with him doing on his own at this time. Weakness is improved significantly, power grade 4 out of 5 upper extremities. He denies any distressing palpitation or heartbeat lightheadedness dizziness nausea vomiting constipation or dysuria. Appetite is good bowel movement every other day. Continue current plan of care.     Past Medical History:   Diagnosis Date    Aneurysm artery, pulmonary ([address] 2022    not surgical    Benign prostatic hyperplasia with lower urinary tract symptoms     Chronic sinusitis     History of left knee replacement 03/15/2022    at [address]    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    HTN (hypertension)     Hx of carpal tunnel syndrome     Obesity     Other fracture of left femur, initial encounter for closed fracture ([address] 3/14/2022    Primary

## 2023-05-15 ENCOUNTER — OFFICE VISIT (OUTPATIENT)
Dept: INFECTIOUS DISEASES | Age: 83
End: 2023-05-15

## 2023-05-15 VITALS — DIASTOLIC BLOOD PRESSURE: 60 MMHG | SYSTOLIC BLOOD PRESSURE: 110 MMHG | TEMPERATURE: 97.7 F | HEART RATE: 73 BPM

## 2023-05-15 DIAGNOSIS — R35.1 BPH ASSOCIATED WITH NOCTURIA: ICD-10-CM

## 2023-05-15 DIAGNOSIS — T84.54XD INFECTION ASSOCIATED WITH INTERNAL LEFT KNEE PROSTHESIS, SUBSEQUENT ENCOUNTER: Primary | ICD-10-CM

## 2023-05-15 DIAGNOSIS — A04.72 C. DIFFICILE COLITIS: ICD-10-CM

## 2023-05-15 DIAGNOSIS — N40.1 BPH ASSOCIATED WITH NOCTURIA: ICD-10-CM

## 2023-05-15 ASSESSMENT — PATIENT HEALTH QUESTIONNAIRE - PHQ9
SUM OF ALL RESPONSES TO PHQ QUESTIONS 1-9: 0
SUM OF ALL RESPONSES TO PHQ9 QUESTIONS 1 & 2: 0
SUM OF ALL RESPONSES TO PHQ QUESTIONS 1-9: 0
2. FEELING DOWN, DEPRESSED OR HOPELESS: 0
1. LITTLE INTEREST OR PLEASURE IN DOING THINGS: 0

## 2023-05-15 NOTE — PROGRESS NOTES
Iam Weiner (:  1940) is a 81 y.o. male,Established patient, here for evaluation of the following chief complaint(s):  Urinary Tract Infection (4 week f/u - e.coli UTI - from [place])         ASSESSMENT/PLAN:  E coli L knee PJI, persistent inflammatory markers despite treatment  C. difficile colitis, resolved  BPH with nocturia, persistent severe symptoms      Bactrim double strength 1 tablet p.o. twice daily for chronic suppressive therapy  CBC BMP CRP sed rate in 3 weeks  Follow-up in 4 weeks  Recommend Urology for nocturia and frequency  Lactobacillus    Discussed with patient and Dr. Srivastava Kai:  HPI  Follow-up L knee PJI. On 3/16/2023, he had LEFT KNEE ARTHROTOMY WITH POLYETHYLENE LINER EXCHANGE AND PLACEMENT OF TOPICAL ANTIBIOTICS   Status post IV extended Ceftriaxone since approx 3/16 x 6 weeks course. Developed C diff subsequently, treated with PO Vanco.   Has 1-2 loose stools daily  No abdominal pain or cramps  Left knee pain after therapy  Knee was aspirated 3 days ago by Dr. Nate Putnam  Has been off antibiotics for 2 weeks  Patient has history of benign prostatic hypertrophy  Has symptoms of nocturia. Has to urinate every hour at night. Occasional dysuria. Currently in a wheelchair  No fevers no chills  No abdominal pain or flank pain  Left knee with decreased swelling and resolved redness  healed  Incision  Review of Systems   All other systems reviewed and are negative. Objective   Physical Exam  Vitals and nursing note reviewed. Constitutional:       General: He is not in acute distress. Appearance: Normal appearance. HENT:      Head: Normocephalic and atraumatic. Nose: No congestion. Eyes:      General: No scleral icterus. Pulmonary:      Effort: Pulmonary effort is normal. No respiratory distress. Abdominal:      General: There is no distension. Tenderness: There is no abdominal tenderness.    Musculoskeletal:

## 2023-05-21 ENCOUNTER — OFFICE VISIT (OUTPATIENT)
Dept: GERIATRIC MEDICINE | Age: 83
End: 2023-05-21

## 2023-05-21 DIAGNOSIS — R53.1 WEAKNESS: ICD-10-CM

## 2023-05-21 DIAGNOSIS — G89.29 CHRONIC PAIN OF LEFT KNEE: Primary | ICD-10-CM

## 2023-05-21 DIAGNOSIS — M25.562 CHRONIC PAIN OF LEFT KNEE: Primary | ICD-10-CM

## 2023-05-21 DIAGNOSIS — I10 HYPERTENSION, UNSPECIFIED TYPE: ICD-10-CM

## 2023-05-22 ENCOUNTER — OFFICE VISIT (OUTPATIENT)
Dept: GERIATRIC MEDICINE | Age: 83
End: 2023-05-22
Payer: MEDICARE

## 2023-05-22 ENCOUNTER — TELEPHONE (OUTPATIENT)
Dept: INFECTIOUS DISEASES | Age: 83
End: 2023-05-22

## 2023-05-22 DIAGNOSIS — R26.2 DIFFICULTY IN WALKING: ICD-10-CM

## 2023-05-22 DIAGNOSIS — M25.562 CHRONIC PAIN OF LEFT KNEE: Primary | ICD-10-CM

## 2023-05-22 DIAGNOSIS — R53.1 WEAKNESS: ICD-10-CM

## 2023-05-22 DIAGNOSIS — R53.1 GENERAL WEAKNESS: ICD-10-CM

## 2023-05-22 DIAGNOSIS — G89.29 CHRONIC PAIN OF LEFT KNEE: Primary | ICD-10-CM

## 2023-05-22 DIAGNOSIS — I10 HYPERTENSION, UNSPECIFIED TYPE: ICD-10-CM

## 2023-05-22 PROCEDURE — 99308 SBSQ NF CARE LOW MDM 20: CPT | Performed by: INTERNAL MEDICINE

## 2023-05-22 PROCEDURE — 1124F ACP DISCUSS-NO DSCNMKR DOCD: CPT | Performed by: INTERNAL MEDICINE

## 2023-05-22 NOTE — TELEPHONE ENCOUNTER
Roverto Salazar, nurse caring for pt, called into office stating that pt was having stomach upset from bactrim. Dr. Lashonda Mccurdy consulted and gave orders to d/c bactrim, and to begin keflex 500 po tid x 2 months and f/u in 4 weeks. Roverto Salazar verbalized understanding. Denies any other pt needs at this time.

## 2023-05-25 ENCOUNTER — OFFICE VISIT (OUTPATIENT)
Dept: GERIATRIC MEDICINE | Age: 83
End: 2023-05-25
Payer: MEDICARE

## 2023-05-25 DIAGNOSIS — I10 HYPERTENSION, UNSPECIFIED TYPE: ICD-10-CM

## 2023-05-25 DIAGNOSIS — M25.562 CHRONIC PAIN OF LEFT KNEE: Primary | ICD-10-CM

## 2023-05-25 DIAGNOSIS — R26.2 DIFFICULTY IN WALKING: ICD-10-CM

## 2023-05-25 DIAGNOSIS — R53.1 GENERAL WEAKNESS: ICD-10-CM

## 2023-05-25 DIAGNOSIS — G89.29 CHRONIC PAIN OF LEFT KNEE: Primary | ICD-10-CM

## 2023-05-25 DIAGNOSIS — R53.1 WEAKNESS: ICD-10-CM

## 2023-05-25 DIAGNOSIS — R52 PAIN: ICD-10-CM

## 2023-05-25 PROCEDURE — 1124F ACP DISCUSS-NO DSCNMKR DOCD: CPT | Performed by: INTERNAL MEDICINE

## 2023-05-25 PROCEDURE — 99308 SBSQ NF CARE LOW MDM 20: CPT | Performed by: INTERNAL MEDICINE

## 2023-05-25 NOTE — PROGRESS NOTES
SNF PROGRESS NOTE      Cc- Knee pain and weakness      Patient is a Jose Narvaez 81 y.o. male who is being seen at [facility]. He is sitting in the chair and continues to work with therapy.          Past Medical History:   Diagnosis Date    Aneurysm artery, pulmonary ([facility] 2022    not surgical    Benign prostatic hyperplasia with lower urinary tract symptoms     Chronic sinusitis     History of left knee replacement 03/15/2022    at [address]    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    HTN (hypertension)     Hx of carpal tunnel syndrome     Obesity     Other fracture of left femur, initial encounter for closed fracture ([facility] 3/14/2022    Primary osteoarthritis of both knees      Aspirin    VS reviewed    Gen- Alert and oriented   Heart- RRR no murmur no LE edema   Lungs- CTA b/l no resp distress RA oxygen   Abd- bs x 4, obese      Lab Results   Component Value Date/Time     05/09/2023 06:26 AM    K 4.1 05/09/2023 06:26 AM    K 4.5 03/17/2023 05:35 AM    CL 98 05/09/2023 06:26 AM    CO2 25 05/09/2023 06:26 AM    BUN 17 05/09/2023 06:26 AM    CREATININE 0.48 05/09/2023 06:26 AM    GLUCOSE 100 05/09/2023 06:26 AM    CALCIUM 8.9 05/09/2023 06:26 AM    LABGLOM >60.0 05/09/2023 06:26 AM      Lab Results   Component Value Date    WBC 5.2 04/18/2023    HGB 12.5 (L) 04/18/2023    HCT 36.5 (L) 04/18/2023    MCV 87.5 04/18/2023     04/18/2023                   Patient Active Problem List   Diagnosis    Chronic sinusitis    Deviated nasal septum    Hyperglycemia    Primary hypertension    Osteoarthritis    Class 2 drug-induced obesity with serious comorbidity and body mass index (BMI) of 37.0 to 37.9 in adult    Benign prostatic hyperplasia with lower urinary tract symptoms    Supracondylar fracture of femur, left, closed, with routine healing, subsequent encounter    General weakness    Urinary tract infection without hematuria    Prosthetic joint infection ([facility]

## 2023-05-26 ENCOUNTER — OFFICE VISIT (OUTPATIENT)
Dept: GERIATRIC MEDICINE | Age: 83
End: 2023-05-26
Payer: MEDICARE

## 2023-05-26 DIAGNOSIS — R53.1 WEAKNESS: ICD-10-CM

## 2023-05-26 DIAGNOSIS — I10 HYPERTENSION, UNSPECIFIED TYPE: ICD-10-CM

## 2023-05-26 DIAGNOSIS — R26.2 DIFFICULTY IN WALKING: ICD-10-CM

## 2023-05-26 DIAGNOSIS — R52 PAIN: ICD-10-CM

## 2023-05-26 DIAGNOSIS — G89.29 CHRONIC PAIN OF LEFT KNEE: Primary | ICD-10-CM

## 2023-05-26 DIAGNOSIS — R53.1 GENERAL WEAKNESS: ICD-10-CM

## 2023-05-26 DIAGNOSIS — M25.562 CHRONIC PAIN OF LEFT KNEE: Primary | ICD-10-CM

## 2023-05-26 PROCEDURE — 99316 NF DSCHRG MGMT 30 MIN+: CPT | Performed by: INTERNAL MEDICINE

## 2023-05-29 NOTE — PROGRESS NOTES
SNF PROGRESS NOTE      Cc- knee pain       Patient is a Jeniffer Memory 81 y.o. male who is being seen at [facility]. He is sitting in the chair and looking forward to going home.          Past Medical History:   Diagnosis Date    Aneurysm artery, pulmonary ([address] 2022    not surgical    Benign prostatic hyperplasia with lower urinary tract symptoms     Chronic sinusitis     History of left knee replacement 03/15/2022    at [facility]    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    HTN (hypertension)     Hx of carpal tunnel syndrome     Obesity     Other fracture of left femur, initial encounter for closed fracture ([facility] 3/14/2022    Primary osteoarthritis of both knees      Aspirin    VS reviewed    Gen- Alert and oriented   Heart- RRR no murmur no LE edema   Lungs- CTA b/l no resp distress RA oxygen   Abd- bs x 4, obese      Lab Results   Component Value Date/Time     05/09/2023 06:26 AM    K 4.1 05/09/2023 06:26 AM    K 4.5 03/17/2023 05:35 AM    CL 98 05/09/2023 06:26 AM    CO2 25 05/09/2023 06:26 AM    BUN 17 05/09/2023 06:26 AM    CREATININE 0.48 05/09/2023 06:26 AM    GLUCOSE 100 05/09/2023 06:26 AM    CALCIUM 8.9 05/09/2023 06:26 AM    LABGLOM >60.0 05/09/2023 06:26 AM      Lab Results   Component Value Date    WBC 5.2 04/18/2023    HGB 12.5 (L) 04/18/2023    HCT 36.5 (L) 04/18/2023    MCV 87.5 04/18/2023     04/18/2023                   Patient Active Problem List   Diagnosis    Chronic sinusitis    Deviated nasal septum    Hyperglycemia    Primary hypertension    Osteoarthritis    Class 2 drug-induced obesity with serious comorbidity and body mass index (BMI) of 37.0 to 37.9 in adult    Benign prostatic hyperplasia with lower urinary tract symptoms    Supracondylar fracture of femur, left, closed, with routine healing, subsequent encounter    General weakness    Urinary tract infection without hematuria    Prosthetic joint infection ([address]    Stenosis

## 2023-05-29 NOTE — PROGRESS NOTES
Discharge Summary       Discharge Disposition Home with home care    Discharge Condition stable       Discharge time 32 min      Medications   Current Outpatient Medications   Medication Sig Dispense Refill    mupirocin (BACTROBAN) 2 % ointment Apply 1 application topically 2 times daily      Oyeza-Qrepy-Fcasluk-Pramoxine 1 % OINT [facility] Triple Antibiotic/Pain 1 % External Ointment APPLY SPARINGLY TO AFFECTED AREA(S) 3 TIMES A DAY Quantity: 3 Refills: 2 Ordered: 11-May-2022 Tami WALKER, Binh Agustin Start : 11-May-2022 Active      omeprazole (PRILOSEC) 20 MG delayed release capsule       pantoprazole (PROTONIX) 20 MG tablet Take 1 tablet by mouth daily Indications: Digestive Tract Discomfort X 30d      acetaminophen (TYLENOL) 325 MG tablet Take 2 tablets by mouth in the morning and 2 tablets at noon and 2 tablets in the evening and 2 tablets before bedtime. Continue on a regular schedule for 30 days. . 240 tablet 0    Lactobacillus TABS Take 1 tablet by mouth daily . Continue on a regular schedule for 60 days. 60 tablet 0    furosemide (LASIX) 20 MG tablet Take 1 tablet by mouth daily Indications: High Blood Pressure Disorder      triamterene-hydroCHLOROthiazide (MAXZIDE-25) 37.5-25 MG per tablet Take 0.5 tablets by mouth daily Indications: High Blood Pressure Disorder      metoprolol succinate (TOPROL XL) 50 MG extended release tablet Take 1 tablet by mouth daily Indications: High Blood Pressure Disorder       No current facility-administered medications for this visit. Diet- as tolerated     Activity - as tolerated         Brief SNF Course--     This is an 81 y.o. male who is being seen at [facility]. He was there for left knee pain/infection. He is to remain on keflex with a stop date 7/22/23. He will follow up with ID.            Discharge Diagnosis :    Left Knee pain/infection   Gen weakness   HTN  BPH        Follow up --   PCP In 1 month   ID in 1 month

## 2023-05-30 DIAGNOSIS — K21.9 GASTROESOPHAGEAL REFLUX DISEASE, UNSPECIFIED WHETHER ESOPHAGITIS PRESENT: ICD-10-CM

## 2023-05-30 RX ORDER — OMEPRAZOLE 20 MG/1
20 CAPSULE, DELAYED RELEASE ORAL
Qty: 30 CAPSULE | Refills: 11 | Status: SHIPPED | OUTPATIENT
Start: 2023-05-30 | End: 2023-11-27 | Stop reason: SDUPTHER

## 2023-05-30 NOTE — TELEPHONE ENCOUNTER
Renate from [facility] called for verbal orders for skilled nursing, PT, and OT.    Renate: [phone]

## 2023-05-31 NOTE — PROGRESS NOTES
SNF PROGRESS NOTE      Cc- knee pain       Patient is a Angeles Lighter 81 y.o. male who is being seen at [facility]. HE is sitting in the chair and working with therapy.          Past Medical History:   Diagnosis Date    Aneurysm artery, pulmonary ([address] 2022    not surgical    Benign prostatic hyperplasia with lower urinary tract symptoms     Chronic sinusitis     History of left knee replacement 03/15/2022    at [facility]    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    HTN (hypertension)     Hx of carpal tunnel syndrome     Obesity     Other fracture of left femur, initial encounter for closed fracture ([address] 3/14/2022    Primary osteoarthritis of both knees      Aspirin    VS reviewed      Gen- Alert and oriented x 3   Heart- RRR no murmur no LE edema   Lungs- CTA b/l no resp distress RA oxygen   Abd- bs x 4, obese    Lab Results   Component Value Date/Time     05/09/2023 06:26 AM    K 4.1 05/09/2023 06:26 AM    K 4.5 03/17/2023 05:35 AM    CL 98 05/09/2023 06:26 AM    CO2 25 05/09/2023 06:26 AM    BUN 17 05/09/2023 06:26 AM    CREATININE 0.48 05/09/2023 06:26 AM    GLUCOSE 100 05/09/2023 06:26 AM    CALCIUM 8.9 05/09/2023 06:26 AM    LABGLOM >60.0 05/09/2023 06:26 AM      Lab Results   Component Value Date    WBC 5.2 04/18/2023    HGB 12.5 (L) 04/18/2023    HCT 36.5 (L) 04/18/2023    MCV 87.5 04/18/2023     04/18/2023                   Patient Active Problem List   Diagnosis    Chronic sinusitis    Deviated nasal septum    Hyperglycemia    Primary hypertension    Osteoarthritis    Class 2 drug-induced obesity with serious comorbidity and body mass index (BMI) of 37.0 to 37.9 in adult    Benign prostatic hyperplasia with lower urinary tract symptoms    Supracondylar fracture of femur, left, closed, with routine healing, subsequent encounter    General weakness    Urinary tract infection without hematuria    Prosthetic joint infection ([address]    Stenosis of

## 2023-06-05 ENCOUNTER — APPOINTMENT (OUTPATIENT)
Dept: PRIMARY CARE | Facility: CLINIC | Age: 83
End: 2023-06-05
Payer: MEDICARE

## 2023-06-05 ENCOUNTER — TELEPHONE (OUTPATIENT)
Dept: INFECTIOUS DISEASES | Age: 83
End: 2023-06-05

## 2023-06-05 DIAGNOSIS — T84.54XD INFECTION ASSOCIATED WITH INTERNAL LEFT KNEE PROSTHESIS, SUBSEQUENT ENCOUNTER: Primary | ICD-10-CM

## 2023-06-05 DIAGNOSIS — T84.54XD INFECTION ASSOCIATED WITH INTERNAL LEFT KNEE PROSTHESIS, SUBSEQUENT ENCOUNTER: ICD-10-CM

## 2023-06-05 DIAGNOSIS — N40.1 BPH ASSOCIATED WITH NOCTURIA: ICD-10-CM

## 2023-06-05 DIAGNOSIS — Z86.19 HISTORY OF CLOSTRIDIUM DIFFICILE COLITIS: ICD-10-CM

## 2023-06-05 DIAGNOSIS — R35.1 BPH ASSOCIATED WITH NOCTURIA: ICD-10-CM

## 2023-06-05 LAB
ANION GAP SERPL CALCULATED.3IONS-SCNC: 13 MEQ/L (ref 9–15)
BUN SERPL-MCNC: 11 MG/DL (ref 8–23)
CALCIUM SERPL-MCNC: 9.5 MG/DL (ref 8.5–9.9)
CHLORIDE SERPL-SCNC: 99 MEQ/L (ref 95–107)
CO2 SERPL-SCNC: 28 MEQ/L (ref 20–31)
CREAT SERPL-MCNC: 0.57 MG/DL (ref 0.7–1.2)
CRP SERPL HS-MCNC: 6.1 MG/L (ref 0–5)
ERYTHROCYTE [DISTWIDTH] IN BLOOD BY AUTOMATED COUNT: 15.2 % (ref 11.5–14.5)
ERYTHROCYTE [SEDIMENTATION RATE] IN BLOOD BY WESTERGREN METHOD: 61 MM (ref 0–20)
GLUCOSE SERPL-MCNC: 111 MG/DL (ref 70–99)
HCT VFR BLD AUTO: 40.7 % (ref 42–52)
HGB BLD-MCNC: 13.7 G/DL (ref 14–18)
MCH RBC QN AUTO: 29.8 PG (ref 27–31.3)
MCHC RBC AUTO-ENTMCNC: 33.6 % (ref 33–37)
MCV RBC AUTO: 88.5 FL (ref 79–92.2)
PLATELET # BLD AUTO: 213 K/UL (ref 130–400)
POTASSIUM SERPL-SCNC: 4.5 MEQ/L (ref 3.4–4.9)
RBC # BLD AUTO: 4.6 M/UL (ref 4.7–6.1)
SODIUM SERPL-SCNC: 140 MEQ/L (ref 135–144)
WBC # BLD AUTO: 7.9 K/UL (ref 4.8–10.8)

## 2023-06-05 NOTE — TELEPHONE ENCOUNTER
Patient presents to O/P Lab with just a discharge note,  request orders. Per Dr To Jamison, after last ID OV, labs to be obtained in about 3 weeks and F/u thereafter.    CBC, BMP, CRP, ESR orders placed as per Dr Pam Parker note on 5/15/23

## 2023-06-08 ENCOUNTER — TELEPHONE (OUTPATIENT)
Dept: PRIMARY CARE | Facility: CLINIC | Age: 83
End: 2023-06-08
Payer: MEDICARE

## 2023-06-08 NOTE — TELEPHONE ENCOUNTER
Kenia from [facility] called, states pt has a new open wound on his left forearm, looks like a boil that he irritated. She is requesting wound care orders. Please advise.    Kenia: [phone]

## 2023-06-08 NOTE — TELEPHONE ENCOUNTER
I placed a phone call to the nurse Lulu and the wound does not appear infected and drainage is serosanguinous only.  We will order the adaptic and calcium alginate as recommended and she will continue to monitor for any signs of infection

## 2023-06-11 PROBLEM — R52 PAIN: Status: RESOLVED | Noted: 2023-05-12 | Resolved: 2023-06-11

## 2023-06-15 DIAGNOSIS — L03.114 LEFT ARM CELLULITIS: ICD-10-CM

## 2023-06-15 PROBLEM — A49.8 E COLI INFECTION: Status: ACTIVE | Noted: 2023-06-15

## 2023-06-21 LAB
CULTURE WOUND: ABNORMAL
ORGANISM: ABNORMAL
ORGANISM: ABNORMAL

## 2023-08-28 ENCOUNTER — TELEPHONE (OUTPATIENT)
Dept: INFECTIOUS DISEASES | Age: 83
End: 2023-08-28

## 2023-08-28 NOTE — TELEPHONE ENCOUNTER
Pt's sister Asking refill of his Keflex 500 mg take 1 tablet daily. We Will Consult Id Doctor.   Adal -[phone]

## 2023-08-28 NOTE — TELEPHONE ENCOUNTER
Per review with Dr Ruben Benjamin. Ok to Refill for 5 mo, #30. Order Called into [facility] on [place], [place]. [phone]. Keflex capsules, 500 mg, po, one qd, with 5/Rf. Pharmacist accepted the order with correct read back.

## 2023-08-30 DIAGNOSIS — R60.9 EDEMA, UNSPECIFIED TYPE: Primary | ICD-10-CM

## 2023-08-30 RX ORDER — FUROSEMIDE 20 MG/1
20 TABLET ORAL DAILY
Qty: 90 TABLET | Refills: 3 | Status: SHIPPED | OUTPATIENT
Start: 2023-08-30 | End: 2024-08-29

## 2023-11-27 DIAGNOSIS — K21.9 GASTROESOPHAGEAL REFLUX DISEASE, UNSPECIFIED WHETHER ESOPHAGITIS PRESENT: ICD-10-CM

## 2023-11-27 RX ORDER — OMEPRAZOLE 20 MG/1
20 CAPSULE, DELAYED RELEASE ORAL
Qty: 90 CAPSULE | Refills: 3 | Status: SHIPPED | OUTPATIENT
Start: 2023-11-27

## 2024-01-11 ENCOUNTER — OFFICE VISIT (OUTPATIENT)
Dept: INFECTIOUS DISEASES | Age: 84
End: 2024-01-11

## 2024-01-11 VITALS
TEMPERATURE: 97.2 F | SYSTOLIC BLOOD PRESSURE: 152 MMHG | DIASTOLIC BLOOD PRESSURE: 82 MMHG | RESPIRATION RATE: 16 BRPM | HEART RATE: 68 BPM

## 2024-01-11 DIAGNOSIS — M86.9 KNEE OSTEOMYELITIS, LEFT (HCC): ICD-10-CM

## 2024-01-11 DIAGNOSIS — T84.50XD INFECTION OF PROSTHETIC JOINT, SUBSEQUENT ENCOUNTER: Primary | ICD-10-CM

## 2024-01-11 RX ORDER — CEPHALEXIN 500 MG/1
500 CAPSULE ORAL DAILY
COMMUNITY
Start: 2023-12-29

## 2024-01-11 RX ORDER — CEPHALEXIN 500 MG/1
500 CAPSULE ORAL DAILY
Qty: 90 CAPSULE | Refills: 1 | Status: SHIPPED | OUTPATIENT
Start: 2024-01-11 | End: 2024-04-10

## 2024-01-11 ASSESSMENT — ENCOUNTER SYMPTOMS
RESPIRATORY NEGATIVE: 1
GASTROINTESTINAL NEGATIVE: 1

## 2024-01-11 NOTE — PROGRESS NOTES
Infectious Disease     Patient Name: Juan Kumar  Date: 1/11/2024  YOB: 1940  Medical Record Number: [id number]        Left knee osteomyelitis  Left knee prosthetic joint infection     3/16/2023, he had LEFT KNEE ARTHROTOMY WITH POLYETHYLENE LINER EXCHANGE AND PLACEMENT OF TOPICAL ANTIBIOTICS   Had IV Ceftriaxone  x 6 weeks course.       5/15/23 and was placed on Bactrim double       L knee developed swelling and was aspirated. Aspirate results reviewed - no crystals and mostly bloody. He did not tolerate the Bactrim. He was changed to Keflex PO TID x 2 months.            Component 3/16/23 1355   Culture Surgical Direct Exam:  FEW NEUTROPHILS   Direct Exam:  MODERATE GRAM POSITIVE COCCI IN CLUSTERS   Culture, Surgical:  NO GROWTH 5 DAYS   Performed at [address] [address]   [phone]          0 Result Notes        Component 3/16/23 1401   Culture Surgical Direct Exam:  FEW NEUTROPHILS   Direct Exam:  FEW GRAM POSITIVE COCCI IN PAIRS   Culture, Surgical:  NO GROWTH 5 DAYS   Performed at [facility] [address]                  Ref Range & Units 5/12/23 1150   Site, SF   KNEE LEFT, SYNOVIAL FLUID    Color, SF Yellow Bloody Abnormal     Clarity, SF Clear Cloudy Abnormal     Supernatant Color, SF Yellow Kayla Abnormal     Supernatant Clarity, SF Clear Clear    RBC, SF <2000 /uL 18,000 High     Total Nucleated Cells, SF 0 - 200 /uL 5,809 High         Order: [id number]    Ref Range & Units 5/12/23 1150   Diff Total Synovial Fluid cells counted 100    Neut%, SF 0 - <25 84 High     Lymph%, SF   7    Mono%, SF   1    Macro%, SF   8           Last seen in June in infectious diseases office with a plan of switching Keflex to 500 mg daily for suppressive therapy            Review of Systems   Constitutional: Negative.    Respiratory: Negative.     Cardiovascular: Negative.    Gastrointestinal: Negative.        Review of Systems: All 14 review of

## 2024-02-26 ENCOUNTER — APPOINTMENT (OUTPATIENT)
Dept: PRIMARY CARE | Facility: CLINIC | Age: 84
End: 2024-02-26
Payer: MEDICARE

## 2024-02-27 DIAGNOSIS — I10 HYPERTENSION, UNSPECIFIED TYPE: ICD-10-CM

## 2024-02-27 RX ORDER — METOPROLOL SUCCINATE 50 MG/1
50 TABLET, EXTENDED RELEASE ORAL DAILY
Qty: 90 TABLET | Refills: 0 | Status: SHIPPED | OUTPATIENT
Start: 2024-02-27 | End: 2024-05-30

## 2024-02-27 NOTE — TELEPHONE ENCOUNTER
Pt informed  He said he is sick right now, he will call back to schedule an appointment when he's feeling better

## 2024-04-12 ENCOUNTER — OFFICE VISIT (OUTPATIENT)
Dept: PRIMARY CARE | Facility: CLINIC | Age: 84
End: 2024-04-12
Payer: MEDICARE

## 2024-04-12 VITALS
HEART RATE: 66 BPM | RESPIRATION RATE: 18 BRPM | SYSTOLIC BLOOD PRESSURE: 135 MMHG | TEMPERATURE: 97.8 F | HEIGHT: 71 IN | DIASTOLIC BLOOD PRESSURE: 80 MMHG | BODY MASS INDEX: 35 KG/M2 | WEIGHT: 250 LBS

## 2024-04-12 DIAGNOSIS — T84.59XD CHRONIC INFECTION OF KNEE JOINT PROSTHESIS, SUBSEQUENT ENCOUNTER: ICD-10-CM

## 2024-04-12 DIAGNOSIS — I10 HTN (HYPERTENSION), BENIGN: ICD-10-CM

## 2024-04-12 DIAGNOSIS — L89.310 PRESSURE ULCER OF RIGHT BUTTOCK, UNSTAGEABLE (MULTI): ICD-10-CM

## 2024-04-12 DIAGNOSIS — E66.09 CLASS 1 OBESITY DUE TO EXCESS CALORIES WITH SERIOUS COMORBIDITY AND BODY MASS INDEX (BMI) OF 34.0 TO 34.9 IN ADULT: ICD-10-CM

## 2024-04-12 DIAGNOSIS — Z00.00 ROUTINE GENERAL MEDICAL EXAMINATION AT HEALTH CARE FACILITY: Primary | ICD-10-CM

## 2024-04-12 DIAGNOSIS — Z96.659 CHRONIC INFECTION OF KNEE JOINT PROSTHESIS, SUBSEQUENT ENCOUNTER: ICD-10-CM

## 2024-04-12 DIAGNOSIS — D17.24 LIPOMA OF LEFT LOWER EXTREMITY: ICD-10-CM

## 2024-04-12 PROBLEM — L98.419 SKIN ULCER OF BUTTOCK (MULTI): Status: RESOLVED | Noted: 2023-01-28 | Resolved: 2024-04-12

## 2024-04-12 PROBLEM — R73.9 HYPERGLYCEMIA: Status: RESOLVED | Noted: 2023-01-28 | Resolved: 2024-04-12

## 2024-04-12 PROBLEM — T84.59XA: Status: ACTIVE | Noted: 2024-04-12

## 2024-04-12 PROBLEM — R21 RASH: Status: RESOLVED | Noted: 2023-01-28 | Resolved: 2024-04-12

## 2024-04-12 PROBLEM — E66.811 CLASS 1 OBESITY DUE TO EXCESS CALORIES WITH SERIOUS COMORBIDITY AND BODY MASS INDEX (BMI) OF 34.0 TO 34.9 IN ADULT: Status: ACTIVE | Noted: 2023-01-28

## 2024-04-12 PROCEDURE — 3078F DIAST BP <80 MM HG: CPT | Performed by: FAMILY MEDICINE

## 2024-04-12 PROCEDURE — 1036F TOBACCO NON-USER: CPT | Performed by: FAMILY MEDICINE

## 2024-04-12 PROCEDURE — 1160F RVW MEDS BY RX/DR IN RCRD: CPT | Performed by: FAMILY MEDICINE

## 2024-04-12 PROCEDURE — 1123F ACP DISCUSS/DSCN MKR DOCD: CPT | Performed by: FAMILY MEDICINE

## 2024-04-12 PROCEDURE — 1158F ADVNC CARE PLAN TLK DOCD: CPT | Performed by: FAMILY MEDICINE

## 2024-04-12 PROCEDURE — G0439 PPPS, SUBSEQ VISIT: HCPCS | Performed by: FAMILY MEDICINE

## 2024-04-12 PROCEDURE — 1159F MED LIST DOCD IN RCRD: CPT | Performed by: FAMILY MEDICINE

## 2024-04-12 PROCEDURE — 1170F FXNL STATUS ASSESSED: CPT | Performed by: FAMILY MEDICINE

## 2024-04-12 PROCEDURE — 99497 ADVNCD CARE PLAN 30 MIN: CPT | Performed by: FAMILY MEDICINE

## 2024-04-12 PROCEDURE — 3075F SYST BP GE 130 - 139MM HG: CPT | Performed by: FAMILY MEDICINE

## 2024-04-12 RX ORDER — CEPHALEXIN 500 MG/1
500 CAPSULE ORAL DAILY
COMMUNITY

## 2024-04-12 ASSESSMENT — PATIENT HEALTH QUESTIONNAIRE - PHQ9
SUM OF ALL RESPONSES TO PHQ9 QUESTIONS 1 AND 2: 1
2. FEELING DOWN, DEPRESSED OR HOPELESS: SEVERAL DAYS
1. LITTLE INTEREST OR PLEASURE IN DOING THINGS: NOT AT ALL

## 2024-04-12 ASSESSMENT — ACTIVITIES OF DAILY LIVING (ADL)
TAKING_MEDICATION: INDEPENDENT
DRESSING: INDEPENDENT
MANAGING_FINANCES: INDEPENDENT
DOING_HOUSEWORK: NEEDS ASSISTANCE
BATHING: INDEPENDENT
GROCERY_SHOPPING: NEEDS ASSISTANCE

## 2024-04-12 NOTE — PROGRESS NOTES
"Subjective   Reason for Visit: Janak Little is an 83 y.o. male here for a Medicare Wellness visit.     Past Medical, Surgical, and Family History reviewed and updated in chart.    Reviewed all medications by prescribing practitioner or clinical pharmacist (such as prescriptions, OTCs, herbal therapies and supplements) and documented in the medical record.    HPI    Patient Care Team:  Ignacio Mckeon MD as PCP - General     Review of Systems    Objective   Vitals:  /80 Comment: reported form the SNF  Pulse 66   Temp 36.6 °C (97.8 °F)   Resp 18   Ht 1.803 m (5' 11\")   Wt 113 kg (250 lb)   BMI 34.87 kg/m²       Physical Exam  Constitutional:       Appearance: Normal appearance.   HENT:      Head: Normocephalic.   Eyes:      Conjunctiva/sclera: Conjunctivae normal.   Cardiovascular:      Rate and Rhythm: Normal rate and regular rhythm.      Heart sounds: Normal heart sounds.   Pulmonary:      Effort: Pulmonary effort is normal.      Breath sounds: Normal breath sounds.   Musculoskeletal:      Cervical back: Neck supple.      Comments: There is a large oval fatty mass subcutaneous on the left medial upper thigh   Skin:     General: Skin is warm and dry.   Neurological:      Mental Status: He is alert.         Assessment/Plan   Problem List Items Addressed This Visit       HTN (hypertension), benign    Current Assessment & Plan     Although his pressure is slightly above goal today he was in a skilled nursing facility and interval readings have all been at therapeutic goal so we will continue his current treatment of metoprolol 50 mg.         Class 1 obesity due to excess calories with serious comorbidity and body mass index (BMI) of 34.0 to 34.9 in adult    Chronic infection of prosthetic knee (CMS-HCC)    Overview     Left on permanent keflex suppression         Current Assessment & Plan     Since our last visit he had an elective replacement of the left knee which unfortunately became infected and the " prosthesis was infected requiring debridement and additional surgeries.  Fortunately, the prosthesis is not compromised but he is now on lifelong prophylactic Keflex.  I reviewed notes from his infectious disease specialist outlined with plan         Pressure ulcer of right buttock, unstageable (Multi)    Current Assessment & Plan     He has had this for many months now and it was treated while in the skilled nursing facility but is still present.  I am referring him to the wound care clinic to work on treatment as his open wound continues to pose a significant infection risk to him and is also quite painful and symptomatic         Relevant Orders    Referral to Wound Clinic    Lipoma of left lower extremity    Overview     Upper inner thigh         Current Assessment & Plan     There is a large soft fatty mass on the upper inner thigh that is clearly not a hernia and highly consistent with fatty tissue lipoma.  I reassured him that this is not likely to be any serious medical problem and as it is currently asymptomatic does not require any intervention          Other Visit Diagnoses       Routine general medical examination at health care facility    -  Primary    Relevant Orders    1 Year Follow Up In Advanced Primary Care - PCP - Wellness Exam             Advanced care planning was discussed with Janak Little today. We reviewed code status, Medical Power of , and Living will.  Approximately 15 minutes spent reviewing and answering questions.  He clearly states that if his heart were to stop or he were to stop breathing he would not want to be put on the breathing machine.  He wants to be treated but does not want to be resuscitated.  He is requesting that his chart be updated to reflect a DO NOT RESUSCITATE arrest.

## 2024-04-12 NOTE — ASSESSMENT & PLAN NOTE
He has had this for many months now and it was treated while in the skilled nursing facility but is still present.  I am referring him to the wound care clinic to work on treatment as his open wound continues to pose a significant infection risk to him and is also quite painful and symptomatic

## 2024-04-12 NOTE — ASSESSMENT & PLAN NOTE
There is a large soft fatty mass on the upper inner thigh that is clearly not a hernia and highly consistent with fatty tissue lipoma.  I reassured him that this is not likely to be any serious medical problem and as it is currently asymptomatic does not require any intervention

## 2024-04-12 NOTE — LETTER
April 12, 2024     Patient: Janak Little   YOB: 1940   Date of Visit: 4/12/2024       To Whom It May Concern:    It is my medical opinion that Mr. Little requires a disability parking placard for the following reasons:  He cannot walk 200 feet without stopping to rest.  Duration of need: 5 years    If you have any questions or concerns, please don't hesitate to call.         Sincerely,        Ignacio Mckeon MD        CC: No Recipients

## 2024-04-12 NOTE — ASSESSMENT & PLAN NOTE
Since our last visit he had an elective replacement of the left knee which unfortunately became infected and the prosthesis was infected requiring debridement and additional surgeries.  Fortunately, the prosthesis is not compromised but he is now on lifelong prophylactic Keflex.  I reviewed notes from his infectious disease specialist outlined with plan

## 2024-04-12 NOTE — ASSESSMENT & PLAN NOTE
Although his pressure is slightly above goal today he was in a skilled nursing facility and interval readings have all been at therapeutic goal so we will continue his current treatment of metoprolol 50 mg.

## 2024-04-23 ENCOUNTER — OFFICE VISIT (OUTPATIENT)
Dept: WOUND CARE | Facility: CLINIC | Age: 84
End: 2024-04-23
Payer: MEDICARE

## 2024-04-23 PROCEDURE — 99214 OFFICE O/P EST MOD 30 MIN: CPT

## 2024-04-23 PROCEDURE — 99212 OFFICE O/P EST SF 10 MIN: CPT | Performed by: SURGERY

## 2024-05-01 ENCOUNTER — OFFICE VISIT (OUTPATIENT)
Dept: WOUND CARE | Facility: CLINIC | Age: 84
End: 2024-05-01
Payer: MEDICARE

## 2024-05-01 PROCEDURE — 99213 OFFICE O/P EST LOW 20 MIN: CPT

## 2024-05-08 ENCOUNTER — OFFICE VISIT (OUTPATIENT)
Dept: WOUND CARE | Facility: CLINIC | Age: 84
End: 2024-05-08
Payer: MEDICARE

## 2024-05-08 PROCEDURE — 99212 OFFICE O/P EST SF 10 MIN: CPT

## 2024-05-30 DIAGNOSIS — I10 HYPERTENSION, UNSPECIFIED TYPE: ICD-10-CM

## 2024-05-30 RX ORDER — METOPROLOL SUCCINATE 50 MG/1
50 TABLET, EXTENDED RELEASE ORAL DAILY
Qty: 90 TABLET | Refills: 3 | Status: SHIPPED | OUTPATIENT
Start: 2024-05-30

## 2024-06-14 DIAGNOSIS — K21.9 GASTROESOPHAGEAL REFLUX DISEASE, UNSPECIFIED WHETHER ESOPHAGITIS PRESENT: ICD-10-CM

## 2024-06-14 RX ORDER — OMEPRAZOLE 20 MG/1
20 CAPSULE, DELAYED RELEASE ORAL
Qty: 90 CAPSULE | Refills: 3 | Status: SHIPPED | OUTPATIENT
Start: 2024-06-14

## 2024-07-08 RX ORDER — CEPHALEXIN 500 MG/1
500 CAPSULE ORAL DAILY
Qty: 90 CAPSULE | Refills: 1 | Status: SHIPPED | OUTPATIENT
Start: 2024-07-08

## 2024-07-09 DIAGNOSIS — R60.9 EDEMA, UNSPECIFIED TYPE: ICD-10-CM

## 2024-07-09 RX ORDER — FUROSEMIDE 20 MG/1
20 TABLET ORAL DAILY
Qty: 90 TABLET | Refills: 3 | Status: SHIPPED | OUTPATIENT
Start: 2024-07-09 | End: 2025-07-09

## 2024-07-11 ENCOUNTER — OFFICE VISIT (OUTPATIENT)
Dept: INFECTIOUS DISEASES | Age: 84
End: 2024-07-11

## 2024-07-11 VITALS
SYSTOLIC BLOOD PRESSURE: 143 MMHG | HEIGHT: 70 IN | TEMPERATURE: 97.3 F | DIASTOLIC BLOOD PRESSURE: 65 MMHG | RESPIRATION RATE: 18 BRPM | HEART RATE: 70 BPM | BODY MASS INDEX: 32.43 KG/M2

## 2024-07-11 DIAGNOSIS — Z79.2 CHRONIC ANTIBIOTIC SUPPRESSION: Primary | ICD-10-CM

## 2024-07-11 DIAGNOSIS — T84.50XD INFECTION OF PROSTHETIC JOINT, SUBSEQUENT ENCOUNTER: ICD-10-CM

## 2024-07-11 DIAGNOSIS — M86.9 KNEE OSTEOMYELITIS, LEFT (HCC): ICD-10-CM

## 2024-07-11 ASSESSMENT — PATIENT HEALTH QUESTIONNAIRE - PHQ9
SUM OF ALL RESPONSES TO PHQ9 QUESTIONS 1 & 2: 1
SUM OF ALL RESPONSES TO PHQ QUESTIONS 1-9: 1
SUM OF ALL RESPONSES TO PHQ QUESTIONS 1-9: 1
2. FEELING DOWN, DEPRESSED OR HOPELESS: SEVERAL DAYS
SUM OF ALL RESPONSES TO PHQ QUESTIONS 1-9: 1
SUM OF ALL RESPONSES TO PHQ QUESTIONS 1-9: 1
1. LITTLE INTEREST OR PLEASURE IN DOING THINGS: NOT AT ALL

## 2024-07-11 NOTE — PROGRESS NOTES
Infectious Disease Progress Note       7/11/2024      Follow up regarding left knee osteomyelitis and left knee prosthetic joint infection, last seen by Dr. Gomez in the clinic on 1/11/2024    E coli L knee PJI, persistent inflammatory markers despite treatment  C. difficile colitis, resolved  BPH with nocturia, persistent severe symptoms    Hx of  L knee PJI. On 3/16/2023, he had LEFT KNEE ARTHROTOMY WITH POLYETHYLENE LINER EXCHANGE AND PLACEMENT OF TOPICAL ANTIBIOTICS   Had IV Ceftriaxone starting approx 3/16 x 6 weeks course.   Developed C diff subsequently, treated with PO Vanco.   Seen by Dr Aly 5/15/23 and was placed on Bactrim double strength 1 tablet p.o. twice daily after his L knee developed swelling and was aspirated. Aspirate results reviewed - no crystals and mostly bloody. He did not tolerate the Bactrim. He was changed to Keflex PO chronic suppression by June 2023 and Lactobacillus    At this point, patient is on Keflex 500 mg daily for suppressive therapy with every 6-month follow-up for reevaluation.  This is his follow-up    No abdominal pain or cramps, no diarrhea nausea vomiting  Overall good spirits.  Presenting with his sister accompanying him    Lab Results   Component Value Date    WBC 7.9 06/05/2023    HGB 13.7 (L) 06/05/2023    HCT 40.7 (L) 06/05/2023    MCV 88.5 06/05/2023     06/05/2023     Lab Results   Component Value Date/Time     06/05/2023 12:28 PM    K 4.5 06/05/2023 12:28 PM    K 4.5 03/17/2023 05:35 AM    CL 99 06/05/2023 12:28 PM    CO2 28 06/05/2023 12:28 PM    BUN 11 06/05/2023 12:28 PM    CREATININE 0.57 06/05/2023 12:28 PM    GLUCOSE 111 06/05/2023 12:28 PM    CALCIUM 9.5 06/05/2023 12:28 PM    LABGLOM >60.0 06/05/2023 12:28 PM        WBC trends are being monitored. Antibiotic doses are being adjusted per most recent renal labs.     Patient Active Problem List   Diagnosis    Chronic sinusitis    Deviated nasal septum    Hyperglycemia    Primary hypertension

## 2024-12-05 DIAGNOSIS — K21.9 GASTROESOPHAGEAL REFLUX DISEASE, UNSPECIFIED WHETHER ESOPHAGITIS PRESENT: ICD-10-CM

## 2024-12-05 RX ORDER — OMEPRAZOLE 20 MG/1
20 CAPSULE, DELAYED RELEASE ORAL
Qty: 90 CAPSULE | Refills: 3 | Status: SHIPPED | OUTPATIENT
Start: 2024-12-05

## 2024-12-06 ENCOUNTER — TELEPHONE (OUTPATIENT)
Dept: PRIMARY CARE | Facility: CLINIC | Age: 84
End: 2024-12-06
Payer: MEDICARE

## 2024-12-06 DIAGNOSIS — L89.310 PRESSURE ULCER OF RIGHT BUTTOCK, UNSTAGEABLE (MULTI): Primary | ICD-10-CM

## 2024-12-06 RX ORDER — HYDROPHILIC CREAM
PASTE (GRAM) TOPICAL
Qty: 170 G | Refills: 0 | Status: SHIPPED | OUTPATIENT
Start: 2024-12-06

## 2024-12-06 NOTE — TELEPHONE ENCOUNTER
Patient's sister Kinza had called stating that Janak has been getting treatment for his bedsores from the wound care clinic.  They cannot prescribe medications and Kinza was told to contact Dr. Mcekon to prescribe the following: Lidocaine 4% solution and Triad wound dressing (in a tube).  If this can be sent to [facility] on [address] [place] [address] in [place] please and thank you!

## 2024-12-24 ENCOUNTER — HOSPITAL ENCOUNTER (INPATIENT)
Age: 84
LOS: 3 days | Discharge: NURSING, REHAB OR LONG-TERM CARE | DRG: 436 | End: 2024-12-30
Attending: EMERGENCY MEDICINE | Admitting: INTERNAL MEDICINE
Payer: MEDICARE

## 2024-12-24 ENCOUNTER — APPOINTMENT (OUTPATIENT)
Dept: CT IMAGING | Age: 84
DRG: 436 | End: 2024-12-24
Payer: MEDICARE

## 2024-12-24 ENCOUNTER — APPOINTMENT (OUTPATIENT)
Dept: ULTRASOUND IMAGING | Age: 84
DRG: 436 | End: 2024-12-24
Payer: MEDICARE

## 2024-12-24 DIAGNOSIS — R93.3 ABNORMAL CT SCAN, COLON: ICD-10-CM

## 2024-12-24 DIAGNOSIS — R10.84 GENERALIZED ABDOMINAL PAIN: ICD-10-CM

## 2024-12-24 DIAGNOSIS — R60.0 PERIPHERAL EDEMA: ICD-10-CM

## 2024-12-24 DIAGNOSIS — C79.9 METASTATIC MALIGNANT NEOPLASM, UNSPECIFIED SITE (HCC): ICD-10-CM

## 2024-12-24 DIAGNOSIS — R53.1 GENERAL WEAKNESS: Primary | ICD-10-CM

## 2024-12-24 PROBLEM — R10.9 ABDOMINAL PAIN: Status: ACTIVE | Noted: 2024-12-24

## 2024-12-24 LAB
ALBUMIN SERPL-MCNC: 4.1 G/DL (ref 3.5–4.6)
ALP SERPL-CCNC: 174 U/L (ref 35–104)
ALT SERPL-CCNC: 19 U/L (ref 0–41)
ANION GAP SERPL CALCULATED.3IONS-SCNC: 11 MEQ/L (ref 9–15)
AST SERPL-CCNC: 38 U/L (ref 0–40)
BACTERIA URNS QL MICRO: NEGATIVE /HPF
BASOPHILS # BLD: 0 K/UL (ref 0–0.2)
BASOPHILS NFR BLD: 0.5 %
BILIRUB SERPL-MCNC: 1.2 MG/DL (ref 0.2–0.7)
BILIRUB UR QL STRIP: NEGATIVE
BNP BLD-MCNC: 369 PG/ML
BUN SERPL-MCNC: 10 MG/DL (ref 8–23)
CALCIUM SERPL-MCNC: 9.5 MG/DL (ref 8.5–9.9)
CHLORIDE SERPL-SCNC: 99 MEQ/L (ref 95–107)
CLARITY UR: CLEAR
CO2 SERPL-SCNC: 29 MEQ/L (ref 20–31)
COLOR UR: YELLOW
CREAT SERPL-MCNC: 0.68 MG/DL (ref 0.7–1.2)
EOSINOPHIL # BLD: 0.1 K/UL (ref 0–0.7)
EOSINOPHIL NFR BLD: 1.7 %
EPI CELLS #/AREA URNS AUTO: ABNORMAL /HPF (ref 0–5)
ERYTHROCYTE [DISTWIDTH] IN BLOOD BY AUTOMATED COUNT: 13.1 % (ref 11.5–14.5)
GLOBULIN SER CALC-MCNC: 3.8 G/DL (ref 2.3–3.5)
GLUCOSE SERPL-MCNC: 133 MG/DL (ref 70–99)
GLUCOSE UR STRIP-MCNC: NEGATIVE MG/DL
HCT VFR BLD AUTO: 42.6 % (ref 42–52)
HGB BLD-MCNC: 14.5 G/DL (ref 14–18)
HGB UR QL STRIP: ABNORMAL
HYALINE CASTS #/AREA URNS AUTO: ABNORMAL /HPF (ref 0–5)
KETONES UR STRIP-MCNC: NEGATIVE MG/DL
LACTATE BLDV-SCNC: 1.8 MMOL/L (ref 0.5–2.2)
LEUKOCYTE ESTERASE UR QL STRIP: ABNORMAL
LYMPHOCYTES # BLD: 0.9 K/UL (ref 1–4.8)
LYMPHOCYTES NFR BLD: 13.9 %
MCH RBC QN AUTO: 30.9 PG (ref 27–31.3)
MCHC RBC AUTO-ENTMCNC: 34 % (ref 33–37)
MCV RBC AUTO: 90.8 FL (ref 79–92.2)
MONOCYTES # BLD: 0.7 K/UL (ref 0.2–0.8)
MONOCYTES NFR BLD: 10.6 %
NEUTROPHILS # BLD: 4.7 K/UL (ref 1.4–6.5)
NEUTS SEG NFR BLD: 72.8 %
NITRITE UR QL STRIP: NEGATIVE
PERFORMED ON: NORMAL
PH UR STRIP: 5.5 [PH] (ref 5–9)
PLATELET # BLD AUTO: 160 K/UL (ref 130–400)
POC CREATININE WHOLE BLOOD: 0.8
POC CREATININE: 0.8 MG/DL (ref 0.8–1.3)
POC SAMPLE TYPE: NORMAL
POTASSIUM SERPL-SCNC: 4.2 MEQ/L (ref 3.4–4.9)
PROT SERPL-MCNC: 7.9 G/DL (ref 6.3–8)
PROT UR STRIP-MCNC: NEGATIVE MG/DL
RBC # BLD AUTO: 4.69 M/UL (ref 4.7–6.1)
RBC #/AREA URNS AUTO: ABNORMAL /HPF (ref 0–5)
SODIUM SERPL-SCNC: 139 MEQ/L (ref 135–144)
SP GR UR STRIP: 1.03 (ref 1–1.03)
URINE REFLEX TO CULTURE: YES
UROBILINOGEN UR STRIP-ACNC: 0.2 E.U./DL
WBC # BLD AUTO: 6.5 K/UL (ref 4.8–10.8)
WBC #/AREA URNS AUTO: ABNORMAL /HPF (ref 0–5)

## 2024-12-24 PROCEDURE — 72131 CT LUMBAR SPINE W/O DYE: CPT

## 2024-12-24 PROCEDURE — 2500000003 HC RX 250 WO HCPCS: Performed by: INTERNAL MEDICINE

## 2024-12-24 PROCEDURE — 96372 THER/PROPH/DIAG INJ SC/IM: CPT

## 2024-12-24 PROCEDURE — 6360000004 HC RX CONTRAST MEDICATION: Performed by: EMERGENCY MEDICINE

## 2024-12-24 PROCEDURE — G0378 HOSPITAL OBSERVATION PER HR: HCPCS

## 2024-12-24 PROCEDURE — 87186 SC STD MICRODIL/AGAR DIL: CPT

## 2024-12-24 PROCEDURE — 96374 THER/PROPH/DIAG INJ IV PUSH: CPT

## 2024-12-24 PROCEDURE — 99285 EMERGENCY DEPT VISIT HI MDM: CPT

## 2024-12-24 PROCEDURE — 36415 COLL VENOUS BLD VENIPUNCTURE: CPT

## 2024-12-24 PROCEDURE — 6370000000 HC RX 637 (ALT 250 FOR IP): Performed by: INTERNAL MEDICINE

## 2024-12-24 PROCEDURE — 6360000002 HC RX W HCPCS: Performed by: EMERGENCY MEDICINE

## 2024-12-24 PROCEDURE — 80053 COMPREHEN METABOLIC PANEL: CPT

## 2024-12-24 PROCEDURE — 96375 TX/PRO/DX INJ NEW DRUG ADDON: CPT

## 2024-12-24 PROCEDURE — 93970 EXTREMITY STUDY: CPT

## 2024-12-24 PROCEDURE — 74177 CT ABD & PELVIS W/CONTRAST: CPT

## 2024-12-24 PROCEDURE — 83880 ASSAY OF NATRIURETIC PEPTIDE: CPT

## 2024-12-24 PROCEDURE — 87077 CULTURE AEROBIC IDENTIFY: CPT

## 2024-12-24 PROCEDURE — 85025 COMPLETE CBC W/AUTO DIFF WBC: CPT

## 2024-12-24 PROCEDURE — 81001 URINALYSIS AUTO W/SCOPE: CPT

## 2024-12-24 PROCEDURE — 87086 URINE CULTURE/COLONY COUNT: CPT

## 2024-12-24 PROCEDURE — 83605 ASSAY OF LACTIC ACID: CPT

## 2024-12-24 PROCEDURE — 71250 CT THORAX DX C-: CPT

## 2024-12-24 PROCEDURE — 6360000002 HC RX W HCPCS: Performed by: INTERNAL MEDICINE

## 2024-12-24 RX ORDER — ACETAMINOPHEN 650 MG/1
650 SUPPOSITORY RECTAL EVERY 6 HOURS PRN
Status: DISCONTINUED | OUTPATIENT
Start: 2024-12-24 | End: 2024-12-30 | Stop reason: HOSPADM

## 2024-12-24 RX ORDER — POLYETHYLENE GLYCOL 3350 17 G/17G
17 POWDER, FOR SOLUTION ORAL DAILY PRN
Status: DISCONTINUED | OUTPATIENT
Start: 2024-12-24 | End: 2024-12-30 | Stop reason: HOSPADM

## 2024-12-24 RX ORDER — METOPROLOL SUCCINATE 50 MG/1
50 TABLET, EXTENDED RELEASE ORAL DAILY
Status: DISCONTINUED | OUTPATIENT
Start: 2024-12-24 | End: 2024-12-30 | Stop reason: HOSPADM

## 2024-12-24 RX ORDER — SODIUM CHLORIDE 0.9 % (FLUSH) 0.9 %
5-40 SYRINGE (ML) INJECTION EVERY 12 HOURS SCHEDULED
Status: DISCONTINUED | OUTPATIENT
Start: 2024-12-24 | End: 2024-12-30 | Stop reason: HOSPADM

## 2024-12-24 RX ORDER — POTASSIUM CHLORIDE 1500 MG/1
40 TABLET, EXTENDED RELEASE ORAL PRN
Status: DISCONTINUED | OUTPATIENT
Start: 2024-12-24 | End: 2024-12-30 | Stop reason: HOSPADM

## 2024-12-24 RX ORDER — ONDANSETRON 2 MG/ML
4 INJECTION INTRAMUSCULAR; INTRAVENOUS ONCE
Status: COMPLETED | OUTPATIENT
Start: 2024-12-24 | End: 2024-12-24

## 2024-12-24 RX ORDER — SODIUM CHLORIDE 9 MG/ML
INJECTION, SOLUTION INTRAVENOUS PRN
Status: DISCONTINUED | OUTPATIENT
Start: 2024-12-24 | End: 2024-12-30 | Stop reason: HOSPADM

## 2024-12-24 RX ORDER — ENOXAPARIN SODIUM 100 MG/ML
30 INJECTION SUBCUTANEOUS 2 TIMES DAILY
Status: DISCONTINUED | OUTPATIENT
Start: 2024-12-24 | End: 2024-12-30 | Stop reason: HOSPADM

## 2024-12-24 RX ORDER — TRIAMTERENE AND HYDROCHLOROTHIAZIDE 37.5; 25 MG/1; MG/1
0.5 TABLET ORAL DAILY
Status: DISCONTINUED | OUTPATIENT
Start: 2024-12-25 | End: 2024-12-30 | Stop reason: HOSPADM

## 2024-12-24 RX ORDER — POTASSIUM CHLORIDE 7.45 MG/ML
10 INJECTION INTRAVENOUS PRN
Status: DISCONTINUED | OUTPATIENT
Start: 2024-12-24 | End: 2024-12-30 | Stop reason: HOSPADM

## 2024-12-24 RX ORDER — FUROSEMIDE 20 MG/1
20 TABLET ORAL DAILY
Status: DISCONTINUED | OUTPATIENT
Start: 2024-12-25 | End: 2024-12-30 | Stop reason: HOSPADM

## 2024-12-24 RX ORDER — TRIAMTERENE AND HYDROCHLOROTHIAZIDE 37.5; 25 MG/1; MG/1
0.5 TABLET ORAL DAILY
Status: DISCONTINUED | OUTPATIENT
Start: 2024-12-24 | End: 2024-12-24

## 2024-12-24 RX ORDER — ACETAMINOPHEN 325 MG/1
650 TABLET ORAL EVERY 6 HOURS PRN
Status: DISCONTINUED | OUTPATIENT
Start: 2024-12-24 | End: 2024-12-30 | Stop reason: HOSPADM

## 2024-12-24 RX ORDER — IOPAMIDOL 755 MG/ML
75 INJECTION, SOLUTION INTRAVASCULAR
Status: COMPLETED | OUTPATIENT
Start: 2024-12-24 | End: 2024-12-24

## 2024-12-24 RX ORDER — FUROSEMIDE 10 MG/ML
40 INJECTION INTRAMUSCULAR; INTRAVENOUS ONCE
Status: COMPLETED | OUTPATIENT
Start: 2024-12-24 | End: 2024-12-24

## 2024-12-24 RX ORDER — MORPHINE SULFATE 4 MG/ML
4 INJECTION, SOLUTION INTRAMUSCULAR; INTRAVENOUS ONCE
Status: COMPLETED | OUTPATIENT
Start: 2024-12-24 | End: 2024-12-24

## 2024-12-24 RX ORDER — ONDANSETRON 4 MG/1
4 TABLET, ORALLY DISINTEGRATING ORAL EVERY 8 HOURS PRN
Status: DISCONTINUED | OUTPATIENT
Start: 2024-12-24 | End: 2024-12-30 | Stop reason: HOSPADM

## 2024-12-24 RX ORDER — MAGNESIUM SULFATE IN WATER 40 MG/ML
2000 INJECTION, SOLUTION INTRAVENOUS PRN
Status: DISCONTINUED | OUTPATIENT
Start: 2024-12-24 | End: 2024-12-30 | Stop reason: HOSPADM

## 2024-12-24 RX ORDER — LORAZEPAM 2 MG/ML
1 INJECTION INTRAMUSCULAR ONCE
Status: COMPLETED | OUTPATIENT
Start: 2024-12-24 | End: 2024-12-24

## 2024-12-24 RX ORDER — FUROSEMIDE 20 MG/1
20 TABLET ORAL DAILY
Status: DISCONTINUED | OUTPATIENT
Start: 2024-12-24 | End: 2024-12-24

## 2024-12-24 RX ORDER — OXYCODONE AND ACETAMINOPHEN 5; 325 MG/1; MG/1
1 TABLET ORAL EVERY 4 HOURS PRN
Status: DISCONTINUED | OUTPATIENT
Start: 2024-12-24 | End: 2024-12-30 | Stop reason: HOSPADM

## 2024-12-24 RX ORDER — ONDANSETRON 2 MG/ML
4 INJECTION INTRAMUSCULAR; INTRAVENOUS EVERY 6 HOURS PRN
Status: DISCONTINUED | OUTPATIENT
Start: 2024-12-24 | End: 2024-12-30 | Stop reason: HOSPADM

## 2024-12-24 RX ORDER — SODIUM CHLORIDE 0.9 % (FLUSH) 0.9 %
5-40 SYRINGE (ML) INJECTION PRN
Status: DISCONTINUED | OUTPATIENT
Start: 2024-12-24 | End: 2024-12-30 | Stop reason: HOSPADM

## 2024-12-24 RX ADMIN — IOPAMIDOL 75 ML: 755 INJECTION, SOLUTION INTRAVENOUS at 12:33

## 2024-12-24 RX ADMIN — MORPHINE SULFATE 4 MG: 4 INJECTION, SOLUTION INTRAMUSCULAR; INTRAVENOUS at 11:45

## 2024-12-24 RX ADMIN — OXYCODONE HYDROCHLORIDE AND ACETAMINOPHEN 1 TABLET: 5; 325 TABLET ORAL at 20:11

## 2024-12-24 RX ADMIN — METOPROLOL SUCCINATE 50 MG: 50 TABLET, EXTENDED RELEASE ORAL at 16:20

## 2024-12-24 RX ADMIN — OXYCODONE HYDROCHLORIDE AND ACETAMINOPHEN 1 TABLET: 5; 325 TABLET ORAL at 16:01

## 2024-12-24 RX ADMIN — FUROSEMIDE 40 MG: 10 INJECTION, SOLUTION INTRAMUSCULAR; INTRAVENOUS at 13:03

## 2024-12-24 RX ADMIN — LORAZEPAM 1 MG: 2 INJECTION INTRAMUSCULAR; INTRAVENOUS at 13:58

## 2024-12-24 RX ADMIN — SODIUM CHLORIDE, PRESERVATIVE FREE 10 ML: 5 INJECTION INTRAVENOUS at 20:11

## 2024-12-24 RX ADMIN — ENOXAPARIN SODIUM 30 MG: 100 INJECTION SUBCUTANEOUS at 20:11

## 2024-12-24 RX ADMIN — ONDANSETRON 4 MG: 2 INJECTION, SOLUTION INTRAMUSCULAR; INTRAVENOUS at 11:45

## 2024-12-24 ASSESSMENT — PAIN DESCRIPTION - LOCATION
LOCATION: BACK
LOCATION: FLANK;BACK
LOCATION: BACK;FLANK
LOCATION: BACK

## 2024-12-24 ASSESSMENT — PAIN DESCRIPTION - ORIENTATION
ORIENTATION: LEFT;RIGHT
ORIENTATION: LEFT
ORIENTATION: MID
ORIENTATION: LEFT

## 2024-12-24 ASSESSMENT — PAIN SCALES - GENERAL
PAINLEVEL_OUTOF10: 7
PAINLEVEL_OUTOF10: 10
PAINLEVEL_OUTOF10: 5
PAINLEVEL_OUTOF10: 10
PAINLEVEL_OUTOF10: 4
PAINLEVEL_OUTOF10: 10

## 2024-12-24 ASSESSMENT — PAIN DESCRIPTION - DESCRIPTORS
DESCRIPTORS: SHARP
DESCRIPTORS: THROBBING;STABBING

## 2024-12-24 ASSESSMENT — LIFESTYLE VARIABLES
HOW OFTEN DO YOU HAVE A DRINK CONTAINING ALCOHOL: NEVER
HOW MANY STANDARD DRINKS CONTAINING ALCOHOL DO YOU HAVE ON A TYPICAL DAY: PATIENT DOES NOT DRINK

## 2024-12-24 ASSESSMENT — PAIN DESCRIPTION - PAIN TYPE: TYPE: ACUTE PAIN

## 2024-12-24 ASSESSMENT — PAIN - FUNCTIONAL ASSESSMENT
PAIN_FUNCTIONAL_ASSESSMENT: 0-10
PAIN_FUNCTIONAL_ASSESSMENT: PREVENTS OR INTERFERES WITH MANY ACTIVE NOT PASSIVE ACTIVITIES

## 2024-12-24 ASSESSMENT — PAIN DESCRIPTION - FREQUENCY: FREQUENCY: CONTINUOUS

## 2024-12-24 NOTE — H&P
mouth daily Indications: Digestive Tract Discomfort X 30d    Provider, MD Franki   acetaminophen (TYLENOL) 325 MG tablet Take 2 tablets by mouth in the morning and 2 tablets at noon and 2 tablets in the evening and 2 tablets before bedtime. Continue on a regular schedule for 30 days.. 3/16/23 4/15/23  Tutu Chance MD   Lactobacillus TABS Take 1 tablet by mouth daily .  Continue on a regular schedule for 60 days. 3/16/23   Tutu Chance MD   furosemide (LASIX) 20 MG tablet Take 1 tablet by mouth daily Indications: High Blood Pressure Disorder    ProviderFranki MD   triamterene-hydroCHLOROthiazide (MAXZIDE-25) 37.5-25 MG per tablet Take 0.5 tablets by mouth daily Indications: High Blood Pressure Disorder    ProviderFranki MD   metoprolol succinate (TOPROL XL) 50 MG extended release tablet Take 1 tablet by mouth daily Indications: High Blood Pressure Disorder    ProviderFranki MD       Allergies:  Aspirin    REVIEW OF SYSTEMS:  All systems reviewed and negative except for what is in HPI      PHYSICAL EXAM:  Vitals:  BP (!) 168/92   Pulse 74   Temp 97.9 °F (36.6 °C) (Oral)   Resp 18   Ht 1.778 m (5' 10\")   Wt 111.1 kg (245 lb)   SpO2 98%   BMI 35.15 kg/m²     Pulse Ox: SpO2  Av %  Min: 98 %  Max: 98 %  Supplemental O2:      CONSTITUTIONAL:  awake, alert, cooperative, no apparent distress, and appears stated age  HEENT: Normocephalic, PERRLA  NECK: no JVD, no LAD  HEART: RRR, no murmurs, gallops, or rubs  LUNGS: clear to auscultation bilaterally, no wheezes, crackles, or rhonchi.  ABDOMEN: soft/NT/ND, positive BS  MUSCULOSKELETAL: Bilateral lower extremity 3+ pitting edema, stasis edema changes, +2 pulses.  No tenderness to palpation  SKIN: intact without rash or jaundice  NEURO:  CN intact and no focal deficits    DATA:  Recent Results (from the past 24 hour(s))   CBC with Auto Differential    Collection Time: 24 11:49 AM   Result Value Ref Range    WBC 6.5 4.8 - 10.8

## 2024-12-24 NOTE — ED PROVIDER NOTES
arthralgias.   Skin:  Negative for pallor and rash.   Allergic/Immunologic: Negative for immunocompromised state.   Neurological:  Negative for dizziness and syncope.   Hematological:  Negative for adenopathy.   Psychiatric/Behavioral:  Negative for agitation and hallucinations. The patient is not nervous/anxious.    All other systems reviewed and are negative.      Except as noted above the remainder of the review of systems was reviewed and negative.       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    HTN (hypertension)     Hx of carpal tunnel syndrome     Obesity     Other fracture of left femur, initial encounter for closed fracture ([facility]) 3/14/2022    Primary osteoarthritis of both knees          SURGICALHISTORY       Past Surgical History:   Procedure Laterality Date    REVISION TOTAL KNEE ARTHROPLASTY Left 3/16/2023    LEFT KNEE ARTHROTOMY WITH POLYETHYLENE LINER EXCHANGE AND PLACEMENT OF TOPICAL ANTIBIOTICS performed by Tutu Chance MD at [facility] OR    TOTAL KNEE ARTHROPLASTY Left 3/15/2022    LEFT KNEE DISTAL FEMUR REPLACEMENT performed by Ranjan Fletcher MD at [facility] OR         CURRENT MEDICATIONS       Previous Medications    ACETAMINOPHEN (TYLENOL) 325 MG TABLET    Take 2 tablets by mouth in the morning and 2 tablets at noon and 2 tablets in the evening and 2 tablets before bedtime. Continue on a regular schedule for 30 days..    CEPHALEXIN (KEFLEX) 500 MG CAPSULE    TAKE 1 CAPSULE BY MOUTH DAILY    FUROSEMIDE (LASIX) 20 MG TABLET    Take 1 tablet by mouth daily Indications: High Blood Pressure Disorder    LACTOBACILLUS TABS    Take 1 tablet by mouth daily .  Continue on a regular schedule for 60 days.    METOPROLOL SUCCINATE

## 2024-12-24 NOTE — ED NOTES
Pt to ER #[id number] via w/c and assisted into bed with 4 staff. Pt's legs are weak and edematous. Pt on bedside monitor, external cath in place. Pt covered with blankets for comfort. Sister at bedside.

## 2024-12-24 NOTE — ED TRIAGE NOTES
Patient arrived via private car due to L flank pain, L side pain, abdominal bloating, and bilateral leg edema. The pain in the side and back has been there for about 1- 1.5  weeks. He states that his leg edema has gotten worse over the last couple of days, he has abdominal bloating/fluid and is unsure as to why. A/O x 4, uses a walker at all times at home.

## 2024-12-25 LAB
ALBUMIN SERPL-MCNC: 3.5 G/DL (ref 3.5–4.6)
ALP SERPL-CCNC: 177 U/L (ref 35–104)
ALT SERPL-CCNC: 16 U/L (ref 0–41)
ANION GAP SERPL CALCULATED.3IONS-SCNC: 10 MEQ/L (ref 9–15)
AST SERPL-CCNC: 31 U/L (ref 0–40)
BASOPHILS # BLD: 0 K/UL (ref 0–0.2)
BASOPHILS NFR BLD: 0.6 %
BILIRUB SERPL-MCNC: 1.1 MG/DL (ref 0.2–0.7)
BUN SERPL-MCNC: 20 MG/DL (ref 8–23)
CALCIUM SERPL-MCNC: 8.8 MG/DL (ref 8.5–9.9)
CHLORIDE SERPL-SCNC: 100 MEQ/L (ref 95–107)
CO2 SERPL-SCNC: 28 MEQ/L (ref 20–31)
CREAT SERPL-MCNC: 1.89 MG/DL (ref 0.7–1.2)
EOSINOPHIL # BLD: 0.2 K/UL (ref 0–0.7)
EOSINOPHIL NFR BLD: 2.4 %
ERYTHROCYTE [DISTWIDTH] IN BLOOD BY AUTOMATED COUNT: 13.2 % (ref 11.5–14.5)
GLOBULIN SER CALC-MCNC: 3.2 G/DL (ref 2.3–3.5)
GLUCOSE SERPL-MCNC: 118 MG/DL (ref 70–99)
HCT VFR BLD AUTO: 39 % (ref 42–52)
HGB BLD-MCNC: 13.4 G/DL (ref 14–18)
LYMPHOCYTES # BLD: 1 K/UL (ref 1–4.8)
LYMPHOCYTES NFR BLD: 14.8 %
MCH RBC QN AUTO: 30.7 PG (ref 27–31.3)
MCHC RBC AUTO-ENTMCNC: 34.4 % (ref 33–37)
MCV RBC AUTO: 89.4 FL (ref 79–92.2)
MONOCYTES # BLD: 0.9 K/UL (ref 0.2–0.8)
MONOCYTES NFR BLD: 12.7 %
NEUTROPHILS # BLD: 4.6 K/UL (ref 1.4–6.5)
NEUTS SEG NFR BLD: 68.9 %
PLATELET # BLD AUTO: 148 K/UL (ref 130–400)
POTASSIUM SERPL-SCNC: 4.3 MEQ/L (ref 3.4–4.9)
PROT SERPL-MCNC: 6.7 G/DL (ref 6.3–8)
PSA SERPL-MCNC: 1.87 NG/ML (ref 0–4)
RBC # BLD AUTO: 4.36 M/UL (ref 4.7–6.1)
SODIUM SERPL-SCNC: 138 MEQ/L (ref 135–144)
WBC # BLD AUTO: 6.7 K/UL (ref 4.8–10.8)

## 2024-12-25 PROCEDURE — 6370000000 HC RX 637 (ALT 250 FOR IP): Performed by: INTERNAL MEDICINE

## 2024-12-25 PROCEDURE — 6360000002 HC RX W HCPCS: Performed by: INTERNAL MEDICINE

## 2024-12-25 PROCEDURE — 96372 THER/PROPH/DIAG INJ SC/IM: CPT

## 2024-12-25 PROCEDURE — 85025 COMPLETE CBC W/AUTO DIFF WBC: CPT

## 2024-12-25 PROCEDURE — 82378 CARCINOEMBRYONIC ANTIGEN: CPT

## 2024-12-25 PROCEDURE — 99223 1ST HOSP IP/OBS HIGH 75: CPT | Performed by: INTERNAL MEDICINE

## 2024-12-25 PROCEDURE — 86301 IMMUNOASSAY TUMOR CA 19-9: CPT

## 2024-12-25 PROCEDURE — 36415 COLL VENOUS BLD VENIPUNCTURE: CPT

## 2024-12-25 PROCEDURE — 2500000003 HC RX 250 WO HCPCS: Performed by: INTERNAL MEDICINE

## 2024-12-25 PROCEDURE — 80053 COMPREHEN METABOLIC PANEL: CPT

## 2024-12-25 PROCEDURE — 51798 US URINE CAPACITY MEASURE: CPT

## 2024-12-25 PROCEDURE — 96365 THER/PROPH/DIAG IV INF INIT: CPT

## 2024-12-25 PROCEDURE — 2580000003 HC RX 258: Performed by: INTERNAL MEDICINE

## 2024-12-25 PROCEDURE — 84153 ASSAY OF PSA TOTAL: CPT

## 2024-12-25 PROCEDURE — G0378 HOSPITAL OBSERVATION PER HR: HCPCS

## 2024-12-25 PROCEDURE — 82105 ALPHA-FETOPROTEIN SERUM: CPT

## 2024-12-25 PROCEDURE — 96361 HYDRATE IV INFUSION ADD-ON: CPT

## 2024-12-25 RX ORDER — SODIUM CHLORIDE 9 MG/ML
INJECTION, SOLUTION INTRAVENOUS CONTINUOUS
Status: DISCONTINUED | OUTPATIENT
Start: 2024-12-25 | End: 2024-12-27

## 2024-12-25 RX ADMIN — CEFTRIAXONE SODIUM 1000 MG: 1 INJECTION, POWDER, FOR SOLUTION INTRAMUSCULAR; INTRAVENOUS at 08:51

## 2024-12-25 RX ADMIN — SODIUM CHLORIDE: 9 INJECTION, SOLUTION INTRAVENOUS at 11:07

## 2024-12-25 RX ADMIN — SODIUM CHLORIDE: 9 INJECTION, SOLUTION INTRAVENOUS at 20:33

## 2024-12-25 RX ADMIN — OXYCODONE HYDROCHLORIDE AND ACETAMINOPHEN 1 TABLET: 5; 325 TABLET ORAL at 08:54

## 2024-12-25 RX ADMIN — OXYCODONE HYDROCHLORIDE AND ACETAMINOPHEN 1 TABLET: 5; 325 TABLET ORAL at 17:38

## 2024-12-25 RX ADMIN — OXYCODONE HYDROCHLORIDE AND ACETAMINOPHEN 1 TABLET: 5; 325 TABLET ORAL at 04:34

## 2024-12-25 RX ADMIN — FUROSEMIDE 20 MG: 20 TABLET ORAL at 08:52

## 2024-12-25 RX ADMIN — TRIAMTERENE AND HYDROCHLOROTHIAZIDE 0.5 TABLET: 37.5; 25 TABLET ORAL at 08:52

## 2024-12-25 RX ADMIN — SODIUM CHLORIDE, PRESERVATIVE FREE 10 ML: 5 INJECTION INTRAVENOUS at 08:52

## 2024-12-25 RX ADMIN — METOPROLOL SUCCINATE 50 MG: 50 TABLET, EXTENDED RELEASE ORAL at 08:52

## 2024-12-25 RX ADMIN — ENOXAPARIN SODIUM 30 MG: 100 INJECTION SUBCUTANEOUS at 20:34

## 2024-12-25 RX ADMIN — OXYCODONE HYDROCHLORIDE AND ACETAMINOPHEN 1 TABLET: 5; 325 TABLET ORAL at 21:51

## 2024-12-25 RX ADMIN — OXYCODONE HYDROCHLORIDE AND ACETAMINOPHEN 1 TABLET: 5; 325 TABLET ORAL at 00:13

## 2024-12-25 RX ADMIN — ENOXAPARIN SODIUM 30 MG: 100 INJECTION SUBCUTANEOUS at 08:53

## 2024-12-25 ASSESSMENT — PAIN DESCRIPTION - LOCATION
LOCATION: BACK;ABDOMEN
LOCATION: ABDOMEN;OTHER (COMMENT);BACK
LOCATION: ABDOMEN
LOCATION: FLANK;BACK
LOCATION: FLANK;BACK

## 2024-12-25 ASSESSMENT — PAIN SCALES - GENERAL
PAINLEVEL_OUTOF10: 8
PAINLEVEL_OUTOF10: 4
PAINLEVEL_OUTOF10: 8
PAINLEVEL_OUTOF10: 3
PAINLEVEL_OUTOF10: 10
PAINLEVEL_OUTOF10: 7
PAINLEVEL_OUTOF10: 10
PAINLEVEL_OUTOF10: 8
PAINLEVEL_OUTOF10: 5

## 2024-12-25 ASSESSMENT — PAIN DESCRIPTION - ORIENTATION
ORIENTATION: LEFT
ORIENTATION: LEFT;RIGHT

## 2024-12-25 ASSESSMENT — PAIN DESCRIPTION - DESCRIPTORS: DESCRIPTORS: ACHING

## 2024-12-25 NOTE — PLAN OF CARE

## 2024-12-26 ENCOUNTER — ANESTHESIA EVENT (OUTPATIENT)
Dept: ENDOSCOPY | Age: 84
End: 2024-12-26
Payer: MEDICARE

## 2024-12-26 ENCOUNTER — APPOINTMENT (OUTPATIENT)
Dept: ULTRASOUND IMAGING | Age: 84
DRG: 436 | End: 2024-12-26
Payer: MEDICARE

## 2024-12-26 PROBLEM — R91.8 LUNG NODULES: Status: ACTIVE | Noted: 2024-12-26

## 2024-12-26 PROBLEM — R93.3 ABNORMAL CT SCAN, COLON: Status: ACTIVE | Noted: 2024-12-24

## 2024-12-26 LAB
AFP-TM SERPL-MCNC: 70.6 UG/L
ALBUMIN SERPL-MCNC: 3.1 G/DL (ref 3.5–4.6)
ALP SERPL-CCNC: 177 U/L (ref 35–104)
ALT SERPL-CCNC: 15 U/L (ref 0–41)
ANION GAP SERPL CALCULATED.3IONS-SCNC: 11 MEQ/L (ref 9–15)
AST SERPL-CCNC: 33 U/L (ref 0–40)
BASOPHILS # BLD: 0 K/UL (ref 0–0.2)
BASOPHILS NFR BLD: 0.4 %
BILIRUB SERPL-MCNC: 0.9 MG/DL (ref 0.2–0.7)
BUN SERPL-MCNC: 34 MG/DL (ref 8–23)
CALCIUM SERPL-MCNC: 8.2 MG/DL (ref 8.5–9.9)
CANCER AG19-9 SERPL IA-ACNC: <2 U/ML (ref 0–35)
CARCINOEMBRYONIC ANTIGEN: 3.6 NG/ML (ref 0–3.8)
CHLORIDE SERPL-SCNC: 101 MEQ/L (ref 95–107)
CO2 SERPL-SCNC: 26 MEQ/L (ref 20–31)
CREAT SERPL-MCNC: 4.44 MG/DL (ref 0.7–1.2)
EOSINOPHIL # BLD: 0.3 K/UL (ref 0–0.7)
EOSINOPHIL NFR BLD: 4.1 %
ERYTHROCYTE [DISTWIDTH] IN BLOOD BY AUTOMATED COUNT: 13.2 % (ref 11.5–14.5)
GLOBULIN SER CALC-MCNC: 3.2 G/DL (ref 2.3–3.5)
GLUCOSE SERPL-MCNC: 110 MG/DL (ref 70–99)
HCT VFR BLD AUTO: 38.1 % (ref 42–52)
HGB BLD-MCNC: 12.7 G/DL (ref 14–18)
LYMPHOCYTES # BLD: 0.9 K/UL (ref 1–4.8)
LYMPHOCYTES NFR BLD: 13.1 %
MCH RBC QN AUTO: 29.9 PG (ref 27–31.3)
MCHC RBC AUTO-ENTMCNC: 33.3 % (ref 33–37)
MCV RBC AUTO: 89.6 FL (ref 79–92.2)
MONOCYTES # BLD: 0.8 K/UL (ref 0.2–0.8)
MONOCYTES NFR BLD: 11.5 %
NEUTROPHILS # BLD: 4.9 K/UL (ref 1.4–6.5)
NEUTS SEG NFR BLD: 70.2 %
PLATELET # BLD AUTO: 136 K/UL (ref 130–400)
POTASSIUM SERPL-SCNC: 4.3 MEQ/L (ref 3.4–4.9)
PROT SERPL-MCNC: 6.3 G/DL (ref 6.3–8)
RBC # BLD AUTO: 4.25 M/UL (ref 4.7–6.1)
SODIUM SERPL-SCNC: 138 MEQ/L (ref 135–144)
WBC # BLD AUTO: 7 K/UL (ref 4.8–10.8)

## 2024-12-26 PROCEDURE — G0378 HOSPITAL OBSERVATION PER HR: HCPCS

## 2024-12-26 PROCEDURE — 6360000002 HC RX W HCPCS: Performed by: INTERNAL MEDICINE

## 2024-12-26 PROCEDURE — 96361 HYDRATE IV INFUSION ADD-ON: CPT

## 2024-12-26 PROCEDURE — 76775 US EXAM ABDO BACK WALL LIM: CPT

## 2024-12-26 PROCEDURE — 6370000000 HC RX 637 (ALT 250 FOR IP): Performed by: INTERNAL MEDICINE

## 2024-12-26 PROCEDURE — 51701 INSERT BLADDER CATHETER: CPT

## 2024-12-26 PROCEDURE — 80053 COMPREHEN METABOLIC PANEL: CPT

## 2024-12-26 PROCEDURE — 2580000003 HC RX 258: Performed by: INTERNAL MEDICINE

## 2024-12-26 PROCEDURE — 85025 COMPLETE CBC W/AUTO DIFF WBC: CPT

## 2024-12-26 PROCEDURE — 6370000000 HC RX 637 (ALT 250 FOR IP): Performed by: SPECIALIST

## 2024-12-26 PROCEDURE — 2500000003 HC RX 250 WO HCPCS: Performed by: INTERNAL MEDICINE

## 2024-12-26 PROCEDURE — 99222 1ST HOSP IP/OBS MODERATE 55: CPT | Performed by: SPECIALIST

## 2024-12-26 PROCEDURE — 96376 TX/PRO/DX INJ SAME DRUG ADON: CPT

## 2024-12-26 PROCEDURE — 36415 COLL VENOUS BLD VENIPUNCTURE: CPT

## 2024-12-26 PROCEDURE — 99232 SBSQ HOSP IP/OBS MODERATE 35: CPT | Performed by: INTERNAL MEDICINE

## 2024-12-26 PROCEDURE — 96372 THER/PROPH/DIAG INJ SC/IM: CPT

## 2024-12-26 RX ORDER — POLYETHYLENE GLYCOL 3350 17 G/17G
17 POWDER, FOR SOLUTION ORAL ONCE
Status: COMPLETED | OUTPATIENT
Start: 2024-12-26 | End: 2024-12-26

## 2024-12-26 RX ORDER — SODIUM CHLORIDE 9 MG/ML
25 INJECTION, SOLUTION INTRAVENOUS PRN
Status: DISCONTINUED | OUTPATIENT
Start: 2024-12-26 | End: 2024-12-27 | Stop reason: HOSPADM

## 2024-12-26 RX ORDER — SODIUM CHLORIDE 0.9 % (FLUSH) 0.9 %
5-40 SYRINGE (ML) INJECTION PRN
Status: DISCONTINUED | OUTPATIENT
Start: 2024-12-26 | End: 2024-12-27 | Stop reason: HOSPADM

## 2024-12-26 RX ORDER — SODIUM CHLORIDE 0.9 % (FLUSH) 0.9 %
5-40 SYRINGE (ML) INJECTION EVERY 12 HOURS SCHEDULED
Status: DISCONTINUED | OUTPATIENT
Start: 2024-12-26 | End: 2024-12-27 | Stop reason: HOSPADM

## 2024-12-26 RX ADMIN — POLYETHYLENE GLYCOL-3350 AND ELECTROLYTES 4000 ML: 236; 6.74; 5.86; 2.97; 22.74 POWDER, FOR SOLUTION ORAL at 18:31

## 2024-12-26 RX ADMIN — ONDANSETRON 4 MG: 2 INJECTION, SOLUTION INTRAMUSCULAR; INTRAVENOUS at 21:25

## 2024-12-26 RX ADMIN — SODIUM CHLORIDE, PRESERVATIVE FREE 10 ML: 5 INJECTION INTRAVENOUS at 09:04

## 2024-12-26 RX ADMIN — OXYCODONE HYDROCHLORIDE AND ACETAMINOPHEN 1 TABLET: 5; 325 TABLET ORAL at 01:50

## 2024-12-26 RX ADMIN — POLYETHYLENE GLYCOL 3350 17 G: 17 POWDER, FOR SOLUTION ORAL at 11:04

## 2024-12-26 RX ADMIN — ENOXAPARIN SODIUM 30 MG: 100 INJECTION SUBCUTANEOUS at 08:36

## 2024-12-26 RX ADMIN — OXYCODONE HYDROCHLORIDE AND ACETAMINOPHEN 1 TABLET: 5; 325 TABLET ORAL at 15:43

## 2024-12-26 RX ADMIN — OXYCODONE HYDROCHLORIDE AND ACETAMINOPHEN 1 TABLET: 5; 325 TABLET ORAL at 11:02

## 2024-12-26 RX ADMIN — OXYCODONE HYDROCHLORIDE AND ACETAMINOPHEN 1 TABLET: 5; 325 TABLET ORAL at 06:32

## 2024-12-26 RX ADMIN — SODIUM CHLORIDE: 9 INJECTION, SOLUTION INTRAVENOUS at 18:22

## 2024-12-26 RX ADMIN — OXYCODONE HYDROCHLORIDE AND ACETAMINOPHEN 1 TABLET: 5; 325 TABLET ORAL at 20:17

## 2024-12-26 RX ADMIN — WATER 1000 MG: 1 INJECTION INTRAMUSCULAR; INTRAVENOUS; SUBCUTANEOUS at 10:09

## 2024-12-26 RX ADMIN — SODIUM CHLORIDE: 9 INJECTION, SOLUTION INTRAVENOUS at 06:31

## 2024-12-26 RX ADMIN — METOPROLOL SUCCINATE 50 MG: 50 TABLET, EXTENDED RELEASE ORAL at 08:36

## 2024-12-26 RX ADMIN — SODIUM CHLORIDE, PRESERVATIVE FREE 10 ML: 5 INJECTION INTRAVENOUS at 20:18

## 2024-12-26 ASSESSMENT — PAIN SCALES - GENERAL
PAINLEVEL_OUTOF10: 7
PAINLEVEL_OUTOF10: 8
PAINLEVEL_OUTOF10: 7
PAINLEVEL_OUTOF10: 4
PAINLEVEL_OUTOF10: 8
PAINLEVEL_OUTOF10: 8

## 2024-12-26 ASSESSMENT — PAIN DESCRIPTION - LOCATION
LOCATION: ABDOMEN
LOCATION: ABDOMEN
LOCATION: BACK
LOCATION: ABDOMEN

## 2024-12-26 ASSESSMENT — PAIN DESCRIPTION - DESCRIPTORS
DESCRIPTORS: SPASM
DESCRIPTORS: TIGHTNESS;SHARP

## 2024-12-26 ASSESSMENT — PAIN DESCRIPTION - ORIENTATION
ORIENTATION: RIGHT;LEFT;MID
ORIENTATION: LOWER
ORIENTATION: ANTERIOR

## 2024-12-26 NOTE — ANESTHESIA PRE PROCEDURE
ALT 15 12/26/2024 06:16 AM       POC Tests: No results for input(s): \"POCGLU\", \"POCNA\", \"POCK\", \"POCCL\", \"POCBUN\", \"POCHEMO\", \"POCHCT\" in the last 72 hours.    Coags:   Lab Results   Component Value Date/Time    PROTIME 14.1 03/14/2022 04:05 PM    INR 1.1 03/14/2022 04:05 PM    APTT 28.5 03/14/2022 04:05 PM       HCG (If Applicable): No results found for: \"PREGTESTUR\", \"PREGSERUM\", \"HCG\", \"HCGQUANT\"     ABGs: No results found for: \"PHART\", \"PO2ART\", \"SMP9BGO\", \"FGL9RFK\", \"BEART\", \"Z5NICZQX\"     Type & Screen (If Applicable):  Lab Results   Component Value Date    ABORH A NEG 01/27/2023    LABANTI NEG 01/27/2023       Drug/Infectious Status (If Applicable):  No results found for: \"HIV\", \"HEPCAB\"    COVID-19 Screening (If Applicable): No results found for: \"COVID19\"        Anesthesia Evaluation  Patient summary reviewed and Nursing notes reviewed  Airway: Mallampati: II  TM distance: >3 FB   Neck ROM: full  Mouth opening: > = 3 FB   Dental: normal exam         Pulmonary:Negative Pulmonary ROS and normal exam                               Cardiovascular:    (+) hypertension:, pulmonary hypertension: mild      ECG reviewed      Echocardiogram reviewed                  Neuro/Psych:   Negative Neuro/Psych ROS              GI/Hepatic/Renal:   (+) bowel prep, morbid obesity          Endo/Other:    (+) malignancy/cancer.                 Abdominal:   (+) obese          Vascular: negative vascular ROS.         Other Findings:             Anesthesia Plan      MAC     ASA 3       Induction: intravenous.      Anesthetic plan and risks discussed with patient.                        CHARLIE Moe - CRNA   12/26/2024

## 2024-12-26 NOTE — CARE COORDINATION
Case Management Assessment  Initial Evaluation    Date/Time of Evaluation: 12/26/2024 10:20 AM  Assessment Completed by: RAZA Amaya    If patient is discharged prior to next notation, then this note serves as note for discharge by case management.    Patient Name: Juan Kumar                   YOB: 1940  Diagnosis: Peripheral edema [R60.0]  Abdominal pain [R10.9]  General weakness [R53.1]  Metastatic malignant neoplasm, unspecified site (HCC) [C79.9]                   Date / Time: 12/24/2024 10:52 AM    Patient Admission Status: Observation   Readmission Risk (Low < 19, Mod (19-27), High > 27): No data recorded  Current PCP: Nathan Garrett MD  PCP verified by ? Yes    Chart Reviewed: Yes      History Provided by: Patient  Patient Orientation: Alert and Oriented    Patient Cognition: Alert    Hospitalization in the last 30 days (Readmission):  No    If yes, Readmission Assessment in CM Navigator will be completed.    Advance Directives:      Code Status: Limited   Patient's Primary Decision Maker is: Legal Next of Kin    Primary Decision Maker: Maddie Becerril - Brother/Sister - [phone]    Discharge Planning:    Patient lives with: Alone Type of Home: House  Primary Care Giver: Self  Patient Support Systems include: Family Members   Current Financial resources:    Current community resources:    Current services prior to admission: None            Current DME:              Type of Home Care services:  Aide Services, Nursing Services, Skilled Therapy    ADLS  Prior functional level: Independent in ADLs/IADLs  Current functional level: Assistance with the following:, Bathing, Dressing, Toileting    PT AM-PAC:   /24  OT AM-PAC:   /24    Family can provide assistance at DC: No  Would you like Case Management to discuss the discharge plan with any other family members/significant others, and if so, who? Yes (Sister, Megan)  Plans to Return to Present Housing: Yes  Other Identified

## 2024-12-26 NOTE — CARE COORDINATION
This LSW visited patient at bedside this am.  CMI, ACP and MOON completed by this LSW. Copy of FARMER given to patient.  Patient plans to return home upon discharge. Patient lives alone, he is accepting of Home Health Care services if recommended upon discharge.  Electronically signed by RAZA Amaya on 12/26/24 at 10:27 AM EST

## 2024-12-26 NOTE — ACP (ADVANCE CARE PLANNING)
Advance Care Planning   Healthcare Decision Maker:    Primary Decision Maker: Maddie Becerril - Brother/Sister - [phone]    Click here to complete Healthcare Decision Makers including selection of the Healthcare Decision Maker Relationship (ie \"Primary\").  Today we documented Decision Maker(s) consistent with Legal Next of Kin hierarchy.     Patient was coherent and not under distress while  discussed primary care decision maker. Patient does not have and kids and is not  and was ok with his sister being primary decision maker.     If the relationship to the patient does NOT follow our state's Next of Kin hierarchy, the patient MUST complete an ACP Document to allow him/her to act on the patient's behalf. :

## 2024-12-26 NOTE — ACP (ADVANCE CARE PLANNING)
Advance Care Planning   Healthcare Decision Maker:    Primary Decision Maker: ScotlaminainsleyMaddie - Brother/Sister - [phone]    Click here to complete Healthcare Decision Makers including selection of the Healthcare Decision Maker Relationship (ie \"Primary\").

## 2024-12-26 NOTE — FLOWSHEET NOTE
0910 spoke with Domitila ANN regarding liver biopsy consult.  Dr Bennett is on vacation at this time.  Pt place onto IR schedule for 1/6 @ 1300.  Pt to arrive to radiology at 1200.  NPO after MN on 1/6.  Pt will need a  after the 2 hour recovery period.

## 2024-12-27 ENCOUNTER — ANESTHESIA (OUTPATIENT)
Dept: ENDOSCOPY | Age: 84
End: 2024-12-27
Payer: MEDICARE

## 2024-12-27 LAB
ALBUMIN SERPL-MCNC: 3 G/DL (ref 3.5–4.6)
ALP SERPL-CCNC: 220 U/L (ref 35–104)
ALT SERPL-CCNC: 18 U/L (ref 0–41)
ANION GAP SERPL CALCULATED.3IONS-SCNC: 10 MEQ/L (ref 9–15)
ANION GAP SERPL CALCULATED.3IONS-SCNC: 12 MEQ/L (ref 9–15)
AST SERPL-CCNC: 47 U/L (ref 0–40)
BASOPHILS # BLD: 0 K/UL (ref 0–0.2)
BASOPHILS NFR BLD: 0.3 %
BILIRUB SERPL-MCNC: 1 MG/DL (ref 0.2–0.7)
BUN SERPL-MCNC: 12 MG/DL (ref 8–23)
BUN SERPL-MCNC: 15 MG/DL (ref 8–23)
CALCIUM SERPL-MCNC: 7.9 MG/DL (ref 8.5–9.9)
CALCIUM SERPL-MCNC: 8.4 MG/DL (ref 8.5–9.9)
CHLORIDE SERPL-SCNC: 99 MEQ/L (ref 95–107)
CHLORIDE SERPL-SCNC: 99 MEQ/L (ref 95–107)
CO2 SERPL-SCNC: 23 MEQ/L (ref 20–31)
CO2 SERPL-SCNC: 24 MEQ/L (ref 20–31)
CREAT SERPL-MCNC: 0.54 MG/DL (ref 0.7–1.2)
CREAT SERPL-MCNC: 0.84 MG/DL (ref 0.7–1.2)
EOSINOPHIL # BLD: 0.3 K/UL (ref 0–0.7)
EOSINOPHIL NFR BLD: 4.3 %
ERYTHROCYTE [DISTWIDTH] IN BLOOD BY AUTOMATED COUNT: 12.8 % (ref 11.5–14.5)
GLOBULIN SER CALC-MCNC: 3.7 G/DL (ref 2.3–3.5)
GLUCOSE SERPL-MCNC: 116 MG/DL (ref 70–99)
GLUCOSE SERPL-MCNC: 118 MG/DL (ref 70–99)
HCT VFR BLD AUTO: 40.3 % (ref 42–52)
HGB BLD-MCNC: 14.2 G/DL (ref 14–18)
INR PPP: 1.2
LYMPHOCYTES # BLD: 0.8 K/UL (ref 1–4.8)
LYMPHOCYTES NFR BLD: 11.5 %
MCH RBC QN AUTO: 31.5 PG (ref 27–31.3)
MCHC RBC AUTO-ENTMCNC: 35.2 % (ref 33–37)
MCV RBC AUTO: 89.4 FL (ref 79–92.2)
MONOCYTES # BLD: 0.9 K/UL (ref 0.2–0.8)
MONOCYTES NFR BLD: 11.9 %
NEUTROPHILS # BLD: 5.2 K/UL (ref 1.4–6.5)
NEUTS SEG NFR BLD: 71.4 %
PLATELET # BLD AUTO: 130 K/UL (ref 130–400)
POTASSIUM SERPL-SCNC: 3.9 MEQ/L (ref 3.4–4.9)
POTASSIUM SERPL-SCNC: 4.2 MEQ/L (ref 3.4–4.9)
PROT SERPL-MCNC: 6.7 G/DL (ref 6.3–8)
PROTHROMBIN TIME: 15.4 SEC (ref 12.3–14.9)
RBC # BLD AUTO: 4.51 M/UL (ref 4.7–6.1)
SODIUM SERPL-SCNC: 132 MEQ/L (ref 135–144)
SODIUM SERPL-SCNC: 135 MEQ/L (ref 135–144)
WBC # BLD AUTO: 7.2 K/UL (ref 4.8–10.8)

## 2024-12-27 PROCEDURE — 43239 EGD BIOPSY SINGLE/MULTIPLE: CPT | Performed by: SPECIALIST

## 2024-12-27 PROCEDURE — 2500000003 HC RX 250 WO HCPCS: Performed by: SPECIALIST

## 2024-12-27 PROCEDURE — 2580000003 HC RX 258: Performed by: INTERNAL MEDICINE

## 2024-12-27 PROCEDURE — 99232 SBSQ HOSP IP/OBS MODERATE 35: CPT | Performed by: INTERNAL MEDICINE

## 2024-12-27 PROCEDURE — 96361 HYDRATE IV INFUSION ADD-ON: CPT

## 2024-12-27 PROCEDURE — 36415 COLL VENOUS BLD VENIPUNCTURE: CPT

## 2024-12-27 PROCEDURE — 85610 PROTHROMBIN TIME: CPT

## 2024-12-27 PROCEDURE — 85025 COMPLETE CBC W/AUTO DIFF WBC: CPT

## 2024-12-27 PROCEDURE — 2580000003 HC RX 258: Performed by: SPECIALIST

## 2024-12-27 PROCEDURE — 3609017100 HC EGD: Performed by: SPECIALIST

## 2024-12-27 PROCEDURE — 1210000000 HC MED SURG R&B

## 2024-12-27 PROCEDURE — 88305 TISSUE EXAM BY PATHOLOGIST: CPT

## 2024-12-27 PROCEDURE — 45380 COLONOSCOPY AND BIOPSY: CPT | Performed by: SPECIALIST

## 2024-12-27 PROCEDURE — 3700000000 HC ANESTHESIA ATTENDED CARE: Performed by: SPECIALIST

## 2024-12-27 PROCEDURE — 6360000002 HC RX W HCPCS: Performed by: INTERNAL MEDICINE

## 2024-12-27 PROCEDURE — 6360000002 HC RX W HCPCS

## 2024-12-27 PROCEDURE — 6370000000 HC RX 637 (ALT 250 FOR IP): Performed by: INTERNAL MEDICINE

## 2024-12-27 PROCEDURE — 0DB98ZX EXCISION OF DUODENUM, VIA NATURAL OR ARTIFICIAL OPENING ENDOSCOPIC, DIAGNOSTIC: ICD-10-PCS | Performed by: SPECIALIST

## 2024-12-27 PROCEDURE — 3609010300 HC COLONOSCOPY W/BIOPSY SINGLE/MULTIPLE: Performed by: SPECIALIST

## 2024-12-27 PROCEDURE — 0DBN8ZX EXCISION OF SIGMOID COLON, VIA NATURAL OR ARTIFICIAL OPENING ENDOSCOPIC, DIAGNOSTIC: ICD-10-PCS | Performed by: SPECIALIST

## 2024-12-27 PROCEDURE — 2500000003 HC RX 250 WO HCPCS: Performed by: INTERNAL MEDICINE

## 2024-12-27 PROCEDURE — 2709999900 HC NON-CHARGEABLE SUPPLY: Performed by: SPECIALIST

## 2024-12-27 PROCEDURE — 80053 COMPREHEN METABOLIC PANEL: CPT

## 2024-12-27 PROCEDURE — 7100000010 HC PHASE II RECOVERY - FIRST 15 MIN: Performed by: SPECIALIST

## 2024-12-27 PROCEDURE — 3700000001 HC ADD 15 MINUTES (ANESTHESIA): Performed by: SPECIALIST

## 2024-12-27 RX ORDER — LIDOCAINE HYDROCHLORIDE 20 MG/ML
INJECTION, SOLUTION INFILTRATION; PERINEURAL
Status: DISCONTINUED | OUTPATIENT
Start: 2024-12-27 | End: 2024-12-27 | Stop reason: SDUPTHER

## 2024-12-27 RX ORDER — CIPROFLOXACIN 2 MG/ML
400 INJECTION, SOLUTION INTRAVENOUS EVERY 12 HOURS
Status: DISCONTINUED | OUTPATIENT
Start: 2024-12-27 | End: 2024-12-30 | Stop reason: HOSPADM

## 2024-12-27 RX ORDER — PROPOFOL 10 MG/ML
INJECTION, EMULSION INTRAVENOUS
Status: DISCONTINUED | OUTPATIENT
Start: 2024-12-27 | End: 2024-12-27 | Stop reason: SDUPTHER

## 2024-12-27 RX ADMIN — LIDOCAINE HYDROCHLORIDE 3 ML: 20 INJECTION, SOLUTION INFILTRATION; PERINEURAL at 10:01

## 2024-12-27 RX ADMIN — PROPOFOL 50 MG: 10 INJECTION, EMULSION INTRAVENOUS at 10:08

## 2024-12-27 RX ADMIN — PROPOFOL 80 MG: 10 INJECTION, EMULSION INTRAVENOUS at 10:01

## 2024-12-27 RX ADMIN — SODIUM CHLORIDE, PRESERVATIVE FREE 10 ML: 5 INJECTION INTRAVENOUS at 23:06

## 2024-12-27 RX ADMIN — PROPOFOL 20 MG: 10 INJECTION, EMULSION INTRAVENOUS at 10:03

## 2024-12-27 RX ADMIN — PROPOFOL 50 MG: 10 INJECTION, EMULSION INTRAVENOUS at 10:13

## 2024-12-27 RX ADMIN — SODIUM CHLORIDE: 9 INJECTION, SOLUTION INTRAVENOUS at 09:55

## 2024-12-27 RX ADMIN — METOPROLOL SUCCINATE 50 MG: 50 TABLET, EXTENDED RELEASE ORAL at 11:27

## 2024-12-27 RX ADMIN — SODIUM CHLORIDE: 9 INJECTION, SOLUTION INTRAVENOUS at 06:08

## 2024-12-27 RX ADMIN — CIPROFLOXACIN 400 MG: 2 INJECTION, SOLUTION INTRAVENOUS at 23:06

## 2024-12-27 RX ADMIN — OXYCODONE HYDROCHLORIDE AND ACETAMINOPHEN 1 TABLET: 5; 325 TABLET ORAL at 23:03

## 2024-12-27 RX ADMIN — PROPOFOL 30 MG: 10 INJECTION, EMULSION INTRAVENOUS at 10:21

## 2024-12-27 RX ADMIN — CIPROFLOXACIN 400 MG: 2 INJECTION, SOLUTION INTRAVENOUS at 11:31

## 2024-12-27 RX ADMIN — PROPOFOL 50 MG: 10 INJECTION, EMULSION INTRAVENOUS at 10:17

## 2024-12-27 ASSESSMENT — PAIN SCALES - GENERAL
PAINLEVEL_OUTOF10: 0
PAINLEVEL_OUTOF10: 6

## 2024-12-27 ASSESSMENT — PAIN DESCRIPTION - ORIENTATION: ORIENTATION: LEFT

## 2024-12-27 ASSESSMENT — PAIN DESCRIPTION - LOCATION: LOCATION: ABDOMEN;FLANK

## 2024-12-27 ASSESSMENT — PAIN - FUNCTIONAL ASSESSMENT
PAIN_FUNCTIONAL_ASSESSMENT: 0-10
PAIN_FUNCTIONAL_ASSESSMENT: 0-10

## 2024-12-27 NOTE — CARE COORDINATION
MET WITH PATIENT THIS AM TO DISCUSS DISCHARGE PLAN. PATIENT STATES TO CALL SISTER SHAWNEE.  SPOKE WITH SISTER SHAWNEE AND PER SISTER SHAWNEE HAS CONCERNS ABOUT PATIENT GOING HOME.  SISTER SHAWNEE STATES SHE CANNOT TAKE CARE OF PATIENT AND STATES PATIENT HAS BEEN AT [facility] IN THE PAST.  DISCHARGE PLAN TBD PEND PT/OT.

## 2024-12-27 NOTE — PLAN OF CARE
Problem: Safety - Adult  Goal: Free from fall injury  12/27/2024 0433 by Marie Hernandez, RN  Outcome: Progressing  12/26/2024 1842 by Domitila Crowder, RN  Outcome: Progressing     Problem: Discharge Planning  Goal: Discharge to home or other facility with appropriate resources  12/26/2024 1842 by Domitila Crowder, RN  Outcome: Progressing     Problem: Pain  Goal: Verbalizes/displays adequate comfort level or baseline comfort level  12/26/2024 1842 by Domitila Crowder, RN  Outcome: Progressing

## 2024-12-27 NOTE — PLAN OF CARE
Nutrition Problem #1: Inadequate oral intake  Intervention: Food and/or Nutrient Delivery: Continue Current Diet    Problem: Nutrition Deficit:  Goal: Optimize nutritional status  Flowsheets (Taken 12/27/2024 0918)  Nutrient intake appropriate for improving, restoring, or maintaining nutritional needs:   Assess nutritional status and recommend course of action   Monitor oral intake, labs, and treatment plans   Recommend appropriate diets, oral nutritional supplements, and vitamin/mineral supplements   Provide specific nutrition education to patient or family as appropriate

## 2024-12-27 NOTE — ANESTHESIA POSTPROCEDURE EVALUATION
Department of Anesthesiology  Postprocedure Note    Patient: Juan Kumar  MRN: [id number]  YOB: 1940  Date of evaluation: 12/27/2024    Procedure Summary       Date: 12/27/24 Room / Location: [facility] OR 01 / [facility]    Anesthesia Start: 0955 Anesthesia Stop: 1031    Procedures:       COLONOSCOPY BIOPSY WITH POLYPECTOMY      ESOPHAGOGASTRODUODENOSCOPY WITH BIOPSY Diagnosis:       Abnormal CT scan, colon      (Abnormal CT scan, colon [R93.3])    Surgeons: Karen Carter MD Responsible Provider: Judie Begum APRN - CRNA    Anesthesia Type: MAC ASA Status: 3            Anesthesia Type: No value filed.    Markus Phase I: Markus Score: 10    Markus Phase II:      Anesthesia Post Evaluation    Patient location during evaluation: bedside  Patient participation: complete - patient participated  Level of consciousness: awake and awake and alert  Airway patency: patent  Nausea & Vomiting: no nausea and no vomiting  Cardiovascular status: blood pressure returned to baseline and hemodynamically stable  Respiratory status: acceptable  Hydration status: euvolemic  Pain management: adequate        No notable events documented.

## 2024-12-28 PROBLEM — K26.9 DUODENAL EROSION: Status: ACTIVE | Noted: 2024-12-28

## 2024-12-28 LAB
ALBUMIN SERPL-MCNC: 2.7 G/DL (ref 3.5–4.6)
ALP SERPL-CCNC: 207 U/L (ref 35–104)
ALT SERPL-CCNC: 17 U/L (ref 0–41)
ANION GAP SERPL CALCULATED.3IONS-SCNC: 9 MEQ/L (ref 9–15)
AST SERPL-CCNC: 34 U/L (ref 0–40)
BACTERIA UR CULT: ABNORMAL
BACTERIA UR CULT: ABNORMAL
BASOPHILS # BLD: 0 K/UL (ref 0–0.2)
BASOPHILS NFR BLD: 0.3 %
BILIRUB SERPL-MCNC: 0.7 MG/DL (ref 0.2–0.7)
BUN SERPL-MCNC: 13 MG/DL (ref 8–23)
CALCIUM SERPL-MCNC: 8.1 MG/DL (ref 8.5–9.9)
CHLORIDE SERPL-SCNC: 96 MEQ/L (ref 95–107)
CO2 SERPL-SCNC: 26 MEQ/L (ref 20–31)
CREAT SERPL-MCNC: 0.62 MG/DL (ref 0.7–1.2)
EOSINOPHIL # BLD: 0.3 K/UL (ref 0–0.7)
EOSINOPHIL NFR BLD: 5.3 %
ERYTHROCYTE [DISTWIDTH] IN BLOOD BY AUTOMATED COUNT: 13.1 % (ref 11.5–14.5)
GLOBULIN SER CALC-MCNC: 3.7 G/DL (ref 2.3–3.5)
GLUCOSE SERPL-MCNC: 99 MG/DL (ref 70–99)
HCT VFR BLD AUTO: 37.7 % (ref 42–52)
HGB BLD-MCNC: 13 G/DL (ref 14–18)
LYMPHOCYTES # BLD: 0.8 K/UL (ref 1–4.8)
LYMPHOCYTES NFR BLD: 12.6 %
MCH RBC QN AUTO: 31 PG (ref 27–31.3)
MCHC RBC AUTO-ENTMCNC: 34.5 % (ref 33–37)
MCV RBC AUTO: 89.8 FL (ref 79–92.2)
MONOCYTES # BLD: 0.8 K/UL (ref 0.2–0.8)
MONOCYTES NFR BLD: 12.1 %
NEUTROPHILS # BLD: 4.4 K/UL (ref 1.4–6.5)
NEUTS SEG NFR BLD: 68.8 %
ORGANISM: ABNORMAL
PLATELET # BLD AUTO: 142 K/UL (ref 130–400)
POTASSIUM SERPL-SCNC: 3.9 MEQ/L (ref 3.4–4.9)
PROT SERPL-MCNC: 6.4 G/DL (ref 6.3–8)
RBC # BLD AUTO: 4.2 M/UL (ref 4.7–6.1)
SODIUM SERPL-SCNC: 131 MEQ/L (ref 135–144)
WBC # BLD AUTO: 6.4 K/UL (ref 4.8–10.8)

## 2024-12-28 PROCEDURE — 6370000000 HC RX 637 (ALT 250 FOR IP): Performed by: INTERNAL MEDICINE

## 2024-12-28 PROCEDURE — 85025 COMPLETE CBC W/AUTO DIFF WBC: CPT

## 2024-12-28 PROCEDURE — 2500000003 HC RX 250 WO HCPCS: Performed by: INTERNAL MEDICINE

## 2024-12-28 PROCEDURE — 6360000002 HC RX W HCPCS: Performed by: INTERNAL MEDICINE

## 2024-12-28 PROCEDURE — 1210000000 HC MED SURG R&B

## 2024-12-28 PROCEDURE — 99231 SBSQ HOSP IP/OBS SF/LOW 25: CPT | Performed by: INTERNAL MEDICINE

## 2024-12-28 PROCEDURE — 6360000002 HC RX W HCPCS: Performed by: NURSE PRACTITIONER

## 2024-12-28 PROCEDURE — 99232 SBSQ HOSP IP/OBS MODERATE 35: CPT | Performed by: INTERNAL MEDICINE

## 2024-12-28 PROCEDURE — 97162 PT EVAL MOD COMPLEX 30 MIN: CPT

## 2024-12-28 PROCEDURE — 36415 COLL VENOUS BLD VENIPUNCTURE: CPT

## 2024-12-28 PROCEDURE — 80053 COMPREHEN METABOLIC PANEL: CPT

## 2024-12-28 RX ORDER — PANTOPRAZOLE SODIUM 40 MG/1
40 TABLET, DELAYED RELEASE ORAL
Status: DISCONTINUED | OUTPATIENT
Start: 2024-12-28 | End: 2024-12-30 | Stop reason: HOSPADM

## 2024-12-28 RX ADMIN — ENOXAPARIN SODIUM 30 MG: 100 INJECTION SUBCUTANEOUS at 08:11

## 2024-12-28 RX ADMIN — OXYCODONE HYDROCHLORIDE AND ACETAMINOPHEN 1 TABLET: 5; 325 TABLET ORAL at 12:50

## 2024-12-28 RX ADMIN — ENOXAPARIN SODIUM 30 MG: 100 INJECTION SUBCUTANEOUS at 23:23

## 2024-12-28 RX ADMIN — CIPROFLOXACIN 400 MG: 2 INJECTION, SOLUTION INTRAVENOUS at 23:26

## 2024-12-28 RX ADMIN — OXYCODONE HYDROCHLORIDE AND ACETAMINOPHEN 1 TABLET: 5; 325 TABLET ORAL at 23:25

## 2024-12-28 RX ADMIN — METOPROLOL SUCCINATE 50 MG: 50 TABLET, EXTENDED RELEASE ORAL at 08:11

## 2024-12-28 RX ADMIN — SODIUM CHLORIDE, PRESERVATIVE FREE 10 ML: 5 INJECTION INTRAVENOUS at 23:28

## 2024-12-28 RX ADMIN — CIPROFLOXACIN 400 MG: 2 INJECTION, SOLUTION INTRAVENOUS at 12:05

## 2024-12-28 RX ADMIN — PANTOPRAZOLE SODIUM 40 MG: 40 TABLET, DELAYED RELEASE ORAL at 08:11

## 2024-12-28 RX ADMIN — SODIUM CHLORIDE, PRESERVATIVE FREE 10 ML: 5 INJECTION INTRAVENOUS at 08:11

## 2024-12-28 ASSESSMENT — PAIN DESCRIPTION - ORIENTATION: ORIENTATION: MID

## 2024-12-28 ASSESSMENT — PAIN SCALES - GENERAL
PAINLEVEL_OUTOF10: 0
PAINLEVEL_OUTOF10: 7
PAINLEVEL_OUTOF10: 6

## 2024-12-28 ASSESSMENT — PAIN DESCRIPTION - LOCATION: LOCATION: ABDOMEN

## 2024-12-28 NOTE — CARE COORDINATION
SPOKE WITH PATIENT ABOUT DC PLANNING. PT AGREEABLE TO SNF. [facility]OC OFFERED AND CHOSE [facility]. PT ASKED CM TO CALL HIS SISTER SHAWNEE. CALL TO SISTER, UPDATED AND IS AGREEABLE TO KO AS WELL. SPOKE WITH TIMMY AT [facility] AND REFERRAL GIVEN.

## 2024-12-29 LAB
ALBUMIN SERPL-MCNC: 2.8 G/DL (ref 3.5–4.6)
ALP SERPL-CCNC: 215 U/L (ref 35–104)
ALT SERPL-CCNC: 16 U/L (ref 0–41)
ANION GAP SERPL CALCULATED.3IONS-SCNC: 11 MEQ/L (ref 9–15)
AST SERPL-CCNC: 32 U/L (ref 0–40)
BASOPHILS # BLD: 0 K/UL (ref 0–0.2)
BASOPHILS NFR BLD: 0.5 %
BILIRUB SERPL-MCNC: 0.7 MG/DL (ref 0.2–0.7)
BUN SERPL-MCNC: 11 MG/DL (ref 8–23)
CALCIUM SERPL-MCNC: 8.3 MG/DL (ref 8.5–9.9)
CHLORIDE SERPL-SCNC: 96 MEQ/L (ref 95–107)
CO2 SERPL-SCNC: 24 MEQ/L (ref 20–31)
CREAT SERPL-MCNC: 0.58 MG/DL (ref 0.7–1.2)
EOSINOPHIL # BLD: 0.3 K/UL (ref 0–0.7)
EOSINOPHIL NFR BLD: 5.7 %
ERYTHROCYTE [DISTWIDTH] IN BLOOD BY AUTOMATED COUNT: 12.8 % (ref 11.5–14.5)
GLOBULIN SER CALC-MCNC: 3.6 G/DL (ref 2.3–3.5)
GLUCOSE SERPL-MCNC: 96 MG/DL (ref 70–99)
HCT VFR BLD AUTO: 37.7 % (ref 42–52)
HGB BLD-MCNC: 13.3 G/DL (ref 14–18)
LYMPHOCYTES # BLD: 0.9 K/UL (ref 1–4.8)
LYMPHOCYTES NFR BLD: 15.5 %
MCH RBC QN AUTO: 31.4 PG (ref 27–31.3)
MCHC RBC AUTO-ENTMCNC: 35.3 % (ref 33–37)
MCV RBC AUTO: 88.9 FL (ref 79–92.2)
MONOCYTES # BLD: 0.7 K/UL (ref 0.2–0.8)
MONOCYTES NFR BLD: 12.8 %
NEUTROPHILS # BLD: 3.7 K/UL (ref 1.4–6.5)
NEUTS SEG NFR BLD: 64.5 %
PLATELET # BLD AUTO: 161 K/UL (ref 130–400)
POTASSIUM SERPL-SCNC: 3.9 MEQ/L (ref 3.4–4.9)
PROT SERPL-MCNC: 6.4 G/DL (ref 6.3–8)
RBC # BLD AUTO: 4.24 M/UL (ref 4.7–6.1)
SODIUM SERPL-SCNC: 131 MEQ/L (ref 135–144)
WBC # BLD AUTO: 5.8 K/UL (ref 4.8–10.8)

## 2024-12-29 PROCEDURE — 97535 SELF CARE MNGMENT TRAINING: CPT

## 2024-12-29 PROCEDURE — 85025 COMPLETE CBC W/AUTO DIFF WBC: CPT

## 2024-12-29 PROCEDURE — 2500000003 HC RX 250 WO HCPCS: Performed by: INTERNAL MEDICINE

## 2024-12-29 PROCEDURE — 6370000000 HC RX 637 (ALT 250 FOR IP): Performed by: INTERNAL MEDICINE

## 2024-12-29 PROCEDURE — 80053 COMPREHEN METABOLIC PANEL: CPT

## 2024-12-29 PROCEDURE — 99232 SBSQ HOSP IP/OBS MODERATE 35: CPT | Performed by: INTERNAL MEDICINE

## 2024-12-29 PROCEDURE — 6360000002 HC RX W HCPCS: Performed by: NURSE PRACTITIONER

## 2024-12-29 PROCEDURE — 36415 COLL VENOUS BLD VENIPUNCTURE: CPT

## 2024-12-29 PROCEDURE — 6360000002 HC RX W HCPCS: Performed by: INTERNAL MEDICINE

## 2024-12-29 PROCEDURE — 97110 THERAPEUTIC EXERCISES: CPT

## 2024-12-29 PROCEDURE — 1210000000 HC MED SURG R&B

## 2024-12-29 RX ADMIN — METOPROLOL SUCCINATE 50 MG: 50 TABLET, EXTENDED RELEASE ORAL at 08:25

## 2024-12-29 RX ADMIN — CIPROFLOXACIN 400 MG: 2 INJECTION, SOLUTION INTRAVENOUS at 23:28

## 2024-12-29 RX ADMIN — ENOXAPARIN SODIUM 30 MG: 100 INJECTION SUBCUTANEOUS at 21:12

## 2024-12-29 RX ADMIN — ENOXAPARIN SODIUM 30 MG: 100 INJECTION SUBCUTANEOUS at 08:25

## 2024-12-29 RX ADMIN — SODIUM CHLORIDE, PRESERVATIVE FREE 10 ML: 5 INJECTION INTRAVENOUS at 21:12

## 2024-12-29 RX ADMIN — PANTOPRAZOLE SODIUM 40 MG: 40 TABLET, DELAYED RELEASE ORAL at 06:44

## 2024-12-29 RX ADMIN — CIPROFLOXACIN 400 MG: 2 INJECTION, SOLUTION INTRAVENOUS at 11:49

## 2024-12-29 RX ADMIN — OXYCODONE HYDROCHLORIDE AND ACETAMINOPHEN 1 TABLET: 5; 325 TABLET ORAL at 22:06

## 2024-12-29 RX ADMIN — OXYCODONE HYDROCHLORIDE AND ACETAMINOPHEN 1 TABLET: 5; 325 TABLET ORAL at 18:11

## 2024-12-29 RX ADMIN — SODIUM CHLORIDE, PRESERVATIVE FREE 10 ML: 5 INJECTION INTRAVENOUS at 11:49

## 2024-12-29 ASSESSMENT — PAIN DESCRIPTION - DESCRIPTORS: DESCRIPTORS: ACHING

## 2024-12-29 ASSESSMENT — PAIN SCALES - GENERAL
PAINLEVEL_OUTOF10: 4
PAINLEVEL_OUTOF10: 7
PAINLEVEL_OUTOF10: 6
PAINLEVEL_OUTOF10: 5
PAINLEVEL_OUTOF10: 6

## 2024-12-29 ASSESSMENT — PAIN DESCRIPTION - LOCATION
LOCATION: BACK
LOCATION: ABDOMEN

## 2024-12-29 ASSESSMENT — PAIN DESCRIPTION - ORIENTATION: ORIENTATION: RIGHT;LEFT

## 2024-12-29 NOTE — PLAN OF CARE

## 2024-12-30 VITALS
HEIGHT: 70 IN | HEART RATE: 90 BPM | BODY MASS INDEX: 36.51 KG/M2 | OXYGEN SATURATION: 94 % | WEIGHT: 255 LBS | TEMPERATURE: 97.9 F | RESPIRATION RATE: 18 BRPM | DIASTOLIC BLOOD PRESSURE: 76 MMHG | SYSTOLIC BLOOD PRESSURE: 147 MMHG

## 2024-12-30 PROBLEM — R33.9 URINARY RETENTION: Status: ACTIVE | Noted: 2024-12-30

## 2024-12-30 LAB
ALBUMIN SERPL-MCNC: 2.9 G/DL (ref 3.5–4.6)
ALP SERPL-CCNC: 212 U/L (ref 35–104)
ALT SERPL-CCNC: 13 U/L (ref 0–41)
ANION GAP SERPL CALCULATED.3IONS-SCNC: 9 MEQ/L (ref 9–15)
AST SERPL-CCNC: 30 U/L (ref 0–40)
BASOPHILS # BLD: 0 K/UL (ref 0–0.2)
BASOPHILS NFR BLD: 0.8 %
BILIRUB SERPL-MCNC: 0.5 MG/DL (ref 0.2–0.7)
BUN SERPL-MCNC: 9 MG/DL (ref 8–23)
CALCIUM SERPL-MCNC: 8.3 MG/DL (ref 8.5–9.9)
CHLORIDE SERPL-SCNC: 97 MEQ/L (ref 95–107)
CO2 SERPL-SCNC: 26 MEQ/L (ref 20–31)
CREAT SERPL-MCNC: 0.63 MG/DL (ref 0.7–1.2)
EOSINOPHIL # BLD: 0.3 K/UL (ref 0–0.7)
EOSINOPHIL NFR BLD: 5.9 %
ERYTHROCYTE [DISTWIDTH] IN BLOOD BY AUTOMATED COUNT: 12.9 % (ref 11.5–14.5)
GLOBULIN SER CALC-MCNC: 3.5 G/DL (ref 2.3–3.5)
GLUCOSE SERPL-MCNC: 110 MG/DL (ref 70–99)
HCT VFR BLD AUTO: 38.2 % (ref 42–52)
HGB BLD-MCNC: 13.3 G/DL (ref 14–18)
LYMPHOCYTES # BLD: 0.8 K/UL (ref 1–4.8)
LYMPHOCYTES NFR BLD: 16.3 %
MAGNESIUM SERPL-MCNC: 2.1 MG/DL (ref 1.7–2.4)
MCH RBC QN AUTO: 30.5 PG (ref 27–31.3)
MCHC RBC AUTO-ENTMCNC: 34.8 % (ref 33–37)
MCV RBC AUTO: 87.6 FL (ref 79–92.2)
MONOCYTES # BLD: 0.6 K/UL (ref 0.2–0.8)
MONOCYTES NFR BLD: 12.7 %
NEUTROPHILS # BLD: 3.1 K/UL (ref 1.4–6.5)
NEUTS SEG NFR BLD: 62.7 %
PLATELET # BLD AUTO: 174 K/UL (ref 130–400)
POTASSIUM SERPL-SCNC: 3.3 MEQ/L (ref 3.4–4.9)
PROT SERPL-MCNC: 6.4 G/DL (ref 6.3–8)
RBC # BLD AUTO: 4.36 M/UL (ref 4.7–6.1)
SODIUM SERPL-SCNC: 132 MEQ/L (ref 135–144)
WBC # BLD AUTO: 4.9 K/UL (ref 4.8–10.8)

## 2024-12-30 PROCEDURE — 83735 ASSAY OF MAGNESIUM: CPT

## 2024-12-30 PROCEDURE — 6370000000 HC RX 637 (ALT 250 FOR IP): Performed by: INTERNAL MEDICINE

## 2024-12-30 PROCEDURE — 6360000002 HC RX W HCPCS: Performed by: NURSE PRACTITIONER

## 2024-12-30 PROCEDURE — 6370000000 HC RX 637 (ALT 250 FOR IP): Performed by: UROLOGY

## 2024-12-30 PROCEDURE — 85025 COMPLETE CBC W/AUTO DIFF WBC: CPT

## 2024-12-30 PROCEDURE — 80053 COMPREHEN METABOLIC PANEL: CPT

## 2024-12-30 PROCEDURE — 97166 OT EVAL MOD COMPLEX 45 MIN: CPT

## 2024-12-30 PROCEDURE — 99223 1ST HOSP IP/OBS HIGH 75: CPT | Performed by: UROLOGY

## 2024-12-30 PROCEDURE — 2500000003 HC RX 250 WO HCPCS: Performed by: INTERNAL MEDICINE

## 2024-12-30 PROCEDURE — 36415 COLL VENOUS BLD VENIPUNCTURE: CPT

## 2024-12-30 PROCEDURE — 6360000002 HC RX W HCPCS: Performed by: INTERNAL MEDICINE

## 2024-12-30 RX ORDER — FINASTERIDE 5 MG/1
5 TABLET, FILM COATED ORAL DAILY
Status: DISCONTINUED | OUTPATIENT
Start: 2024-12-30 | End: 2024-12-30 | Stop reason: HOSPADM

## 2024-12-30 RX ORDER — PANTOPRAZOLE SODIUM 40 MG/1
40 TABLET, DELAYED RELEASE ORAL
DISCHARGE
Start: 2024-12-31

## 2024-12-30 RX ORDER — CIPROFLOXACIN 500 MG/1
500 TABLET, FILM COATED ORAL 2 TIMES DAILY
DISCHARGE
Start: 2024-12-30 | End: 2025-01-06

## 2024-12-30 RX ORDER — TAMSULOSIN HYDROCHLORIDE 0.4 MG/1
0.4 CAPSULE ORAL DAILY
DISCHARGE
Start: 2024-12-31

## 2024-12-30 RX ORDER — POLYETHYLENE GLYCOL 3350 17 G/17G
17 POWDER, FOR SOLUTION ORAL DAILY PRN
DISCHARGE
Start: 2024-12-30 | End: 2025-01-29

## 2024-12-30 RX ORDER — FINASTERIDE 5 MG/1
5 TABLET, FILM COATED ORAL DAILY
DISCHARGE
Start: 2024-12-31

## 2024-12-30 RX ORDER — OXYCODONE AND ACETAMINOPHEN 5; 325 MG/1; MG/1
1 TABLET ORAL EVERY 4 HOURS PRN
Qty: 20 TABLET | Refills: 0 | Status: SHIPPED | OUTPATIENT
Start: 2024-12-30 | End: 2025-01-02

## 2024-12-30 RX ORDER — TAMSULOSIN HYDROCHLORIDE 0.4 MG/1
0.4 CAPSULE ORAL DAILY
Status: DISCONTINUED | OUTPATIENT
Start: 2024-12-30 | End: 2024-12-30 | Stop reason: HOSPADM

## 2024-12-30 RX ADMIN — FINASTERIDE 5 MG: 5 TABLET, FILM COATED ORAL at 09:16

## 2024-12-30 RX ADMIN — TAMSULOSIN HYDROCHLORIDE 0.4 MG: 0.4 CAPSULE ORAL at 09:15

## 2024-12-30 RX ADMIN — SODIUM CHLORIDE, PRESERVATIVE FREE 10 ML: 5 INJECTION INTRAVENOUS at 09:16

## 2024-12-30 RX ADMIN — POTASSIUM CHLORIDE 40 MEQ: 1500 TABLET, EXTENDED RELEASE ORAL at 09:15

## 2024-12-30 RX ADMIN — PANTOPRAZOLE SODIUM 40 MG: 40 TABLET, DELAYED RELEASE ORAL at 06:39

## 2024-12-30 RX ADMIN — ENOXAPARIN SODIUM 30 MG: 100 INJECTION SUBCUTANEOUS at 09:15

## 2024-12-30 RX ADMIN — METOPROLOL SUCCINATE 50 MG: 50 TABLET, EXTENDED RELEASE ORAL at 09:16

## 2024-12-30 RX ADMIN — CIPROFLOXACIN 400 MG: 2 INJECTION, SOLUTION INTRAVENOUS at 12:12

## 2024-12-30 ASSESSMENT — PAIN SCALES - GENERAL
PAINLEVEL_OUTOF10: 0
PAINLEVEL_OUTOF10: 6

## 2024-12-30 NOTE — PLAN OF CARE
Problem: Safety - Adult  Goal: Free from fall injury  12/30/2024 1128 by April Orozco RN  Outcome: Progressing  12/30/2024 0417 by Gomez, Grisel, RN  Outcome: Progressing     Problem: Discharge Planning  Goal: Discharge to home or other facility with appropriate resources  12/30/2024 1128 by April Orozco RN  Outcome: Progressing  12/30/2024 0417 by Gomez, Grisel, RN  Outcome: Progressing     Problem: Pain  Goal: Verbalizes/displays adequate comfort level or baseline comfort level  12/30/2024 1128 by April Orozco RN  Outcome: Progressing  12/30/2024 0417 by Gomez, Grisel, RN  Outcome: Progressing     Problem: Skin/Tissue Integrity  Goal: Absence of new skin breakdown  Description: 1.  Monitor for areas of redness and/or skin breakdown  2.  Assess vascular access sites hourly  3.  Every 4-6 hours minimum:  Change oxygen saturation probe site  4.  Every 4-6 hours:  If on nasal continuous positive airway pressure, respiratory therapy assess nares and determine need for appliance change or resting period.  12/30/2024 1128 by April Orozco RN  Outcome: Progressing  12/30/2024 0417 by Gomez, Grisel, RN  Outcome: Progressing     Problem: Nutrition Deficit:  Goal: Optimize nutritional status  12/30/2024 1128 by April Orozco RN  Outcome: Progressing  12/30/2024 0417 by Gomez, Grisel, RN  Outcome: Progressing

## 2024-12-30 NOTE — CONSULTS
Hematology/Oncology Consult  Encounter Date: 2024 8:17 AM    Mr. Juan Kumar is a 84 y.o. male  : 1940  MRN: [id number]  Acct Number: [id number]  Requesting Provider: Shama Szymanski DO    Reason for request: metastatic disease.      CONSULTANT: Xavier Bourne MD    HPI: Juan was admitted for abdominal pain. Ct scan of abdomen showed multiple lung and liver lesions. Largest lesion in right lobe of liver measuring 8.7 x 5.8 cm. Denies smoking or drinking. Consult Ir for liver biopsy. Ordered tumor markers.     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 
Pulmonary and Critical Care Medicine  Consult Note  Encounter Date: 2024 11:57 AM    Mr. Juan Kumar is a 84 y.o. male  : 1940  Requesting Provider: Shama Szymanski DO    Reason for request: Lung nodules            HISTORY OF PRESENT ILLNESS:    Patient is 84 y.o. presents with back pain and abdominal pain, no chest pain, no coughing, no shortness of breath, had CT scan done showing multiple lung nodules.  No fever.  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    HTN (hypertension)     Hx of carpal tunnel syndrome     Obesity     Other fracture of left femur, initial encounter for closed fracture ([facility]) 3/14/2022    Primary osteoarthritis of both knees        Past Surgical History:        Procedure Laterality Date    REVISION TOTAL KNEE ARTHROPLASTY Left 3/16/2023    LEFT KNEE ARTHROTOMY WITH POLYETHYLENE LINER EXCHANGE AND PLACEMENT OF TOPICAL ANTIBIOTICS [place] [facility] [place]    TOTAL KNEE ARTHROPLASTY Left 3/15/2022    LEFT KNEE DISTAL FEMUR REPLACEMENT [place] Ranjan [place] [facility] [place]       Social History:     reports that he quit smoking about 43 years ago. His smoking use included cigarettes. He started smoking about 83 years ago. He has been exposed to tobacco smoke. He has quit using smokeless tobacco. He reports that he does not currently use alcohol. He reports that he does not use drugs.    Family History:       Problem Relation Age of Onset    Cancer Mother     Cancer Father        Allergies:  Aspirin        MEDICATIONS during current hospitalization:    Continuous Infusions:   sodium chloride 100 mL/hr at 24 4618    
Spiritual Health History and Assessment/Progress Note  [facility]    Initial Encounter, Grief, Loss, and Adjustments,  ,  ,      Name: Juan Kumar MRN: [id number]    Age: 84 y.o.     Sex: male   Language: English   Mu-ism: Buddhist   Abdominal pain     Date: 12/26/2024            Total Time Calculated: 20 min              Spiritual Assessment began in [place]  4[facility] MED SURG UNIT        Referral/Consult From: Nurse   Encounter Overview/Reason: Initial Encounter, Grief, Loss, and Adjustments  Service Provided For: Patient    Patient sitting in bed awake and alert. Patent was passive with questioning. Patient was gloomy and did not want to talk much. Patient does not have any kids and sister is primary decision maker. Patient had a recent cancer diagnosis that it hitting him hard.  offer support, patient was thankful and wanted prayer. Patient and  said the Our Father. Patient is Buddhist and wanted communion if possible.  addressed wants saying a volunteer lay  should be around later to give communion. Patient was thankful and friendly.    Mitzi, Belief, Meaning:   Patient has beliefs or practices that help with coping during difficult times  Family/Friends No family/friends present      Importance and Influence:  Patient has spiritual/personal beliefs that influence decisions regarding their health  Family/Friends No family/friends present    Community:  Patient feels well-supported. Support system includes: Friends and Extended family  Family/Friends No family/friends present    Assessment and Plan of Care:     Patient Interventions include: Facilitated expression of thoughts and feelings and Affirmed coping skills/support systems  Family/Friends Interventions include: No family/friends present    Patient Plan of Care: Spiritual Care available upon further referral  Family/Friends Plan of Care: No family/friends present    Electronically signed by Chaplain Jenni 
Includes pulmonary artery aneurysm, BPH, hypertension, obesity, osteoarthritis.    PAST SURGICAL HISTORY:  Includes revision of a left total knee and then a prior left total knee replacement in 2022; the knee was revised in 2023.    SOCIAL HISTORY:  He has a remote history of cigarette smoking.  He quit in 1982.  Smoked about a pack a day for 30-40 years prior.    FAMILY HISTORY:  He has no family history of genitourinary malignancies.    ALLERGIES:  TO ASPIRIN.      MEDICATIONS ON ADMISSION:  Included Keflex, Bactroban, polymyxin, Prilosec, Protonix, Tylenol, Lasix, Maxzide, Toprol.    REVIEW OF SYSTEMS:  He denies chest pain, shortness of breath, fevers, or chills.  Otherwise, negative and noncontributory unless as outlined in the history of present illness.    PHYSICAL EXAMINATION:  VITAL SIGNS:  His temperature is 36.6, heart rate 90, respiratory rate 18, blood pressure 147/76.   GENERAL:  He is a well-developed, well-nourished male, lying in bed, in no obvious acute distress.  HEENT:  Atraumatic, normocephalic.  His trachea is midline and supple.    LUNGS:  Clear anteriorly without rhonchi, wheezing, or rales.  HEART:  Regular rate and rhythm.  ABDOMEN:  Soft, nondistended, nontender.  He had no palpable organomegaly.  He had no palpable abdominal hernias.  He had no CVA tenderness.  He had bowel sounds positive.  GENITOURINARY:  Revealed a Kaufman catheter 16-Vietnamese draining clear yellow urine.  His scrotum was unremarkable.  His testes were downgoing bilaterally.   EXTREMITIES:  Showed no cyanosis or edema.  NEUROLOGIC:  He was alert and oriented.  PSYCH:  He had a normal affect.    LABORATORY DATA:  His creatinine on 12/30 is 0.63 and normal.  His white count is 4.9, hematocrit 38.2, hemoglobin 13.3, platelet count of 174.  He is currently on IV Cipro.  He did have a positive urine culture for pseudomonas, greater than 100,000 colonies, which was sensitive to Levaquin.  His PSA is 1.87 and normal.  His INR is 
COMPARISON: None. HISTORY: ORDERING SYSTEM PROVIDED HISTORY: Edema left entire leg TECHNOLOGIST PROVIDED HISTORY: What reading provider will be dictating this exam?->CRC FINDINGS: The visualized veins of the bilateral lower extremities are patent and free of echogenic thrombus. The veins demonstrate good compressibility with normal color flow study and spectral analysis. Calf veins are segmentally visualized.     No evidence of DVT in either lower extremity.     CT ABDOMEN PELVIS W IV CONTRAST Additional Contrast? None    Result Date: 12/24/2024  EXAMINATION: CT OF THE ABDOMEN AND PELVIS WITH CONTRAST; CT OF THE LUMBAR SPINE WITHOUT CONTRAST 12/24/2024 12:25 pm TECHNIQUE: CT of the abdomen and pelvis was performed with the administration of intravenous contrast. Multiplanar reformatted images are provided for review. Automated exposure control, iterative reconstruction, and/or weight based adjustment of the mA/kV was utilized to reduce the radiation dose to as low as reasonably achievable.; CT of the lumbar spine was performed without the administration of intravenous contrast. Multiplanar reformatted images are provided for review.  Adjustment of mA and/or kV according to patient size was utilized.  Automated exposure control, iterative reconstruction, and/or weight based adjustment of the mA/kV was utilized to reduce the radiation dose to as low as reasonably achievable.  Radiation dose: 2435.32 mGy-cm COMPARISON: CT abdomen and pelvis: January 27, 2021; MRI lumbar spine March 13, 2023 HISTORY: ORDERING SYSTEM PROVIDED HISTORY: abd bloating TECHNOLOGIST PROVIDED HISTORY: Reason for exam:->abd bloating Additional Contrast?->None Decision Support Exception - unselect if not a suspected or confirmed emergency medical condition->Emergency Medical Condition (MA) What reading provider will be dictating this exam?->CRC; ORDERING SYSTEM PROVIDED HISTORY: midline pain TECHNOLOGIST PROVIDED HISTORY: Reason for 
was performed without the administration of intravenous contrast. Multiplanar reformatted images are provided for review.  Adjustment of mA and/or kV according to patient size was utilized.  Automated exposure control, iterative reconstruction, and/or weight based adjustment of the mA/kV was utilized to reduce the radiation dose to as low as reasonably achievable.  Radiation dose: 2435.32 mGy-cm COMPARISON: CT abdomen and pelvis: January 27, 2021; MRI lumbar spine March 13, 2023 HISTORY: ORDERING SYSTEM PROVIDED HISTORY: abd bloating TECHNOLOGIST PROVIDED HISTORY: Reason for exam:->abd bloating Additional Contrast?->None Decision Support Exception - unselect if not a suspected or confirmed emergency medical condition->Emergency Medical Condition (MA) What reading provider will be dictating this exam?->CRC; ORDERING SYSTEM PROVIDED HISTORY: midline pain TECHNOLOGIST PROVIDED HISTORY: Reason for exam:->midline pain Decision Support Exception - unselect if not a suspected or confirmed emergency medical condition->Emergency Medical Condition (MA) What reading provider will be dictating this exam?->CRC FINDINGS: CT OF THE ABDOMEN AND PELVIS: Lower Chest: Atelectasis is seen in the bases.  There are multiple nodules seen in the bases of the lungs. Right: Right middle lobe, 6/214, 9.9 mm Right middle lobe, 6/214,8.8 mm Right lower lobe, 8/214, 9 mm Right middle lobe, 15/214, 7 mm Right lower lobe, 21/214, 1.2 cm, appears mildly spiculated is There of the smaller nodules ductal seen in the visualized right lung field. Left: Lingula, 7/214 1.1 cm Left lower lobe, 9/214, 3.8 mm Anterior right lower lobe, 25/214, 1.2 cm A few other small nodules are seen in the left lung. Organs: There are multiple hypodense lesions seen in the liver.  The largest area seen is in the right lobe and measures approximately 8.7 x 5.8 cm.  No intrahepatic ductal dilation is seen.  There are multiple stones seen in the gallbladder.  No

## 2024-12-30 NOTE — DISCHARGE INSTRUCTIONS
Liver bx on 1/06/24  Please arrive at 1200  Nothing to eat or drink starting at midnight the evening before  Will need a ride home after procedure.

## 2024-12-30 NOTE — PLAN OF CARE
See OT evaluation for all goals and OT POC. Electronically signed by Vinita Bonilla OT on 12/30/2024 at 11:55 AM

## 2024-12-30 NOTE — DISCHARGE INSTR - COC
Respiratory Treatments: N/A  Oxygen Therapy:  is not on home oxygen therapy.  Ventilator:    - No ventilator support    Rehab Therapies: Physical Therapy and Occupational Therapy  Weight Bearing Status/Restrictions: No weight bearing restrictions  Other Medical Equipment (for information only, NOT a DME order):  wheelchair, walker, and bedside commode  Other Treatments: N/A    Patient's personal belongings (please select all that are sent with patient):  None    RN SIGNATURE:  Electronically signed by April Orozco RN on 12/30/24 at 2:01 PM EST    CASE MANAGEMENT/SOCIAL WORK SECTION    Inpatient Status Date: ***    Readmission Risk Assessment Score:  [facility] RISK OF UNPLANNED READMISSION 2.0             14.7 Total Score        Discharging to Facility/ Agency   Name: [facility]   Address:  Phone: [phone]  Fax:    / signature: Electronically signed by RAZA Amaya on 12/30/24 at 11:30 AM EST    PHYSICIAN SECTION    Prognosis: Fair    Condition at Discharge: Stable    Rehab Potential (if transferring to Rehab): Good    Recommended Labs or Other Treatments After Discharge: Follow-up with IR for liver biopsy, follow-up with oncology and pulmonary medicine    Physician Certification: I certify the above information and transfer of Juan Kumar  is necessary for the continuing treatment of the diagnosis listed and that he requires Skilled Nursing Facility for less 30 days.   The individual is being discharged to a nursing facility directly from the hospital after receiving acute patient care at the hospital; and    Requires nursing facility services for the condition for which the patient received care in the hospital; and    As the attending physician, certifies no later than the date of discharge, the individual requires fewer than 30 days of nursing facility care   Update Admission H&P: No change in H&P    PHYSICIAN SIGNATURE:  Electronically signed by Shama FRANCO

## 2024-12-30 NOTE — FLOWSHEET NOTE
Spoke with SETH Lowe on 4WT to confirm pt is scheduled for procedure with Dr. Bennett on 1/6/25 (pt being discharged today). Informed that pt needs to be NPO after midnight before procedure, pt needs to arrange transportation home after procedure as he will not be able to drive after, and that pt should arrive at 1200 on 1/6/24.

## 2024-12-30 NOTE — CARE COORDINATION
This LSW visited patient at bedside this am. Patient will be discharged to [facility] today, 12/30/2024.  I arranged transportation via [facility]. Transport is scheduled for 2:00pm.  Patient, patients sister, Keyona Perkins at [facility] and SETH Chen are all aware.  I completed 2nd IMM with patient, copy provided.  I completed [id number] for SNF transfer.  Electronically signed by RAZA Amaya on 12/30/24 at 11:55 AM EST

## 2024-12-30 NOTE — DISCHARGE SUMMARY
small umbilical hernia is seen that contains fat. CT OF THE LUMBAR SPINE: ALIGNMENT: No significant scoliosis is seen.  The bony structures are demineralized. DEGENERATIVE CHANGES: Mild loss of vertebral body height is seen at the L2. There is also compression deformity seen in the superior endplate of T11. Mild intervertebral disc space narrowing is seen at L1-2, moderate at L2-3 and severe at L5-S1.  Anterior osteophytes are seen at multiple levels. Mild neural foraminal narrowing is seen at T12-L1 on the right.  Mild neural foraminal narrowing is also seen at L2-3 bilaterally.  At L3-4 neural foraminal narrowing is seen bilaterally and there is mild canal stenosis.  At L4-5 mild neural foraminal narrowing is seen on the left.  There is no evidence for canal stenosis.  No canal stenosis is seen at L5-S1.  Bilateral mild neural foraminal narrowing is seen. BONES/SOFT TISSUES: Atelectasis is seen in the visualized bases.  7.5 mm stone is seen in the right kidney.  Bladder wall thickening is seen and calcification is seen in the posterior right of the bladder that measures 1 point 2 cm.  The prostate gland protrudes into the base of the bladder.     CT of the abdomen and pelvis: 1.  Multiple nodules are seen in the visualized lungs thought likely metastatic disease 2.  Hypodense lesions are now seen in the liver also concerning for metastatic disease 3.  Cholelithiasis 4.  Nephroliths in the right kidney that is not causing hydronephrosis 5.  No evidence for bowel obstruction, appendicitis or diverticulitis 6.  Enlarged prostate CT of the lumbar spine: Multilevel degenerative changes are seen.  No acute fracture is seen on this examination. Findings discussed with ELSIE Wong, on today's date at 1 20 p.m.       Discharge Medications:         Medication List        START taking these medications      ciprofloxacin 500 MG tablet  Commonly known as: CIPRO  Take 1 tablet by mouth 2 times daily for 7 days

## 2024-12-30 NOTE — PLAN OF CARE
Problem: Nutrition Deficit:  Goal: Optimize nutritional status  12/30/2024 0417 by Gomez, Grisel, RN  Outcome: Progressing  12/29/2024 1556 by Kelsey Jean RN  Outcome: Progressing     Problem: Skin/Tissue Integrity  Goal: Absence of new skin breakdown  Description: 1.  Monitor for areas of redness and/or skin breakdown  2.  Assess vascular access sites hourly  3.  Every 4-6 hours minimum:  Change oxygen saturation probe site  4.  Every 4-6 hours:  If on nasal continuous positive airway pressure, respiratory therapy assess nares and determine need for appliance change or resting period.  12/30/2024 0417 by Gomez, Grisel, RN  Outcome: Progressing  12/29/2024 1556 by Kelsey Jean RN  Outcome: Progressing     Problem: Pain  Goal: Verbalizes/displays adequate comfort level or baseline comfort level  12/30/2024 0417 by Gomez, Grisel, RN  Outcome: Progressing  12/29/2024 1556 by Kelsey Jean RN  Outcome: Progressing     Problem: Discharge Planning  Goal: Discharge to home or other facility with appropriate resources  12/30/2024 0417 by Gomez, Grisel, RN  Outcome: Progressing  12/29/2024 1556 by Kelsey Jean RN  Outcome: Progressing     Problem: Safety - Adult  Goal: Free from fall injury  12/30/2024 0417 by Gomez, Grisel, RN  Outcome: Progressing  12/29/2024 1556 by Kelsey Jean RN  Outcome: Progressing

## 2024-12-31 ENCOUNTER — OFFICE VISIT (OUTPATIENT)
Dept: GERIATRIC MEDICINE | Age: 84
End: 2024-12-31

## 2024-12-31 DIAGNOSIS — R16.0 LIVER MASS: ICD-10-CM

## 2024-12-31 DIAGNOSIS — N13.9 OBSTRUCTIVE UROPATHY: ICD-10-CM

## 2024-12-31 DIAGNOSIS — R91.8 LUNG NODULES: Primary | ICD-10-CM

## 2024-12-31 DIAGNOSIS — K26.9 DUODENAL ULCER: ICD-10-CM

## 2024-12-31 NOTE — PROGRESS NOTES
0600: Pt has minimal output during my shift. Bladder scan showed 630 mL. Pt states his baseline is trickles of urine and he denies of any discomfort with urination. Dr. Arthur made aware via perfect serve.    Per Dr Arthur, continue to monitor this for now. Will place payne if needed.     Electronically signed by Gianna Collier RN on 12/25/2024 at 6:13 AM    
Brief Renal Note:  Attempted to see patient but he is off the floor getting EGD/C-scope  Renal function much better w/ Scr going from 4.4 to 0.8, will confirm this w/ repeat BMP when he gets back  Ok to keep off IVFs and maintain payne   ? If urinary retention as only change yesterday was placement of payne catheter (had been on IVFs prior to that w/ no improvement) though renal ultrasound showed no hydronephrosis    Srinivasa Machado MD  Nephrology  
Comprehensive Nutrition Assessment    Type and Reason for Visit:  Initial, Positive nutrition screen    Nutrition Recommendations/Plan:   Continue clear liquid diet until medically appropriate to advance  Pt declines supplements   Monitor nutrition progression     Malnutrition Assessment:  Malnutrition Status:  At risk for malnutrition (12/27/24 1939)    Context:  Chronic Illness     Findings of the 6 clinical characteristics of malnutrition:  Energy Intake:  Mild decrease in energy intake  Weight Loss:  No weight loss     Body Fat Loss:  No body fat loss     Muscle Mass Loss:  No muscle mass loss    Fluid Accumulation:  Unable to assess     Strength:  Not Performed    Nutrition Assessment:    Pt at risk for malnutrition with poor PO intake. Wt appears stable per EMR records. Currently on clear liquids s/p EGD with biopsy. RD will continue to monitor nutrition progression. Pt requiring increased nutrient needs for wound healing- declined all prescribed supplements.    Nutrition Related Findings:    Pt presents with abd pain. c/o decreased PO intake and weight loss. CT imaging c/f multiple lesions in the liver c/f metastases and multiple lung nodules as well. Elavated LFTs and BUN/Cr.  +2 BLE pitting edema. Wound Type: Pressure Injury       Current Nutrition Intake & Therapies:    Average Meal Intake: 0%  Average Supplements Intake: None Ordered  ADULT DIET; Clear Liquid    Anthropometric Measures:  Height: 177.8 cm (5' 10\")  Ideal Body Weight (IBW): 166 lbs (75 kg)    Admission Body Weight: 115.7 kg (255 lb)  Current Body Weight: 115.7 kg (255 lb),  Weight Source: Bed scale  Current BMI (kg/m2): 36.6  Usual Body Weight: 116.3 kg (256 lb 6 oz) (2022 bed; 250 lb office visit 4/2024)     % Weight Change (Calculated): -0.5                    BMI Categories: Obese Class 2 (BMI 35.0 -39.9)    Estimated Daily Nutrient Needs:  Energy Requirements Based On: Kcal/kg  Weight Used for Energy Requirements: Current  Energy 
Hematology/Oncology   Progress Note        CHIEF COMPLAINT/HPI:  Follow up of lung and liver metastasis. Endoscopy and colonoscopy showed duodenal erosions and polyp from the colon. AFP elevated at 70 . Biopsy for 1/6/2025.     REVIEW OF SYSTEMS:    Unremarkable except for symptoms mentioned in HPI.    Current Inpatient Medications:    Current Facility-Administered Medications   Medication Dose Route Frequency Provider Last Rate Last Admin    pantoprazole (PROTONIX) tablet 40 mg  40 mg Oral QAM AC Stephon, Yazid R, DO   40 mg at 12/28/24 0811    ciprofloxacin (CIPRO) IVPB 400 mg  400 mg IntraVENous Q12H Stephon, Yazid R, DO   Stopped at 12/28/24 1305    polyethylene glycol (GoLYTELY) solution 4,000 mL  4,000 mL Oral Once Karen Carter MD        HYDROmorphone (DILAUDID) injection 0.5 mg  0.5 mg IntraVENous Q4H PRN Kelly Arthur MD        sodium chloride flush 0.9 % injection 5-40 mL  5-40 mL IntraVENous 2 times per day Stepohn, Yazid R, DO   10 mL at 12/28/24 0811    sodium chloride flush 0.9 % injection 5-40 mL  5-40 mL IntraVENous PRN Stephon, Yazid R, DO        0.9 % sodium chloride infusion   IntraVENous PRN Stephon, Yazid R, DO        potassium chloride (KLOR-CON M) extended release tablet 40 mEq  40 mEq Oral PRN Stephon, Yazid R, DO        Or    potassium bicarb-citric acid (EFFER-K) effervescent tablet 40 mEq  40 mEq Oral PRN Stephon, Yazid R, DO        Or    potassium chloride 10 mEq/100 mL IVPB (Peripheral Line)  10 mEq IntraVENous PRN Stephon, Yazid R, DO        magnesium sulfate 2000 mg in 50 mL IVPB premix  2,000 mg IntraVENous PRN Stephon, Yazid R, DO        enoxaparin Sodium (LOVENOX) injection 30 mg  30 mg SubCUTAneous BID Bolzan-Roche, NicoCHARLIE parra - CNP   30 mg at 12/28/24 0811    ondansetron (ZOFRAN-ODT) disintegrating tablet 4 mg  4 mg Oral Q8H PRN Stephon, Yazid R, DO        Or    ondansetron (ZOFRAN) injection 4 mg  4 mg IntraVENous Q6H PRN Stephon, Yazid R, DO   4 mg at 12/26/24 2127    
Hematology/Oncology   Progress Note        CHIEF COMPLAINT/HPI:  Follow up of suspected metastatic cancer. CEA,Ca 19-9 PSA were normal. AFP elevated at 70. In acute renal failure . Biopsy of liver is on hold until 1/6/2024.     REVIEW OF SYSTEMS:    Unremarkable except for symptoms mentioned in HPI.    Current Inpatient Medications:    Current Facility-Administered Medications   Medication Dose Route Frequency Provider Last Rate Last Admin    sodium chloride flush 0.9 % injection 5-40 mL  5-40 mL IntraVENous 2 times per day Karen Carter MD        sodium chloride flush 0.9 % injection 5-40 mL  5-40 mL IntraVENous PRN Karen Carter MD        0.9 % sodium chloride infusion  25 mL IntraVENous PRN Karen Carter MD        polyethylene glycol (GoLYTELY) solution 4,000 mL  4,000 mL Oral Once Karen Carter MD        0.9 % sodium chloride infusion   IntraVENous Continuous Stephon, Yazid R,  mL/hr at 12/26/24 0631 New Bag at 12/26/24 0631    HYDROmorphone (DILAUDID) injection 0.5 mg  0.5 mg IntraVENous Q4H PRN Kelly Arthur MD        cefTRIAXone (ROCEPHIN) 1,000 mg in sterile water 10 mL IV syringe  1,000 mg IntraVENous Q24H Stephon, Yazid R, DO   1,000 mg at 12/26/24 1009    sodium chloride flush 0.9 % injection 5-40 mL  5-40 mL IntraVENous 2 times per day Stephon, Yazid R, DO   10 mL at 12/26/24 0904    sodium chloride flush 0.9 % injection 5-40 mL  5-40 mL IntraVENous PRN Stephon, Yazid R, DO        0.9 % sodium chloride infusion   IntraVENous PRN Stephon, Yazid R, DO        potassium chloride (KLOR-CON M) extended release tablet 40 mEq  40 mEq Oral PRN Stephon, Yazid R, DO        Or    potassium bicarb-citric acid (EFFER-K) effervescent tablet 40 mEq  40 mEq Oral PRN Stephon, Yazid R, DO        Or    potassium chloride 10 mEq/100 mL IVPB (Peripheral Line)  10 mEq IntraVENous PRN Stephon, Yazid R, DO        magnesium sulfate 2000 mg in 50 mL IVPB premix  2,000 mg IntraVENous PRN Stephon, Yazid R, DO     
INPATIENT PROGRESS NOTES    PATIENT NAME: Juan Kumar  MRN: [id number]  SERVICE DATE:  December 27, 2024   SERVICE TIME:  5:18 PM      PRIMARY SERVICE: Pulmonary Disease    CHIEF COMPLAIN: Lung mass      INTERVAL HPI: Patient seen and examined at bedside, Interval Notes, orders reviewed. Nursing notes noted  He is comfortable in bed without having any complaint of shortness of breath.  No chest pain.  No fever.  No cough or sputum production.  Still waiting for biopsy report.  On room air O2 saturation 91%         OBJECTIVE   I/O:24HR INTAKE/OUTPUT:    Intake/Output Summary (Last 24 hours) at 12/27/2024 1718  Last data filed at 12/27/2024 1524  Gross per 24 hour   Intake 590 ml   Output 2100 ml   Net -1510 ml     12/26 0701 - 12/27 0700  In: 10 [I.V.:10]  Out: 3725 [Urine:3725]  Body mass index is 36.59 kg/m².    PHYSICAL EXAM:  Vitals:  /61   Pulse 79   Temp 98.8 °F (37.1 °C) (Oral)   Resp 18   Ht 1.778 m (5' 10\")   Wt 115.7 kg (255 lb)   SpO2 94%   BMI 36.59 kg/m²     General: Alert, awake .comfortable in bed, No distress.  Head: Atraumatic , Normocephalic   Eyes: PERRL. No sclera icterus. No conjunctival injection. No discharge   ENT: No nasal  discharge. Pharynx clear.  Neck:  Trachea midline. No thyromegaly, no JVD, No cervical adenopathy.  Chest : Bilaterally symmetrical ,Normal effort,  No accessory muscle use  Lung : Diminished breath sound bilaterally No Rales. No wheezing. No rhonchi.   Heart:: Normal rate. Regular rhythm. No mumur ,  Rub or gallop  ABD: Non-tender. Non-distended. No masses. No organmegaly. Normal bowel sounds. No hernia.  Ext : No Pitting both leg , No Cyanosis No clubbing  Neuro: no focal weakness    Labs:  CBC:   Recent Labs     12/25/24  0554 12/26/24  0616 12/27/24  0512   WBC 6.7 7.0 7.2   HGB 13.4* 12.7* 14.2   HCT 39.0* 38.1* 40.3*    136 130     BMP:    Recent Labs     12/26/24  0616 12/27/24  0512 12/27/24  1400    135 132*   K 4.3 4.2 3.9    99 99 
INPATIENT PROGRESS NOTES    PATIENT NAME: Juan Kumar  MRN: [id number]  SERVICE DATE:  2024   SERVICE TIME:  10:44 AM      PRIMARY SERVICE: Pulmonary Disease    CHIEF COMPLAINTS: Lung nodules    INTERVAL HPI: Patient seen and examined at bedside, Interval Notes, orders reviewed. Nursing notes noted    No chest pain, no coughing, no shortness of breath, and no worsening lower extremity edema he is on room air saturation 90%    New information updated in the note today, rest of the examination did not change compared to yesterday.    Review of system:     GI Abdominal pain No  Skin Rash No    Social History     Tobacco Use    Smoking status: Former     Current packs/day: 0.00     Types: Cigarettes     Start date:      Quit date:      Years since quittin.0     Passive exposure: Past    Smokeless tobacco: Never    Tobacco comments:     40 years ago per patient   Substance Use Topics    Alcohol use: Not Currently         Problem Relation Age of Onset    Cancer Mother     Cancer Father     Colon Cancer Neg Hx          OBJECTIVE    Body mass index is 36.59 kg/m².    PHYSICAL EXAM:  Vitals:  /72   Pulse 78   Temp 97.9 °F (36.6 °C) (Oral)   Resp 18   Ht 1.778 m (5' 10\")   Wt 115.7 kg (255 lb)   SpO2 90%   BMI 36.59 kg/m²     General: alert, cooperative, no distress  Head: normocephalic, atraumatic  Eyes:No gross abnormalities.  ENT:  MMM no lesions  Neck:  supple and no masses  Chest : clear to auscultation bilaterally- no wheezes, rales or rhonchi, normal air movement, no respiratory distress  Heart:: Heart sounds are normal.  Regular rate and rhythm without murmur, gallop or rub.  ABD:  symmetric, soft, non-tender  Musculoskeletal : no cyanosis, no clubbing, and no edema  Neuro:  Grossly normal  Skin: No rashes or nodules noted.  Lymph node:  no cervical nodes  Urology: No Kaufman   Psychiatric: appropriate    DATA:   Recent Labs     24  0419 24  0509   WBC 6.4 5.8   HGB 
INPATIENT PROGRESS NOTES    PATIENT NAME: Juan Kumar  MRN: [id number]  SERVICE DATE:  2024   SERVICE TIME:  2:43 PM      PRIMARY SERVICE: Pulmonary Disease    CHIEF COMPLAINTS: Lung mass    INTERVAL HPI: Patient seen and examined at bedside, Interval Notes, orders reviewed. Nursing notes noted    Patient report no complaint, no chest pain, no shortness of breath, he is on room air saturation 90%, no fever.    New information updated in the note today, rest of the examination did not change compared to yesterday.    Review of system:     GI Abdominal pain No  Skin Rash No    Social History     Tobacco Use    Smoking status: Former     Current packs/day: 0.00     Types: Cigarettes     Start date:      Quit date:      Years since quittin.0     Passive exposure: Past    Smokeless tobacco: Former    Tobacco comments:     40 years ago per patient   Substance Use Topics    Alcohol use: Not Currently         Problem Relation Age of Onset    Cancer Mother     Cancer Father          OBJECTIVE    Body mass index is 36.59 kg/m².    PHYSICAL EXAM:  Vitals:  /64   Pulse 81   Temp 98.1 °F (36.7 °C) (Oral)   Resp 18   Ht 1.778 m (5' 10\")   Wt 115.7 kg (255 lb)   SpO2 90%   BMI 36.59 kg/m²     General: alert, cooperative, no distress  Head: normocephalic, atraumatic  Eyes:No gross abnormalities.  ENT:  MMM no lesions  Neck:  supple and no masses  Chest : clear to auscultation bilaterally- no wheezes, rales or rhonchi, normal air movement, no respiratory distress  Heart:: Heart sounds are normal.  Regular rate and rhythm without murmur, gallop or rub.  ABD:  symmetric, soft, non-tender  Musculoskeletal : no cyanosis, no clubbing, and no edema  Neuro:  Grossly normal  Skin: No rashes or nodules noted.  Lymph node:  no cervical nodes  Urology: No Kaufman   Psychiatric: appropriate    DATA:   Recent Labs     24  0554 24  0616   WBC 6.7 7.0   HGB 13.4* 12.7*   HCT 39.0* 38.1*   MCV 89.4 
INPATIENT PROGRESS NOTES    PATIENT NAME: Juan Kumar  MRN: [id number]  SERVICE DATE:  2024   SERVICE TIME:  11:22 AM      PRIMARY SERVICE: Pulmonary Disease    CHIEF COMPLAINTS: Lung nodules    INTERVAL HPI: Patient seen and examined at bedside, Interval Notes, orders reviewed. Nursing notes noted    No chest pain, no shortness of breath, no coughing, no fever, on room air saturation 93%.    New information updated in the note today, rest of the examination did not change compared to yesterday.    Review of system:     GI Abdominal pain No  Skin Rash No    Social History     Tobacco Use    Smoking status: Former     Current packs/day: 0.00     Types: Cigarettes     Start date:      Quit date:      Years since quittin.0     Passive exposure: Past    Smokeless tobacco: Never    Tobacco comments:     40 years ago per patient   Substance Use Topics    Alcohol use: Not Currently         Problem Relation Age of Onset    Cancer Mother     Cancer Father     Colon Cancer Neg Hx          OBJECTIVE    Body mass index is 36.59 kg/m².    PHYSICAL EXAM:  Vitals:  BP (!) 124/48   Pulse 74   Temp 98.1 °F (36.7 °C)   Resp 18   Ht 1.778 m (5' 10\")   Wt 115.7 kg (255 lb)   SpO2 93%   BMI 36.59 kg/m²     General: alert, cooperative, no distress  Head: normocephalic, atraumatic  Eyes:No gross abnormalities.  ENT:  MMM no lesions  Neck:  supple and no masses  Chest : clear to auscultation bilaterally- no wheezes, rales or rhonchi, normal air movement, no respiratory distress  Heart:: Heart sounds are normal.  Regular rate and rhythm without murmur, gallop or rub.  ABD:  symmetric, soft, non-tender  Musculoskeletal : no cyanosis, no clubbing, and no edema  Neuro:  Grossly normal  Skin: No rashes or nodules noted.  Lymph node:  no cervical nodes  Urology: No Kaufman   Psychiatric: appropriate    DATA:   Recent Labs     24  0512 24  0419   WBC 7.2 6.4   HGB 14.2 13.0*   HCT 40.3* 37.7*   MCV 89.4 
Internal Medicine   Hospitalist   Progress Note    2024   10:18 AM    Name:  Juan Kumar  MRN:    [id number]     IP Day: 0     Admit Date: 2024 10:52 AM  PCP: Nathan Garrett MD    Code Status:  Limited    Assessment and Plan:        Active Problems/ diagnosis:       Abdominal and flank pain-has multiple liver lesions on CT abdomen pelvis that is new concerning for new metastatic disease  Multiple lung nodule-concerning for metastatic disease  Possible UTI  HARJINDER-possibly due to diuresis vs contrast nephropathy vs post obstructive    Bilateral lower extremity edema-rule out DVT  Back pain-has compression deformity at T8 in addition to neuroforaminal narrowing in different areas  Hypertension  Obesity  PPH     Plan  HARJINDER is worse, consult renal. Place payne. Resume saline. Hold 6 and Maxide.  Avoid nephrotoxic meds.  Discussed with pulmonary.  Seen by oncology, IR consulted for liver biopsy.  Will add GI consult for possible colonoscopy to look for ?primary colon mass  As needed Percocet  Negative bilateral lower extremity duplex  Rocephin-follow-up urine culture  Resume home medication as appropriate   Discussed plan with patient   DVT prophylaxis    7 pm- 7 am, please contact on call Hospitalist for any needs     Subjective:      no new events.  Denies new complaints.    Physical Examination:      Vitals:  /64   Pulse 81   Temp 98.1 °F (36.7 °C) (Oral)   Resp 18   Ht 1.778 m (5' 10\")   Wt 115.7 kg (255 lb)   SpO2 90%   BMI 36.59 kg/m²   Temp (24hrs), Av °F (37.2 °C), Min:98.1 °F (36.7 °C), Max:99.9 °F (37.7 °C)      General appearance: alert, cooperative and no distress  Mental Status: oriented to person, place and time and normal affect  Lungs: clear to auscultation bilaterally, normal effort  Heart: regular rate and rhythm, no murmur  Abdomen: soft, nontender, nondistended, bowel sounds present, no masses  Extremities: no edema, redness, tenderness in the calves  Skin: no gross lesions, 
Internal Medicine   Hospitalist   Progress Note    2024   9:02 AM    Name:  Juan Kumar  MRN:    [id number]     IP Day: 2     Admit Date: 2024 10:52 AM  PCP: Nathan Garrett MD    Code Status:  Limited    Assessment and Plan:        Active Problems/ diagnosis:       Abdominal and flank pain-has multiple liver lesions on CT abdomen pelvis that is new concerning for new metastatic disease  Multiple lung nodule-concerning for metastatic disease  Pseudomonas UTI present on admission  HARJINDER-due to diuresis vs contrast nephropathy vs post obstructive ---> seems mostly postobstructive given very fast improvement after Payne  Debility  Bilateral lower extremity edema-ruled out DVT  Back pain-has compression deformity at T8 in addition to neuroforaminal narrowing in different areas  Hypertension  Obesity  BPH     Plan  Awaiting K OV placement.  HARJINDER improved, seen by nephrology.  Maintain payne, resume Flomax. Hold Lasix and Maxide.  Avoid nephrotoxic meds.  Urology consulted by nephrologist.  Nephrologist signed off.  Discussed with pulmonary.  Seen by oncology, IR consulted for liver biopsy, plan for biopsy in January.    EGD and colonoscopy 2024-had duodenal ulcer and colon polyp but no large suspicious masses.  PPI started.  GI signed off  As needed Percocet  Negative bilateral lower extremity duplex  Resume Cipro.  Resume home medication as appropriate   Discussed plan with patient   DVT prophylaxis    7 pm- 7 am, please contact on call Hospitalist for any needs     Subjective:     Denies any new symptoms.    Physical Examination:      Vitals:  /72   Pulse 78   Temp 97.9 °F (36.6 °C) (Oral)   Resp 18   Ht 1.778 m (5' 10\")   Wt 115.7 kg (255 lb)   SpO2 90%   BMI 36.59 kg/m²   Temp (24hrs), Av.7 °F (37.1 °C), Min:97.9 °F (36.6 °C), Max:99.5 °F (37.5 °C)      General appearance: alert, cooperative and no distress  Mental Status: oriented to person, place and time and normal affect  Lungs: 
Internal Medicine   Hospitalist   Progress Note    2024   9:29 AM    Name:  Juan Kumar  MRN:    [id number]     IP Day: 1     Admit Date: 2024 10:52 AM  PCP: Nathan Garrett MD    Code Status:  Limited    Assessment and Plan:        Active Problems/ diagnosis:       Abdominal and flank pain-has multiple liver lesions on CT abdomen pelvis that is new concerning for new metastatic disease  Multiple lung nodule-concerning for metastatic disease  Pseudomonas UTI present on admission  HRAJINDER-due to diuresis vs contrast nephropathy vs post obstructive ---> seems mostly postobstructive given very fast improvement after Payne  Bilateral lower extremity edema-ruled out DVT  Back pain-has compression deformity at T8 in addition to neuroforaminal narrowing in different areas  Hypertension  Obesity  BPH     Plan  HARJINDER improved, seen by nephrology.  Maintain payne, resume Flomax. Hold Lasix and Maxide.  Avoid nephrotoxic meds.  Will need outpatient urology follow-up.  Discussed with pulmonary.  Seen by oncology, IR consulted for liver biopsy, plan for biopsy in January.    EGD and colonoscopy 2024-had duodenal ulcer and colon polyp but no large suspicious masses.  PPI started.  As needed Percocet  Negative bilateral lower extremity duplex  Resume Cipro.  Resume home medication as appropriate   Discussed plan with patient   DVT prophylaxis    7 pm- 7 am, please contact on call Hospitalist for any needs     Subjective:     Denies any new symptoms.    Physical Examination:      Vitals:  BP (!) 124/48   Pulse 74   Temp 98.1 °F (36.7 °C)   Resp 18   Ht 1.778 m (5' 10\")   Wt 115.7 kg (255 lb)   SpO2 93%   BMI 36.59 kg/m²   Temp (24hrs), Av.5 °F (36.9 °C), Min:98.1 °F (36.7 °C), Max:98.8 °F (37.1 °C)      General appearance: alert, cooperative and no distress  Mental Status: oriented to person, place and time and normal affect  Lungs: clear to auscultation bilaterally, normal effort  Heart: regular rate and 
[facility]  Occupational Therapy        NAME:  Juan Kumar  ROOM: [facility] OR/NONE  :  1940  DATE: 2024    Attempted to see Juan Kumar at 0954 on this date for:   [x]  Initial Evaluation   []  Treatment       Patient was unable to be seen due to:   [x] Off unit for testing/procedure- pt off floor having liver biopsy   [] Patient refused, stating \"    [] Therapy on hold due to     [] Nursing deferred due to    [] Other:      Will re-attempt as able     Electronically signed by Vinita Bonilla OT on 24 at 9:54 AM EST  
Nephrology Progress Note    Assessment:  HARJINDER post obstructive  Hypertension  JOAN  Obesity  Mild hyponatremia possibly related to suspected malignancy        Plan: maintain payne  maintain euvolemic state  follow labs    Patient Active Problem List:     Chronic sinusitis     Deviated nasal septum     Hyperglycemia     Primary hypertension     Osteoarthritis     Class 2 drug-induced obesity with serious comorbidity and body mass index (BMI) of 37.0 to 37.9 in adult     Benign prostatic hyperplasia with lower urinary tract symptoms     Supracondylar fracture of femur, left, closed, with routine healing, subsequent encounter     General weakness     Urinary tract infection without hematuria     Prosthetic joint infection (HCC)     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 difficile colitis     Pressure injury of right buttock, unstageable (HCC)     Pressure injury of left buttock, unstageable (HCC)     E coli infection     Abdominal pain     Abnormal CT scan, colon     Lung nodules     Duodenal erosion      Subjective:  Admit Date: 12/24/2024    Interval History: wants more to eat    Medications:  Scheduled Meds:   pantoprazole  40 mg Oral QAM AC    ciprofloxacin  400 mg IntraVENous Q12H    polyethylene glycol  4,000 mL Oral Once    sodium chloride flush  5-40 mL IntraVENous 2 times per day    enoxaparin  30 mg SubCUTAneous BID    metoprolol succinate  50 mg Oral Daily    [Held by provider] furosemide  20 mg Oral Daily    [Held by provider] triamterene-hydroCHLOROthiazide  0.5 tablet Oral Daily     Continuous Infusions:   sodium chloride         CBC:   Recent Labs     12/28/24  0419 12/29/24  0509   WBC 6.4 5.8   HGB 13.0* 13.3*    161     CMP:    Recent Labs     12/27/24  1400 12/28/24  0419 12/29/24  0509   * 131* 131*   K 3.9 3.9 3.9   CL 
Nephrology Progress Note  No DVT  Assessment:  Resolved HARJINDER ? BPH/contrast  no hydro on CT  Hypertension  Obese  UTI   Malignancy Lung/Liver  Mild hyponatremia      Plan:w/u malignancy bx complete--  mild hyponatremia ? SIADH malignancy    Patient Active Problem List:     Chronic sinusitis     Deviated nasal septum     Hyperglycemia     Primary hypertension     Osteoarthritis     Class 2 drug-induced obesity with serious comorbidity and body mass index (BMI) of 37.0 to 37.9 in adult     Benign prostatic hyperplasia with lower urinary tract symptoms     Supracondylar fracture of femur, left, closed, with routine healing, subsequent encounter     General weakness     Urinary tract infection without hematuria     Prosthetic joint infection (HCC)     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 difficile colitis     Pressure injury of right buttock, unstageable (HCC)     Pressure injury of left buttock, unstageable (HCC)     E coli infection     Abdominal pain     Abnormal CT scan, colon     Lung nodules     Duodenal erosion      Subjective:  Admit Date: 12/24/2024    Interval History: wants more to eat    Medications:  Scheduled Meds:   pantoprazole  40 mg Oral QAM AC    ciprofloxacin  400 mg IntraVENous Q12H    polyethylene glycol  4,000 mL Oral Once    sodium chloride flush  5-40 mL IntraVENous 2 times per day    enoxaparin  30 mg SubCUTAneous BID    metoprolol succinate  50 mg Oral Daily    [Held by provider] furosemide  20 mg Oral Daily    [Held by provider] triamterene-hydroCHLOROthiazide  0.5 tablet Oral Daily     Continuous Infusions:   sodium chloride         CBC:   Recent Labs     12/28/24  0419 12/29/24  0509   WBC 6.4 5.8   HGB 13.0* 13.3*    161     CMP:    Recent Labs     12/27/24  1400 12/28/24  0419 12/29/24  0509   * 131* 131* 
New consult reviewed - patient was seen by this wound ostomy RN yesterday 12/26 for same wounds. Please refer to note from 12/26. Orders were placed at that time and Triad was sent to unit via tubing system. Continuing to follow weekly while in house.    Shukri Duggan, BSN, RN, Wound/Ostomy Nurse on 12/27/2024 at 8:36 AM    
Patient requesting \"limited code\"- No intubation or compressions but ok with medications. Dr. Szymanski notified.  
Physical Therapy  Facility/Department: [facility] MED SURG W489/W489-01  Physical Therapy Discharge      NAME: Juan Kumar    : 1940 (84 y.o.)  MRN: [id number]    Account: [id number]  Gender: male      Patient has been discharged from acute care hospital. DC patient from current PT program.      Electronically signed by Abby Bruce PT on 24 at 8:55 AM EST    
Physical Therapy Med Surg Initial Assessment  Facility/Department: [address] MED SURG UNIT  Room: [place] [address]       NAME: Juan Kumar  : 1940 (84 y.o.)  MRN: [id number]  CODE STATUS: Limited    Date of Service: 2024    Patient Diagnosis(es): Peripheral edema [R60.0]  Abdominal pain [R10.9]  General weakness [R53.1]  Metastatic malignant neoplasm, unspecified site (HCC) [C79.9]   Chief Complaint   Patient presents with    Flank Pain     Flank pain x L side x 1 week      Patient Active Problem List    Diagnosis Date Noted    Artificial joint pain (HCC) 2023    Spinal stenosis 2023    Difficulty in walking 03/15/2023    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    
Physical Therapy Missed Treatment   Facility/Department: [facility] MED SURG W489/W489-01    NAME: Juan Kumar    : 1940 (84 y.o.)  MRN: [id number]    Account: [id number]  Gender: male        Patient Declines PT Evaluation: Pt declines PT this date, states he has been up all night, just got back from GI and cannot move. Pt declined for PT to attempt this PM and requests tomorrow instead.       Will attempt PT evaluation again at earliest convenience.      Electronically signed by Krystle Wolf PT on 24 at 11:30 AM EST    
Physical Therapy Missed Treatment   Facility/Department: [facility] MED SURG MLOZ- GC OR/NONE    NAME: Juan Kumar    : 1940 (84 y.o.)  MRN: [id number]    Account: [id number]  Gender: male    Patient Unavailable: Pt off floor having liver biopsy      Will attempt PT evaluation again at earliest convenience.      Electronically signed by Krystle Wolf, PT on 24 at 9:54 AM EST    
Physical Therapy Missed Treatment   Facility/Department: [facility] MED SURG W489/W489-01    NAME: Juan Kumar    : 1940 (84 y.o.)  MRN: [id number]    Account: [id number]  Gender: male      PT evaluation and treatment orders received. Chart reviewed. PT evaluation attempted. Pt declining mobility assessment at this time. \"I'm in too much pain today. There's no way I can move.\" RN and LSW notified.       Will follow and attempt PT evaluation again at earliest availability.       Abby Bruce, PT, 24 at 11:03 AM    
Pt VS and assessment completed. Pt A&O x4, calm and cooperative. Pt tolerated reg diet. Meds given per MAR. Bed locked and in low position. Call light within reach.    Discharge instructions provided to patient. Verbalized understanding. All questions answered. IV catheter discontinued intact. Site without signs and symptoms of complications. Dressing and pressure applied. Report called and given to Nurse Jackson at [facility]. All questions answered.     Electronically signed by April Orozco RN on 12/30/2024 at 2:05 PM   
Pt assessed and chart reviewed. Message sent to NP in regard to auto hold of Lovenox. Pt is now post-procedure, concerning metastatic disease. Concerns of increase likelihood of clotting due to previously noted . Awaiting response.     Lovenox un-held, order restart in am     Kaufman  maintained output noted in flowsheets. Call light in reach and bed in th lowest position    
Pt c/o lower abd pain through out the night. Pt only had 250 out if urine, bladder scan was 618, Dr notified, straight cath  ordered, 675 removed from bladder.   
Pt with multiple bowel movements throughout the night with with continued on bowel prep. Pt urged to continue bowel prep in lieu of diarrhea. Pt has not finished bowel prep. Stool is watery and brown with mild translucency. Pt declines continuing prep at this time.   
[facility]  Occupational Therapy        NAME:  Juan Kumar  ROOM: W489/W489-01  :  1940  DATE: 2024    Attempted to see Juan Kumar at 1055 on this date for:   [x]  Initial Evaluation   []  Treatment       Patient was unable to be seen due to:   [] Off unit for testing/procedure    [x] Patient refused, stating \"You can't move me, I just hurt too much.\" Pt agreeable to therapy at another time    [] Therapy on hold due to     [] Nursing deferred due to    [] Other:      Electronically signed by LAURY Bertrand on 24 at 11:02 AM EST  
Renal Progress Note    Assessment and Plan:    84 y.o. male with history s/f OA, HTN, nephrolithiasis, morbid obesity and BPH who presented for abdominal and back pain.     HARJINDER: possibly in setting of contrast induced nephropathy however more likely obstructive given rapid improvement with Payne.  U/s ok but done after payne placement.    Hypoalbuminemia   C/f malignancy: CT showing multiple lung nodules and liver lesions   Anemia   Pseudomonas Aeruginosa UTI     Plan:    - keep lasix and maxzide on hold   - will sign off  - jackelyn consult urology for urinary retention + HARJINDER improved after payne      Patient Active Problem List:     Chronic sinusitis     Deviated nasal septum     Hyperglycemia     Primary hypertension     Osteoarthritis     Class 2 drug-induced obesity with serious comorbidity and body mass index (BMI) of 37.0 to 37.9 in adult     Benign prostatic hyperplasia with lower urinary tract symptoms     Supracondylar fracture of femur, left, closed, with routine healing, subsequent encounter     General weakness     Urinary tract infection without hematuria     Prosthetic joint infection (HCC)     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 difficile colitis     Pressure injury of right buttock, unstageable (HCC)     Pressure injury of left buttock, unstageable (HCC)     E coli infection     Abdominal pain     Abnormal CT scan, colon     Lung nodules      Subjective:   Admit Date: 12/24/2024    Interval History: labs back to normal.  Diuretics on hold.  Bp is good      Medications:   Scheduled Meds:   pantoprazole  40 mg Oral QAM AC    ciprofloxacin  400 mg IntraVENous Q12H    polyethylene glycol  4,000 mL Oral Once    sodium chloride flush  5-40 mL IntraVENous 2 times per day    enoxaparin  30 mg SubCUTAneous BID    metoprolol succinate  50 mg 
pallor  Cardiovascular - RRR, no murmur  Lungs - clear to auscultation bilaterally  Abdomen - non- distended,  non- tender, no organomegaly, Bowel sounds normal  Extremities - no edema  Skin - Warm and dry  Neuro: no asterixis     Data    Data Review:    Recent Labs     12/26/24  0616 12/27/24  0512 12/28/24  0419   WBC 7.0 7.2 6.4   HGB 12.7* 14.2 13.0*   HCT 38.1* 40.3* 37.7*   MCV 89.6 89.4 89.8    130 142     Recent Labs     12/27/24  0512 12/27/24  1400 12/28/24  0419    132* 131*   K 4.2 3.9 3.9   CL 99 99 96   CO2 24 23 26   BUN 15 12 13   CREATININE 0.84 0.54* 0.62*     Recent Labs     12/26/24  0616 12/27/24  0512 12/28/24  0419   AST 33 47* 34   ALT 15 18 17   BILITOT 0.9* 1.0* 0.7   ALKPHOS 177* 220* 207*     No results for input(s): \"LIPASE\", \"AMYLASE\" in the last 72 hours.  Recent Labs     12/27/24  0512   PROTIME 15.4*   INR 1.2     Radiology Review:    CT Result (most recent):  CT CHEST WO CONTRAST   CT of the abdomen and pelvis:   12/24/2024  1.  Multiple nodules are seen in the visualized lungs thought likely  metastatic disease 2.  Hypodense lesions are now seen in the liver also  concerning for metastatic disease 3.  Cholelithiasis 4.  Nephroliths in the  right kidney that is not causing hydronephrosis 5.  No evidence for bowel  obstruction, appendicitis or diverticulitis 6.  Enlarged prostate     ASSESSMENT:  84-year-old male admitted with abdominal pain, flank and back pain.  CT of the abdomen noted multiple hypodense lesions in the liver and lungs.  EGD and colonoscopy were completed 12/27/2024, noting duodenal erosions without bleeding superficial ulcerations and scattered erosions in the duodenal bulb.  Colonoscopy noted polyp in the cecum, was not resected or biopsied.  1 2 mm polyp in the rectosigmoid colon and internal hemorrhoids.  CA-19-9 less than 2, CEA 3.6 and AFP 70.6.     PLAN :  -Proceed with liver biopsy per IR not until 1/6/2024  -No further GI input, inpatient GI 
replacement 03/15/2022    at [facility]    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    HTN (hypertension)     Hx of carpal tunnel syndrome     Obesity     Other fracture of left femur, initial encounter for closed fracture (HCC) 3/14/2022    Primary osteoarthritis of both knees      Past Surgical History:   Procedure Laterality Date    COLONOSCOPY N/A 12/27/2024    COLONOSCOPY BIOPSY WITH POLYPECTOMY performed by Karen Carter MD at [facility]    REVISION TOTAL KNEE ARTHROPLASTY Left 3/16/2023    LEFT KNEE ARTHROTOMY WITH POLYETHYLENE LINER EXCHANGE AND PLACEMENT OF TOPICAL ANTIBIOTICS performed by Tutu Chance MD at [facility] OR    TOTAL KNEE ARTHROPLASTY Left 3/15/2022    LEFT KNEE DISTAL FEMUR REPLACEMENT performed by Ranjan Fletcher MD at [facility] OR    UPPER GASTROINTESTINAL ENDOSCOPY N/A 12/27/2024    ESOPHAGOGASTRODUODENOSCOPY WITH BIOPSY performed by Karen Carter MD at [facility]       Restrictions  Restrictions/Precautions: Fall Risk    SUBJECTIVE   General  Chart Reviewed: Yes  Subjective  Subjective: Pt reports pain in abdomen 5-6/10.Pt agreeable to attempt mobility    Pre-Session Pain Report: 5-6/10 before session     Post-Session Pain Report : 0/10     OBJECTIVE        Bed mobility  Supine to Sit: Maximum assistance  Sit to Supine: Maximum assistance  Bed Mobility Comments: Moderate A to LEs back to bed    TE: Seated LAQ/Hip flex, AP    Transfers  Sit to Stand: Maximum Assistance  Stand to Sit: Maximum Assistance  ASSESSMENT   Body Structures, Functions, Activity Limitations Requiring Skilled Therapeutic Intervention: Decreased functional mobility ;Decreased ADL status;Decreased ROM;Decreased strength;Decreased balance;Decreased endurance;Decreased posture  Assessment: Pt required max A for bed mobility and attempted STS however pt had explosive diarrhea x 2 and was returned to bed for nursing to clean up.     Discharge Recommendations:  Continue to assess pending 
650 mg Oral Q6H PRN Shama Szymanski DO        Or    acetaminophen (TYLENOL) suppository 650 mg  650 mg Rectal Q6H PRN Stephon, Shama R, DO        oxyCODONE-acetaminophen (PERCOCET) 5-325 MG per tablet 1 tablet  1 tablet Oral Q4H PRN Stephon, Shama R, DO   1 tablet at 12/25/24 0854    metoprolol succinate (TOPROL XL) extended release tablet 50 mg  50 mg Oral Daily StephonShama R, DO   50 mg at 12/25/24 0852    [Held by provider] furosemide (LASIX) tablet 20 mg  20 mg Oral Daily StephonShama R, DO   20 mg at 12/25/24 0852    [Held by provider] triamterene-hydroCHLOROthiazide (MAXZIDE-25) 37.5-25 MG per tablet 0.5 tablet  0.5 tablet Oral Daily Shama Szymanski DO   0.5 tablet at 12/25/24 0852       New information updated in the note today, rest of the examination did not change compared to yesterday.    Additional work up or/and treatment plan may be added today or then after based on clinical progression. I am managing a portion of pt care. Some medical issues are handled by other specialists. Additional work up and treatment should be done in out pt setting by pt PCP and other out pt providers.      In addition to examining and evaluating pt, I spent additional time explaining care, normaland abnormal findings, and treatment plan. All of pt questions were answered. Counseling, diet and education were provided. Case will be discussed with nursing staff when appropriate. Family will be updated if and when appropriate.       Electronically signed by Shama Szymanski DO on 12/25/2024 at 12:32 PM   
Demonstrated understanding       [] No evidence of learning  [] Refused teaching         [] N/A       Electronically signed by CLARENCE HarringtonN, RN, Wound/Ostomy Nurse on 12/26/2024 at 1:02 PM      
appropriate. Family will be updated if and when appropriate.       Electronically signed by Shama Szymanski DO on 12/27/2024 at 9:04 AM   
normalization) to complete ADLs as projected.  - Sequence self-care tasks with 25% verbal cues     Patient Goal: Patient goals : \"to get out of here\"     Discussed and agreed upon: Yes Comments:       Therapy Time:   Individual   Time In 0953   Time Out 1015   Minutes 22          Eval: 22 minutes     Electronically signed by:    Vinita Bonilla OT,   12/30/2024, 11:54 AM

## 2025-01-01 ENCOUNTER — OFFICE VISIT (OUTPATIENT)
Dept: GERIATRIC MEDICINE | Age: 85
End: 2025-01-01
Payer: MEDICARE

## 2025-01-01 ENCOUNTER — PREP FOR PROCEDURE (OUTPATIENT)
Dept: PULMONOLOGY | Age: 85
End: 2025-01-01

## 2025-01-01 DIAGNOSIS — N13.9 OBSTRUCTIVE UROPATHY: ICD-10-CM

## 2025-01-01 DIAGNOSIS — I10 HYPERTENSION, UNSPECIFIED TYPE: ICD-10-CM

## 2025-01-01 DIAGNOSIS — R91.1 LUNG NODULE: ICD-10-CM

## 2025-01-01 DIAGNOSIS — R53.1 WEAKNESS: ICD-10-CM

## 2025-01-01 DIAGNOSIS — R16.0 LIVER MASS: ICD-10-CM

## 2025-01-01 DIAGNOSIS — R91.8 LUNG NODULES: Primary | ICD-10-CM

## 2025-01-01 DIAGNOSIS — K26.9 DUODENAL ULCER: ICD-10-CM

## 2025-01-01 PROCEDURE — 99316 NF DSCHRG MGMT 30 MIN+: CPT | Performed by: INTERNAL MEDICINE

## 2025-01-02 ENCOUNTER — TELEPHONE (OUTPATIENT)
Dept: INTERVENTIONAL RADIOLOGY/VASCULAR | Age: 85
End: 2025-01-02

## 2025-01-02 NOTE — TELEPHONE ENCOUNTER
Charlotte RN at [facility] was given this information via phone conversation - voiced understanding  2.  Spoke to Liana in Specials to schedule procedure    >  Liver lesion biopsy is scheduled on 1/6/2025 @ 1:00 pm   >  You will need to arrive at 12:00 [facility] and check in at the Diagnostic Imaging Check In desk.   >  Do not eat or drink after midnight.    >  Make arrangements for transportation, as you should not drive immediately after.

## 2025-01-03 ENCOUNTER — OFFICE VISIT (OUTPATIENT)
Dept: GERIATRIC MEDICINE | Age: 85
End: 2025-01-03

## 2025-01-03 DIAGNOSIS — K26.9 DUODENAL ULCER: ICD-10-CM

## 2025-01-03 DIAGNOSIS — G89.29 CHRONIC PAIN OF LEFT KNEE: ICD-10-CM

## 2025-01-03 DIAGNOSIS — N13.9 OBSTRUCTIVE UROPATHY: ICD-10-CM

## 2025-01-03 DIAGNOSIS — R16.0 LIVER MASS: ICD-10-CM

## 2025-01-03 DIAGNOSIS — R91.8 LUNG NODULES: Primary | ICD-10-CM

## 2025-01-03 DIAGNOSIS — R53.1 WEAKNESS: ICD-10-CM

## 2025-01-03 DIAGNOSIS — M25.562 CHRONIC PAIN OF LEFT KNEE: ICD-10-CM

## 2025-01-06 ENCOUNTER — OFFICE VISIT (OUTPATIENT)
Dept: GERIATRIC MEDICINE | Age: 85
End: 2025-01-06

## 2025-01-06 ENCOUNTER — TELEPHONE (OUTPATIENT)
Dept: INTERVENTIONAL RADIOLOGY/VASCULAR | Age: 85
End: 2025-01-06

## 2025-01-06 DIAGNOSIS — M25.562 CHRONIC PAIN OF LEFT KNEE: ICD-10-CM

## 2025-01-06 DIAGNOSIS — R91.8 LUNG NODULES: Primary | ICD-10-CM

## 2025-01-06 DIAGNOSIS — R16.0 LIVER MASS: ICD-10-CM

## 2025-01-06 DIAGNOSIS — G89.29 CHRONIC PAIN OF LEFT KNEE: ICD-10-CM

## 2025-01-06 DIAGNOSIS — K26.9 DUODENAL ULCER: ICD-10-CM

## 2025-01-06 DIAGNOSIS — N13.9 OBSTRUCTIVE UROPATHY: ICD-10-CM

## 2025-01-06 DIAGNOSIS — R53.1 WEAKNESS: ICD-10-CM

## 2025-01-06 NOTE — TELEPHONE ENCOUNTER
Rula (@ [facility]) was given this information via phone conversation - voiced understanding  2.  Spoke to Liana in specials to schedule procedure    >  liver lesion biopsy is scheduled on 1/17/25 @ 10am   >  You will need to arrive at 9am ( from [facility] nursing [place]) and check in at the Diagnostic Imaging Check In desk.   >  Do not eat or drink after midnight.    >  Make arrangements for transportation, as you should not drive immediately after.

## 2025-01-07 ENCOUNTER — OFFICE VISIT (OUTPATIENT)
Dept: GERIATRIC MEDICINE | Age: 85
End: 2025-01-07

## 2025-01-07 DIAGNOSIS — K26.9 DUODENAL ULCER: ICD-10-CM

## 2025-01-07 DIAGNOSIS — G89.29 CHRONIC PAIN OF LEFT KNEE: ICD-10-CM

## 2025-01-07 DIAGNOSIS — R91.8 LUNG NODULES: Primary | ICD-10-CM

## 2025-01-07 DIAGNOSIS — R16.0 LIVER MASS: ICD-10-CM

## 2025-01-07 DIAGNOSIS — N13.9 OBSTRUCTIVE UROPATHY: ICD-10-CM

## 2025-01-07 DIAGNOSIS — R53.1 WEAKNESS: ICD-10-CM

## 2025-01-07 DIAGNOSIS — I10 HYPERTENSION, UNSPECIFIED TYPE: ICD-10-CM

## 2025-01-07 DIAGNOSIS — M25.562 CHRONIC PAIN OF LEFT KNEE: ICD-10-CM

## 2025-01-08 NOTE — PROGRESS NOTES
History and Physical      CHIEF COMPLAINT:  liver mass     History of Present Illness:      A 84 y.o. male who is being seen at [facility] who presented to the hospital with weakness and abdominal pain. He was noted to have multiple liver and lung nodules. EGD was preformed and showed a duodenal ulcer and colonoscopy which polyps were biopsied. He was also noted to have a Pseudomonas UTI for which he was treated.     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    Benign prostatic hyperplasia with lower urinary tract symptoms     Chronic sinusitis     History of left knee replacement 03/15/2022    at [facility]    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    HTN (hypertension)     Hx of carpal tunnel syndrome     Obesity     Other fracture of left femur, initial encounter for closed fracture (HCC) 3/14/2022    Primary osteoarthritis of both knees        Surgical History:  Past Surgical History:   Procedure Laterality Date    COLONOSCOPY N/A 12/27/2024    COLONOSCOPY BIOPSY WITH POLYPECTOMY performed by Karen Carter MD at [facility]    REVISION TOTAL KNEE ARTHROPLASTY Left 3/16/2023    LEFT KNEE ARTHROTOMY WITH POLYETHYLENE LINER EXCHANGE AND PLACEMENT OF TOPICAL ANTIBIOTICS performed by Tutu Chance MD at [facility] OR    TOTAL KNEE ARTHROPLASTY Left 3/15/2022    LEFT KNEE DISTAL FEMUR REPLACEMENT performed by Ranjan Fletcher MD at [facility] OR    UPPER GASTROINTESTINAL ENDOSCOPY N/A 12/27/2024    ESOPHAGOGASTRODUODENOSCOPY WITH BIOPSY performed by Karen Carter MD at [facility]       Medications Prior to Admission:    Prior to Admission medications    Medication Sig Start Date End Date Taking? Authorizing Provider   pantoprazole (PROTONIX) 40 MG tablet Take 1 tablet by mouth every morning (before breakfast) 12/31/24

## 2025-01-09 ENCOUNTER — OFFICE VISIT (OUTPATIENT)
Dept: GERIATRIC MEDICINE | Age: 85
End: 2025-01-09

## 2025-01-09 DIAGNOSIS — K26.9 DUODENAL ULCER: ICD-10-CM

## 2025-01-09 DIAGNOSIS — R91.8 LUNG NODULES: Primary | ICD-10-CM

## 2025-01-09 DIAGNOSIS — R16.0 LIVER MASS: ICD-10-CM

## 2025-01-09 DIAGNOSIS — N13.9 OBSTRUCTIVE UROPATHY: ICD-10-CM

## 2025-01-09 DIAGNOSIS — M25.562 CHRONIC PAIN OF LEFT KNEE: ICD-10-CM

## 2025-01-09 DIAGNOSIS — G89.29 CHRONIC PAIN OF LEFT KNEE: ICD-10-CM

## 2025-01-09 DIAGNOSIS — R53.1 WEAKNESS: ICD-10-CM

## 2025-01-10 ENCOUNTER — OFFICE VISIT (OUTPATIENT)
Dept: GERIATRIC MEDICINE | Age: 85
End: 2025-01-10

## 2025-01-10 DIAGNOSIS — R91.8 LUNG NODULES: Primary | ICD-10-CM

## 2025-01-10 DIAGNOSIS — K26.9 DUODENAL ULCER: ICD-10-CM

## 2025-01-10 DIAGNOSIS — M25.562 CHRONIC PAIN OF LEFT KNEE: ICD-10-CM

## 2025-01-10 DIAGNOSIS — G89.29 CHRONIC PAIN OF LEFT KNEE: ICD-10-CM

## 2025-01-10 DIAGNOSIS — R16.0 LIVER MASS: ICD-10-CM

## 2025-01-10 DIAGNOSIS — N13.9 OBSTRUCTIVE UROPATHY: ICD-10-CM

## 2025-01-10 DIAGNOSIS — R53.1 WEAKNESS: ICD-10-CM

## 2025-01-10 DIAGNOSIS — I10 HYPERTENSION, UNSPECIFIED TYPE: ICD-10-CM

## 2025-01-11 NOTE — PROGRESS NOTES
SNF PROGRESS NOTE      Cc- liver mass       Patient is a Juan José 84 y.o. male who is being seen at [facility] who presented to the hospital with weakness and abdominal pain. He was noted to have multiple liver and lung nodules. EGD was preformed and showed a duodenal ulcer and colonoscopy which polyps were biopsied. He was also noted to have a Pseudomonas UTI for which he was treated.       Patient was placed on cipro for UTI. Patient was not able to go for liver bx because he ate breakfast.         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    Benign prostatic hyperplasia with lower urinary tract symptoms     Chronic sinusitis     History of left knee replacement 03/15/2022    at [facility]    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    HTN (hypertension)     Hx of carpal tunnel syndrome     Obesity     Other fracture of left femur, initial encounter for closed fracture (HCC) 3/14/2022    Primary osteoarthritis of both knees      Aspirin    VS reviewed    Gen- Alert and oriented x 3  Heart- RRR no murmur no LE edema   Lungs- CTA b/l no resp distress RA oxygen   Abd- bs x 4     + payne       Assessment and Plan       Liver and lung masses -- suspect metastatic disease  Liver bx planned for 1/7/25-- cancelled and be rescheduled  Heme onc f/u 1/17/25  PRN pain meds   Pseudomonas UTI   Treated in hospital   Obstructive uropathy   F/u urology 1/28/25  Finasteride   Duodenal Ulcer   Protonix      Freda Petit DO, FACKATARINA     Electronically signed by: Freda Petit DO on 1/6/2025

## 2025-01-13 ENCOUNTER — OFFICE VISIT (OUTPATIENT)
Dept: GERIATRIC MEDICINE | Age: 85
End: 2025-01-13

## 2025-01-13 DIAGNOSIS — I10 HYPERTENSION, UNSPECIFIED TYPE: ICD-10-CM

## 2025-01-13 DIAGNOSIS — R91.8 LUNG NODULES: Primary | ICD-10-CM

## 2025-01-13 DIAGNOSIS — R53.1 WEAKNESS: ICD-10-CM

## 2025-01-13 DIAGNOSIS — K26.9 DUODENAL ULCER: ICD-10-CM

## 2025-01-13 DIAGNOSIS — G89.29 CHRONIC PAIN OF LEFT KNEE: ICD-10-CM

## 2025-01-13 DIAGNOSIS — R16.0 LIVER MASS: ICD-10-CM

## 2025-01-13 DIAGNOSIS — M25.562 CHRONIC PAIN OF LEFT KNEE: ICD-10-CM

## 2025-01-13 DIAGNOSIS — N13.9 OBSTRUCTIVE UROPATHY: ICD-10-CM

## 2025-01-14 NOTE — PROGRESS NOTES
SNF PROGRESS NOTE      Cc- liver mass       Patient is a Juan José 84 y.o. male  who is being seen at [facility] who presented to the hospital with weakness and abdominal pain. He was noted to have multiple liver and lung nodules. EGD was preformed and showed a duodenal ulcer and colonoscopy which polyps were biopsied. He was also noted to have a Pseudomonas UTI for which he was treated.     Patient is laying in bed. He is eating well. No pain. He is pleasant.         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    Benign prostatic hyperplasia with lower urinary tract symptoms     Chronic sinusitis     History of left knee replacement 03/15/2022    at [facility]    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    HTN (hypertension)     Hx of carpal tunnel syndrome     Obesity     Other fracture of left femur, initial encounter for closed fracture (HCC) 3/14/2022    Primary osteoarthritis of both knees      Aspirin    VS reviewed      Gen- Alert and oriented x 3  Heart- RRR no murmur no LE edema   Lungs- CTA b/l no resp distress RA oxygen   Abd- bs x 4      + payne      Assessment and Plan    Liver and lung masses -- suspect metastatic disease  Liver bx planned for 1/17/25--Heme onc f/u 1/17/25  PRN pain meds   Pseudomonas UTI   Treated in hospital   Obstructive uropathy   F/u urology 1/28/25  Finasteride   Duodenal Ulcer   Protonix         FELIPE Catalan DO     Electronically signed by: Freda Petit DO on 1/7/2025

## 2025-01-15 ENCOUNTER — OFFICE VISIT (OUTPATIENT)
Dept: GERIATRIC MEDICINE | Age: 85
End: 2025-01-15

## 2025-01-15 DIAGNOSIS — G89.29 CHRONIC PAIN OF LEFT KNEE: ICD-10-CM

## 2025-01-15 DIAGNOSIS — R16.0 LIVER MASS: ICD-10-CM

## 2025-01-15 DIAGNOSIS — R91.8 LUNG NODULES: Primary | ICD-10-CM

## 2025-01-15 DIAGNOSIS — R53.1 WEAKNESS: ICD-10-CM

## 2025-01-15 DIAGNOSIS — K26.9 DUODENAL ULCER: ICD-10-CM

## 2025-01-15 DIAGNOSIS — M25.562 CHRONIC PAIN OF LEFT KNEE: ICD-10-CM

## 2025-01-15 DIAGNOSIS — N13.9 OBSTRUCTIVE UROPATHY: ICD-10-CM

## 2025-01-15 NOTE — PROGRESS NOTES
SNF PROGRESS NOTE      Cc-  liver mass       Patient is a Juan [place] 84 y.o. male who is being seen at [facility] who presented to the hospital with weakness and abdominal pain. He was noted to have multiple liver and lung nodules. EGD was preformed and showed a duodenal ulcer and colonoscopy which polyps were biopsied. He was also noted to have a Pseudomonas UTI for which he was treated.     Patient is sitting up in bed. He is eating well. He denies any complaints.         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    Benign prostatic hyperplasia with lower urinary tract symptoms     Chronic sinusitis     History of left knee replacement 03/15/2022    at [facility]    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    HTN (hypertension)     Hx of carpal tunnel syndrome     Obesity     Other fracture of left femur, initial encounter for closed fracture (HCC) 3/14/2022    Primary osteoarthritis of both knees      Aspirin    VS reviewed    Gen- Alert and oriented x 3  Heart- RRR no murmur no LE edema   Lungs- CTA b/l no resp distress RA oxygen   Abd- bs x 4      + payne          Assessment and Plan    Liver and lung masses -- suspect metastatic disease  Liver bx planned for 1/17/25--Heme onc f/u 1/17/25  PRN pain meds   Pseudomonas UTI   Treated in hospital   Obstructive uropathy   F/u urology 1/28/25  Finasteride   Duodenal Ulcer   Protonix         FELIPE Catalan DO     Electronically signed by: Freda Petit DO on 1/9/2025

## 2025-01-17 ENCOUNTER — HOSPITAL ENCOUNTER (OUTPATIENT)
Dept: ULTRASOUND IMAGING | Age: 85
Discharge: HOME | End: 2025-01-19
Payer: MEDICARE

## 2025-01-17 ENCOUNTER — HOSPITAL ENCOUNTER (OUTPATIENT)
Dept: CT IMAGING | Age: 85
Discharge: HOME | End: 2025-01-19
Payer: MEDICARE

## 2025-01-17 ENCOUNTER — OFFICE VISIT (OUTPATIENT)
Dept: GERIATRIC MEDICINE | Age: 85
End: 2025-01-17

## 2025-01-17 VITALS
RESPIRATION RATE: 19 BRPM | DIASTOLIC BLOOD PRESSURE: 63 MMHG | OXYGEN SATURATION: 91 % | HEART RATE: 77 BPM | SYSTOLIC BLOOD PRESSURE: 134 MMHG | TEMPERATURE: 97.6 F

## 2025-01-17 DIAGNOSIS — R53.1 WEAKNESS: ICD-10-CM

## 2025-01-17 DIAGNOSIS — I10 HYPERTENSION, UNSPECIFIED TYPE: ICD-10-CM

## 2025-01-17 DIAGNOSIS — N13.9 OBSTRUCTIVE UROPATHY: ICD-10-CM

## 2025-01-17 DIAGNOSIS — R91.8 LUNG NODULES: Primary | ICD-10-CM

## 2025-01-17 DIAGNOSIS — G89.29 CHRONIC PAIN OF LEFT KNEE: ICD-10-CM

## 2025-01-17 DIAGNOSIS — R16.0 LIVER MASS: ICD-10-CM

## 2025-01-17 DIAGNOSIS — M25.562 CHRONIC PAIN OF LEFT KNEE: ICD-10-CM

## 2025-01-17 DIAGNOSIS — K26.9 DUODENAL ULCER: ICD-10-CM

## 2025-01-17 PROCEDURE — 88307 TISSUE EXAM BY PATHOLOGIST: CPT

## 2025-01-17 PROCEDURE — 99152 MOD SED SAME PHYS/QHP 5/>YRS: CPT | Performed by: RADIOLOGY

## 2025-01-17 PROCEDURE — 6370000000 HC RX 637 (ALT 250 FOR IP): Performed by: RADIOLOGY

## 2025-01-17 PROCEDURE — 6360000002 HC RX W HCPCS: Performed by: RADIOLOGY

## 2025-01-17 PROCEDURE — 2709999900 CT NEEDLE BIOPSY LIVER PERCUTANEOUS

## 2025-01-17 PROCEDURE — 99153 MOD SED SAME PHYS/QHP EA: CPT

## 2025-01-17 PROCEDURE — 47000 NEEDLE BIOPSY OF LIVER PERQ: CPT | Performed by: RADIOLOGY

## 2025-01-17 PROCEDURE — 77012 CT SCAN FOR NEEDLE BIOPSY: CPT | Performed by: RADIOLOGY

## 2025-01-17 PROCEDURE — 88313 SPECIAL STAINS GROUP 2: CPT

## 2025-01-17 PROCEDURE — 2580000003 HC RX 258: Performed by: RADIOLOGY

## 2025-01-17 PROCEDURE — 7100000010 HC PHASE II RECOVERY - FIRST 15 MIN

## 2025-01-17 PROCEDURE — 7100000011 HC PHASE II RECOVERY - ADDTL 15 MIN

## 2025-01-17 RX ORDER — FENTANYL CITRATE 0.05 MG/ML
INJECTION, SOLUTION INTRAMUSCULAR; INTRAVENOUS PRN
Status: COMPLETED | OUTPATIENT
Start: 2025-01-17 | End: 2025-01-17

## 2025-01-17 RX ORDER — NEOMYCIN/BACITRACIN/POLYMYXINB 3.5-400-5K
OINTMENT (GRAM) TOPICAL PRN
Status: COMPLETED | OUTPATIENT
Start: 2025-01-17 | End: 2025-01-17

## 2025-01-17 RX ORDER — MIDAZOLAM HYDROCHLORIDE 2 MG/2ML
INJECTION, SOLUTION INTRAMUSCULAR; INTRAVENOUS PRN
Status: COMPLETED | OUTPATIENT
Start: 2025-01-17 | End: 2025-01-17

## 2025-01-17 RX ORDER — 0.9 % SODIUM CHLORIDE 0.9 %
INTRAVENOUS SOLUTION INTRAVENOUS CONTINUOUS PRN
Status: COMPLETED | OUTPATIENT
Start: 2025-01-17 | End: 2025-01-17

## 2025-01-17 RX ORDER — LIDOCAINE HYDROCHLORIDE 20 MG/ML
INJECTION, SOLUTION INFILTRATION; PERINEURAL PRN
Status: COMPLETED | OUTPATIENT
Start: 2025-01-17 | End: 2025-01-17

## 2025-01-17 RX ADMIN — FENTANYL CITRATE 50 MCG: 50 INJECTION INTRAMUSCULAR; INTRAVENOUS at 11:01

## 2025-01-17 RX ADMIN — MIDAZOLAM HYDROCHLORIDE 0.5 MG: 1 INJECTION, SOLUTION INTRAMUSCULAR; INTRAVENOUS at 11:01

## 2025-01-17 RX ADMIN — MIDAZOLAM HYDROCHLORIDE 0.5 MG: 1 INJECTION, SOLUTION INTRAMUSCULAR; INTRAVENOUS at 11:22

## 2025-01-17 RX ADMIN — FENTANYL CITRATE 50 MCG: 50 INJECTION INTRAMUSCULAR; INTRAVENOUS at 11:22

## 2025-01-17 RX ADMIN — LIDOCAINE HYDROCHLORIDE 20 ML: 20 INJECTION, SOLUTION INFILTRATION; PERINEURAL at 11:30

## 2025-01-17 RX ADMIN — SODIUM CHLORIDE 250 ML: 9 INJECTION, SOLUTION INTRAVENOUS at 10:55

## 2025-01-17 RX ADMIN — GELATIN ABSORBABLE SPONGE 12-7 MM 1 EACH: 12-7 MISC at 11:35

## 2025-01-17 RX ADMIN — BACITRACIN, NEOMYCIN, POLYMYXIN B 1 EACH: 400; 3.5; 5 OINTMENT TOPICAL at 11:35

## 2025-01-17 ASSESSMENT — PAIN SCALES - GENERAL: PAINLEVEL_OUTOF10: 4

## 2025-01-17 ASSESSMENT — PAIN DESCRIPTION - DESCRIPTORS
DESCRIPTORS: ACHING
DESCRIPTORS: ACHING

## 2025-01-17 ASSESSMENT — PAIN - FUNCTIONAL ASSESSMENT: PAIN_FUNCTIONAL_ASSESSMENT: 0-10

## 2025-01-17 ASSESSMENT — PAIN DESCRIPTION - LOCATION: LOCATION: BACK

## 2025-01-17 NOTE — PRE SEDATION
Sedation Pre-Procedure Note    Patient Name: Juan Kumar   YOB: 1940  Room/Bed: Room/bed info not found  Medical Record Number: [id number]  Date: 1/17/2025   Time: 10:04 AM       Indication:  Liver mass biopsy    Consent: I have discussed with the patient and/or the patient representative the indication, alternatives, and the possible risks and/or complications of the planned procedure and the anesthesia methods. The patient and/or patient representative appear to understand and agree to proceed.    Vital Signs:   Vitals:    01/17/25 0937   BP: (!) 158/77   Pulse: 77   Resp: 24   Temp:    SpO2: 94%       Past Medical History:   has a past medical history of Aneurysm artery, pulmonary (HCC), Benign prostatic hyperplasia with lower urinary tract symptoms, Chronic sinusitis, History of left knee replacement, History of total left knee replacement (TKR), HTN (hypertension), Hx of carpal tunnel syndrome, Obesity, Other fracture of left femur, initial encounter for closed fracture (HCC), and Primary osteoarthritis of both knees.    Past Surgical History:   has a past surgical history that includes Total knee arthroplasty (Left, 3/15/2022); Revision total knee arthroplasty (Left, 3/16/2023); Colonoscopy (N/A, 12/27/2024); and Upper gastrointestinal endoscopy (N/A, 12/27/2024).    Medications:   Scheduled Meds:   Continuous Infusions:   PRN Meds:   Home Meds:   Prior to Admission medications    Medication Sig Start Date End Date Taking? Authorizing Provider   pantoprazole (PROTONIX) 40 MG tablet Take 1 tablet by mouth every morning (before breakfast) 12/31/24   Shama Szymanski DO   polyethylene glycol (GLYCOLAX) 17 g packet Take 1 packet by mouth daily as needed for Constipation 12/30/24 1/29/25  Shama Szymanski DO   finasteride (PROSCAR) 5 MG tablet Take 1 tablet by mouth daily 12/31/24   Shama Szymanski DO   tamsulosin (FLOMAX) 0.4 MG capsule Take 1 capsule by mouth daily 12/31/24   Shama Szymanski,  DO   metoprolol succinate (TOPROL XL) 50 MG extended release tablet Take 1 tablet by mouth daily Indications: High Blood Pressure    Provider, MD Franki     Coumadin Use Last 7 Days:  no  Antiplatelet drug therapy use last 7 days: no  Other anticoagulant use last 7 days: no  Additional Medication Information:  none      Pre-Sedation Documentation and Exam:   I have personally completed a history, physical exam & review of systems for this patient (see notes).    Mallampati Airway Assessment:  Mallampati Class I - (soft palate, fauces, uvula & anterior/posterior tonsillar pillars are visible)    Prior History of Anesthesia Complications:   none    ASA Classification:  Class 3 - A patient with severe systemic disease that limits activity but is not incapacitating    Sedation/ Anesthesia Plan:   intravenous sedation    Medications Planned:   midazolam (Versed) intravenously and fentanyl intravenously    Patient is an appropriate candidate for plan of sedation: yes    Electronically signed by KEN ESPINOZA MD on 1/17/2025 at 10:04 AM

## 2025-01-17 NOTE — FLOWSHEET NOTE
Pt arrived back to ct holding. Pt reports 0/10 pain to procedure site.     Pt reports back pain 4/10.     Pts VSS. Pts right upper quadrant bx site is c/d/I, pillow placed under pts left side to help place him on his right side x 2 hr per Dr. Bennett order.Electronically signed by Nabila Malave RN on 1/17/2025 at 11:50 AM       Pt provided water to patient, pt denies any food at this time. Pts tray also arrived from cafeteria, pt states he will hold off at this time. Pt resting with eyes closed. Electronically signed by Nabila Malave RN on 1/17/2025 at 12:11 PM     Call x 3 to [facility] to give report. 3 attempted calls prior to answer to connect to pts nurse. Report called to Tyesha ANN, no questions or concerns. Call to [facility] to notify discharge time of 1340. Electronically signed by Nabila Malave RN on 1/17/2025 at 12:48 PM     Per , pts sister looking to visit with patient. Pts sister brought to bedside.     Pt boosted in bed and lunch heated up per pt request. Pt also provided water. Pt denies pain to procedural site, site checked and is c/d/I. Electronically signed by Nabila Malave RN on 1/17/2025 at 12:58 PM     Pts bx site remains c/d/I without complication. Pt has no pain to site. Pt assisted into street clothes and WC. PT taken to discharge exit by GERSON ANN,  [facility] to take him home. Kaufman catheter noted with 300 ml of dark yellow urine and does not need emptied at this time.Electronically signed by Nabila Malave RN on 1/17/2025 at 2:08 PM

## 2025-01-17 NOTE — FLOWSHEET NOTE
Pt arrived to CT holding. Pt unable to stand with 1 assist. PT noted to have payne catheter draining dark yellow urine, approx 200 ml in drainage bag at this time. Awaiting lift help.  Medical history, allergies, and home medications reviewed with patient. Consents reviewed with patient and signed. Pt noted to be wheezing but states that is normal and he does occasional breathing treatments for it that only help temporarily. He states he is breathing fine at this time, sating 93% on RA and denies 02 use or need.     Pt 2 assist onto cart and Pt changed into gown.  Pt hooked up to vitals sign machine. VSS.     Confirmed NPO status. Pt not on any blood thinning medications. Electronically signed by Nabila Malave RN on 1/17/2025 at 9:35 AM       Iv established to R AC. Electronically signed by Nabila Malave RN on 1/17/2025 at 9:36 AM       Pt resting on cart in stable condition. Electronically signed by Nabila Malave RN on 1/17/2025 at 9:39 AM      Dr. Bennett notified pt ready to be seen. Electronically signed by Nabila Malave RN on 1/17/2025 at 9:49 AM       Dr. Bennett in to speak with patient. Electronically signed by Nabila Malave RN on 1/17/2025 at 9:57 AM     Pt notifying physician he is unable to lay on R side d/t back pain post procedure. Electronically signed by Nabila Malave RN on 1/17/2025 at 10:01 AM

## 2025-01-17 NOTE — DISCHARGE INSTRUCTIONS
BIOPSY DISCHARGE INSTRUCTIONS    ACTIVITY:   Rest today. Expect to be sore for 1 to 2 days. No heavy bending, lifting , stretching, pushing or pulling for at least 24 hours. Do not lift anything over 10 pounds for the next 24 - 48 hours. Do not drive today.      BATHING:   May shower, bathe, or swim after 24 hours.  Pat the site dry. May remove large band aid after 24 hours.    DIET:   May resume your normal diet.     MEDICATION:  * Resume home medication unless otherwise instructed by your physician.  * (If Applicable) If taking any blood thinners or Aspirin, hold for 24 hours after biopsy.  * May take mild pain reliever, as needed, such as Acetaminophen or Ibuprofen.      COMPLICATIONS RELATED TO BIOPSY:   - Dizziness, weakness, fatigue  - Bruising/firmness/ and/or pain at the site.  - Extreme pain after leaving the hospital.   - Large or heavy bleeding at biopsy site.   IF THESE SYMPTOMS OCCUR AND BECOME SEVERE, REPORT TO THE EMERGENCY ROOM FOR FURTHER EVALUATION.     For the next 48 hours watch site for redness, drainage, swelling , fever (temp above 101 degrees F), or chills.   If these signs of infection occur contact DR. ESPINOZA at [phone].

## 2025-01-17 NOTE — OR NURSING
SEDATION ADMINISTERED    1043 Patient positioned supine on CT table.  Vitals Monitor applied.  VSS.  CT scans done.    1101 Timeout completed for CT guided liver lesion bx.  Versed 0.5 mg IV and Fentanyl 50 mcg IV sedation administered by Doretha Hebert, independent observer.     1111 Dr Bennett reviewed scans, right abdominal liver biopsy site marked, prepped with Chloraprep. After 3 minute dry time, draped with sterile drape and towels.      1115 Skin numbed with Lidocaine 2% by Dr Bennett.    Spinal needle placed with CT guidance used to give additional numbing.     1122 additional sedation given per order.     1125 additional scanning at this time.     1132 CT Fluoro guidance used to place 19 g x 11.1  cm introducer.  core biopsy needle 20 g x  16 cm used to obtain a total of 2 samples.  Samples placed into formalin.  Verbal and tactile reassurance provided throughout.    1134 Introducer withdrawn, pressure held then neosporin and bandaid applied.  Patient tolerated well.     1140 pt assisted off CT table onto cart. Specimen taken to lab.  Patient taken to CT Holding Room for Recovery.    Total Sedation Given:  Versed:     1  mg       Fentanyl:    100  mcg

## 2025-01-17 NOTE — PROGRESS NOTES
SNF PROGRESS NOTE      Cc- liver mass       Patient is a Juan José 84 y.o. male who is being seen at [facility] who presented to the hospital with weakness and abdominal pain. He was noted to have multiple liver and lung nodules. EGD was preformed and showed a duodenal ulcer and colonoscopy which polyps were biopsied. He was also noted to have a Pseudomonas UTI for which he was treated.     Night nurse was concerned about shingles with patient, however, upon examination the patient rash is diffuse across the back. He denies it being itchy. He is sitting up and eating breakfast.         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    Benign prostatic hyperplasia with lower urinary tract symptoms     Chronic sinusitis     History of left knee replacement 03/15/2022    at [facility]    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    HTN (hypertension)     Hx of carpal tunnel syndrome     Obesity     Other fracture of left femur, initial encounter for closed fracture (HCC) 3/14/2022    Primary osteoarthritis of both knees      Aspirin    VS reviewed      Gen- Alert and oriented x 3  Heart- RRR no murmur no LE edema   Lungs- CTA b/l no resp distress RA oxygen   Abd- bs x 4      + payne           Assessment and Plan    Liver and lung 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

## 2025-01-20 ENCOUNTER — OFFICE VISIT (OUTPATIENT)
Dept: GERIATRIC MEDICINE | Age: 85
End: 2025-01-20

## 2025-01-20 DIAGNOSIS — I10 HYPERTENSION, UNSPECIFIED TYPE: ICD-10-CM

## 2025-01-20 DIAGNOSIS — N13.9 OBSTRUCTIVE UROPATHY: ICD-10-CM

## 2025-01-20 DIAGNOSIS — R91.8 LUNG NODULES: Primary | ICD-10-CM

## 2025-01-20 DIAGNOSIS — R16.0 LIVER MASS: ICD-10-CM

## 2025-01-20 DIAGNOSIS — K26.9 DUODENAL ULCER: ICD-10-CM

## 2025-01-20 DIAGNOSIS — G89.29 CHRONIC PAIN OF LEFT KNEE: ICD-10-CM

## 2025-01-20 DIAGNOSIS — R53.1 WEAKNESS: ICD-10-CM

## 2025-01-20 DIAGNOSIS — M25.562 CHRONIC PAIN OF LEFT KNEE: ICD-10-CM

## 2025-01-21 ENCOUNTER — OFFICE VISIT (OUTPATIENT)
Dept: GERIATRIC MEDICINE | Age: 85
End: 2025-01-21
Payer: MEDICARE

## 2025-01-21 DIAGNOSIS — R16.0 LIVER MASS: ICD-10-CM

## 2025-01-21 DIAGNOSIS — K26.9 DUODENAL ULCER: ICD-10-CM

## 2025-01-21 DIAGNOSIS — N13.9 OBSTRUCTIVE UROPATHY: ICD-10-CM

## 2025-01-21 DIAGNOSIS — G89.29 CHRONIC PAIN OF LEFT KNEE: ICD-10-CM

## 2025-01-21 DIAGNOSIS — I10 HYPERTENSION, UNSPECIFIED TYPE: ICD-10-CM

## 2025-01-21 DIAGNOSIS — M25.562 CHRONIC PAIN OF LEFT KNEE: ICD-10-CM

## 2025-01-21 DIAGNOSIS — R53.1 WEAKNESS: ICD-10-CM

## 2025-01-21 DIAGNOSIS — R91.8 LUNG NODULES: Primary | ICD-10-CM

## 2025-01-21 PROCEDURE — 1123F ACP DISCUSS/DSCN MKR DOCD: CPT | Performed by: INTERNAL MEDICINE

## 2025-01-21 PROCEDURE — 99308 SBSQ NF CARE LOW MDM 20: CPT | Performed by: INTERNAL MEDICINE

## 2025-01-21 NOTE — PROGRESS NOTES
SNF PROGRESS NOTE      Cc- liver mass       Patient is a Juan José 84 y.o. male  who is being seen at [facility] who presented to the hospital with weakness and abdominal pain. He was noted to have multiple liver and lung nodules. EGD was preformed and showed a duodenal ulcer and colonoscopy which polyps were biopsied. He was also noted to have a Pseudomonas UTI for which he was treated.     Patient is having a cough and states that he is not feeling that great today. He remains on RA.         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    Benign prostatic hyperplasia with lower urinary tract symptoms     Chronic sinusitis     History of left knee replacement 03/15/2022    at [facility]    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    HTN (hypertension)     Hx of carpal tunnel syndrome     Obesity     Other fracture of left femur, initial encounter for closed fracture (HCC) 3/14/2022    Primary osteoarthritis of both knees      Aspirin    VS reviewed      Gen- Alert and oriented x 3  Heart- RRR no murmur no LE edema   Lungs- CTA b/l no resp distress RA oxygen   Abd- bs x 4      + payne        Assessment and Plan    Liver and lung 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

## 2025-01-23 DIAGNOSIS — R16.0 LIVER MASS: Primary | ICD-10-CM

## 2025-01-23 NOTE — PROGRESS NOTES
SNF PROGRESS NOTE      Cc-liver/lung nodules/mass      Patient is a Juan José 84 y.o. male who is being seen at [facility] who presented to hospital with increased weakness and abdominal pain.  He was noted to have multiple liver and lung nodules.  EGD was performed and showed duodenal ulcer and colonoscopy showed polyps which were biopsied.  He was noted to have Pseudomonas UTI for which she was treated in the hospital    Patient is sitting up.  He is eating breakfast.  His chest x-ray was reviewed.  He remains on room air oxygen.          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    Benign prostatic hyperplasia with lower urinary tract symptoms     Chronic sinusitis     History of left knee replacement 03/15/2022    at [facility]    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    HTN (hypertension)     Hx of carpal tunnel syndrome     Obesity     Other fracture of left femur, initial encounter for closed fracture (HCC) 3/14/2022    Primary osteoarthritis of both knees      Aspirin    VS reviewed      Gen- Alert and oriented x 3  Heart- RRR no murmur no LE edema   Lungs- CTA b/l no resp distress RA oxygen   Abd- bs x 4      + payne        Assessment and Plan    Liver and lung masses -- 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

## 2025-01-24 ENCOUNTER — TELEPHONE (OUTPATIENT)
Dept: INTERVENTIONAL RADIOLOGY/VASCULAR | Age: 85
End: 2025-01-24

## 2025-01-25 NOTE — PROGRESS NOTES
SNF PROGRESS NOTE      Cc- liver/lung nodules/mass       Patient is a Juan [place] 84 y.o. male who is being seen at [facility] who presented to hospital with increased weakness and abdominal pain. He was noted to have multiple liver and lung nodules. EGD was performed and showed duodenal ulcer and colonoscopy showed polyps which were biopsied. He was noted to have Pseudomonas UTI for which she was treated in the hospital.     Patient is sitting up. He is to go for a biopsy today. He did not eat breakfast for this reason.         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    Benign prostatic hyperplasia with lower urinary tract symptoms     Chronic sinusitis     History of left knee replacement 03/15/2022    at [facility]    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    HTN (hypertension)     Hx of carpal tunnel syndrome     Obesity     Other fracture of left femur, initial encounter for closed fracture (HCC) 3/14/2022    Primary osteoarthritis of both knees      Aspirin    VS reviewed    Gen- Alert and oriented x 3  Heart- RRR no murmur no LE edema   Lungs- CTA b/l no resp distress RA oxygen   Abd- bs x 4      + payne        Assessment and Plan    Liver and lung 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

## 2025-01-27 ENCOUNTER — OFFICE VISIT (OUTPATIENT)
Dept: GERIATRIC MEDICINE | Age: 85
End: 2025-01-27
Payer: MEDICARE

## 2025-01-27 DIAGNOSIS — K26.9 DUODENAL ULCER: ICD-10-CM

## 2025-01-27 DIAGNOSIS — I10 HYPERTENSION, UNSPECIFIED TYPE: ICD-10-CM

## 2025-01-27 DIAGNOSIS — N13.9 OBSTRUCTIVE UROPATHY: ICD-10-CM

## 2025-01-27 DIAGNOSIS — R91.8 LUNG NODULES: Primary | ICD-10-CM

## 2025-01-27 DIAGNOSIS — G89.29 CHRONIC PAIN OF LEFT KNEE: ICD-10-CM

## 2025-01-27 DIAGNOSIS — R53.1 WEAKNESS: ICD-10-CM

## 2025-01-27 DIAGNOSIS — R16.0 LIVER MASS: ICD-10-CM

## 2025-01-27 DIAGNOSIS — M25.562 CHRONIC PAIN OF LEFT KNEE: ICD-10-CM

## 2025-01-27 PROCEDURE — 1123F ACP DISCUSS/DSCN MKR DOCD: CPT | Performed by: INTERNAL MEDICINE

## 2025-01-27 PROCEDURE — 99308 SBSQ NF CARE LOW MDM 20: CPT | Performed by: INTERNAL MEDICINE

## 2025-01-28 ENCOUNTER — OFFICE VISIT (OUTPATIENT)
Dept: UROLOGY | Age: 85
End: 2025-01-28
Payer: MEDICARE

## 2025-01-28 ENCOUNTER — OFFICE VISIT (OUTPATIENT)
Dept: GERIATRIC MEDICINE | Age: 85
End: 2025-01-28
Payer: MEDICARE

## 2025-01-28 VITALS
SYSTOLIC BLOOD PRESSURE: 114 MMHG | OXYGEN SATURATION: 93 % | BODY MASS INDEX: 32.14 KG/M2 | WEIGHT: 224 LBS | HEART RATE: 107 BPM | DIASTOLIC BLOOD PRESSURE: 70 MMHG

## 2025-01-28 DIAGNOSIS — G89.29 CHRONIC PAIN OF LEFT KNEE: ICD-10-CM

## 2025-01-28 DIAGNOSIS — K26.9 DUODENAL ULCER: ICD-10-CM

## 2025-01-28 DIAGNOSIS — R53.1 WEAKNESS: ICD-10-CM

## 2025-01-28 DIAGNOSIS — R91.8 LUNG NODULES: Primary | ICD-10-CM

## 2025-01-28 DIAGNOSIS — M25.562 CHRONIC PAIN OF LEFT KNEE: ICD-10-CM

## 2025-01-28 DIAGNOSIS — R16.0 LIVER MASS: ICD-10-CM

## 2025-01-28 DIAGNOSIS — N13.9 OBSTRUCTIVE UROPATHY: ICD-10-CM

## 2025-01-28 DIAGNOSIS — R33.9 URINARY RETENTION: Primary | ICD-10-CM

## 2025-01-28 PROCEDURE — 3078F DIAST BP <80 MM HG: CPT | Performed by: PHYSICIAN ASSISTANT

## 2025-01-28 PROCEDURE — G8427 DOCREV CUR MEDS BY ELIG CLIN: HCPCS | Performed by: PHYSICIAN ASSISTANT

## 2025-01-28 PROCEDURE — 3074F SYST BP LT 130 MM HG: CPT | Performed by: PHYSICIAN ASSISTANT

## 2025-01-28 PROCEDURE — G8417 CALC BMI ABV UP PARAM F/U: HCPCS | Performed by: PHYSICIAN ASSISTANT

## 2025-01-28 PROCEDURE — 1123F ACP DISCUSS/DSCN MKR DOCD: CPT | Performed by: PHYSICIAN ASSISTANT

## 2025-01-28 PROCEDURE — 99203 OFFICE O/P NEW LOW 30 MIN: CPT | Performed by: PHYSICIAN ASSISTANT

## 2025-01-28 PROCEDURE — 1111F DSCHRG MED/CURRENT MED MERGE: CPT | Performed by: PHYSICIAN ASSISTANT

## 2025-01-28 PROCEDURE — 1123F ACP DISCUSS/DSCN MKR DOCD: CPT | Performed by: INTERNAL MEDICINE

## 2025-01-28 PROCEDURE — 1159F MED LIST DOCD IN RCRD: CPT | Performed by: PHYSICIAN ASSISTANT

## 2025-01-28 PROCEDURE — 99308 SBSQ NF CARE LOW MDM 20: CPT | Performed by: INTERNAL MEDICINE

## 2025-01-28 PROCEDURE — 1036F TOBACCO NON-USER: CPT | Performed by: PHYSICIAN ASSISTANT

## 2025-01-28 ASSESSMENT — ENCOUNTER SYMPTOMS
SORE THROAT: 0
APNEA: 0

## 2025-01-28 NOTE — PROGRESS NOTES
Subjective:      Patient ID: Juan Kumar is a 84 y.o. male    HPI 84 year old male who was found to be in urinary retention requiring payne catheter placed in the hospital on 12/26/24. He presents today for a follow up appointment. The patient states the the nursing home staff where he is a resident, accidentally pulled out his payne catheter yesterday. He was unable to void through out the day and last night required having a payne catheter being placed. He did notice blood in his underwear after the catheter was reinserted. He is taking flomax but not proscar.     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    Benign prostatic hyperplasia with lower urinary tract symptoms     Chronic sinusitis     History of left knee replacement 03/15/2022    at [facility]    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    HTN (hypertension)     Hx of carpal tunnel syndrome     Obesity     Other fracture of left femur, initial encounter for closed fracture ([facility]) 3/14/2022    Primary osteoarthritis of both knees      Past Surgical History:   Procedure Laterality Date    COLONOSCOPY N/A 12/27/2024    COLONOSCOPY BIOPSY WITH POLYPECTOMY performed by Karen Carter MD at [facility]    CT NEEDLE BIOPSY LIVER PERCUTANEOUS Right 01/17/2025    liver lesion bx by Dr Bennett    CT NEEDLE BIOPSY LIVER PERCUTANEOUS  1/17/2025    CT NEEDLE BIOPSY LIVER PERCUTANEOUS 1/17/2025 [facility] CT SCAN    REVISION TOTAL KNEE ARTHROPLASTY Left 03/16/2023    LEFT KNEE ARTHROTOMY WITH POLYETHYLENE LINER EXCHANGE AND PLACEMENT OF TOPICAL ANTIBIOTICS performed by Tutu Chance MD at [facility] OR    TOTAL KNEE ARTHROPLASTY Left 03/15/2022    LEFT KNEE DISTAL FEMUR REPLACEMENT performed by Ranjan Fletcher MD at [facility] OR    UPPER GASTROINTESTINAL ENDOSCOPY N/A 12/27/2024    ESOPHAGOGASTRODUODENOSCOPY WITH BIOPSY performed by Karen Carter MD at [facility]     Social History

## 2025-01-29 ENCOUNTER — OFFICE VISIT (OUTPATIENT)
Dept: PULMONOLOGY | Age: 85
End: 2025-01-29
Payer: MEDICARE

## 2025-01-29 ENCOUNTER — OFFICE VISIT (OUTPATIENT)
Dept: INFECTIOUS DISEASES | Age: 85
End: 2025-01-29
Payer: MEDICARE

## 2025-01-29 VITALS
SYSTOLIC BLOOD PRESSURE: 128 MMHG | HEIGHT: 70 IN | BODY MASS INDEX: 32.07 KG/M2 | TEMPERATURE: 97.4 F | RESPIRATION RATE: 18 BRPM | WEIGHT: 224 LBS | DIASTOLIC BLOOD PRESSURE: 70 MMHG | HEART RATE: 85 BPM | OXYGEN SATURATION: 94 %

## 2025-01-29 VITALS
HEART RATE: 85 BPM | DIASTOLIC BLOOD PRESSURE: 70 MMHG | HEIGHT: 70 IN | RESPIRATION RATE: 18 BRPM | OXYGEN SATURATION: 94 % | SYSTOLIC BLOOD PRESSURE: 128 MMHG | BODY MASS INDEX: 32.14 KG/M2 | TEMPERATURE: 97.4 F

## 2025-01-29 DIAGNOSIS — K76.9 LIVER LESION: ICD-10-CM

## 2025-01-29 DIAGNOSIS — Z79.2 CHRONIC ANTIBIOTIC SUPPRESSION: Primary | ICD-10-CM

## 2025-01-29 DIAGNOSIS — R91.1 LUNG NODULE: Primary | ICD-10-CM

## 2025-01-29 DIAGNOSIS — M86.9 KNEE OSTEOMYELITIS, LEFT: ICD-10-CM

## 2025-01-29 PROCEDURE — 1111F DSCHRG MED/CURRENT MED MERGE: CPT | Performed by: INTERNAL MEDICINE

## 2025-01-29 PROCEDURE — 3078F DIAST BP <80 MM HG: CPT | Performed by: INTERNAL MEDICINE

## 2025-01-29 PROCEDURE — 1159F MED LIST DOCD IN RCRD: CPT | Performed by: INTERNAL MEDICINE

## 2025-01-29 PROCEDURE — 99213 OFFICE O/P EST LOW 20 MIN: CPT | Performed by: INTERNAL MEDICINE

## 2025-01-29 PROCEDURE — 3074F SYST BP LT 130 MM HG: CPT | Performed by: INTERNAL MEDICINE

## 2025-01-29 PROCEDURE — 1036F TOBACCO NON-USER: CPT | Performed by: INTERNAL MEDICINE

## 2025-01-29 PROCEDURE — G8417 CALC BMI ABV UP PARAM F/U: HCPCS | Performed by: INTERNAL MEDICINE

## 2025-01-29 PROCEDURE — 1123F ACP DISCUSS/DSCN MKR DOCD: CPT | Performed by: INTERNAL MEDICINE

## 2025-01-29 PROCEDURE — G8428 CUR MEDS NOT DOCUMENT: HCPCS | Performed by: INTERNAL MEDICINE

## 2025-01-29 PROCEDURE — G8427 DOCREV CUR MEDS BY ELIG CLIN: HCPCS | Performed by: INTERNAL MEDICINE

## 2025-01-29 RX ORDER — BUDESONIDE 0.5 MG/2ML
1 INHALANT ORAL 2 TIMES DAILY
COMMUNITY

## 2025-01-29 RX ORDER — IPRATROPIUM BROMIDE AND ALBUTEROL SULFATE 2.5; .5 MG/3ML; MG/3ML
1 SOLUTION RESPIRATORY (INHALATION) EVERY 4 HOURS
COMMUNITY

## 2025-01-29 RX ORDER — ALBUTEROL SULFATE 0.63 MG/3ML
1 SOLUTION RESPIRATORY (INHALATION) EVERY 6 HOURS PRN
COMMUNITY

## 2025-01-29 ASSESSMENT — PATIENT HEALTH QUESTIONNAIRE - PHQ9
SUM OF ALL RESPONSES TO PHQ QUESTIONS 1-9: 2
1. LITTLE INTEREST OR PLEASURE IN DOING THINGS: SEVERAL DAYS
SUM OF ALL RESPONSES TO PHQ9 QUESTIONS 1 & 2: 2
2. FEELING DOWN, DEPRESSED OR HOPELESS: SEVERAL DAYS
SUM OF ALL RESPONSES TO PHQ QUESTIONS 1-9: 2

## 2025-01-29 ASSESSMENT — ENCOUNTER SYMPTOMS
GASTROINTESTINAL NEGATIVE: 1
RESPIRATORY NEGATIVE: 1

## 2025-01-29 NOTE — PROGRESS NOTES
PATIENT VISIT-PULMONARY/SLEEP    1/29/2025     REFERRING PHYSICIAN:  Nathan Garrett MD     REASON FOR REFERRAL: Lung nodule    HPI:     Juan Kumar is a 84 y.o. male who was referred to pulmonary clinic for evaluation.     Was seen recently in the hospital 4 weeks ago with multiple complaints including abdominal pain.  He had a CT abdomen and CT chest.  I reviewed CT chest personally and interpret the results independently.  He has multiple nodules bilaterally concerning for metastatic disease.  The largest 1 is probably measuring 1.7 cm in the right lower lobe.  Additionally, he had liver lesions and underwent a liver biopsy but this did not showed malignancy.  Has been losing weight overall.  Had been treated for E. coli UTI as well.    Quit smoking 53 years ago.     Past Medical History   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    Benign prostatic hyperplasia with lower urinary tract symptoms     Chronic sinusitis     History of left knee replacement 03/15/2022    at [facility]    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    HTN (hypertension)     Hx of carpal tunnel syndrome     Obesity     Other fracture of left femur, initial encounter for closed fracture (HCC) 3/14/2022    Primary osteoarthritis of both knees        Past Surgical History  Past Surgical History:   Procedure Laterality Date    COLONOSCOPY N/A 12/27/2024    COLONOSCOPY BIOPSY WITH POLYPECTOMY performed by Karen Carter MD at [facility]    CT NEEDLE BIOPSY LIVER PERCUTANEOUS Right 01/17/2025    liver lesion bx by Dr Bennett    CT NEEDLE BIOPSY LIVER PERCUTANEOUS  1/17/2025    CT NEEDLE BIOPSY LIVER PERCUTANEOUS 1/17/2025 [facility] CT SCAN    REVISION TOTAL KNEE ARTHROPLASTY Left 03/16/2023    LEFT KNEE ARTHROTOMY WITH POLYETHYLENE LINER EXCHANGE AND PLACEMENT OF TOPICAL ANTIBIOTICS performed by Tutu Chance MD at [facility] OR    TOTAL KNEE ARTHROPLASTY Left

## 2025-01-29 NOTE — PROGRESS NOTES
SNF PROGRESS NOTE      Cc- liver/lung nodules/mass          Patient is a Juan José 84 y.o. male who is being seen at [facility] who presented to hospital with increased weakness and abdominal pain. He was noted to have multiple liver and lung nodules. EGD was performed and showed duodenal ulcer and colonoscopy showed polyps which were biopsied. He was noted to have Pseudomonas UTI for which she was treated in the hospital.        Patient is sitting up in the chair. He is eating breakfast and states that his appetite is good.         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    Benign prostatic hyperplasia with lower urinary tract symptoms     Chronic sinusitis     History of left knee replacement 03/15/2022    at [facility]    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    HTN (hypertension)     Hx of carpal tunnel syndrome     Obesity     Other fracture of left femur, initial encounter for closed fracture (HCC) 3/14/2022    Primary osteoarthritis of both knees      Aspirin    VS reviewed     Gen- Alert and oriented x 3  Heart- RRR no murmur no LE edema   Lungs- CTA b/l no resp distress RA oxygen   Abd- bs x 4      + payne          Assessment and Plan    Liver and lung masses -- 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

## 2025-01-29 NOTE — PROGRESS NOTES
Constitutional: Negative.    Respiratory: Negative.     Cardiovascular: Negative.    Gastrointestinal: Negative.        Review of Systems: All 14 review of systems negative other than as stated above    Social History     Tobacco Use    Smoking status: Former     Current packs/day: 0.00     Types: Cigarettes     Start date:      Quit date:      Years since quittin.1     Passive exposure: Past    Smokeless tobacco: Never    Tobacco comments:     40 years ago per patient   Vaping Use    Vaping status: Never Used   Substance Use Topics    Alcohol use: Not Currently    Drug use: Never         Past Medical History:   Diagnosis Date    Aneurysm artery, pulmonary (HCC)     not surgical    Benign prostatic hyperplasia with lower urinary tract symptoms     Chronic sinusitis     History of left knee replacement 03/15/2022    at [facility]    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    HTN (hypertension)     Hx of carpal tunnel syndrome     Obesity     Other fracture of left femur, initial encounter for closed fracture (HCC) 3/14/2022    Primary osteoarthritis of both knees            Past Surgical History:   Procedure Laterality Date    COLONOSCOPY N/A 2024    COLONOSCOPY BIOPSY WITH POLYPECTOMY performed by Karen Carter MD at [facility]    CT NEEDLE BIOPSY LIVER PERCUTANEOUS Right 2025    liver lesion bx by Dr Bennett    CT NEEDLE BIOPSY LIVER PERCUTANEOUS  2025    CT NEEDLE BIOPSY LIVER PERCUTANEOUS 2025 [facility] [place] SCAN    REVISION TOTAL KNEE ARTHROPLASTY Left 2023    LEFT KNEE ARTHROTOMY WITH POLYETHYLENE LINER EXCHANGE AND PLACEMENT OF TOPICAL ANTIBIOTICS performed by Tutu Chance MD at [facility]    TOTAL KNEE ARTHROPLASTY Left 03/15/2022    LEFT KNEE DISTAL FEMUR REPLACEMENT performed by Ranjan Fletcher MD at [facility]    UPPER GASTROINTESTINAL ENDOSCOPY N/A 2024    ESOPHAGOGASTRODUODENOSCOPY WITH BIOPSY performed by Karen Carter MD at [facility]

## 2025-01-30 ENCOUNTER — TELEPHONE (OUTPATIENT)
Dept: PULMONOLOGY | Age: 85
End: 2025-01-30

## 2025-01-30 ENCOUNTER — TELEPHONE (OUTPATIENT)
Dept: INTERVENTIONAL RADIOLOGY/VASCULAR | Age: 85
End: 2025-01-30

## 2025-01-30 NOTE — TELEPHONE ENCOUNTER
[facility] via fax and patient/patient's wife was given this information prior to leaving the office - voiced understanding  2.  Spoke to Liana in [facility] to schedule procedure    >  Paracentesis is scheduled on 2/5/2025 @ 9:00 am   >  You will need to arrive at 8:00 am [facility] and check in at the Diagnostic Imaging Check In desk.   >  Do not eat or drink after midnight.    >  Nursing Home will make arrangements for transportation   >  Patient to take metoprolol with a small sip of water the morning of the procedure  >  Patient to have PT/INR, CBC and BMP drawn 2-4 days prior to procedure

## 2025-01-31 ENCOUNTER — OFFICE VISIT (OUTPATIENT)
Dept: GERIATRIC MEDICINE | Age: 85
End: 2025-01-31

## 2025-01-31 DIAGNOSIS — K26.9 DUODENAL ULCER: ICD-10-CM

## 2025-01-31 DIAGNOSIS — R16.0 LIVER MASS: ICD-10-CM

## 2025-01-31 DIAGNOSIS — M25.562 CHRONIC PAIN OF LEFT KNEE: ICD-10-CM

## 2025-01-31 DIAGNOSIS — R53.1 WEAKNESS: ICD-10-CM

## 2025-01-31 DIAGNOSIS — G89.29 CHRONIC PAIN OF LEFT KNEE: ICD-10-CM

## 2025-01-31 DIAGNOSIS — I10 HYPERTENSION, UNSPECIFIED TYPE: ICD-10-CM

## 2025-01-31 DIAGNOSIS — R91.8 LUNG NODULES: Primary | ICD-10-CM

## 2025-01-31 DIAGNOSIS — N13.9 OBSTRUCTIVE UROPATHY: ICD-10-CM

## 2025-02-03 ENCOUNTER — TELEPHONE (OUTPATIENT)
Dept: PULMONOLOGY | Age: 85
End: 2025-02-03

## 2025-02-03 ENCOUNTER — TELEPHONE (OUTPATIENT)
Dept: INTERVENTIONAL RADIOLOGY/VASCULAR | Age: 85
End: 2025-02-03

## 2025-02-03 ENCOUNTER — OFFICE VISIT (OUTPATIENT)
Dept: GERIATRIC MEDICINE | Age: 85
End: 2025-02-03

## 2025-02-03 DIAGNOSIS — R16.0 LIVER MASS: ICD-10-CM

## 2025-02-03 DIAGNOSIS — K26.9 DUODENAL ULCER: ICD-10-CM

## 2025-02-03 DIAGNOSIS — R91.8 LUNG NODULES: Primary | ICD-10-CM

## 2025-02-03 DIAGNOSIS — N13.9 OBSTRUCTIVE UROPATHY: ICD-10-CM

## 2025-02-03 NOTE — TELEPHONE ENCOUNTER
KEMAR FROM [facility] CALLED IN TO THE OFFICE BECAUSE THE PT IS SUPPOSED TO HAVE A BRONCH THIS FRIDAY.        IT LOOKS LIKE IT WAS CANCELED 1/31/ BUT NEVER RESCHEDULED.        PLEASE CALL KEMAR BACK [phone]

## 2025-02-03 NOTE — TELEPHONE ENCOUNTER
Patient's CT guided liver lesion biopsy is back on for 2/5/2025 -- SETH Jackson at [facility] and patient's sister, Maddie are aware - voiced understanding

## 2025-02-03 NOTE — TELEPHONE ENCOUNTER
Spoke with sister she said to go forward on liver BX and if Bronch needs to be done later we will explore that then.

## 2025-02-03 NOTE — PROGRESS NOTES
SNF PROGRESS NOTE      Cc- liver/lung nodules/mass       Patient is a Juan Godley 84 y.o. male who is being seen at [facility] who presented to hospital with increased weakness and abdominal pain. He was noted to have multiple liver and lung nodules. EGD was performed and showed duodenal ulcer and colonoscopy showed polyps which were biopsied. He was noted to have Pseudomonas UTI for which she was treated in the hospital.        Patient is on RA. He is sitting up in his chair. The patient is eating well.         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   Follow up heme onc in 2-3 weeks.   Pseudomonas UTI   Treated in hospital   Obstructive uropathy   F/u urology 1/28/25  Finasteride   Duodenal Ulcer   Protonix      FELIPE Catalan DO     Electronically signed by: Freda Petit DO on 1/27/2025

## 2025-02-03 NOTE — TELEPHONE ENCOUNTER
Per Radha - Dr. Reyes wants to hold off on doing CT guided liver lesion biopsy until after bronchoscopy    Specials Department is aware     Unable to contact patient - continuous ring/no answer/no voicemail    Called patient's sister, Maddie - voiced understanding    Called Rick - spoke to SETH Sherman to advise the same - voiced understanding

## 2025-02-04 ENCOUNTER — OFFICE VISIT (OUTPATIENT)
Dept: GERIATRIC MEDICINE | Age: 85
End: 2025-02-04

## 2025-02-04 DIAGNOSIS — N13.9 OBSTRUCTIVE UROPATHY: ICD-10-CM

## 2025-02-04 DIAGNOSIS — R91.8 LUNG NODULES: Primary | ICD-10-CM

## 2025-02-04 DIAGNOSIS — K26.9 DUODENAL ULCER: ICD-10-CM

## 2025-02-04 DIAGNOSIS — I10 HYPERTENSION, UNSPECIFIED TYPE: ICD-10-CM

## 2025-02-04 DIAGNOSIS — G89.29 CHRONIC PAIN OF LEFT KNEE: ICD-10-CM

## 2025-02-04 DIAGNOSIS — M25.562 CHRONIC PAIN OF LEFT KNEE: ICD-10-CM

## 2025-02-04 DIAGNOSIS — R16.0 LIVER MASS: ICD-10-CM

## 2025-02-04 DIAGNOSIS — R53.1 WEAKNESS: ICD-10-CM

## 2025-02-04 NOTE — PROGRESS NOTES
SNF PROGRESS NOTE      Cc-  liver/lung nodules/mass       Patient is a Juan José 84 y.o. male who is being seen at [facility] who presented to hospital with increased weakness and abdominal pain. He was noted to have multiple liver and lung nodules. EGD was performed and showed duodenal ulcer and colonoscopy showed polyps which were biopsied. He was noted to have Pseudomonas UTI for which she was treated in the hospital.     Patient has urology appt today. He is sitting up in the chair. No complaints. No Pain.         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   Follow up heme onc in 2-3 weeks.   Pseudomonas UTI   Treated in hospital   Obstructive uropathy   F/u urology today   Finasteride   Duodenal Ulcer   Protonix      Freda Petit DO, FELIPE     Electronically signed by: Freda Petit DO on 1/28/2025

## 2025-02-05 ENCOUNTER — HOSPITAL ENCOUNTER (OUTPATIENT)
Dept: CT IMAGING | Age: 85
Discharge: HOME | End: 2025-02-07
Payer: MEDICARE

## 2025-02-05 VITALS
OXYGEN SATURATION: 93 % | TEMPERATURE: 97.8 F | DIASTOLIC BLOOD PRESSURE: 66 MMHG | RESPIRATION RATE: 10 BRPM | SYSTOLIC BLOOD PRESSURE: 122 MMHG | HEART RATE: 77 BPM

## 2025-02-05 DIAGNOSIS — R16.0 LIVER MASS: ICD-10-CM

## 2025-02-05 LAB
INR PPP: 1.1
PROTHROMBIN TIME: 14.9 SEC (ref 12.3–14.9)

## 2025-02-05 PROCEDURE — 6370000000 HC RX 637 (ALT 250 FOR IP): Performed by: RADIOLOGY

## 2025-02-05 PROCEDURE — 2580000003 HC RX 258: Performed by: RADIOLOGY

## 2025-02-05 PROCEDURE — 7100000011 HC PHASE II RECOVERY - ADDTL 15 MIN

## 2025-02-05 PROCEDURE — 7100000010 HC PHASE II RECOVERY - FIRST 15 MIN

## 2025-02-05 PROCEDURE — 2709999900 CT NEEDLE BIOPSY LIVER PERCUTANEOUS

## 2025-02-05 PROCEDURE — 6370000000 HC RX 637 (ALT 250 FOR IP): Performed by: NURSE PRACTITIONER

## 2025-02-05 PROCEDURE — 6360000002 HC RX W HCPCS: Performed by: RADIOLOGY

## 2025-02-05 PROCEDURE — 85610 PROTHROMBIN TIME: CPT

## 2025-02-05 PROCEDURE — 6360000002 HC RX W HCPCS: Performed by: NURSE PRACTITIONER

## 2025-02-05 RX ORDER — SODIUM CHLORIDE 9 MG/ML
INJECTION, SOLUTION INTRAVENOUS CONTINUOUS PRN
Status: COMPLETED | OUTPATIENT
Start: 2025-02-05 | End: 2025-02-05

## 2025-02-05 RX ORDER — MIDAZOLAM HYDROCHLORIDE 2 MG/2ML
INJECTION, SOLUTION INTRAMUSCULAR; INTRAVENOUS PRN
Status: COMPLETED | OUTPATIENT
Start: 2025-02-05 | End: 2025-02-05

## 2025-02-05 RX ORDER — FENTANYL CITRATE 0.05 MG/ML
INJECTION, SOLUTION INTRAMUSCULAR; INTRAVENOUS PRN
Status: COMPLETED | OUTPATIENT
Start: 2025-02-05 | End: 2025-02-05

## 2025-02-05 RX ORDER — LIDOCAINE HYDROCHLORIDE 20 MG/ML
INJECTION, SOLUTION INFILTRATION; PERINEURAL PRN
Status: COMPLETED | OUTPATIENT
Start: 2025-02-05 | End: 2025-02-05

## 2025-02-05 RX ORDER — NEOMYCIN/BACITRACIN/POLYMYXINB 3.5-400-5K
OINTMENT (GRAM) TOPICAL PRN
Status: COMPLETED | OUTPATIENT
Start: 2025-02-05 | End: 2025-02-05

## 2025-02-05 RX ADMIN — LIDOCAINE HYDROCHLORIDE 5 ML: 20 INJECTION, SOLUTION INFILTRATION; PERINEURAL at 09:56

## 2025-02-05 RX ADMIN — FENTANYL CITRATE 50 MCG: 50 INJECTION INTRAMUSCULAR; INTRAVENOUS at 09:46

## 2025-02-05 RX ADMIN — SODIUM CHLORIDE 50 ML/HR: 9 INJECTION, SOLUTION INTRAVENOUS at 09:45

## 2025-02-05 RX ADMIN — MIDAZOLAM HYDROCHLORIDE 0.5 MG: 1 INJECTION, SOLUTION INTRAMUSCULAR; INTRAVENOUS at 10:02

## 2025-02-05 RX ADMIN — MIDAZOLAM HYDROCHLORIDE 0.5 MG: 1 INJECTION, SOLUTION INTRAMUSCULAR; INTRAVENOUS at 09:46

## 2025-02-05 RX ADMIN — GELATIN ABSORBABLE SPONGE SIZE 100 1 EACH: MISC at 10:10

## 2025-02-05 RX ADMIN — BACITRACIN, NEOMYCIN, POLYMYXIN B 1 EACH: 400; 3.5; 5 OINTMENT TOPICAL at 10:11

## 2025-02-05 RX ADMIN — FENTANYL CITRATE 50 MCG: 50 INJECTION INTRAMUSCULAR; INTRAVENOUS at 10:02

## 2025-02-05 NOTE — FLOWSHEET NOTE
1022 - Patient arrived back to CT holding post procedure. Awake, eyes open and responding appropriately to questioning. Denies pain. Procedural site assessed - band aid clean / dry / intact. No bleeding / drainage / swelling noted. Given water per request and sipping on it, tolerating well. Reports he feels \"sleepy / fuzzy\" from medication. Declines food at this time. VSS, on RA. Call light at hand.     1040 - Patient asleep, appears comfortable. No distress. VS remain stable.     1056 - , Gianni for [employer] called and aware  of discharge time of 1215. Will pick patient up at discharge entrance.     1105 - Patient awakened and given more water. Still declines food intake at this time. Procedural site assessed and remains WNL.     1112 - Report called to SETH Martinez at [facility].     1140 - Patient's sister brought back to sit at cart side, talking with patient.     1158 - VS remain stable and procedural site WNL. IV out and monitor off. Patient assisted with getting dressed and back into WC - 2 max assist. 150cc pritesh urine emptied from payne.     1222 - Taken to hospital entrance via  with his sister, picked up by [employer] transport who will take him back to facility.  Electronically signed by Irene Chandler, RN on 2/5/2025 at 12:36 PM

## 2025-02-05 NOTE — OR NURSING
SEDATION ADMINISTERED    Patient positioned supine on CT table.  Vitals Monitor and 2L/NC ETCO2 oxygen tubing applied.  VSS.  CT scans done.    0945 Timeout completed for image guided percutaneous needle biopsy of liver lesion with conscious sedation.  Versed 0.5 mg IV and Fentanyl 50 mcg IV sedation administered by Katie, independent observer.     0952 Dr Bennett and Cathi Coronel CNP reviewed scans, right upper abdomen site marked, prepped with Chloraprep. After 3 minute dry time, draped with sterile drape and towels.      0956 Skin numbed with Lidocaine 2% by Cathi Coronel CNP.  CT Fluoro guidance used to place 19 g x15.1 cm introducer.    1002 Dr. Bennett and Cathi Coronel reviewing scans. Additional sedation given by SETH NIETO, see eMAR.     1005 core biopsy needle 20 g x 20cm used to obtain a total of 3 samples.  Samples placed into formalin cup.  Verbal and tactile reassurance provided throughout.    1010 Gel foam placed through introducer by Cathi coronel CNP and Dr. Bennett.    1011 Introducer withdrawn, pressure held then neosporin and bandaid applied.  Additional imaging taken and reviewed by Dr. Bennett/Cathi Coronel CNP. Patient tolerated well.     Specimen taken to lab.  Patient taken to CT Holding Room for Recovery to lay right side down x 2 hours.    Total Sedation Given:  Versed:     1 mg       Fentanyl:    100 mcg

## 2025-02-05 NOTE — PRE SEDATION
Sedation Pre-Procedure Note    Patient Name: Juan Kumar   YOB: 1940  Room/Bed: Room/bed info not found  Medical Record Number: [id number]  Date: 2/5/2025   Time: 9:22 AM       Indication:  Liver biopsy    Consent: I have discussed with the patient and/or the patient representative the indication, alternatives, and the possible risks and/or complications of the planned procedure and the anesthesia methods. The patient and/or patient representative appear to understand and agree to proceed.    Vital Signs:   Vitals:    02/05/25 0817   BP: 127/66   Pulse: 96   Resp: 24   Temp: 97.8 °F (36.6 °C)   SpO2: 94%       Past Medical History:   has a past medical history of Aneurysm artery, pulmonary (HCC), Benign prostatic hyperplasia with lower urinary tract symptoms, Chronic sinusitis, History of left knee replacement, History of total left knee replacement (TKR), HTN (hypertension), Hx of carpal tunnel syndrome, Obesity, Other fracture of left femur, initial encounter for closed fracture (HCC), and Primary osteoarthritis of both knees.    Past Surgical History:   has a past surgical history that includes Total knee arthroplasty (Left, 03/15/2022); Revision total knee arthroplasty (Left, 03/16/2023); Colonoscopy (N/A, 12/27/2024); Upper gastrointestinal endoscopy (N/A, 12/27/2024); CT NEEDLE BIOPSY LIVER PERCUTANEOUS (Right, 01/17/2025); and CT NEEDLE BIOPSY LIVER PERCUTANEOUS (1/17/2025).    Medications:   Scheduled Meds:   Continuous Infusions:   PRN Meds:   Home Meds:   Prior to Admission medications    Medication Sig Start Date End Date Taking? Authorizing Provider   albuterol (ACCUNEB) 0.63 MG/3ML nebulizer solution Take 3 mLs by nebulization every 6 hours as needed for Wheezing    Franki Hopkins MD   ipratropium 0.5 mg-albuterol 2.5 mg (DUONEB) 0.5-2.5 (3) MG/3ML SOLN nebulizer solution Inhale 3 mLs into the lungs every 4 hours    ProviderFranki MD   budesonide (PULMICORT) 0.5 MG/2ML

## 2025-02-05 NOTE — FLOWSHEET NOTE
0777 - Patient brought back to CT holding via . Arrived to [facility] by transport from [facility] -  leaving, provided phone # to call when patient discharged.     Max 2 person assist transfer of patient from  to [facility]. Clothing removed, gown applied. Incont brief left on per patient request - payne cath in place. A&O4, calm and cooperative. IV #22 inserted LFA without issues, tolerated well. Stat PT/INR specimen drawn and taken to lab.     VSS, on RA. Denies pain. Confirms NPO since prior to MN except sip of water with meds this AM. Denies taking a blood thinner medication. Procedure explained and consent signed. DNRCCA status noted on patient's paperwork from nursing home - confirmed status with patient, addendum form also completed.     2715 - Dr. Sabrina millan, patient ready to be seen in CT holding.      6710 - Dr. Bennett & Cathi Salinas, CNP at cart side to evaluate patient and sign consent.     Into CT via [facility] for procedure.

## 2025-02-05 NOTE — DISCHARGE INSTRUCTIONS
BIOPSY DISCHARGE INSTRUCTIONS    ACTIVITY:   Rest today. Expect to be sore for 1 to 2 days. No heavy bending, lifting , stretching, pushing or pulling for at least 24 hours. Do not lift anything over 10 pounds for the next 24 - 48 hours. Do not drive today.      BATHING:   May shower, bathe, or swim after 24 hours.  Pat the site dry. May remove large band aid after 24 hours.    DIET:   May resume your normal diet.     MEDICATION:  * Resume home medication unless otherwise instructed by your physician.  * (If Applicable) If taking any blood thinners or Aspirin, hold for 24 hours after biopsy.  * May take mild pain reliever, as needed, such as Acetaminophen or Ibuprofen.      COMPLICATIONS RELATED TO BIOPSY:   - Dizziness, weakness, fatigue  - Bruising/firmness/ and/or pain at the site.  - Extreme pain after leaving the hospital.   - Large or heavy bleeding at biopsy site.   IF THESE SYMPTOMS OCCUR AND BECOME SEVERE, REPORT TO THE EMERGENCY ROOM FOR FURTHER EVALUATION.     For the next 48 hours watch site for redness, drainage, swelling , fever (temp above 101 degrees F), or chills.   If these signs of infection occur contact DR. ESPINOZA at [phone].

## 2025-02-06 ENCOUNTER — OFFICE VISIT (OUTPATIENT)
Dept: GERIATRIC MEDICINE | Age: 85
End: 2025-02-06

## 2025-02-06 ENCOUNTER — TELEPHONE (OUTPATIENT)
Dept: CT IMAGING | Age: 85
End: 2025-02-06

## 2025-02-06 DIAGNOSIS — G89.29 CHRONIC PAIN OF LEFT KNEE: ICD-10-CM

## 2025-02-06 DIAGNOSIS — R53.1 WEAKNESS: ICD-10-CM

## 2025-02-06 DIAGNOSIS — K26.9 DUODENAL ULCER: ICD-10-CM

## 2025-02-06 DIAGNOSIS — N13.9 OBSTRUCTIVE UROPATHY: ICD-10-CM

## 2025-02-06 DIAGNOSIS — R16.0 LIVER MASS: ICD-10-CM

## 2025-02-06 DIAGNOSIS — M25.562 CHRONIC PAIN OF LEFT KNEE: ICD-10-CM

## 2025-02-06 DIAGNOSIS — R91.8 LUNG NODULES: Primary | ICD-10-CM

## 2025-02-06 DIAGNOSIS — I10 HYPERTENSION, UNSPECIFIED TYPE: ICD-10-CM

## 2025-02-06 NOTE — TELEPHONE ENCOUNTER
Post procedure phone call placed to [facility]. Spoke to nurse Jolene for this patient. This patient had a Liver biopsy done by Dr. Bennett/Cathi Salinas CNP on 02/05/2025. Per nurse Jolene the patient has had no issues,complications, or problems overnight due to the biopsy. Biopsy site \"looks good.\" Per Nurse.

## 2025-02-06 NOTE — PROGRESS NOTES
SNF PROGRESS NOTE      Cc- liver/lung nodules/mass       Patient is a Juan Marcella 84 y.o. male who is being seen at [facility] who presented to hospital with increased weakness and abdominal pain. He was noted to have multiple liver and lung nodules. EGD was performed and showed duodenal ulcer and colonoscopy showed polyps which were biopsied. He was noted to have Pseudomonas UTI for which she was treated in the hospital.     Patient tells me he is supposed to have a scope today and he doesn't know if he wants to go. He is sitting up in the chair and dressed.         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   Follow up heme onc in 2-3 weeks.   Bronchoscopy to be rescheduled 2/7/25  Pseudomonas UTI   Treated in hospital   Obstructive uropathy   F/u urology today   Finasteride   Duodenal Ulcer   Protonix      Freda Petit DO, FELIPE     Electronically signed by: Freda Petit DO on 1/31/2025

## 2025-02-07 ENCOUNTER — OFFICE VISIT (OUTPATIENT)
Dept: GERIATRIC MEDICINE | Age: 85
End: 2025-02-07

## 2025-02-07 DIAGNOSIS — G89.29 CHRONIC PAIN OF LEFT KNEE: ICD-10-CM

## 2025-02-07 DIAGNOSIS — N13.9 OBSTRUCTIVE UROPATHY: ICD-10-CM

## 2025-02-07 DIAGNOSIS — R53.1 WEAKNESS: ICD-10-CM

## 2025-02-07 DIAGNOSIS — R16.0 LIVER MASS: ICD-10-CM

## 2025-02-07 DIAGNOSIS — I10 HYPERTENSION, UNSPECIFIED TYPE: ICD-10-CM

## 2025-02-07 DIAGNOSIS — M25.562 CHRONIC PAIN OF LEFT KNEE: ICD-10-CM

## 2025-02-07 DIAGNOSIS — K26.9 DUODENAL ULCER: ICD-10-CM

## 2025-02-07 DIAGNOSIS — R91.8 LUNG NODULES: Primary | ICD-10-CM

## 2025-02-08 NOTE — PROGRESS NOTES
SNF PROGRESS NOTE      Cc- liver/lung nodules/mass       Patient is a Juan Hilham 84 y.o. male who is being seen at [facility] who presented to hospital with increased weakness and abdominal pain. He was noted to have multiple liver and lung nodules. EGD was performed and showed duodenal ulcer and colonoscopy showed polyps which were biopsied. He was noted to have Pseudomonas UTI for which she was treated in the hospital.     Patient had liver bx done yesterday. He is sitting in the chair and states that he is just riley \"not great\". He is eating ok. He denies any pain. He just states that he feels off.         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   Bronchoscopy to be rescheduled 2/7/25  Pseudomonas UTI   Treated in hospital   Obstructive uropathy   F/u urology   Finasteride   Duodenal Ulcer   Protonix         FELIPE Catalan DO     Electronically signed by: Freda Petit DO on 2/6/2025

## 2025-02-10 ENCOUNTER — OFFICE VISIT (OUTPATIENT)
Dept: GERIATRIC MEDICINE | Age: 85
End: 2025-02-10

## 2025-02-10 DIAGNOSIS — M25.562 CHRONIC PAIN OF LEFT KNEE: ICD-10-CM

## 2025-02-10 DIAGNOSIS — R53.1 WEAKNESS: ICD-10-CM

## 2025-02-10 DIAGNOSIS — R91.8 LUNG NODULES: Primary | ICD-10-CM

## 2025-02-10 DIAGNOSIS — G89.29 CHRONIC PAIN OF LEFT KNEE: ICD-10-CM

## 2025-02-10 DIAGNOSIS — N13.9 OBSTRUCTIVE UROPATHY: ICD-10-CM

## 2025-02-10 DIAGNOSIS — K26.9 DUODENAL ULCER: ICD-10-CM

## 2025-02-10 DIAGNOSIS — I10 HYPERTENSION, UNSPECIFIED TYPE: ICD-10-CM

## 2025-02-10 DIAGNOSIS — R16.0 LIVER MASS: ICD-10-CM

## 2025-02-11 ENCOUNTER — OFFICE VISIT (OUTPATIENT)
Dept: GERIATRIC MEDICINE | Age: 85
End: 2025-02-11

## 2025-02-11 DIAGNOSIS — M25.562 CHRONIC PAIN OF LEFT KNEE: ICD-10-CM

## 2025-02-11 DIAGNOSIS — K26.9 DUODENAL ULCER: ICD-10-CM

## 2025-02-11 DIAGNOSIS — I10 HYPERTENSION, UNSPECIFIED TYPE: ICD-10-CM

## 2025-02-11 DIAGNOSIS — G89.29 CHRONIC PAIN OF LEFT KNEE: ICD-10-CM

## 2025-02-11 DIAGNOSIS — R91.8 LUNG NODULES: Primary | ICD-10-CM

## 2025-02-11 DIAGNOSIS — R16.0 LIVER MASS: ICD-10-CM

## 2025-02-11 DIAGNOSIS — N13.9 OBSTRUCTIVE UROPATHY: ICD-10-CM

## 2025-02-11 DIAGNOSIS — R53.1 WEAKNESS: ICD-10-CM

## 2025-02-11 NOTE — PROGRESS NOTES
SNF PROGRESS NOTE      Cc- liver/lung nodules/mass       Patient is a Juan [place] 84 y.o. male who is being seen at [facility] who presented to hospital with increased weakness and abdominal pain. He was noted to have multiple liver and lung nodules. EGD was performed and showed duodenal ulcer and colonoscopy showed polyps which were biopsied. He was noted to have Pseudomonas UTI for which she was treated in the hospital.     Patient states that he is just sore on his side where he had the biopsy. He is on RA. He is eating ok.         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   Bronchoscopy to be rescheduled   Pseudomonas UTI   Treated in hospital   Obstructive uropathy   F/u urology   Finasteride   Duodenal Ulcer   Protonix      FELIPE Catalan DO     Electronically signed by: Freda Petit DO on 2/10/2025

## 2025-02-12 NOTE — PROGRESS NOTES
SNF PROGRESS NOTE      Cc- liver/lung nodules/mass       Patient is a Juan [place] 84 y.o. male who is being seen at [facility] who presented to hospital with increased weakness and abdominal pain. He was noted to have multiple liver and lung nodules. EGD was performed and showed duodenal ulcer and colonoscopy showed polyps which were biopsied. He was noted to have Pseudomonas UTI for which she was treated in the hospital.     Patient is sitting in his chair. He is sore but doing ok. He is eating breakfast.         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   Bronchoscopy to be rescheduled  Pseudomonas UTI   Treated in hospital   Obstructive uropathy   F/u urology   Finasteride   Duodenal Ulcer   Protonix      FELIPE Catalan DO     Electronically signed by: Freda Petit DO on 2/7/2025

## 2025-02-12 NOTE — PROGRESS NOTES
SNF PROGRESS NOTE      Cc-  liver/lung nodules/mass       Patient is a Juan José 84 y.o. male who is being seen at [facility] who presented to hospital with increased weakness and abdominal pain. He was noted to have multiple liver and lung nodules. EGD was performed and showed duodenal ulcer and colonoscopy showed polyps which were biopsied. He was noted to have Pseudomonas UTI for which she was treated in the hospital.     Patient has planned biopsy for tomorrow. He is eating ok. No complaints.         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   Follow up heme onc in 2-3 weeks.   Bronchoscopy to be rescheduled 2/7/25  Pseudomonas UTI   Treated in hospital   Obstructive uropathy   F/u urology today   Finasteride   Duodenal Ulcer   Protonix      FELIPE Catalan DO     Electronically signed by: Freda Petit DO on 2/4/2025

## 2025-02-14 ENCOUNTER — OFFICE VISIT (OUTPATIENT)
Dept: GERIATRIC MEDICINE | Age: 85
End: 2025-02-14

## 2025-02-14 DIAGNOSIS — G89.29 CHRONIC PAIN OF LEFT KNEE: ICD-10-CM

## 2025-02-14 DIAGNOSIS — K26.9 DUODENAL ULCER: ICD-10-CM

## 2025-02-14 DIAGNOSIS — R53.1 WEAKNESS: ICD-10-CM

## 2025-02-14 DIAGNOSIS — M25.562 CHRONIC PAIN OF LEFT KNEE: ICD-10-CM

## 2025-02-14 DIAGNOSIS — R91.8 LUNG NODULES: Primary | ICD-10-CM

## 2025-02-14 DIAGNOSIS — R16.0 LIVER MASS: ICD-10-CM

## 2025-02-14 DIAGNOSIS — N13.9 OBSTRUCTIVE UROPATHY: ICD-10-CM

## 2025-02-17 ENCOUNTER — OFFICE VISIT (OUTPATIENT)
Dept: GERIATRIC MEDICINE | Age: 85
End: 2025-02-17
Payer: MEDICARE

## 2025-02-17 DIAGNOSIS — M25.562 CHRONIC PAIN OF LEFT KNEE: ICD-10-CM

## 2025-02-17 DIAGNOSIS — R91.8 LUNG NODULES: Primary | ICD-10-CM

## 2025-02-17 DIAGNOSIS — G89.29 CHRONIC PAIN OF LEFT KNEE: ICD-10-CM

## 2025-02-17 DIAGNOSIS — R16.0 LIVER MASS: ICD-10-CM

## 2025-02-17 DIAGNOSIS — K26.9 DUODENAL ULCER: ICD-10-CM

## 2025-02-17 DIAGNOSIS — N13.9 OBSTRUCTIVE UROPATHY: ICD-10-CM

## 2025-02-17 DIAGNOSIS — R53.1 WEAKNESS: ICD-10-CM

## 2025-02-17 PROCEDURE — 99308 SBSQ NF CARE LOW MDM 20: CPT | Performed by: INTERNAL MEDICINE

## 2025-02-17 PROCEDURE — 1123F ACP DISCUSS/DSCN MKR DOCD: CPT | Performed by: INTERNAL MEDICINE

## 2025-02-18 ENCOUNTER — OFFICE VISIT (OUTPATIENT)
Dept: GERIATRIC MEDICINE | Age: 85
End: 2025-02-18

## 2025-02-18 DIAGNOSIS — R91.8 LUNG NODULES: Primary | ICD-10-CM

## 2025-02-18 DIAGNOSIS — M25.562 CHRONIC PAIN OF LEFT KNEE: ICD-10-CM

## 2025-02-18 DIAGNOSIS — K26.9 DUODENAL ULCER: ICD-10-CM

## 2025-02-18 DIAGNOSIS — G89.29 CHRONIC PAIN OF LEFT KNEE: ICD-10-CM

## 2025-02-18 DIAGNOSIS — R53.1 WEAKNESS: ICD-10-CM

## 2025-02-18 DIAGNOSIS — I10 HYPERTENSION, UNSPECIFIED TYPE: ICD-10-CM

## 2025-02-18 DIAGNOSIS — R16.0 LIVER MASS: ICD-10-CM

## 2025-02-18 DIAGNOSIS — N13.9 OBSTRUCTIVE UROPATHY: ICD-10-CM

## 2025-02-19 NOTE — PROGRESS NOTES
SNF PROGRESS NOTE      Cc-  liver/lung nodules/mass       Patient is a Juan José 84 y.o. male who is being seen at [facility] who presented to hospital with increased weakness and abdominal pain. He was noted to have multiple liver and lung nodules. EGD was performed and showed duodenal ulcer and colonoscopy showed polyps which were biopsied. He was noted to have Pseudomonas UTI for which she was treated in the hospital.     Patient is sitting in the chair. He is on RA. He states that his appetite is good.         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    HTN (hypertension)     Hx of carpal tunnel syndrome     Obesity     Other fracture of left femur, initial encounter for closed fracture (HCC) 3/14/2022    Primary osteoarthritis of both knees      Aspirin    VS reviewed    Gen- Alert and oriented x 3  Heart- RRR no murmur no LE edema   Lungs- CTA b/l no resp distress RA oxygen   Abd- bs x 4      + payne          Assessment and Plan    Liver and lung masses -- 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

## 2025-02-19 NOTE — PROGRESS NOTES
SNF PROGRESS NOTE      Cc- liver/lung nodules/mass       Patient is a Juan [place] 84 y.o. male who is being seen at [facility] who presented to hospital with increased weakness and abdominal pain. He was noted to have multiple liver and lung nodules. EGD was performed and showed duodenal ulcer and colonoscopy showed polyps which were biopsied. He was noted to have Pseudomonas UTI for which she was treated in the hospital.     Patient with upcoming biopsy. He is sitting in the chair. He eats well. His pain is controlled.         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   Follow up heme onc in 2-3 weeks.   Bronchoscopy to be rescheduled 2/7/25  Pseudomonas UTI   Treated in hospital   Obstructive uropathy   F/u urology   Finasteride   Duodenal Ulcer   Protonix      FELIPE Catalan DO     Electronically signed by: Freda Petit DO on 2/3/2025

## 2025-02-20 ENCOUNTER — OFFICE VISIT (OUTPATIENT)
Dept: GERIATRIC MEDICINE | Age: 85
End: 2025-02-20

## 2025-02-20 DIAGNOSIS — R53.1 WEAKNESS: ICD-10-CM

## 2025-02-20 DIAGNOSIS — K26.9 DUODENAL ULCER: ICD-10-CM

## 2025-02-20 DIAGNOSIS — R16.0 LIVER MASS: ICD-10-CM

## 2025-02-20 DIAGNOSIS — R91.8 LUNG NODULES: Primary | ICD-10-CM

## 2025-02-20 DIAGNOSIS — N13.9 OBSTRUCTIVE UROPATHY: ICD-10-CM

## 2025-02-20 DIAGNOSIS — I10 HYPERTENSION, UNSPECIFIED TYPE: ICD-10-CM

## 2025-02-24 ENCOUNTER — OFFICE VISIT (OUTPATIENT)
Dept: GERIATRIC MEDICINE | Age: 85
End: 2025-02-24

## 2025-02-24 DIAGNOSIS — K26.9 DUODENAL ULCER: ICD-10-CM

## 2025-02-24 DIAGNOSIS — I10 HYPERTENSION, UNSPECIFIED TYPE: ICD-10-CM

## 2025-02-24 DIAGNOSIS — R91.8 LUNG NODULES: Primary | ICD-10-CM

## 2025-02-24 DIAGNOSIS — R53.1 WEAKNESS: ICD-10-CM

## 2025-02-24 DIAGNOSIS — N13.9 OBSTRUCTIVE UROPATHY: ICD-10-CM

## 2025-02-24 DIAGNOSIS — R16.0 LIVER MASS: ICD-10-CM

## 2025-02-24 NOTE — PROGRESS NOTES
SNF PROGRESS NOTE      Cc- liver/lung nodules/mass       Patient is a Juan [place] 84 y.o. male who is being seen at [facility] who presented to hospital with increased weakness and abdominal pain. He was noted to have multiple liver and lung nodules. EGD was performed and showed duodenal ulcer and colonoscopy showed polyps which were biopsied. He was noted to have Pseudomonas UTI for which she was treated in the hospital.     Patient is sitting up in his room. He is eating ok. He has no complaints at this time.         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    HTN (hypertension)     Hx of carpal tunnel syndrome     Obesity     Other fracture of left femur, initial encounter for closed fracture (HCC) 3/14/2022    Primary osteoarthritis of both knees      Aspirin    VS reviewed    Gen- Alert and oriented x 3  Heart- RRR no murmur no LE edema   Lungs- CTA b/l no resp distress RA oxygen   Abd- bs x 4      + payne        Assessment and Plan      Liver and lung masses -- suspect metastatic disease  Liver bx done on 1/17 and 2/5   PRN pain meds   Follow up heme onc 2/25/25  Bronchoscopy   Pseudomonas UTI   Treated in hospital   Obstructive uropathy   F/u urology   Finasteride   Duodenal Ulcer   Protonix    FELIPE Catalan DO     Electronically signed by: Freda Petit DO on 2/17/2025

## 2025-02-25 ENCOUNTER — OFFICE VISIT (OUTPATIENT)
Dept: GERIATRIC MEDICINE | Age: 85
End: 2025-02-25

## 2025-02-25 DIAGNOSIS — R16.0 LIVER MASS: ICD-10-CM

## 2025-02-25 DIAGNOSIS — R53.1 WEAKNESS: ICD-10-CM

## 2025-02-25 DIAGNOSIS — R91.8 LUNG NODULES: Primary | ICD-10-CM

## 2025-02-25 DIAGNOSIS — K26.9 DUODENAL ULCER: ICD-10-CM

## 2025-02-25 DIAGNOSIS — I10 HYPERTENSION, UNSPECIFIED TYPE: ICD-10-CM

## 2025-02-25 DIAGNOSIS — N13.9 OBSTRUCTIVE UROPATHY: ICD-10-CM

## 2025-02-26 NOTE — PROGRESS NOTES
SNF PROGRESS NOTE      Cc- liver/lung nodules/mass       Patient is a Juan [place] 84 y.o. male who is being seen at [facility] who presented to hospital with increased weakness and abdominal pain. He was noted to have multiple liver and lung nodules. EGD was performed and showed duodenal ulcer and colonoscopy showed polyps which were biopsied. He was noted to have Pseudomonas UTI for which she was treated in the hospital.     Patient is sitting up in his chair at bedside. He denies any complaints of pain. No N/V, Eating well.         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    Benign prostatic hyperplasia with lower urinary tract symptoms     Chronic sinusitis     History of left knee replacement 03/15/2022    at [facility]    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    HTN (hypertension)     Hx of carpal tunnel syndrome     Obesity     Other fracture of left femur, initial encounter for closed fracture (HCC) 3/14/2022    Primary osteoarthritis of both knees      Aspirin    VS reviewed      Gen- Alert and oriented x 3  Heart- RRR no murmur no LE edema   Lungs- CTA b/l no resp distress RA oxygen   Abd- bs x 4      + payne        Assessment and Plan    Liver and lung masses -- metastatic disease   Liver bx done on 1/17 and 2/5   PRN pain meds   Follow up heme onc 2/25/25  Bronchoscopy   Pseudomonas UTI   Treated in hospital   Obstructive uropathy   F/u urology   Finasteride   Duodenal Ulcer   Protonix    Had a discussion with patient about his pathology. He talked about likely not wanting chemo. He did ask me to call his sister, kori, which I did. The patient is ok to listen to hospice consult.     FELIPE Catalan DO     Electronically signed by: Freda Petit DO on 2/18/2025

## 2025-02-27 ENCOUNTER — OFFICE VISIT (OUTPATIENT)
Dept: GERIATRIC MEDICINE | Age: 85
End: 2025-02-27
Payer: MEDICARE

## 2025-02-27 DIAGNOSIS — N13.9 OBSTRUCTIVE UROPATHY: ICD-10-CM

## 2025-02-27 DIAGNOSIS — R53.1 WEAKNESS: ICD-10-CM

## 2025-02-27 DIAGNOSIS — R91.8 LUNG NODULES: Primary | ICD-10-CM

## 2025-02-27 DIAGNOSIS — K26.9 DUODENAL ULCER: ICD-10-CM

## 2025-02-27 DIAGNOSIS — I10 HYPERTENSION, UNSPECIFIED TYPE: ICD-10-CM

## 2025-02-27 DIAGNOSIS — R16.0 LIVER MASS: ICD-10-CM

## 2025-02-27 PROCEDURE — 1123F ACP DISCUSS/DSCN MKR DOCD: CPT | Performed by: INTERNAL MEDICINE

## 2025-02-27 PROCEDURE — 99309 SBSQ NF CARE MODERATE MDM 30: CPT | Performed by: INTERNAL MEDICINE

## 2025-02-28 ENCOUNTER — OFFICE VISIT (OUTPATIENT)
Dept: GERIATRIC MEDICINE | Age: 85
End: 2025-02-28
Payer: MEDICARE

## 2025-02-28 DIAGNOSIS — K26.9 DUODENAL ULCER: ICD-10-CM

## 2025-02-28 DIAGNOSIS — N13.9 OBSTRUCTIVE UROPATHY: ICD-10-CM

## 2025-02-28 DIAGNOSIS — I10 HYPERTENSION, UNSPECIFIED TYPE: ICD-10-CM

## 2025-02-28 DIAGNOSIS — R91.8 LUNG NODULES: Primary | ICD-10-CM

## 2025-02-28 DIAGNOSIS — R16.0 LIVER MASS: ICD-10-CM

## 2025-02-28 DIAGNOSIS — R53.1 WEAKNESS: ICD-10-CM

## 2025-02-28 PROCEDURE — 1123F ACP DISCUSS/DSCN MKR DOCD: CPT | Performed by: INTERNAL MEDICINE

## 2025-02-28 PROCEDURE — 99308 SBSQ NF CARE LOW MDM 20: CPT | Performed by: INTERNAL MEDICINE

## 2025-03-01 NOTE — PROGRESS NOTES
SNF PROGRESS NOTE      Cc- liver/lung nodules/mass       Patient is a Juan [place] 84 y.o. male who is being seen at [facility] who presented to hospital with increased weakness and abdominal pain. He was noted to have multiple liver and lung nodules. EGD was performed and showed duodenal ulcer and colonoscopy showed polyps which were biopsied. He was noted to have Pseudomonas UTI for which she was treated in the hospital.        Patient sitting in the chair. He talked about wanting to go to his upcoming appt on Tuesday with heme onc to see what they say. He is not in any pain. He is eating ok.         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    Benign prostatic hyperplasia with lower urinary tract symptoms     Chronic sinusitis     History of left knee replacement 03/15/2022    at [facility]    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    HTN (hypertension)     Hx of carpal tunnel syndrome     Obesity     Other fracture of left femur, initial encounter for closed fracture (HCC) 3/14/2022    Primary osteoarthritis of both knees      Aspirin    VS reviewed      Gen- Alert and oriented x 3  Heart- RRR no murmur no LE edema   Lungs- CTA b/l no resp distress RA oxygen   Abd- bs x 4      + payne        Assessment and Plan    Liver and lung masses -- metastatic disease   Liver bx done on 1/17 and 2/5   PRN pain meds   Follow up heme onc 2/25/25  Bronchoscopy   Pseudomonas UTI   Treated in hospital   Obstructive uropathy   F/u urology   Finasteride   Duodenal Ulcer   Protonix    Patient would like to go to heme onc appt on Tuesday and then make a final decision after talking with them. He seems to be leaning towards comfort model of care.     FELIPE Catalan DO     Electronically signed by: Freda Petit DO on 2/20/2025

## 2025-03-03 ENCOUNTER — OFFICE VISIT (OUTPATIENT)
Dept: GERIATRIC MEDICINE | Age: 85
End: 2025-03-03

## 2025-03-03 DIAGNOSIS — R53.1 WEAKNESS: ICD-10-CM

## 2025-03-03 DIAGNOSIS — R16.0 LIVER MASS: ICD-10-CM

## 2025-03-03 DIAGNOSIS — K26.9 DUODENAL ULCER: ICD-10-CM

## 2025-03-03 DIAGNOSIS — I10 HYPERTENSION, UNSPECIFIED TYPE: ICD-10-CM

## 2025-03-03 DIAGNOSIS — R91.8 LUNG NODULES: Primary | ICD-10-CM

## 2025-03-03 DIAGNOSIS — N13.9 OBSTRUCTIVE UROPATHY: ICD-10-CM

## 2025-03-03 NOTE — PROGRESS NOTES
SNF PROGRESS NOTE      Cc- liver/lung nodules/mass -- cancer       Patient is a Juan Kumar 84 y.o. male who is being seen at [facility] who presented to hospital with increased weakness and abdominal pain. He was noted to have multiple liver and lung nodules. EGD was performed and showed duodenal ulcer and colonoscopy showed polyps which were biopsied. He was noted to have Pseudomonas UTI for which she was treated in the hospital.     Patient is sitting up in the chair. He states no more chemotherapy and treatment after his appointment and he asked what I thought about this. I told him I agreed. He is eating well. Occasional pain. Ok with meeting with hospice.          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    Benign prostatic hyperplasia with lower urinary tract symptoms     Chronic sinusitis     History of left knee replacement 03/15/2022    at [facility]    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    HTN (hypertension)     Hx of carpal tunnel syndrome     Obesity     Other fracture of left femur, initial encounter for closed fracture (HCC) 3/14/2022    Primary osteoarthritis of both knees      Aspirin    VS reviewed    Gen- Alert and oriented x 3  Heart- RRR no murmur no LE edema   Lungs- CTA b/l no resp distress RA oxygen   Abd- bs x 4      + payne        Assessment and Plan    Liver and lung masses -- metastatic disease   Liver bx done on 1/17 and 2/5 -- + cancer.   PRN pain meds   Planning hospice meeting.   Pseudomonas UTI   Treated in hospital   Obstructive uropathy   F/u urology   Finasteride   Duodenal Ulcer   Protonix         FELIPE Catalan DO     Electronically signed by: Freda Petit DO on 2/27/2025

## 2025-03-04 ENCOUNTER — OFFICE VISIT (OUTPATIENT)
Dept: GERIATRIC MEDICINE | Age: 85
End: 2025-03-04
Payer: MEDICARE

## 2025-03-04 DIAGNOSIS — K26.9 DUODENAL ULCER: ICD-10-CM

## 2025-03-04 DIAGNOSIS — I10 HYPERTENSION, UNSPECIFIED TYPE: ICD-10-CM

## 2025-03-04 DIAGNOSIS — N13.9 OBSTRUCTIVE UROPATHY: ICD-10-CM

## 2025-03-04 DIAGNOSIS — R53.1 WEAKNESS: ICD-10-CM

## 2025-03-04 DIAGNOSIS — R91.8 LUNG NODULES: Primary | ICD-10-CM

## 2025-03-04 DIAGNOSIS — R16.0 LIVER MASS: ICD-10-CM

## 2025-03-04 PROCEDURE — 99308 SBSQ NF CARE LOW MDM 20: CPT | Performed by: INTERNAL MEDICINE

## 2025-03-04 PROCEDURE — 1123F ACP DISCUSS/DSCN MKR DOCD: CPT | Performed by: INTERNAL MEDICINE

## 2025-03-05 NOTE — PROGRESS NOTES
SNF PROGRESS NOTE      Cc-  liver/lung nodules/mass -- cancer       Patient is a Juan Kumar 84 y.o. male who is being seen at [facility] who presented to hospital with increased weakness and abdominal pain. He was noted to have multiple liver and lung nodules. EGD was performed and showed duodenal ulcer and colonoscopy showed polyps which were biopsied. He was noted to have Pseudomonas UTI for which she was treated in the hospital.     Patient is sitting up in bed.  Plan is to get established with hospice.  He does state that he has some pain on his side.  He is eating fair.        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    Benign prostatic hyperplasia with lower urinary tract symptoms     Chronic sinusitis     History of left knee replacement 03/15/2022    at [facility]    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    HTN (hypertension)     Hx of carpal tunnel syndrome     Obesity     Other fracture of left femur, initial encounter for closed fracture (HCC) 3/14/2022    Primary osteoarthritis of both knees      Aspirin    VS reviewed    Gen- Alert and oriented x 3  Heart- RRR no murmur no LE edema   Lungs- CTA b/l no resp distress RA oxygen   Abd- bs x 4      + payne          Assessment and Plan       Liver and lung masses -- metastatic disease   Liver bx done on 1/17 and 2/5 -- + cancer.   PRN pain meds   Planning hospice meeting.   Pseudomonas UTI   Treated in hospital   Obstructive uropathy   F/u urology   Finasteride   Duodenal Ulcer   Protonix    Change decision about hospice companies.  Planning to meet with hospice over the weekend.      FELIPE Catalan DO     Electronically signed by: Freda Petit DO on 2/28/2025

## 2025-03-06 NOTE — PROGRESS NOTES
SNF PROGRESS NOTE      Cc- liver/lung nodules/mass -- cancer       Patient is a Juan Kumar 84 y.o. male who is being seen at [facility] who presented to hospital with increased weakness and abdominal pain. He was noted to have multiple liver and lung nodules. EGD was performed and showed duodenal ulcer and colonoscopy showed polyps which were biopsied. He was noted to have Pseudomonas UTI for which she was treated in the hospital.     Patient has hospice appointment later today.  He is eating okay.  No complaints of pain at this time.        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    Benign prostatic hyperplasia with lower urinary tract symptoms     Chronic sinusitis     History of left knee replacement 03/15/2022    at [facility]    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    HTN (hypertension)     Hx of carpal tunnel syndrome     Obesity     Other fracture of left femur, initial encounter for closed fracture (HCC) 3/14/2022    Primary osteoarthritis of both knees      Aspirin    VS reviewed    Gen- Alert and oriented x 3  Heart- RRR no murmur no LE edema   Lungs- CTA b/l no resp distress RA oxygen   Abd- bs x 4            Assessment and Plan    Liver and lung masses -- metastatic disease   Liver bx done on 1/17 and 2/5 -- + cancer.   PRN pain meds   Planning hospice meeting.   Pseudomonas UTI   Treated in hospital   Obstructive uropathy   F/u urology   Finasteride   Duodenal Ulcer   Protonix     Hospice meeting today at 5 PM      FELIPE Catalan DO     Electronically signed by: Freda Petit DO on 3/3/2025

## 2025-03-10 NOTE — PROGRESS NOTES
SNF PROGRESS NOTE      Cc-cancer      Patient is a Juan Kumar 84 y.o. male who is being seen at [facility] who presented to hospital with increased weakness and abdominal pain. He was noted to have multiple liver and lung nodules. EGD was performed and showed duodenal ulcer and colonoscopy showed polyps which were biopsied. He was noted to have Pseudomonas UTI for which she was treated in the hospital.     Patient is signed under hospice care.  His pain seems controlled.  He is eating okay.  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   PRN pain meds   Pseudomonas UTI   Treated in hospital   Obstructive uropathy   Finasteride   Duodenal Ulcer   Protonix    Patient is under hospice care      FELIPE Catalan DO     Electronically signed by: Freda Petit DO on 3/4/2025

## 2025-04-01 NOTE — PROGRESS NOTES
Discharge Summary       Discharge Disposition       Discharge Condition N/A       Discharge time 31 min       Diet/ Activity/ Meds- N/A         Brief SNF Course--     This is an 84 y.o. male who was being seen for liver and lung masses. He was noted to have metastatic cancer. He transitioned to LTC.           Discharge Diagnosis :    Liver and lung masses -- metastatic disease   Pseudomonas UTI   Obstructive uropathy   Duodenal Ulcer        Follow up --     N/A         FELIPE Catalan DO     Electronically signed by: Freda Petit DO on 3/31/2025

## 2025-04-11 ENCOUNTER — APPOINTMENT (OUTPATIENT)
Dept: PRIMARY CARE | Facility: CLINIC | Age: 85
End: 2025-04-11
Payer: MEDICARE

## 2025-05-07 DIAGNOSIS — Z00.00 WELL ADULT EXAM: ICD-10-CM

## (undated) DEVICE — GLOVE ORANGE PI 8   MSG9080

## (undated) DEVICE — SYSTEM SKIN CLSR 22CM DERMBND PRINEO

## (undated) DEVICE — Device: Brand: STABLECUT®

## (undated) DEVICE — APPLICATOR MEDICATED 26 CC SOLUTION HI LT ORNG CHLORAPREP

## (undated) DEVICE — BANDAGE COMPR M W6INXL10YD WHT BGE VELC E MTRX HK AND LOOP

## (undated) DEVICE — BONE PREPARATION KIT: Brand: BIOPREP

## (undated) DEVICE — MAT FLR ABSRB ECODRI-SAFE

## (undated) DEVICE — 3M™ STERI-DRAPE™ INSTRUMENT POUCH 1018: Brand: STERI-DRAPE™

## (undated) DEVICE — ABSORBENT ROLL ECODRI-SAFE

## (undated) DEVICE — HYPODERMIC SAFETY NEEDLE: Brand: MAGELLAN

## (undated) DEVICE — SUTURE VCRL SZ 2-0 L27IN ABSRB UD L36MM CP-1 1/2 CIR REV J266H

## (undated) DEVICE — 4-PORT MANIFOLD: Brand: NEPTUNE 2

## (undated) DEVICE — TUBE SET 96 MM 64 MM H2O PERISTALTIC STD AUX CHANNEL

## (undated) DEVICE — SUTURE VCRL SZ 2-0 L36IN ABSRB UD L36MM CT-1 1/2 CIR J945H

## (undated) DEVICE — HOOD: Brand: T7PLUS

## (undated) DEVICE — SOLUTION PREP POVIDONE IOD FOR SKIN MUCOUS MEM PRIOR TO

## (undated) DEVICE — SINGLE PORT MANIFOLD: Brand: NEPTUNE 2

## (undated) DEVICE — BRUSH ENDO CLN L90.5IN SHTH DIA1.7MM BRIST DIA5-7MM 2-6MM

## (undated) DEVICE — SUTURE STRATAFIX SPRL MCRYL + SZ 3-0 L18IN ABSRB UD PS-2 SXMP1B107

## (undated) DEVICE — DRAPE,U/ SHT,SPLIT,PLAS,STERIL: Brand: MEDLINE

## (undated) DEVICE — SIPS DUAL 2 MINUTE TIP

## (undated) DEVICE — PAD,ABDOMINAL,8"X10",ST,LF: Brand: MEDLINE

## (undated) DEVICE — COTTON UNDERCAST PADDING,REGULAR FINISH: Brand: WEBRIL

## (undated) DEVICE — ELECTRODE PT RET AD L9FT HI MOIST COND ADH HYDRGEL CORDED

## (undated) DEVICE — SUTURE PDS II SZ 1 L36IN ABSRB VLT CT L40MM 1/2 CIR TAPR Z359T

## (undated) DEVICE — STERILE PATIENT PROTECTIVE PAD FOR IMP® KNEE POSITIONERS & COHESIVE WRAP (10 / CASE): Brand: DE MAYO KNEE POSITIONER®

## (undated) DEVICE — BLADE SAW W0.49XL3.15IN THK0.047IN CUT THK0.047IN REPL SAG

## (undated) DEVICE — DRESSING SURG 4X14 IN POSTOP SIL BORD MEPILEX

## (undated) DEVICE — SYRINGE MED 30ML STD CLR PLAS LUERLOCK TIP N CTRL DISP

## (undated) DEVICE — DRESSING GZ W1XL8IN COT XRFRM N ADH OVERWRAP CURAD

## (undated) DEVICE — CEMENT MIXING SYSTEM WITH FEMORAL BREAKWAY NOZZLE: Brand: REVOLUTION

## (undated) DEVICE — TOWEL,OR,DSP,ST,BLUE,STD,4/PK,20PK/CS: Brand: MEDLINE

## (undated) DEVICE — CONMED SCOPE SAVER BITE BLOCK, 20X27 MM: Brand: SCOPE SAVER

## (undated) DEVICE — SUTURE MCRYL SZ 2-0 L36IN ABSRB UD L36MM CT-1 1/2 CIR Y945H

## (undated) DEVICE — 2108 SERIES SAGITTAL BLADE FAN, OFFSET  (36.0 X 1.2 X 64.0MM)

## (undated) DEVICE — 3M™ STERI-DRAPE™ U-DRAPE 1015: Brand: STERI-DRAPE™

## (undated) DEVICE — BIPOLAR SEALER 23-112-1 AQM 6.0: Brand: AQUAMANTYS ®

## (undated) DEVICE — SUTURE ABSORBABLE ANTIBACT 1-0 CT-1 24 IN STRATAFIX PDS + SXPP1A443

## (undated) DEVICE — GLOVE ORANGE PI 7 1/2   MSG9075

## (undated) DEVICE — PACK PROCEDURE SURG TOTAL KNEE PACK

## (undated) DEVICE — T-DRAPE,EXTREMITY,STERILE: Brand: MEDLINE

## (undated) DEVICE — ROD RMR L950MM DIA2.5MM W/ EXTN BALL TIP

## (undated) DEVICE — COVER LT HNDL BLU PLAS

## (undated) DEVICE — SUTURE VCRL SZ 1 L36IN ABSRB UD L36MM CT-1 1/2 CIR J947H

## (undated) DEVICE — TUBING, SUCTION, 1/4" X 10', STRAIGHT: Brand: MEDLINE

## (undated) DEVICE — GLOVE ORANGE PI 7   MSG9070

## (undated) DEVICE — SUTURE MCRYL SZ 3-0 L27IN ABSRB UD L19MM PS-2 3/8 CIR PRIM Y427H

## (undated) DEVICE — BANDAGE COBAN 6 IN WND 6INX5YD FOAM

## (undated) DEVICE — GOWN,SIRUS,POLYRNF,BRTHSLV,XLN/XL,20/CS: Brand: MEDLINE

## (undated) DEVICE — DRESSING FOAM 4X6 DISP POSTOP MEPILEX BORD AG

## (undated) DEVICE — SUTURE ETHLN SZ 3-0 L18IN NONABSORBABLE BLK L24MM PS-1 3/8 1663G

## (undated) DEVICE — TOTAL TRAY, DB, 100% SILI FOLEY, 16FR 10: Brand: MEDLINE

## (undated) DEVICE — TUBING IRRIGATION 140/160/180/190 SER GI ENDOSCP SMARTCAP

## (undated) DEVICE — SHEET,DRAPE,53X77,STERILE: Brand: MEDLINE

## (undated) DEVICE — 450 ML BOTTLE OF 0.05% CHLORHEXIDINE GLUCONATE IN 99.95% STERILE WATER FOR IRRIGATION, USP AND APPLICATOR.: Brand: IRRISEPT ANTIMICROBIAL WOUND LAVAGE

## (undated) DEVICE — FORCEPS BX L240CM JAW DIA2.8MM L CAP W/ NDL MIC MESH TOOTH

## (undated) DEVICE — INTENDED FOR TISSUE SEPARATION, AND OTHER PROCEDURES THAT REQUIRE A SHARP SURGICAL BLADE TO PUNCTURE OR CUT.: Brand: BARD-PARKER ® CARBON RIB-BACK BLADES

## (undated) DEVICE — GLOVE ORANGE PI 8 1/2   MSG9085

## (undated) DEVICE — PIN FIX L3.5IN DIA1/8IN LNG HDLSS FOR MIS KNEE JT REPL

## (undated) DEVICE — ZIMMER® STERILE DISPOSABLE TOURNIQUET CUFF, DUAL PORT, SINGLE BLADDER, 34 IN. (86 CM)

## (undated) DEVICE — SPONGE GZ W4XL4IN COT 12 PLY TYP VII WVN C FLD DSGN

## (undated) DEVICE — BLADE,CARBON-STEEL,15,STRL,DISPOSABLE,TB: Brand: MEDLINE

## (undated) DEVICE — PADDING UNDERCAST W4INXL12FT RAYON POLY SYN NONADHESIVE

## (undated) DEVICE — ENDO CARRY-ON PROCEDURE KIT: Brand: ENDO CARRY-ON PROCEDURE KIT

## (undated) DEVICE — Z DISCONTINUED USE 2272117 DRAPE SURG 3 QTR N INVASIVE 2 LAYR DISP

## (undated) DEVICE — 3 BONE CEMENT MIXER WITH SPATULA: Brand: MIXEVAC, ACM